# Patient Record
Sex: MALE | Race: BLACK OR AFRICAN AMERICAN | NOT HISPANIC OR LATINO
[De-identification: names, ages, dates, MRNs, and addresses within clinical notes are randomized per-mention and may not be internally consistent; named-entity substitution may affect disease eponyms.]

---

## 2017-01-26 PROBLEM — Z00.00 ENCOUNTER FOR PREVENTIVE HEALTH EXAMINATION: Status: ACTIVE | Noted: 2017-01-26

## 2017-04-25 ENCOUNTER — APPOINTMENT (OUTPATIENT)
Dept: OPHTHALMOLOGY | Facility: CLINIC | Age: 77
End: 2017-04-25

## 2017-07-18 ENCOUNTER — APPOINTMENT (OUTPATIENT)
Dept: OPHTHALMOLOGY | Facility: CLINIC | Age: 77
End: 2017-07-18

## 2017-08-29 ENCOUNTER — APPOINTMENT (OUTPATIENT)
Dept: OPHTHALMOLOGY | Facility: CLINIC | Age: 77
End: 2017-08-29
Payer: MEDICARE

## 2017-08-29 PROCEDURE — 92012 INTRM OPH EXAM EST PATIENT: CPT

## 2017-12-19 ENCOUNTER — APPOINTMENT (OUTPATIENT)
Dept: OPHTHALMOLOGY | Facility: CLINIC | Age: 77
End: 2017-12-19
Payer: MEDICARE

## 2017-12-19 ENCOUNTER — NON-APPOINTMENT (OUTPATIENT)
Age: 77
End: 2017-12-19

## 2017-12-19 PROCEDURE — 92012 INTRM OPH EXAM EST PATIENT: CPT

## 2018-04-17 ENCOUNTER — APPOINTMENT (OUTPATIENT)
Dept: OPHTHALMOLOGY | Facility: CLINIC | Age: 78
End: 2018-04-17
Payer: MEDICARE

## 2018-04-17 PROCEDURE — 92225: CPT | Mod: LT

## 2018-04-17 PROCEDURE — 92014 COMPRE OPH EXAM EST PT 1/>: CPT

## 2018-08-14 ENCOUNTER — APPOINTMENT (OUTPATIENT)
Dept: OPHTHALMOLOGY | Facility: CLINIC | Age: 78
End: 2018-08-14
Payer: MEDICARE

## 2018-08-14 PROCEDURE — 92083 EXTENDED VISUAL FIELD XM: CPT

## 2018-08-14 PROCEDURE — 92133 CPTRZD OPH DX IMG PST SGM ON: CPT

## 2018-08-14 PROCEDURE — 92012 INTRM OPH EXAM EST PATIENT: CPT

## 2018-12-18 ENCOUNTER — APPOINTMENT (OUTPATIENT)
Dept: OPHTHALMOLOGY | Facility: CLINIC | Age: 78
End: 2018-12-18
Payer: MEDICARE

## 2018-12-18 PROCEDURE — 92012 INTRM OPH EXAM EST PATIENT: CPT

## 2019-04-16 ENCOUNTER — APPOINTMENT (OUTPATIENT)
Dept: OPHTHALMOLOGY | Facility: CLINIC | Age: 79
End: 2019-04-16
Payer: MEDICARE

## 2019-04-16 ENCOUNTER — NON-APPOINTMENT (OUTPATIENT)
Age: 79
End: 2019-04-16

## 2019-04-16 PROCEDURE — 92014 COMPRE OPH EXAM EST PT 1/>: CPT

## 2019-07-08 ENCOUNTER — NON-APPOINTMENT (OUTPATIENT)
Age: 79
End: 2019-07-08

## 2019-07-08 ENCOUNTER — APPOINTMENT (OUTPATIENT)
Dept: OPHTHALMOLOGY | Facility: CLINIC | Age: 79
End: 2019-07-08
Payer: MEDICARE

## 2019-07-08 PROCEDURE — 92015 DETERMINE REFRACTIVE STATE: CPT

## 2019-07-08 PROCEDURE — 92012 INTRM OPH EXAM EST PATIENT: CPT

## 2019-08-08 ENCOUNTER — NON-APPOINTMENT (OUTPATIENT)
Age: 79
End: 2019-08-08

## 2019-08-08 ENCOUNTER — APPOINTMENT (OUTPATIENT)
Dept: OPHTHALMOLOGY | Facility: CLINIC | Age: 79
End: 2019-08-08
Payer: MEDICARE

## 2019-08-08 PROCEDURE — 92012 INTRM OPH EXAM EST PATIENT: CPT

## 2019-08-15 ENCOUNTER — NON-APPOINTMENT (OUTPATIENT)
Age: 79
End: 2019-08-15

## 2019-08-15 ENCOUNTER — APPOINTMENT (OUTPATIENT)
Dept: OPHTHALMOLOGY | Facility: CLINIC | Age: 79
End: 2019-08-15
Payer: MEDICARE

## 2019-08-15 PROCEDURE — 92012 INTRM OPH EXAM EST PATIENT: CPT

## 2019-08-19 ENCOUNTER — NON-APPOINTMENT (OUTPATIENT)
Age: 79
End: 2019-08-19

## 2019-08-19 ENCOUNTER — APPOINTMENT (OUTPATIENT)
Dept: OPHTHALMOLOGY | Facility: CLINIC | Age: 79
End: 2019-08-19
Payer: MEDICARE

## 2019-08-19 PROCEDURE — 92012 INTRM OPH EXAM EST PATIENT: CPT

## 2019-08-19 PROCEDURE — 92083 EXTENDED VISUAL FIELD XM: CPT

## 2019-08-19 PROCEDURE — 92133 CPTRZD OPH DX IMG PST SGM ON: CPT

## 2019-09-13 ENCOUNTER — NON-APPOINTMENT (OUTPATIENT)
Age: 79
End: 2019-09-13

## 2019-09-13 ENCOUNTER — APPOINTMENT (OUTPATIENT)
Dept: OPHTHALMOLOGY | Facility: CLINIC | Age: 79
End: 2019-09-13
Payer: MEDICARE

## 2019-09-13 PROCEDURE — 76514 ECHO EXAM OF EYE THICKNESS: CPT

## 2019-09-13 PROCEDURE — 92012 INTRM OPH EXAM EST PATIENT: CPT

## 2019-10-01 ENCOUNTER — NON-APPOINTMENT (OUTPATIENT)
Age: 79
End: 2019-10-01

## 2019-10-01 ENCOUNTER — APPOINTMENT (OUTPATIENT)
Dept: OPHTHALMOLOGY | Facility: CLINIC | Age: 79
End: 2019-10-01
Payer: MEDICARE

## 2019-10-01 PROCEDURE — 92012 INTRM OPH EXAM EST PATIENT: CPT

## 2019-10-07 ENCOUNTER — APPOINTMENT (OUTPATIENT)
Dept: OPHTHALMOLOGY | Facility: CLINIC | Age: 79
End: 2019-10-07
Payer: MEDICARE

## 2019-10-07 ENCOUNTER — NON-APPOINTMENT (OUTPATIENT)
Age: 79
End: 2019-10-07

## 2019-10-07 PROCEDURE — 92012 INTRM OPH EXAM EST PATIENT: CPT

## 2019-10-11 ENCOUNTER — NON-APPOINTMENT (OUTPATIENT)
Age: 79
End: 2019-10-11

## 2019-10-11 ENCOUNTER — APPOINTMENT (OUTPATIENT)
Dept: OPHTHALMOLOGY | Facility: CLINIC | Age: 79
End: 2019-10-11
Payer: MEDICARE

## 2019-10-11 PROCEDURE — 92012 INTRM OPH EXAM EST PATIENT: CPT

## 2019-10-14 ENCOUNTER — APPOINTMENT (OUTPATIENT)
Dept: OPHTHALMOLOGY | Facility: CLINIC | Age: 79
End: 2019-10-14
Payer: MEDICARE

## 2019-10-14 ENCOUNTER — NON-APPOINTMENT (OUTPATIENT)
Age: 79
End: 2019-10-14

## 2019-10-14 PROCEDURE — 92012 INTRM OPH EXAM EST PATIENT: CPT

## 2019-10-15 ENCOUNTER — APPOINTMENT (OUTPATIENT)
Dept: OPHTHALMOLOGY | Facility: CLINIC | Age: 79
End: 2019-10-15
Payer: MEDICARE

## 2019-10-15 ENCOUNTER — NON-APPOINTMENT (OUTPATIENT)
Age: 79
End: 2019-10-15

## 2019-10-15 PROCEDURE — 92012 INTRM OPH EXAM EST PATIENT: CPT

## 2019-10-16 ENCOUNTER — NON-APPOINTMENT (OUTPATIENT)
Age: 79
End: 2019-10-16

## 2019-10-16 ENCOUNTER — APPOINTMENT (OUTPATIENT)
Dept: OPHTHALMOLOGY | Facility: CLINIC | Age: 79
End: 2019-10-16
Payer: MEDICARE

## 2019-10-16 ENCOUNTER — APPOINTMENT (OUTPATIENT)
Dept: OPHTHALMOLOGY | Facility: CLINIC | Age: 79
End: 2019-10-16

## 2019-10-16 PROCEDURE — 92012 INTRM OPH EXAM EST PATIENT: CPT

## 2019-10-17 ENCOUNTER — NON-APPOINTMENT (OUTPATIENT)
Age: 79
End: 2019-10-17

## 2019-10-17 ENCOUNTER — APPOINTMENT (OUTPATIENT)
Dept: OPHTHALMOLOGY | Facility: CLINIC | Age: 79
End: 2019-10-17
Payer: MEDICARE

## 2019-10-17 PROCEDURE — 92012 INTRM OPH EXAM EST PATIENT: CPT

## 2019-10-18 ENCOUNTER — NON-APPOINTMENT (OUTPATIENT)
Age: 79
End: 2019-10-18

## 2019-10-18 ENCOUNTER — APPOINTMENT (OUTPATIENT)
Dept: OPHTHALMOLOGY | Facility: CLINIC | Age: 79
End: 2019-10-18
Payer: MEDICARE

## 2019-10-18 PROCEDURE — 92012 INTRM OPH EXAM EST PATIENT: CPT

## 2019-10-21 ENCOUNTER — APPOINTMENT (OUTPATIENT)
Dept: OPHTHALMOLOGY | Facility: CLINIC | Age: 79
End: 2019-10-21
Payer: MEDICARE

## 2019-10-21 ENCOUNTER — NON-APPOINTMENT (OUTPATIENT)
Age: 79
End: 2019-10-21

## 2019-10-21 PROCEDURE — 92012 INTRM OPH EXAM EST PATIENT: CPT

## 2019-10-23 ENCOUNTER — APPOINTMENT (OUTPATIENT)
Dept: OPHTHALMOLOGY | Facility: CLINIC | Age: 79
End: 2019-10-23
Payer: MEDICARE

## 2019-10-23 ENCOUNTER — NON-APPOINTMENT (OUTPATIENT)
Age: 79
End: 2019-10-23

## 2019-10-23 PROCEDURE — 92012 INTRM OPH EXAM EST PATIENT: CPT

## 2019-10-24 ENCOUNTER — INPATIENT (INPATIENT)
Facility: HOSPITAL | Age: 79
LOS: 4 days | Discharge: NURSING, REHAB OR LONG-TERM CARE | DRG: 312 | End: 2019-10-29
Attending: INTERNAL MEDICINE | Admitting: INTERNAL MEDICINE
Payer: MEDICARE

## 2019-10-24 VITALS
WEIGHT: 167.99 LBS | HEART RATE: 68 BPM | DIASTOLIC BLOOD PRESSURE: 70 MMHG | SYSTOLIC BLOOD PRESSURE: 115 MMHG | TEMPERATURE: 98 F | OXYGEN SATURATION: 98 % | RESPIRATION RATE: 16 BRPM

## 2019-10-24 LAB
ALBUMIN SERPL ELPH-MCNC: 4.4 G/DL — SIGNIFICANT CHANGE UP (ref 3.3–5)
ALP SERPL-CCNC: 60 U/L — SIGNIFICANT CHANGE UP (ref 40–120)
ALT FLD-CCNC: 22 U/L — SIGNIFICANT CHANGE UP (ref 10–45)
ANION GAP SERPL CALC-SCNC: 17 MMOL/L — SIGNIFICANT CHANGE UP (ref 5–17)
AST SERPL-CCNC: 30 U/L — SIGNIFICANT CHANGE UP (ref 10–40)
BASOPHILS # BLD AUTO: 0.03 K/UL — SIGNIFICANT CHANGE UP (ref 0–0.2)
BASOPHILS NFR BLD AUTO: 0.5 % — SIGNIFICANT CHANGE UP (ref 0–2)
BILIRUB SERPL-MCNC: 0.6 MG/DL — SIGNIFICANT CHANGE UP (ref 0.2–1.2)
BUN SERPL-MCNC: 15 MG/DL — SIGNIFICANT CHANGE UP (ref 7–23)
CALCIUM SERPL-MCNC: 9.7 MG/DL — SIGNIFICANT CHANGE UP (ref 8.4–10.5)
CHLORIDE SERPL-SCNC: 95 MMOL/L — LOW (ref 96–108)
CO2 SERPL-SCNC: 24 MMOL/L — SIGNIFICANT CHANGE UP (ref 22–31)
CREAT SERPL-MCNC: 1.21 MG/DL — SIGNIFICANT CHANGE UP (ref 0.5–1.3)
EOSINOPHIL # BLD AUTO: 0.02 K/UL — SIGNIFICANT CHANGE UP (ref 0–0.5)
EOSINOPHIL NFR BLD AUTO: 0.3 % — SIGNIFICANT CHANGE UP (ref 0–6)
GAS PNL BLDV: SIGNIFICANT CHANGE UP
GLUCOSE SERPL-MCNC: 191 MG/DL — HIGH (ref 70–99)
HCT VFR BLD CALC: 38.9 % — LOW (ref 39–50)
HGB BLD-MCNC: 13 G/DL — SIGNIFICANT CHANGE UP (ref 13–17)
IMM GRANULOCYTES NFR BLD AUTO: 0.5 % — SIGNIFICANT CHANGE UP (ref 0–1.5)
LYMPHOCYTES # BLD AUTO: 0.76 K/UL — LOW (ref 1–3.3)
LYMPHOCYTES # BLD AUTO: 12.5 % — LOW (ref 13–44)
MAGNESIUM SERPL-MCNC: 1.8 MG/DL — SIGNIFICANT CHANGE UP (ref 1.6–2.6)
MCHC RBC-ENTMCNC: 28.8 PG — SIGNIFICANT CHANGE UP (ref 27–34)
MCHC RBC-ENTMCNC: 33.4 GM/DL — SIGNIFICANT CHANGE UP (ref 32–36)
MCV RBC AUTO: 86.1 FL — SIGNIFICANT CHANGE UP (ref 80–100)
MONOCYTES # BLD AUTO: 0.5 K/UL — SIGNIFICANT CHANGE UP (ref 0–0.9)
MONOCYTES NFR BLD AUTO: 8.2 % — SIGNIFICANT CHANGE UP (ref 2–14)
NEUTROPHILS # BLD AUTO: 4.74 K/UL — SIGNIFICANT CHANGE UP (ref 1.8–7.4)
NEUTROPHILS NFR BLD AUTO: 78 % — HIGH (ref 43–77)
NRBC # BLD: 0 /100 WBCS — SIGNIFICANT CHANGE UP (ref 0–0)
PHOSPHATE SERPL-MCNC: 2.5 MG/DL — SIGNIFICANT CHANGE UP (ref 2.5–4.5)
PLATELET # BLD AUTO: 216 K/UL — SIGNIFICANT CHANGE UP (ref 150–400)
POTASSIUM SERPL-MCNC: 4.3 MMOL/L — SIGNIFICANT CHANGE UP (ref 3.5–5.3)
POTASSIUM SERPL-SCNC: 4.3 MMOL/L — SIGNIFICANT CHANGE UP (ref 3.5–5.3)
PROT SERPL-MCNC: 7.5 G/DL — SIGNIFICANT CHANGE UP (ref 6–8.3)
RBC # BLD: 4.52 M/UL — SIGNIFICANT CHANGE UP (ref 4.2–5.8)
RBC # FLD: 13 % — SIGNIFICANT CHANGE UP (ref 10.3–14.5)
SODIUM SERPL-SCNC: 136 MMOL/L — SIGNIFICANT CHANGE UP (ref 135–145)
WBC # BLD: 6.08 K/UL — SIGNIFICANT CHANGE UP (ref 3.8–10.5)
WBC # FLD AUTO: 6.08 K/UL — SIGNIFICANT CHANGE UP (ref 3.8–10.5)

## 2019-10-24 PROCEDURE — 70450 CT HEAD/BRAIN W/O DYE: CPT | Mod: 26

## 2019-10-24 PROCEDURE — 99285 EMERGENCY DEPT VISIT HI MDM: CPT | Mod: GC

## 2019-10-24 PROCEDURE — 93010 ELECTROCARDIOGRAM REPORT: CPT

## 2019-10-24 PROCEDURE — 71045 X-RAY EXAM CHEST 1 VIEW: CPT | Mod: 26

## 2019-10-24 NOTE — ED PROVIDER NOTE - NS ED ROS FT
CONST: no fevers, no chills, no trauma  EYES: no pain, no visual disturbances  ENT: no sore throat, no epistaxis, no rhinorrhea, no hearing changes  CV: no chest pain, no palpitations, no orthopnea, no extremity pain or swelling  RESP: no shortness of breath, no cough, no sputum, no pleurisy, no wheezing  ABD: no abdominal pain, no nausea, no vomiting, no diarrhea, no black or bloody stool  : no dysuria, no hematuria, no frequency, no urgency  MSK: no back pain, no neck pain, no extremity pain  NEURO: no headache, no sensory disturbances, no focal weakness, no dizziness  HEME: no easy bleeding or bruising  SKIN: no diaphoresis, no rash CONST: no fevers, no chills, no trauma  EYES: + pain, + visual disturbances, + pus and crusting in b/l eyes   ENT: no sore throat, no epistaxis, no rhinorrhea, no hearing changes  CV: no chest pain, no palpitations, no orthopnea, no extremity pain or swelling  RESP: no shortness of breath, + cough, no sputum, no pleurisy, no wheezing  ABD: no abdominal pain, no nausea, no vomiting, no diarrhea, no black or bloody stool  : no dysuria, no hematuria, no frequency, no urgency  MSK: no back pain, no neck pain, no extremity pain  NEURO: + LOC, no headache, no sensory disturbances, no focal weakness, no dizziness  HEME: no easy bleeding or bruising  SKIN: no diaphoresis, no rash

## 2019-10-24 NOTE — ED ADULT NURSE NOTE - NSIMPLEMENTINTERV_GEN_ALL_ED
Implemented All Fall Risk Interventions:  [place] to call system. Call bell, personal items and telephone within reach. Instruct patient to call for assistance. Room bathroom lighting operational. Non-slip footwear when patient is off stretcher. Physically safe environment: no spills, clutter or unnecessary equipment. Stretcher in lowest position, wheels locked, appropriate side rails in place. Provide visual cue, wrist band, yellow gown, etc. Monitor gait and stability. Monitor for mental status changes and reorient to person, place, and time. Review medications for side effects contributing to fall risk. Reinforce activity limits and safety measures with patient and family.

## 2019-10-24 NOTE — ED PROVIDER NOTE - PROGRESS NOTE DETAILS
Spoke with Dr. Bucio (PCP) who agrees on admission. Spoke with [facility] Hospitalist, will admit for further eval/mgt. pt signed out. admitted for syncope. plan for me was to fu head ct which was read as neg

## 2019-10-24 NOTE — ED PROVIDER NOTE - CLINICAL SUMMARY MEDICAL DECISION MAKING FREE TEXT BOX
SANTINO Wayne MD: P is an 78 y/o male with pmhx DM, glaucoma, BPH is BIBA s/p syncope. Per wife and per EMS report, patient was sitting at the dinner table when wife saw his head go back and become unresponsive. Soon after, pt went to the bathroom and had a bout of diarrhea and appeared weak. Pt is currently being treated for complicated eye condition, keratoconjunctivitis x 4 weeks on both PO and ocular abx and other eye medications, with worsening vision. Pt is being closely followed by [facility] on [address] Pt denies: chest pain, SOB, cough, fevers, chills, pleuritic chest pain, abdominal pain, n/v/d, back pain, neck pain, HA, neck stiffness, focal numbness or weakness, dizziness, lightheadedness, leg pain/swelling, recent travel, recent trauma, recent immobilization, dysuria, hematuria, rash. Ddx includes, however, is not limited to: CARMEL, dehydration, electrolyte abnormality, metabolic d/o, infectious d/o, arrhythmia, other cardiac d/o, ICH. Plan: basic labs, ekg, trop, CXR, CTH, TBA for syncope w/u SANTINO Wayne MD: P is an 80 y/o male with pmhx DM, HTN, HLD, glaucoma, BPH is BIBA s/p syncope. Per wife and per EMS report, patient was sitting at the dinner table when wife saw his head go back and become unresponsive. Soon after, pt went to the bathroom and had a bout of diarrhea and appeared weak. Pt is currently being treated for complicated eye condition, keratoconjunctivitis x 4 weeks on both PO and ocular abx and other eye medications, with worsening vision. Pt is being closely followed by [facility] on [address] Pt denies: chest pain, SOB, cough, fevers, chills, pleuritic chest pain, abdominal pain, n/v/d, back pain, neck pain, HA, neck stiffness, focal numbness or weakness, dizziness, lightheadedness, leg pain/swelling, recent travel, recent trauma, recent immobilization, dysuria, hematuria, rash. Ddx includes, however, is not limited to: CARMEL, dehydration, electrolyte abnormality, metabolic d/o, infectious d/o, arrhythmia, other cardiac d/o, ICH. Plan: basic labs, ekg, trop, CXR, CTH, TBA for syncope w/u

## 2019-10-24 NOTE — ED PROVIDER NOTE - OBJECTIVE STATEMENT
79M pmhx DM, glaucoma, BPH presenting with CC of syncope. Per EMS report, patient was sitting at the dinner table when wife saw him starting to slouch over and was not very responsive. He went to the bathroom and had a bout of diarrhea and appeared weak. Per pt, he does not believe he lost consciousness, states that he was aware during the episode. Denies dizziness, lightheadedness, recent fevers, chills, nausea, vomiting. Not currently in pain, no chest pain or abdominal pain. Patient did not fall during LOC, wife was able to catch him when he started slouching. Of note patient has been struggling with b/l bacterial conjunctivitis and has been having worsening vision as a result.

## 2019-10-24 NOTE — ED ADULT NURSE NOTE - OBJECTIVE STATEMENT
80 YO male with PMH DM, glaucoma, & BPH, via EMS from home, presenting with complaints of syncopal episode. As per patient, and patients' family, while eating dinner, pt became "shaky," and lost consciousness for approximately 5 minutes. Afterwards, pt experienced NBNB diarhea.   Pt Axox4, gross neuro intact, PERRL mm. Lungs clear throughout bilateral. S1S2 heard. Abdomen soft, non-tender, non-distended. Skin warm, dry, and intact. Safety and comfort measures maintained. family present at bedside. 80 YO male with PMH DM, glaucoma, & BPH, via EMS from home, presenting with complaints of syncopal episode. As per patient, and patients' family, while eating dinner, pt became "shaky," and lost consciousness for approximately 5 minutes. Afterwards, pt experienced NBNB diarhea. Pt denies losing consciousness. Pt denies falling, pt denies hitting head. Pt endorsing that she was going for glaucoma treatment, but due to redness in his sclera, and glaucoma treatment was postponed. Pt denies chest pain, shortness of breath, visual disturbances, numbness/tingling, fever, chills, diaphoresis, headache, nausea, vomiting, constipation, diarrhea, or urinary symptoms.   Pt Axox2, gross neuro intact, Reddened sclera. Lungs clear throughout bilateral. S1S2 heard. Abdomen soft, non-tender, non-distended. Skin warm, dry, and intact. Pt placed in position of comfort. Pt educated on call bell system and provided call bell. Bed in lowest position, wheels locked, appropriate side rails raised. Pt denies needs at this time. Fall risk precautions in place.

## 2019-10-24 NOTE — ED PROVIDER NOTE - PHYSICAL EXAMINATION
Const: Well-nourished, Well-developed, appearing stated age.  Eyes: PERRL, no conjunctival injection, and symmetrical lids.  HEENT: Head NCAT, no lesions. Atraumatic external nose and ears. Moist MM.  Neck: Symmetric, trachea midline, No thyromegaly.  CVS: +S1/S2, Peripheral pulses 2+ and equal in all extremities.  RESP: Unlabored respiratory effort. Clear to auscultation bilaterally.  GI: Nontender/Nondistended, No hepatosplenomegaly.  MSK: Normocephalic/Atraumatic, Extremities w/o deformity or ttp. No cyanosis or clubbing  Skin: Warm, dry and intact. No rashes or lesions.  Neuro: CNs II-XII grossly intact. Motor & Sensation grossly intact.  Psych: Awake, Alert, & Oriented (AAO) x3. Appropriate mood and affect. Const: Well-nourished, Well-developed, appearing stated age.  Eyes: Pt initially has eyes closed when assessed. Unable to visualize pupils 2/2 to infection, + conjunctival injection b/l with pus/crusting, EOMI, vision grossly intact in both eyes.   HEENT: Head NCAT, no lesions. Atraumatic external nose and ears. Moist MM.  Neck: Symmetric, trachea midline.   CVS: +S1/S2, Peripheral pulses 2+ and equal in all extremities.  RESP: Unlabored respiratory effort. Clear to auscultation bilaterally.  GI: Nontender/Nondistended, No hepatosplenomegaly.  MSK: Normocephalic/Atraumatic, Extremities w/o deformity or ttp. No cyanosis or clubbing  Skin: Warm, dry and intact. No rashes or lesions.  Neuro: CNs II-XII grossly intact. Motor & Sensation grossly intact.  Psych: Awake, Alert, & Oriented (AAO) x3. Appropriate mood and affect.

## 2019-10-25 DIAGNOSIS — R55 SYNCOPE AND COLLAPSE: ICD-10-CM

## 2019-10-25 LAB
ALBUMIN SERPL ELPH-MCNC: 3.8 G/DL — SIGNIFICANT CHANGE UP (ref 3.3–5)
ALP SERPL-CCNC: 54 U/L — SIGNIFICANT CHANGE UP (ref 40–120)
ALT FLD-CCNC: 19 U/L — SIGNIFICANT CHANGE UP (ref 10–45)
ANION GAP SERPL CALC-SCNC: 11 MMOL/L — SIGNIFICANT CHANGE UP (ref 5–17)
APPEARANCE UR: CLEAR — SIGNIFICANT CHANGE UP
AST SERPL-CCNC: 27 U/L — SIGNIFICANT CHANGE UP (ref 10–40)
BACTERIA # UR AUTO: NEGATIVE — SIGNIFICANT CHANGE UP
BASOPHILS # BLD AUTO: 0.03 K/UL — SIGNIFICANT CHANGE UP (ref 0–0.2)
BASOPHILS NFR BLD AUTO: 0.6 % — SIGNIFICANT CHANGE UP (ref 0–2)
BILIRUB SERPL-MCNC: 0.4 MG/DL — SIGNIFICANT CHANGE UP (ref 0.2–1.2)
BILIRUB UR-MCNC: NEGATIVE — SIGNIFICANT CHANGE UP
BUN SERPL-MCNC: 15 MG/DL — SIGNIFICANT CHANGE UP (ref 7–23)
CALCIUM SERPL-MCNC: 9.5 MG/DL — SIGNIFICANT CHANGE UP (ref 8.4–10.5)
CHLORIDE SERPL-SCNC: 101 MMOL/L — SIGNIFICANT CHANGE UP (ref 96–108)
CHOLEST SERPL-MCNC: 141 MG/DL — SIGNIFICANT CHANGE UP (ref 10–199)
CO2 SERPL-SCNC: 27 MMOL/L — SIGNIFICANT CHANGE UP (ref 22–31)
COLOR SPEC: YELLOW — SIGNIFICANT CHANGE UP
COMMENT - URINE: SIGNIFICANT CHANGE UP
CREAT SERPL-MCNC: 0.91 MG/DL — SIGNIFICANT CHANGE UP (ref 0.5–1.3)
DIFF PNL FLD: NEGATIVE — SIGNIFICANT CHANGE UP
EOSINOPHIL # BLD AUTO: 0.04 K/UL — SIGNIFICANT CHANGE UP (ref 0–0.5)
EOSINOPHIL NFR BLD AUTO: 0.8 % — SIGNIFICANT CHANGE UP (ref 0–6)
EPI CELLS # UR: 2 — SIGNIFICANT CHANGE UP
GLUCOSE BLDC GLUCOMTR-MCNC: 109 MG/DL — HIGH (ref 70–99)
GLUCOSE BLDC GLUCOMTR-MCNC: 91 MG/DL — SIGNIFICANT CHANGE UP (ref 70–99)
GLUCOSE BLDC GLUCOMTR-MCNC: 94 MG/DL — SIGNIFICANT CHANGE UP (ref 70–99)
GLUCOSE BLDC GLUCOMTR-MCNC: 96 MG/DL — SIGNIFICANT CHANGE UP (ref 70–99)
GLUCOSE SERPL-MCNC: 102 MG/DL — HIGH (ref 70–99)
GLUCOSE UR QL: ABNORMAL
GRAN CASTS # UR COMP ASSIST: ABNORMAL /LPF
HBA1C BLD-MCNC: 6.6 % — HIGH (ref 4–5.6)
HCT VFR BLD CALC: 38.3 % — LOW (ref 39–50)
HDLC SERPL-MCNC: 51 MG/DL — SIGNIFICANT CHANGE UP
HGB BLD-MCNC: 12.6 G/DL — LOW (ref 13–17)
HYALINE CASTS # UR AUTO: 24 /LPF — HIGH (ref 0–7)
IMM GRANULOCYTES NFR BLD AUTO: 0.4 % — SIGNIFICANT CHANGE UP (ref 0–1.5)
KETONES UR-MCNC: ABNORMAL
LEUKOCYTE ESTERASE UR-ACNC: NEGATIVE — SIGNIFICANT CHANGE UP
LIPID PNL WITH DIRECT LDL SERPL: 81 MG/DL — SIGNIFICANT CHANGE UP
LYMPHOCYTES # BLD AUTO: 0.87 K/UL — LOW (ref 1–3.3)
LYMPHOCYTES # BLD AUTO: 18.2 % — SIGNIFICANT CHANGE UP (ref 13–44)
MCHC RBC-ENTMCNC: 29 PG — SIGNIFICANT CHANGE UP (ref 27–34)
MCHC RBC-ENTMCNC: 32.9 GM/DL — SIGNIFICANT CHANGE UP (ref 32–36)
MCV RBC AUTO: 88.2 FL — SIGNIFICANT CHANGE UP (ref 80–100)
MONOCYTES # BLD AUTO: 0.41 K/UL — SIGNIFICANT CHANGE UP (ref 0–0.9)
MONOCYTES NFR BLD AUTO: 8.6 % — SIGNIFICANT CHANGE UP (ref 2–14)
NEUTROPHILS # BLD AUTO: 3.4 K/UL — SIGNIFICANT CHANGE UP (ref 1.8–7.4)
NEUTROPHILS NFR BLD AUTO: 71.4 % — SIGNIFICANT CHANGE UP (ref 43–77)
NITRITE UR-MCNC: NEGATIVE — SIGNIFICANT CHANGE UP
PH UR: 6 — SIGNIFICANT CHANGE UP (ref 5–8)
PLATELET # BLD AUTO: 199 K/UL — SIGNIFICANT CHANGE UP (ref 150–400)
POTASSIUM SERPL-MCNC: 3.7 MMOL/L — SIGNIFICANT CHANGE UP (ref 3.5–5.3)
POTASSIUM SERPL-SCNC: 3.7 MMOL/L — SIGNIFICANT CHANGE UP (ref 3.5–5.3)
PROT SERPL-MCNC: 6.6 G/DL — SIGNIFICANT CHANGE UP (ref 6–8.3)
PROT UR-MCNC: ABNORMAL
RBC # BLD: 4.34 M/UL — SIGNIFICANT CHANGE UP (ref 4.2–5.8)
RBC # FLD: 13.1 % — SIGNIFICANT CHANGE UP (ref 10.3–14.5)
RBC CASTS # UR COMP ASSIST: 8 /HPF — HIGH (ref 0–4)
SODIUM SERPL-SCNC: 139 MMOL/L — SIGNIFICANT CHANGE UP (ref 135–145)
SP GR SPEC: 1.03 — HIGH (ref 1.01–1.02)
TOTAL CHOLESTEROL/HDL RATIO MEASUREMENT: 2.8 RATIO — LOW (ref 3.4–9.6)
TRIGL SERPL-MCNC: 43 MG/DL — SIGNIFICANT CHANGE UP (ref 10–149)
TROPONIN T, HIGH SENSITIVITY RESULT: 21 NG/L — SIGNIFICANT CHANGE UP (ref 0–51)
UROBILINOGEN FLD QL: ABNORMAL
WBC # BLD: 4.77 K/UL — SIGNIFICANT CHANGE UP (ref 3.8–10.5)
WBC # FLD AUTO: 4.77 K/UL — SIGNIFICANT CHANGE UP (ref 3.8–10.5)
WBC UR QL: 7 /HPF — HIGH (ref 0–5)

## 2019-10-25 PROCEDURE — 93306 TTE W/DOPPLER COMPLETE: CPT | Mod: 26

## 2019-10-25 RX ORDER — ATORVASTATIN CALCIUM 80 MG/1
10 TABLET, FILM COATED ORAL AT BEDTIME
Refills: 0 | Status: DISCONTINUED | OUTPATIENT
Start: 2019-10-25 | End: 2019-10-29

## 2019-10-25 RX ORDER — INSULIN LISPRO 100/ML
VIAL (ML) SUBCUTANEOUS
Refills: 0 | Status: DISCONTINUED | OUTPATIENT
Start: 2019-10-25 | End: 2019-10-29

## 2019-10-25 RX ORDER — INSULIN LISPRO 100/ML
3 VIAL (ML) SUBCUTANEOUS
Refills: 0 | Status: DISCONTINUED | OUTPATIENT
Start: 2019-10-25 | End: 2019-10-29

## 2019-10-25 RX ORDER — GLUCAGON INJECTION, SOLUTION 0.5 MG/.1ML
1 INJECTION, SOLUTION SUBCUTANEOUS ONCE
Refills: 0 | Status: DISCONTINUED | OUTPATIENT
Start: 2019-10-25 | End: 2019-10-29

## 2019-10-25 RX ORDER — LATANOPROST 0.05 MG/ML
1 SOLUTION/ DROPS OPHTHALMIC; TOPICAL AT BEDTIME
Refills: 0 | Status: DISCONTINUED | OUTPATIENT
Start: 2019-10-25 | End: 2019-10-29

## 2019-10-25 RX ORDER — SODIUM CHLORIDE 9 MG/ML
1000 INJECTION, SOLUTION INTRAVENOUS
Refills: 0 | Status: DISCONTINUED | OUTPATIENT
Start: 2019-10-25 | End: 2019-10-29

## 2019-10-25 RX ORDER — INFLUENZA VIRUS VACCINE 15; 15; 15; 15 UG/.5ML; UG/.5ML; UG/.5ML; UG/.5ML
0.5 SUSPENSION INTRAMUSCULAR ONCE
Refills: 0 | Status: DISCONTINUED | OUTPATIENT
Start: 2019-10-25 | End: 2019-10-29

## 2019-10-25 RX ORDER — DEXTROSE 50 % IN WATER 50 %
25 SYRINGE (ML) INTRAVENOUS ONCE
Refills: 0 | Status: DISCONTINUED | OUTPATIENT
Start: 2019-10-25 | End: 2019-10-29

## 2019-10-25 RX ORDER — CIPROFLOXACIN HCL 0.3 %
1 DROPS OPHTHALMIC (EYE) EVERY 4 HOURS
Refills: 0 | Status: DISCONTINUED | OUTPATIENT
Start: 2019-10-25 | End: 2019-10-29

## 2019-10-25 RX ORDER — DEXTROSE 50 % IN WATER 50 %
15 SYRINGE (ML) INTRAVENOUS ONCE
Refills: 0 | Status: DISCONTINUED | OUTPATIENT
Start: 2019-10-25 | End: 2019-10-29

## 2019-10-25 RX ORDER — TAMSULOSIN HYDROCHLORIDE 0.4 MG/1
0.4 CAPSULE ORAL AT BEDTIME
Refills: 0 | Status: DISCONTINUED | OUTPATIENT
Start: 2019-10-25 | End: 2019-10-29

## 2019-10-25 RX ORDER — ASPIRIN/CALCIUM CARB/MAGNESIUM 324 MG
81 TABLET ORAL DAILY
Refills: 0 | Status: DISCONTINUED | OUTPATIENT
Start: 2019-10-25 | End: 2019-10-29

## 2019-10-25 RX ORDER — DORZOLAMIDE HYDROCHLORIDE 20 MG/ML
1 SOLUTION/ DROPS OPHTHALMIC THREE TIMES A DAY
Refills: 0 | Status: DISCONTINUED | OUTPATIENT
Start: 2019-10-25 | End: 2019-10-29

## 2019-10-25 RX ORDER — DEXTROSE 50 % IN WATER 50 %
12.5 SYRINGE (ML) INTRAVENOUS ONCE
Refills: 0 | Status: DISCONTINUED | OUTPATIENT
Start: 2019-10-25 | End: 2019-10-29

## 2019-10-25 RX ORDER — NEOMYCIN/POLYMYXIN B/DEXAMETHA 0.1 %
1 SUSPENSION, DROPS(FINAL DOSAGE FORM)(ML) OPHTHALMIC (EYE) AT BEDTIME
Refills: 0 | Status: DISCONTINUED | OUTPATIENT
Start: 2019-10-25 | End: 2019-10-25

## 2019-10-25 RX ORDER — ENOXAPARIN SODIUM 100 MG/ML
40 INJECTION SUBCUTANEOUS DAILY
Refills: 0 | Status: DISCONTINUED | OUTPATIENT
Start: 2019-10-25 | End: 2019-10-29

## 2019-10-25 RX ADMIN — DORZOLAMIDE HYDROCHLORIDE 1 DROP(S): 20 SOLUTION/ DROPS OPHTHALMIC at 21:50

## 2019-10-25 RX ADMIN — ENOXAPARIN SODIUM 40 MILLIGRAM(S): 100 INJECTION SUBCUTANEOUS at 12:50

## 2019-10-25 RX ADMIN — LATANOPROST 1 DROP(S): 0.05 SOLUTION/ DROPS OPHTHALMIC; TOPICAL at 21:50

## 2019-10-25 RX ADMIN — Medication 3 UNIT(S): at 12:06

## 2019-10-25 RX ADMIN — DORZOLAMIDE HYDROCHLORIDE 1 DROP(S): 20 SOLUTION/ DROPS OPHTHALMIC at 14:02

## 2019-10-25 RX ADMIN — Medication 1 DROP(S): at 17:08

## 2019-10-25 RX ADMIN — Medication 81 MILLIGRAM(S): at 12:50

## 2019-10-25 RX ADMIN — Medication 3 UNIT(S): at 07:30

## 2019-10-25 RX ADMIN — Medication 3 UNIT(S): at 17:02

## 2019-10-25 RX ADMIN — Medication 1 DROP(S): at 22:03

## 2019-10-25 RX ADMIN — DORZOLAMIDE HYDROCHLORIDE 1 DROP(S): 20 SOLUTION/ DROPS OPHTHALMIC at 05:23

## 2019-10-25 RX ADMIN — Medication 1 DROP(S): at 05:24

## 2019-10-25 RX ADMIN — Medication 1 DROP(S): at 12:47

## 2019-10-25 RX ADMIN — ATORVASTATIN CALCIUM 10 MILLIGRAM(S): 80 TABLET, FILM COATED ORAL at 21:49

## 2019-10-25 RX ADMIN — TAMSULOSIN HYDROCHLORIDE 0.4 MILLIGRAM(S): 0.4 CAPSULE ORAL at 21:49

## 2019-10-25 RX ADMIN — Medication 20 MILLIGRAM(S): at 05:25

## 2019-10-25 NOTE — H&P ADULT - ASSESSMENT
79yr old m with hx of dm II comes in for a syncopal episode.     1. Syncope: monitor on telemetry for now. CT head negative. check echo. monitor orthostatics.     2. DM II: hold oral meds, continue on sliding scale for now.     3. Hypertension: stable on enalipril.    4. Bilateral conjunctivitis: continue multiple eyedrops. outpt follow up.     5. DVT prophylaxis: lovenox daily.

## 2019-10-25 NOTE — CONSULT NOTE ADULT - SUBJECTIVE AND OBJECTIVE BOX
[facility] Ophthalmology Consult Note    HPI:    Pt seen at outpt clinic, measured VA is CF at 1ft OU.   No pain despite epithelial defects.    Of note, pt reports 60 lb weight loss in the last 3 months.     PMH: None  Meds: None  POcHx (including surgeries/lasers/trauma):  Glaucoma   Drops: None  FamHx: None  Social Hx: None  Allergies: NKDA    ROS:  General (weight loss ), Vision (per HPI), Head and Neck (neg), Pulm (neg), CV (neg), GI (neg),  (neg), Musculoskeletal (neg), Skin/Integ (neg), Neuro (neg), Endocrine (neg), Heme (neg), All/Immuno (neg)    Mood and Affect Appropriate ( x ),  Oriented to Time, Place, and Person x 3 ( x )    Ophthalmology Exam    Visual acuity (cc): OD: CF 1 ft, OS : 20/800   Pupils: PERRL OU, no APD  Ttono: 19 OD, 18 OS   Extraocular movements (EOMs): Full OU, no pain, no diplopia     Pen Light Exam (PLE)  External:  Flat OU  Lids/Lashes/Lacrimal Ducts: Flat OU    Sclera/Conjunctiva:  OU diffuse +2 injection of the bulbar and palpebral conj, +3 follicles of the palpebral conj   Cornea: OU: +3 SPK with stromal haze diffuse and D-folds visible. OD: central horizontal 4 x 2 epithelial defect, OS central horizontal 5 x 1 epithelial defect,   Anterior Chamber: D+F OU  Iris:  Flat OU  Lens:  Cl OU      Diagnostic Testing:  EKC swab in Outpatient Clinic 10/2019 : negative  Conjunctival Mucus discharge OD 10/25/19 : sent and pending  Conjunctival Mucus discharge OS 10/25/19: sent and pending    Assessment and Plan:    Ciprofloxacin QID both eyes  Lotemax Ointment QHS both eyes was his outpt regimen but this is not available inpatient, pt can use Maxitrol ointment QHS both eyes  Dorzolamide BID both eyes  Travatan QHS both eyes  PO Doxycycline 50 mg BID         Follow-Up:  Patient should follow up his/her ophthalmologist or in the [facility] Ophthalmology Practice within 1 week of discharge.  [address] [place] [id number]  [phone]    S/D/W Dr Jimenez (attending) [facility] Ophthalmology Consult Note    HPI:  The pt is an 78 y/o M with a PMH DM, glaucoma, BPH who is admitted for management of an episode of syncope which took place from a seated position. The pt is well known to the ophthalmology service. He has been evaluated numerous times in the last month. The pt has had chronic conjunctivitis for the last four weeks in both eyes. The pt has been treated for a slowly healing epithelial defect in both eyes over this same time period. The pt reports mucoid discharge and poor vision OU. Pt seen at outpt clinic and measured to have VA of CF at 1ft OU. The pt also reports no eye pain despite epithelial defects.      Of note, pt reports 60 lb weight loss in the last 3 months. Pt denies dysuria, oral ulcers, fever, night sweats, abdominal pain, dysphagia, new joint pain.     PMH: as above   Meds: Enalapril, Glipizide, Metformin, Atorvastatin, ASA 81, Tamsulosin,  POcHx (including surgeries/lasers/trauma):  Glaucoma, chronic conjunctivitis   Drops: Travatan QHS ,Vigamox QID OU , Lotemax Ointment QHS both eyes , Dorzolamide BID OU ,   FamHx: None  Social Hx: None  Allergies: NKDA    ROS:  General (weight loss ), Vision (per HPI), Head and Neck (neg),  Neuro (neg),     Mood and Affect Appropriate ( x ),  Oriented to Time, Place, and Person x 3 ( x )    Ophthalmology Exam    Visual acuity (cc): OD: CF 1 ft, OS : 20/800   Pupils: PERRL OU, no APD  Ttono: 19 OD, 18 OS   Extraocular movements (EOMs): Full OU, no pain, no diplopia     Pen Light Exam (PLE)  External:  Flat OU  Lids/Lashes/Lacrimal Ducts: Flat OU    Sclera/Conjunctiva:  OU diffuse +2 injection of the bulbar and palpebral conj, +3 follicles of the palpebral conj   Cornea: OU: +3 SPK with stromal haze diffuse and D-folds visible. OD: central horizontal 4 x 2 epithelial defect, OS central horizontal 5 x 1 epithelial defect,   Anterior Chamber: D+F OU  Iris:  Flat OU  Lens:  Cl OU      Diagnostic Testing:  EKC swab in Outpatient Clinic 10/2019 : negative  Conjunctival Mucus discharge OD 10/25/19 : sent and pending  Conjunctival Mucus discharge OS 10/25/19: sent and pending    Assessment   78 y/o M with a PMH DM, glaucoma, BPH who is admitted for management of an episode of syncope. The pt is well known to the ophthalmology service, has had chronic conjunctivitis for the last four weeks in both eyes. The pt reports mucoid discharge, eye redness, and poor vision OU. Pt seen at outpt clinic and measured to have VA of CF at 1ft OU. The pt also reports no eye pain despite epithelial defects.      Plan:   Ciprofloxacin QID both eyes  Lotemax Ointment QHS both eyes was his outpt regimen but this is not available inpatient, pt can use Maxitrol ointment QHS both eyes  Dorzolamide BID both eyes  Travatan QHS both eyes  PO Doxycycline 50 mg BID   eye cultures of both eyes sent, will follow result        Follow-Up:  Patient should follow up his/her ophthalmologist or in the [facility] Ophthalmology Practice within 1 week of discharge.  [address] [place] [address].  [place] [address] [phone]    S/D/W Dr Jimenez (attending) [facility] Ophthalmology Consult Note    HPI:  The pt is an 78 y/o M with a PMH DM, glaucoma, BPH who is admitted for management of an episode of syncope which took place from a seated position. The pt is well known to the ophthalmology service. He has been evaluated numerous times in the last month. The pt has had chronic conjunctivitis for the last four weeks in both eyes. The pt has been treated for a slowly healing epithelial defect in both eyes over this same time period. The pt reports mucoid discharge and poor vision OU. Pt seen at outpt clinic and measured to have VA of CF at 1ft OU. The pt also reports no eye pain despite epithelial defects.      Of note, pt reports 60 lb weight loss in the last 3 months. Pt denies dysuria, oral ulcers, fever, night sweats, abdominal pain, dysphagia, new joint pain.     PMH: as above   Meds: Enalapril, Glipizide, Metformin, Atorvastatin, ASA 81, Tamsulosin,  POcHx (including surgeries/lasers/trauma):  Glaucoma, chronic conjunctivitis   Drops: Travatan QHS ,Vigamox QID OU , Lotemax Ointment QHS both eyes , Dorzolamide BID OU ,   FamHx: None  Social Hx: None  Allergies: NKDA    ROS:  General (weight loss ), Vision (per HPI), Head and Neck (neg),  Neuro (neg),     Mood and Affect Appropriate ( x ),  Oriented to Time, Place, and Person x 3 ( x )    Ophthalmology Exam    Visual acuity (cc): OD: CF 1 ft, OS : 20/800   Pupils: PERRL OU, no APD  Ttono: 19 OD, 18 OS   Extraocular movements (EOMs): Full OU, no pain, no diplopia     Pen Light Exam (PLE)  External:  Flat OU  Lids/Lashes/Lacrimal Ducts: Flat OU    Sclera/Conjunctiva:  OU diffuse +2 injection of the bulbar and palpebral conj, +3 follicles of the palpebral conj   Cornea: OU: +3 SPK with stromal haze diffuse and D-folds visible. OD: central horizontal 4 x 2 epithelial defect, OS central horizontal 5 x 1 epithelial defect,   Anterior Chamber: D+F OU  Iris:  Flat OU  Lens:  Cl OU      Diagnostic Testing:  EKC swab in Outpatient Clinic 10/2019 : negative  Conjunctival Mucus discharge OD 10/25/19 : sent and pending  Conjunctival Mucus discharge OS 10/25/19: sent and pending    Assessment   78 y/o M with a PMH DM, glaucoma, BPH who is admitted for management of an episode of syncope. The pt is well known to the ophthalmology service, has had chronic conjunctivitis for the last four weeks in both eyes. The pt reports mucoid discharge, eye redness, and poor vision OU. Pt seen at outpt clinic and measured to have VA of CF at 1ft OU. The pt also reports no eye pain despite epithelial defects.      Plan:   Ciprofloxacin QID both eyes  Lotemax Ointment QHS both eyes was his outpt regimen but this is not available inpatient, pt can use Maxitrol ointment QHS both eyes  Dorzolamide BID both eyes  Travatan QHS both eyes  PO Doxycycline 50 mg BID   eye cultures of both eyes sent, will follow result        Follow-Up:  Patient should follow up his/her ophthalmologist or in the [facility] Ophthalmology Practice within 1 week of discharge.  [address] [place] [address].  [place] [address] [phone]    S/D/W Dr Warren (attending)

## 2019-10-25 NOTE — H&P ADULT - HISTORY OF PRESENT ILLNESS
79M pmhx DM, glaucoma, BPH presenting with CC of syncope. Per EMS report, patient was sitting at the dinner table when wife saw him starting to slouch over and was not very responsive. He went to the bathroom and had a bout of diarrhea and appeared weak. Per pt, he does not believe he lost consciousness, states that he was aware during the episode. Denies dizziness, lightheadedness, recent fevers, chills, nausea, vomiting. Not currently in pain, no chest pain or abdominal pain. Patient did not fall during LOC, wife was able to catch him when he started slouching. Of note patient has been struggling with b/l bacterial conjunctivitis and has been having worsening vision as a result 79yr old m with a pmhx DM, glaucoma, BPH presenting with CC of syncope. Patient was sitting at the dinner table when wife saw him starting to slouch over and was not very responsive. She then decided to get neighbors assistance and then they called EMS. When he became arousable he went to the bathroom and had a bout of diarrhea and appeared weak.  Denies dizziness, lightheadedness, recent fevers, chills, nausea, vomiting. Not currently in pain, no chest pain or abdominal pain. Patient did not fall during LOC, wife was able to catch him when he started slouching. Of note patient has been struggling with b/l bacterial conjunctivitis and has been having worsening vision as a result

## 2019-10-25 NOTE — CONSULT NOTE ADULT - SUBJECTIVE AND OBJECTIVE BOX
[facility]        Patient is a 79y old  Male who presents with a chief complaint of evaluation after syncopal episode (25 Oct 2019 16:07)    Excerpt from H&P, 79yr old m with a pmhx DM, glaucoma, BPH presenting with CC of syncope. Patient was sitting at the dinner table when wife saw him starting to slouch over and was not very responsive. She then decided to get neighbors assistance and then they called EMS. When he became arousable he went to the bathroom and had a bout of diarrhea and appeared weak.  Denies dizziness, lightheadedness, recent fevers, chills, nausea, vomiting. Not currently in pain, no chest pain or abdominal pain. Patient did not fall during LOC, wife was able to catch him when he started slouching. Of note patient has been struggling with b/l bacterial conjunctivitis and has been having worsening vision as a result           *****PAST MEDICAL / Surgical  HISTORY:  PAST MEDICAL & SURGICAL HISTORY:  BPH (benign prostatic hyperplasia)  Hyperlipidemia  HTN (hypertension)  DM (diabetes mellitus)  No significant past surgical history           *****FAMILY HISTORY:  FAMILY HISTORY:           *****SOCIAL HISTORY:  Alcohol: None  Smoking: None         *****ALLERGIES:   Allergies    No Known Allergies    Intolerances             *****MEDICATIONS: current medication reviewed and documented.   MEDICATIONS  (STANDING):  artificial  tears Solution 1 Drop(s) Both EYES four times a day  aspirin enteric coated 81 milliGRAM(s) Oral daily  atorvastatin 10 milliGRAM(s) Oral at bedtime  ciprofloxacin  0.3% Ophthalmic Solution 1 Drop(s) Both EYES every 4 hours  dextrose 5%. 1000 milliLiter(s) (50 mL/Hr) IV Continuous <Continuous>  dextrose 50% Injectable 12.5 Gram(s) IV Push once  dextrose 50% Injectable 25 Gram(s) IV Push once  dextrose 50% Injectable 25 Gram(s) IV Push once  dorzolamide 2% Ophthalmic Solution 1 Drop(s) Both EYES three times a day  doxycycline hyclate Capsule 50 milliGRAM(s) Oral every 12 hours  enalapril 20 milliGRAM(s) Oral daily  enoxaparin Injectable 40 milliGRAM(s) SubCutaneous daily  influenza   Vaccine 0.5 milliLiter(s) IntraMuscular once  insulin lispro (HumaLOG) corrective regimen sliding scale   SubCutaneous three times a day before meals  insulin lispro Injectable (HumaLOG) 3 Unit(s) SubCutaneous three times a day before meals  latanoprost 0.005% Ophthalmic Solution 1 Drop(s) Both EYES at bedtime  Timmy &amp; PolymyxinB, Bacitr, &amp; HC Eye Oint 1 Application(s) 1 Application(s) Both EYES at bedtime  tamsulosin 0.4 milliGRAM(s) Oral at bedtime    MEDICATIONS  (PRN):  dextrose 40% Gel 15 Gram(s) Oral once PRN Blood Glucose LESS THAN 70 milliGRAM(s)/deciliter  glucagon  Injectable 1 milliGRAM(s) IntraMuscular once PRN Glucose LESS THAN 70 milligrams/deciliter           *****REVIEW OF SYSTEM:  GEN: no fever, no chills, no pain  RESP: no SOB, no cough, no sputum  CVS: no chest pain, no palpitations, no edema  GI: no abdominal pain, no nausea, no vomiting, no constipation, no diarrhea  : no dysurea, no frequency, no hematurea  Neuro: no headache, no dizziness  PSYCH: no anxiety, no depression  Derm : no itching, no rash         *****VITAL SIGNS:  T(C): 36.3 (10-26-19 @ 04:21), Max: 36.7 (10-25-19 @ 20:36)  HR: 73 (10-26-19 @ 04:21) (62 - 73)  BP: 119/74 (10-26-19 @ 04:21) (119/74 - 130/71)  RR: 18 (10-26-19 @ 04:21) (18 - 18)  SpO2: 99% (10-26-19 @ 04:21) (97% - 99%)  Wt(kg): --    10-24 @ 07:  -  10-25 @ 07:00  --------------------------------------------------------  IN: 240 mL / OUT: 300 mL / NET: -60 mL    10-25 @ 07:  -  10-26 @ 06:24  --------------------------------------------------------  IN: 950 mL / OUT: 650 mL / NET: 300 mL             *****PHYSICAL EXAM:   eyes closed  Alert oriented x2 didnot know the name of the hospital or the date.  Attention comprehension are limited    Able to follow 1-2 step commands.       eyes closed crusted. poor vision   No facial asymmetry   Tongue is midline   Palate elevates symmetrically   Moving all 4 ext symmetrically        Gait : not assessed.  B/L down going toes               *****LAB AND IMAGIN.6   4.77  )-----------( 199      ( 25 Oct 2019 09:40 )             38.3               10-25    139  |  101  |  15  ----------------------------<  102<H>  3.7   |  27  |  0.91    Ca    9.5      25 Oct 2019 07:15  Phos  2.5     10  Mg     1.8     10-24    TPro  6.6  /  Alb  3.8  /  TBili  0.4  /  DBili  x   /  AST  27  /  ALT  19  /  AlkPhos  54  10-25                            Urinalysis Basic - ( 25 Oct 2019 03:34 )    Color: Yellow / Appearance: Clear / S.026 / pH: x  Gluc: x / Ketone: Small  / Bili: Negative / Urobili: 2 mg/dL   Blood: x / Protein: 30 mg/dL / Nitrite: Negative   Leuk Esterase: Negative / RBC: 8 /hpf / WBC 7 /HPF   Sq Epi: x / Non Sq Epi: 2 / Bacteria: Negative      < from: CT Head No Cont (10.24.19 @ 22:54) >  There is a 5 mm calcified meningioma along the right parietal bone and a   5 mm calcified meningioma along the right frontal bone.    The basal cisterns are patent.    Small mucous retention cyst versus polyp in the left maxillary sinus. The   remaining visualized paranasal sinuses and mastoid air cells are clear.     The soft tissues of the scalp and face are unremarkable. Sequela of   bilateral lenssurgery.    The bones of the calvarium, skull base, and face are unremarkable.    IMPRESSION:     No acute intracranial hemorrhage, territorial infarct or mass effe    < end of copied text >    [All pertinent recent Imaging reports reviewed]         *****A S S E S S M E N T   A N D   P L A N :        Excerpt from H&P, 79yr old m with a pmhx DM, glaucoma, BPH presenting with CC of syncope. Patient was sitting at the dinner table when wife saw him starting to slouch over and was not very responsive. She then decided to get neighbors assistance and then they called EMS. When he became arousable he went to the bathroom and had a bout of diarrhea and appeared weak.  Denies dizziness, lightheadedness, recent fevers, chills, nausea, vomiting. Not currently in pain, no chest pain or abdominal pain. Patient did not fall during LOC, wife was able to catch him when he started slouching. Of note patient has been struggling with b/l bacterial conjunctivitis and has been having worsening vision as a result      Problem/Recommendations 1: syncope   cardiac w/u underway   pt had a large meal ? post prandial vagal episode?    there was no prolonged post ictal period. lower suspicion for seizure.   ct head without any acute process.    will continue to monitor.      ___________________________  Will follow with you.  Thank you,  Yelena Alatorre MD  Diplomate of the American Board of Neurology and Psychiatry.  Diplomate of the American Board of Vascular Neurology.   [facility] ([facility]), [facility]   Ph: [phone]    Differential diagnosis and plan of care discussed with patient after the evaluation.   Advanced care planning options discussed.   Pain assessed and judicious use of narcotics when appropriate was discussed.  Importance of Fall prevention discussed.  Counseling on Smoking and Alcohol cessation was offered when appropriate.  Counseling on Diet, exercise, and medication compliance was done.     123 minutes spent on the total encounter;  more than 50 % of the visit was spent on counseling  and or coordinating care by the attending physician.    Thank you for allowing me to participate in the care of this maritza patient. Please do not hesitate to call me if you have any questions.     This and subsequent notes were partially created using voice recognition software and will  inherently be subject to errors including those of syntax and sound alike substitutions which may escape proofreading. In such instances original meaning may be extrapolated by contextual derivation.

## 2019-10-25 NOTE — H&P ADULT - NSTOBACCOSCREENHP_GEN_A_NCS
INR is in goal range. Continue same coumadin dosage. Watch for bleeding problems such as black tarry stools, blood in urine, frequent or prolonged nose bleeds, unusual bruising, or any other unusual bleeding.  Seek medical attention if any bleeding prob
No

## 2019-10-25 NOTE — H&P ADULT - NSHPREVIEWOFSYSTEMS_GEN_ALL_CORE
REVIEW OF SYSEMS:  General: no weakness, no fever/chills, no weight loss/gain  Skin/Breast: no rash, no jaundice  Ophthalmologic: no vision changes, no dry eyes   Respiratory and Thorax: no cough, no wheezing, no hemoptysis, no dyspnea  Cardiovascular: no chest pain, no shortness of breath, no orthopnea  Gastrointestinal: no n/v/d, no abdominal pain, no dysphagia   Genitourinary: no dysuria, no frequency, no nocturia, no hematuria  Musculoskeletal: no trauma, no sprain/strain, no myalgias, no arthralgias, no fracture  Neurological: no HA, no dizziness, no weakness, no numbness  Psychiatric: no depression, no SI/HI  Hematology/Lymphatics: no easy bruising  Endocrine: no heat or cold intolerance. no weight gain or loss  Allergic/Immunologic: no allergy or recent reaction REVIEW OF SYSTEMS:  General: + weakness, no fever/chills, no weight loss/gain  Skin/Breast: no rash, no jaundice  Ophthalmologic: bilateral conjunctival discharge+  Respiratory and Thorax: no cough, no wheezing, no hemoptysis, no dyspnea  Cardiovascular: no chest pain, no shortness of breath, no orthopnea  Gastrointestinal: no n/v/d, no abdominal pain, no dysphagia   Genitourinary: no dysuria, no frequency, no nocturia, no hematuria  Musculoskeletal: no trauma, no sprain/strain, no myalgias, no arthralgias, no fracture  Neurological: no HA, no dizziness, no weakness, no numbness  Psychiatric: no depression, no SI/HI  Hematology/Lymphatics: no easy bruising  Endocrine: no heat or cold intolerance. no weight gain or loss  Allergic/Immunologic: no allergy or recent reaction

## 2019-10-25 NOTE — H&P ADULT - NSHPLABSRESULTS_GEN_ALL_CORE
LABS:                        13.0   6.08  )-----------( 216      ( 24 Oct 2019 22:18 )             38.9     10-    136  |  95<L>  |  15  ----------------------------<  191<H>  4.3   |  24  |  1.21    Ca    9.7      24 Oct 2019 22:18  Phos  2.5     10-  Mg     1.8     10-24    TPro  7.5  /  Alb  4.4  /  TBili  0.6  /  DBili  x   /  AST  30  /  ALT  22  /  AlkPhos  60  10-      CAPILLARY BLOOD GLUCOSE      POCT Blood Glucose.: 177 mg/dL (24 Oct 2019 21:24)        Urinalysis Basic - ( 25 Oct 2019 03:34 )    Color: Yellow / Appearance: Clear / S.026 / pH: x  Gluc: x / Ketone: Small  / Bili: Negative / Urobili: 2 mg/dL   Blood: x / Protein: 30 mg/dL / Nitrite: Negative   Leuk Esterase: Negative / RBC: 8 /hpf / WBC 7 /HPF   Sq Epi: x / Non Sq Epi: 2 / Bacteria: Negative        RADIOLOGY & ADDITIONAL TESTS:    Imaging Personally Reviewed:  [x] YES  [ ] NO    Consultant(s) Notes Reviewed:  [x] YES  [ ] NO    Care Discussed with Consultants/Other Providers [x] YES  [ ] NO

## 2019-10-25 NOTE — H&P ADULT - NSHPPHYSICALEXAM_GEN_ALL_CORE
GENERAL: NAD, AAOx3  HEAD:  Atraumatic, Normocephalic  EYES: bilateral conjunctival discharge, keeps eyes closed.  NECK: Supple, No JVD, No LAD  CHEST/LUNG: clear bilaterally, No wheeze  HEART: Regular rate and rhythm; No murmurs, rubs, or gallops  ABDOMEN: Soft, Nontender, Nondistended; Bowel sounds present  EXTREMITIES:   No clubbing, cyanosis, or edema  SKIN: No rashes or lesions

## 2019-10-25 NOTE — H&P ADULT - NSICDXPASTMEDICALHX_GEN_ALL_CORE_FT
PAST MEDICAL HISTORY:  BPH (benign prostatic hyperplasia)     DM (diabetes mellitus)     HTN (hypertension)     Hyperlipidemia

## 2019-10-26 LAB
CULTURE RESULTS: SIGNIFICANT CHANGE UP
GLUCOSE BLDC GLUCOMTR-MCNC: 104 MG/DL — HIGH (ref 70–99)
GLUCOSE BLDC GLUCOMTR-MCNC: 132 MG/DL — HIGH (ref 70–99)
GLUCOSE BLDC GLUCOMTR-MCNC: 148 MG/DL — HIGH (ref 70–99)
GLUCOSE BLDC GLUCOMTR-MCNC: 189 MG/DL — HIGH (ref 70–99)
HBA1C BLD-MCNC: 6.7 % — HIGH (ref 4–5.6)
SPECIMEN SOURCE: SIGNIFICANT CHANGE UP

## 2019-10-26 RX ADMIN — Medication 1 DROP(S): at 11:43

## 2019-10-26 RX ADMIN — Medication 50 MILLIGRAM(S): at 17:46

## 2019-10-26 RX ADMIN — LATANOPROST 1 DROP(S): 0.05 SOLUTION/ DROPS OPHTHALMIC; TOPICAL at 21:30

## 2019-10-26 RX ADMIN — Medication 50 MILLIGRAM(S): at 05:01

## 2019-10-26 RX ADMIN — Medication 1 DROP(S): at 21:30

## 2019-10-26 RX ADMIN — DORZOLAMIDE HYDROCHLORIDE 1 DROP(S): 20 SOLUTION/ DROPS OPHTHALMIC at 21:31

## 2019-10-26 RX ADMIN — Medication 3 UNIT(S): at 12:00

## 2019-10-26 RX ADMIN — ENOXAPARIN SODIUM 40 MILLIGRAM(S): 100 INJECTION SUBCUTANEOUS at 11:42

## 2019-10-26 RX ADMIN — Medication 1 DROP(S): at 17:47

## 2019-10-26 RX ADMIN — DORZOLAMIDE HYDROCHLORIDE 1 DROP(S): 20 SOLUTION/ DROPS OPHTHALMIC at 05:01

## 2019-10-26 RX ADMIN — Medication 3 UNIT(S): at 08:25

## 2019-10-26 RX ADMIN — Medication 1 DROP(S): at 05:01

## 2019-10-26 RX ADMIN — Medication 1 DROP(S): at 13:01

## 2019-10-26 RX ADMIN — Medication 81 MILLIGRAM(S): at 11:42

## 2019-10-26 RX ADMIN — TAMSULOSIN HYDROCHLORIDE 0.4 MILLIGRAM(S): 0.4 CAPSULE ORAL at 21:29

## 2019-10-26 RX ADMIN — Medication 1 DROP(S): at 00:07

## 2019-10-26 RX ADMIN — Medication 20 MILLIGRAM(S): at 05:01

## 2019-10-26 RX ADMIN — DORZOLAMIDE HYDROCHLORIDE 1 DROP(S): 20 SOLUTION/ DROPS OPHTHALMIC at 13:00

## 2019-10-26 RX ADMIN — Medication 1 DROP(S): at 02:13

## 2019-10-26 RX ADMIN — ATORVASTATIN CALCIUM 10 MILLIGRAM(S): 80 TABLET, FILM COATED ORAL at 21:29

## 2019-10-26 RX ADMIN — Medication 1: at 12:00

## 2019-10-26 RX ADMIN — Medication 3 UNIT(S): at 16:45

## 2019-10-26 RX ADMIN — Medication 1 DROP(S): at 09:06

## 2019-10-26 NOTE — PHYSICAL THERAPY INITIAL EVALUATION ADULT - PERTINENT HX OF CURRENT PROBLEM, REHAB EVAL
Pt is an 79 year old male PMH DM, glaucoma, BPH presenting with CC of syncope. Patient was sitting at the dinner table when wife saw him starting to slouch over and was not very responsive. Evaluated by opthalm, started on appropriate eye therapy, conjunctivitis has been ongoing for the last 4 weeks. CT head neg.

## 2019-10-26 NOTE — PHYSICAL THERAPY INITIAL EVALUATION ADULT - ADDITIONAL COMMENTS
PTA pt lived in pvt home with wife, 2 steps to enter +HR, flight inside to bedroom +HR, independent without AD, often does grocery shopping and mobilizes in community.

## 2019-10-26 NOTE — PHYSICAL THERAPY INITIAL EVALUATION ADULT - CRITERIA FOR SKILLED THERAPEUTIC INTERVENTIONS
functional limitations in following categories/impairments found/therapy frequency/predicted duration of therapy intervention/risk reduction/prevention/rehab potential/anticipated discharge recommendation

## 2019-10-27 LAB
ANION GAP SERPL CALC-SCNC: 10 MMOL/L — SIGNIFICANT CHANGE UP (ref 5–17)
BUN SERPL-MCNC: 15 MG/DL — SIGNIFICANT CHANGE UP (ref 7–23)
CALCIUM SERPL-MCNC: 9.5 MG/DL — SIGNIFICANT CHANGE UP (ref 8.4–10.5)
CHLORIDE SERPL-SCNC: 101 MMOL/L — SIGNIFICANT CHANGE UP (ref 96–108)
CO2 SERPL-SCNC: 25 MMOL/L — SIGNIFICANT CHANGE UP (ref 22–31)
CREAT SERPL-MCNC: 0.81 MG/DL — SIGNIFICANT CHANGE UP (ref 0.5–1.3)
CULTURE RESULTS: SIGNIFICANT CHANGE UP
CULTURE RESULTS: SIGNIFICANT CHANGE UP
GLUCOSE BLDC GLUCOMTR-MCNC: 125 MG/DL — HIGH (ref 70–99)
GLUCOSE BLDC GLUCOMTR-MCNC: 132 MG/DL — HIGH (ref 70–99)
GLUCOSE BLDC GLUCOMTR-MCNC: 137 MG/DL — HIGH (ref 70–99)
GLUCOSE BLDC GLUCOMTR-MCNC: 146 MG/DL — HIGH (ref 70–99)
GLUCOSE SERPL-MCNC: 125 MG/DL — HIGH (ref 70–99)
HCT VFR BLD CALC: 37.6 % — LOW (ref 39–50)
HGB BLD-MCNC: 12.4 G/DL — LOW (ref 13–17)
MCHC RBC-ENTMCNC: 28.1 PG — SIGNIFICANT CHANGE UP (ref 27–34)
MCHC RBC-ENTMCNC: 33 GM/DL — SIGNIFICANT CHANGE UP (ref 32–36)
MCV RBC AUTO: 85.3 FL — SIGNIFICANT CHANGE UP (ref 80–100)
PLATELET # BLD AUTO: 213 K/UL — SIGNIFICANT CHANGE UP (ref 150–400)
POTASSIUM SERPL-MCNC: 3.8 MMOL/L — SIGNIFICANT CHANGE UP (ref 3.5–5.3)
POTASSIUM SERPL-SCNC: 3.8 MMOL/L — SIGNIFICANT CHANGE UP (ref 3.5–5.3)
RBC # BLD: 4.41 M/UL — SIGNIFICANT CHANGE UP (ref 4.2–5.8)
RBC # FLD: 12.9 % — SIGNIFICANT CHANGE UP (ref 10.3–14.5)
SODIUM SERPL-SCNC: 136 MMOL/L — SIGNIFICANT CHANGE UP (ref 135–145)
SPECIMEN SOURCE: SIGNIFICANT CHANGE UP
SPECIMEN SOURCE: SIGNIFICANT CHANGE UP
WBC # BLD: 4.3 K/UL — SIGNIFICANT CHANGE UP (ref 3.8–10.5)
WBC # FLD AUTO: 4.3 K/UL — SIGNIFICANT CHANGE UP (ref 3.8–10.5)

## 2019-10-27 PROCEDURE — 95816 EEG AWAKE AND DROWSY: CPT | Mod: 26

## 2019-10-27 RX ADMIN — Medication 1 DROP(S): at 00:01

## 2019-10-27 RX ADMIN — TAMSULOSIN HYDROCHLORIDE 0.4 MILLIGRAM(S): 0.4 CAPSULE ORAL at 21:40

## 2019-10-27 RX ADMIN — DORZOLAMIDE HYDROCHLORIDE 1 DROP(S): 20 SOLUTION/ DROPS OPHTHALMIC at 13:03

## 2019-10-27 RX ADMIN — Medication 1 DROP(S): at 01:25

## 2019-10-27 RX ADMIN — Medication 50 MILLIGRAM(S): at 17:41

## 2019-10-27 RX ADMIN — Medication 3 UNIT(S): at 16:49

## 2019-10-27 RX ADMIN — Medication 1 DROP(S): at 11:36

## 2019-10-27 RX ADMIN — LATANOPROST 1 DROP(S): 0.05 SOLUTION/ DROPS OPHTHALMIC; TOPICAL at 21:44

## 2019-10-27 RX ADMIN — ATORVASTATIN CALCIUM 10 MILLIGRAM(S): 80 TABLET, FILM COATED ORAL at 21:40

## 2019-10-27 RX ADMIN — Medication 3 UNIT(S): at 08:00

## 2019-10-27 RX ADMIN — Medication 1 DROP(S): at 17:42

## 2019-10-27 RX ADMIN — Medication 1 DROP(S): at 06:10

## 2019-10-27 RX ADMIN — Medication 1 DROP(S): at 21:44

## 2019-10-27 RX ADMIN — Medication 1 DROP(S): at 13:04

## 2019-10-27 RX ADMIN — ENOXAPARIN SODIUM 40 MILLIGRAM(S): 100 INJECTION SUBCUTANEOUS at 11:36

## 2019-10-27 RX ADMIN — Medication 81 MILLIGRAM(S): at 11:36

## 2019-10-27 RX ADMIN — DORZOLAMIDE HYDROCHLORIDE 1 DROP(S): 20 SOLUTION/ DROPS OPHTHALMIC at 06:10

## 2019-10-27 RX ADMIN — DORZOLAMIDE HYDROCHLORIDE 1 DROP(S): 20 SOLUTION/ DROPS OPHTHALMIC at 21:43

## 2019-10-27 RX ADMIN — Medication 3 UNIT(S): at 12:22

## 2019-10-27 RX ADMIN — Medication 20 MILLIGRAM(S): at 06:10

## 2019-10-27 RX ADMIN — Medication 1 DROP(S): at 10:46

## 2019-10-27 RX ADMIN — Medication 50 MILLIGRAM(S): at 06:10

## 2019-10-27 NOTE — EEG REPORT - NS EEG TEXT BOX
BARNEY ERICKSON [id number]     Study Date: 		10-27-19    ROUTINE EEG    Technical Information:			  		  Placement and Labeling of Electrodes:  The EEG was performed utilizing 20 channels referential EEG connections (coronal over temporal over parasagittal montage) using all standard 10-20 electrode placements with EKG.  Recording was at a sampling rate of 256 samples per second per channel.  Time synchronized digital video recording was done simultaneously with EEG recording.  A low light infrared camera was used for low light recording.  Charles and seizure detection algorithms were utilized.    CSA Technical Component:  Quantitative EEG analysis using a separate Compressed Spectral Array (CSA) software package was conducted in real-time and run at bedside after set up by the technician, digitally displaying the power of electrographic frequencies included in the 1-30Hz band using a graded color map.  This data was reviewed and interpreted independently, and is reported in a separate section below.    --------------------------------------------------------------------------------------------------  History:  CC/ HPI Patient is a 79y old  Male who presents with a chief complaint of evaluation after syncopal episode (27 Oct 2019 11:54)    MEDICATIONS  (STANDING):  artificial  tears Solution 1 Drop(s) Both EYES four times a day  aspirin enteric coated 81 milliGRAM(s) Oral daily  atorvastatin 10 milliGRAM(s) Oral at bedtime  ciprofloxacin  0.3% Ophthalmic Solution 1 Drop(s) Both EYES every 4 hours  dextrose 5%. 1000 milliLiter(s) (50 mL/Hr) IV Continuous <Continuous>  dextrose 50% Injectable 12.5 Gram(s) IV Push once  dextrose 50% Injectable 25 Gram(s) IV Push once  dextrose 50% Injectable 25 Gram(s) IV Push once  dorzolamide 2% Ophthalmic Solution 1 Drop(s) Both EYES three times a day  doxycycline hyclate Capsule 50 milliGRAM(s) Oral every 12 hours  enalapril 20 milliGRAM(s) Oral daily  enoxaparin Injectable 40 milliGRAM(s) SubCutaneous daily  influenza   Vaccine 0.5 milliLiter(s) IntraMuscular once  insulin lispro (HumaLOG) corrective regimen sliding scale   SubCutaneous three times a day before meals  insulin lispro Injectable (HumaLOG) 3 Unit(s) SubCutaneous three times a day before meals  latanoprost 0.005% Ophthalmic Solution 1 Drop(s) Both EYES at bedtime  Timmy &amp; PolymyxinB, Bacitr, &amp; HC Eye Oint 1 Application(s) 1 Application(s) Both EYES at bedtime  tamsulosin 0.4 milliGRAM(s) Oral at bedtime    --------------------------------------------------------------------------------------------------  Study Interpretation:    [[[Abbreviation Key:  PDR=alpha rhythm/posterior dominant rhythm. A-P=anterior posterior gradient.  Amplitude: ‘very low’:<20; ‘low’:20-50; ‘medium’:; ‘high’:>200uV.  Persistence for periodic/rhythmic patterns (% of epoch) ‘rare’:<1%; ‘occasional’:1-10%; ‘frequent’:10-50%; ‘abundant’:50-90%; ‘continuous’:>90%.  Persistence for sporadic discharges: ‘rare’:<1/hr; ‘occasional’:1/min-1/hr; ‘frequent’:>1/min; ‘abundant’:>1/10 sec.  GRDA=generalized rhythmic delta activity, LRDA=lateralized rhythmic delta activity, TIRDA=temporal intermittent rhythmic delta activity, FIRDA=frontal intermittent rhythmic activity. LPD=PLED=lateralized periodic discharges, GPD=generalized periodic discharges, BiPDs=BiPLEDs=bilateral independent periodic epileptiform discharges, SIRPID=stimulus induced rhythmic, periodic, or ictal appearing discharges.  Modifiers: +F=with fast component, +S=with spike component, +R=with rhythmic component.  S-B=burst suppression pattern.  Max=maximal. N1-drowsy, N2-stage II sleep, N3-slow wave sleep.  HV=hyperventilation, PS=photic stimulation]]]    FINDINGS:  The background was continuous, spontaneously variable and reactive.  During wakefulness, the posteriorly dominant rhythm was modulated at 7hz..      There was more continuous diffuse irregular theta and polymorphic delta activity present.    Sleep Background:  Drowsiness was characterized by fragmentation, attenuation, and slowing of the background activity.    Stage II sleep transients were not recorded.    Epileptiform Activity:   No epileptiform discharges were present.    Events:  No clinical events were recorded.  No seizures were recorded.    Activation Procedures:   -Hyperventilation was not performed.    -Photic stimulation was not performed.    Artifacts:  Intermittent myogenic and movement artifacts were noted.    ECG:  The heart rate on single channel ECG at baseline was predominantly near BPM = 60-70  -----------------------------------------------------------------------------------------------------    EEG Classification / Summary:  Abnormal EEG study  Mild background slowing    -----------------------------------------------------------------------------------------------------    Clinical Impression:  Mild diffuse or multifocal cerebral dysfunction, not specific as to etiology.  There were no epileptiform abnormalities recorded.      -------------------------------------------------------------------------------------------------------  Kadi Pugh DO  Epilepsy Fellow, [facility] [place]    Quentin Elizabeth MD PhD   of Neurology, [facility] [place]

## 2019-10-28 ENCOUNTER — TRANSCRIPTION ENCOUNTER (OUTPATIENT)
Age: 79
End: 2019-10-28

## 2019-10-28 ENCOUNTER — APPOINTMENT (OUTPATIENT)
Dept: OPHTHALMOLOGY | Facility: CLINIC | Age: 79
End: 2019-10-28

## 2019-10-28 LAB
ANION GAP SERPL CALC-SCNC: 15 MMOL/L — SIGNIFICANT CHANGE UP (ref 5–17)
BUN SERPL-MCNC: 12 MG/DL — SIGNIFICANT CHANGE UP (ref 7–23)
CALCIUM SERPL-MCNC: 9.7 MG/DL — SIGNIFICANT CHANGE UP (ref 8.4–10.5)
CHLORIDE SERPL-SCNC: 101 MMOL/L — SIGNIFICANT CHANGE UP (ref 96–108)
CO2 SERPL-SCNC: 24 MMOL/L — SIGNIFICANT CHANGE UP (ref 22–31)
CREAT SERPL-MCNC: 0.84 MG/DL — SIGNIFICANT CHANGE UP (ref 0.5–1.3)
GLUCOSE BLDC GLUCOMTR-MCNC: 116 MG/DL — HIGH (ref 70–99)
GLUCOSE BLDC GLUCOMTR-MCNC: 132 MG/DL — HIGH (ref 70–99)
GLUCOSE BLDC GLUCOMTR-MCNC: 134 MG/DL — HIGH (ref 70–99)
GLUCOSE BLDC GLUCOMTR-MCNC: 137 MG/DL — HIGH (ref 70–99)
GLUCOSE SERPL-MCNC: 157 MG/DL — HIGH (ref 70–99)
POTASSIUM SERPL-MCNC: 3.8 MMOL/L — SIGNIFICANT CHANGE UP (ref 3.5–5.3)
POTASSIUM SERPL-SCNC: 3.8 MMOL/L — SIGNIFICANT CHANGE UP (ref 3.5–5.3)
SODIUM SERPL-SCNC: 140 MMOL/L — SIGNIFICANT CHANGE UP (ref 135–145)

## 2019-10-28 RX ORDER — CIPROFLOXACIN HCL 0.3 %
1 DROPS OPHTHALMIC (EYE)
Qty: 1 | Refills: 0
Start: 2019-10-28 | End: 2019-10-31

## 2019-10-28 RX ADMIN — LATANOPROST 1 DROP(S): 0.05 SOLUTION/ DROPS OPHTHALMIC; TOPICAL at 22:03

## 2019-10-28 RX ADMIN — Medication 1 DROP(S): at 05:15

## 2019-10-28 RX ADMIN — Medication 1 DROP(S): at 17:01

## 2019-10-28 RX ADMIN — Medication 3 UNIT(S): at 12:05

## 2019-10-28 RX ADMIN — Medication 50 MILLIGRAM(S): at 05:14

## 2019-10-28 RX ADMIN — DORZOLAMIDE HYDROCHLORIDE 1 DROP(S): 20 SOLUTION/ DROPS OPHTHALMIC at 05:14

## 2019-10-28 RX ADMIN — Medication 1 DROP(S): at 11:16

## 2019-10-28 RX ADMIN — Medication 3 UNIT(S): at 16:56

## 2019-10-28 RX ADMIN — Medication 1 DROP(S): at 13:09

## 2019-10-28 RX ADMIN — ENOXAPARIN SODIUM 40 MILLIGRAM(S): 100 INJECTION SUBCUTANEOUS at 11:15

## 2019-10-28 RX ADMIN — ATORVASTATIN CALCIUM 10 MILLIGRAM(S): 80 TABLET, FILM COATED ORAL at 22:03

## 2019-10-28 RX ADMIN — Medication 50 MILLIGRAM(S): at 17:01

## 2019-10-28 RX ADMIN — Medication 20 MILLIGRAM(S): at 05:14

## 2019-10-28 RX ADMIN — Medication 1 DROP(S): at 05:14

## 2019-10-28 RX ADMIN — Medication 1 DROP(S): at 22:03

## 2019-10-28 RX ADMIN — TAMSULOSIN HYDROCHLORIDE 0.4 MILLIGRAM(S): 0.4 CAPSULE ORAL at 22:03

## 2019-10-28 RX ADMIN — Medication 81 MILLIGRAM(S): at 11:15

## 2019-10-28 RX ADMIN — Medication 1 DROP(S): at 01:44

## 2019-10-28 RX ADMIN — Medication 1 DROP(S): at 00:11

## 2019-10-28 RX ADMIN — DORZOLAMIDE HYDROCHLORIDE 1 DROP(S): 20 SOLUTION/ DROPS OPHTHALMIC at 22:04

## 2019-10-28 RX ADMIN — Medication 3 UNIT(S): at 07:45

## 2019-10-28 RX ADMIN — Medication 1 DROP(S): at 09:39

## 2019-10-28 RX ADMIN — Medication 1 DROP(S): at 23:57

## 2019-10-28 RX ADMIN — DORZOLAMIDE HYDROCHLORIDE 1 DROP(S): 20 SOLUTION/ DROPS OPHTHALMIC at 13:08

## 2019-10-28 NOTE — DISCHARGE NOTE PROVIDER - NSDCFUSCHEDAPPT_GEN_ALL_CORE_FT
BARNEY ERICKSON ; 12/19/2019 ; NPP Ophthal [address] [facility] BARNEY ERICKSON ; 12/19/2019 ; NPP Ophthal [address] [facility] BARNEY ERICKSON ; 12/19/2019 ; NPP Ophthal [address] [facility] BARNEY ERICKSON ; 12/19/2019 ; NPP Ophthal [address] [facility]

## 2019-10-28 NOTE — DISCHARGE NOTE PROVIDER - NSDCCPCAREPLAN_GEN_ALL_CORE_FT
PRINCIPAL DISCHARGE DIAGNOSIS  Diagnosis: Syncope and collapse  Assessment and Plan of Treatment: HOME CARE INSTRUCTIONS  Have someone stay with you until you feel stable.  Do not drive, operate machinery, or play sports until your caregiver says it is okay.  Keep all follow-up appointments as directed by your caregiver.   Lie down right away if you start feeling like you might faint. Breathe deeply and steadily. Wait until all the symptoms have passed.Drink enough fluids to keep your urine clear or pale yellow.  If you are taking blood pressure or heart medicine, get up slowly, taking several minutes to sit and then stand. This can reduce dizziness.  SEEK IMMEDIATE MEDICAL CARE IF:  You have a severe headache.  You have unusual pain in the chest, abdomen, or back.  You are bleeding from the mouth or rectum, or you have black or tarry stool.  You have an irregular or very fast heartbeat.  You have pain with breathing.  You have repeated fainting or seizure-like jerking during an episode.  You faint when sitting or lying down.  You have confusion.  You have difficulty walking.  You have severe weakness.  You have vision problems.  If you fainted, call your local emergency services. Do not drive yourself to the hospital      SECONDARY DISCHARGE DIAGNOSES  Diagnosis: Bilateral conjunctivitis  Assessment and Plan of Treatment: Continue eye drops  Follow up with Ophthalmology    Diagnosis: Benign essential HTN  Assessment and Plan of Treatment: Low salt diet  Activity as tolerated.  Take all medication as prescribed.  Follow up with your medical doctor for routine blood pressure monitoring at your next visit.  Notify your doctor if you have any of the following symptoms:   Dizziness, Lightheadedness, Blurry vision, Headache, Chest pain, Shortness of breath

## 2019-10-28 NOTE — DISCHARGE NOTE PROVIDER - CARE PROVIDER_API CALL
Kelvin,   Ophthalmology  Phone: (   )    -  Fax: (   )    -  Follow Up Time:     Ronny Bucio)  Internal Medicine  [facility], [address] [place] [id number]  Phone: [phone]  Fax: [phone]  Follow Up Time: Ronny Bucio)  Internal Medicine  [facility], [address] [place] [id number]  Phone: [phone]  Fax: [phone]  Follow Up Time:     Kelvin,   Ophthalmology  Phone: (   )    -  Fax: (   )    -  Follow Up Time:     Nissenbaum, Michael A (MD)  Neurology  [address] [facility] [address]  [place] [address]  Phone: [phone]  Fax: [phone]  Follow Up Time:

## 2019-10-28 NOTE — DISCHARGE NOTE PROVIDER - PROVIDER TOKENS
FREE:[LAST:[Kelvin],PHONE:[(   )    -],FAX:[(   )    -],ADDRESS:[Ophthalmology]],PROVIDER:[TOKEN:[[id number]]] PROVIDER:[TOKEN:[[id number]]],FREE:[LAST:[Kelvin],PHONE:[(   )    -],FAX:[(   )    -],ADDRESS:[Ophthalmology]],PROVIDER:[TOKEN:[[id number]]]

## 2019-10-28 NOTE — DISCHARGE NOTE PROVIDER - CARE PROVIDERS DIRECT ADDRESSES
,DirectAddress_Unknown,[email or web address],DirectAddress_Unknown,DirectAddress_Unknown

## 2019-10-28 NOTE — DISCHARGE NOTE PROVIDER - HOSPITAL COURSE
79yr old m with hx of dm II comes in for a syncopal episode.   CTH negative. TTE showed nl LV systolic function without any segmental wall motion abnormalities. not orthostatic.  S/P EEG: Mild diffuse or multifocal cerebral dysfunction, not specific as to etiology.  There were no epileptiform abnormalities recorded.      DM II: hold oral meds, continue on sliding scale for now.     Hypertension: stable on enalapril.    Bilateral conjunctivitis: continue multiple eyedrops. Ophtho cs appreciated. D/C to home with home PT. 79yr old m with hx of dm II comes in for a syncopal episode.   CTH negative. TTE showed nl LV systolic function without any segmental wall motion abnormalities. not orthostatic.  S/P EEG: Mild diffuse or multifocal cerebral dysfunction, not specific as to etiology.  There were no epileptiform abnormalities recorded.      DM II: hold oral meds, continue on sliding scale for now.   Prolonged EEG out pt. and memory eval. F/U with Dr. Nissenbaum out pt.     Hypertension: stable on enalapril.    Bilateral conjunctivitis: continue multiple eyedrops. Ophtho cs appreciated. D/C to home with home PT.

## 2019-10-28 NOTE — PROGRESS NOTE ADULT - ASSESSMENT
79yr old m with hx of dm II comes in for a syncopal episode.     1. Syncope: monitor on telemetry for now. CT head negative. TTE showed nl LV systolic function without any segmental wall motion abnormalities. not orthostatic.   neuro cs fu appreciated; undergoing EEG -non diagnostic  outpt pcp and cards fu  likely vasovagal syncope    2. DM II: hold oral meds, continue on sliding scale for now.     3. Hypertension: stable on enalapril.    4. Bilateral conjunctivitis: continue multiple eyedrops. Ophtho cs appreciated  improving  oupt ophtho fu    5. DVT prophylaxis: lovenox daily.     PT eval
79yr old m with hx of dm II comes in for a syncopal episode.     1. Syncope: monitor on telemetry for now. CT head negative. TTE showed nl LV systolic function without any segmental wall motion abnormalities. not orthostatic.   neuro cs fu appreciated; undergoing EEG today    2. DM II: hold oral meds, continue on sliding scale for now.     3. Hypertension: stable on enalapril.    4. Bilateral conjunctivitis: continue multiple eyedrops. Ophtho cs appreciated    5. DVT prophylaxis: lovenox daily.     PT eval
79yr old m with hx of dm II comes in for a syncopal episode.     1. Syncope: monitor on telemetry for now. CT head negative. check echo. notorthostatics.   neuro cs fu    2. DM II: hold oral meds, continue on sliding scale for now.     3. Hypertension: stable on enalipril.    4. Bilateral conjunctivitis: continue multiple eyedrops. Ophtho cs appreciated    5. DVT prophylaxis: lovenox daily.     Pt eval
Assessment   80 y/o M with a PMH DM, glaucoma, BPH who is admitted for management of an episode of syncope. The pt is well known to the ophthalmology service, has had chronic conjunctivitis for the last four weeks in both eyes. The pt reports mucoid discharge, eye redness, and poor vision OU. Pt seen at outpt clinic and measured to have VA of CF at 1ft OU. The pt also reports no eye pain despite epithelial defects.      Plan:   The epithelial defect is similar comparing to yesterday.  continue:  Ciprofloxacin QID both eyes  Lotemax Ointment QHS both eyes was his outpt regimen but this is not available inpatient, pt can use Maxitrol ointment QHS both eyes  Dorzolamide BID both eyes  Travatan QHS both eyes  PO Doxycycline 50 mg BID   eye cultures of both eyes sent, will follow result      Follow-Up:  Patient should follow up his/her ophthalmologist or in the [facility] within 1 week of discharge.  [address] [place] [address].  [place] [address] [phone]

## 2019-10-28 NOTE — DISCHARGE NOTE PROVIDER - NSDCFUADDAPPT_GEN_ALL_CORE_FT
Follow-Up:  You should follow up his/her ophthalmologist or in the [facility] within 1 week of discharge.  [address] [facility] [place] [address] [phone] Follow-Up:  You should follow up his/her ophthalmologist or in the [facility] within 1 week of discharge.  [address] [facility] [place] [address]  [phone]  Follow up with neurology, Dr. Nissenbaum for prolong EEG, memory evaluation, and continued monitoring.

## 2019-10-29 ENCOUNTER — TRANSCRIPTION ENCOUNTER (OUTPATIENT)
Age: 79
End: 2019-10-29

## 2019-10-29 VITALS
RESPIRATION RATE: 18 BRPM | OXYGEN SATURATION: 99 % | DIASTOLIC BLOOD PRESSURE: 81 MMHG | TEMPERATURE: 98 F | HEART RATE: 76 BPM | SYSTOLIC BLOOD PRESSURE: 146 MMHG

## 2019-10-29 LAB
GLUCOSE BLDC GLUCOMTR-MCNC: 148 MG/DL — HIGH (ref 70–99)
GLUCOSE BLDC GLUCOMTR-MCNC: 180 MG/DL — HIGH (ref 70–99)

## 2019-10-29 RX ORDER — FLUOROMETHOLONE 1 MG/ML
1 SOLUTION/ DROPS OPHTHALMIC
Qty: 1 | Refills: 0
Start: 2019-10-29 | End: 2019-11-04

## 2019-10-29 RX ORDER — FLUOROMETHOLONE 1 MG/ML
1 SOLUTION/ DROPS OPHTHALMIC AT BEDTIME
Refills: 0 | Status: DISCONTINUED | OUTPATIENT
Start: 2019-10-29 | End: 2019-10-29

## 2019-10-29 RX ADMIN — Medication 1 DROP(S): at 02:56

## 2019-10-29 RX ADMIN — Medication 1 DROP(S): at 10:13

## 2019-10-29 RX ADMIN — Medication 1 DROP(S): at 06:09

## 2019-10-29 RX ADMIN — Medication 3 UNIT(S): at 08:02

## 2019-10-29 RX ADMIN — Medication 1 DROP(S): at 11:54

## 2019-10-29 RX ADMIN — Medication 3 UNIT(S): at 11:57

## 2019-10-29 RX ADMIN — DORZOLAMIDE HYDROCHLORIDE 1 DROP(S): 20 SOLUTION/ DROPS OPHTHALMIC at 06:09

## 2019-10-29 RX ADMIN — Medication 1: at 11:57

## 2019-10-29 RX ADMIN — DORZOLAMIDE HYDROCHLORIDE 1 DROP(S): 20 SOLUTION/ DROPS OPHTHALMIC at 14:10

## 2019-10-29 RX ADMIN — Medication 20 MILLIGRAM(S): at 06:08

## 2019-10-29 RX ADMIN — Medication 81 MILLIGRAM(S): at 11:54

## 2019-10-29 RX ADMIN — Medication 50 MILLIGRAM(S): at 06:08

## 2019-10-29 RX ADMIN — ENOXAPARIN SODIUM 40 MILLIGRAM(S): 100 INJECTION SUBCUTANEOUS at 11:54

## 2019-10-29 RX ADMIN — Medication 1 DROP(S): at 14:10

## 2019-10-29 NOTE — PROGRESS NOTE ADULT - SUBJECTIVE AND OBJECTIVE BOX
[facility] Neurological Care [facility]      Seen earlier today, and examined.  - Today, patient is without complaints.           *****MEDICATIONS: Current medication reviewed and documented.    MEDICATIONS  (STANDING):  artificial  tears Solution 1 Drop(s) Both EYES four times a day  aspirin enteric coated 81 milliGRAM(s) Oral daily  atorvastatin 10 milliGRAM(s) Oral at bedtime  ciprofloxacin  0.3% Ophthalmic Solution 1 Drop(s) Both EYES every 4 hours  dextrose 5%. 1000 milliLiter(s) (50 mL/Hr) IV Continuous <Continuous>  dextrose 50% Injectable 12.5 Gram(s) IV Push once  dextrose 50% Injectable 25 Gram(s) IV Push once  dextrose 50% Injectable 25 Gram(s) IV Push once  dorzolamide 2% Ophthalmic Solution 1 Drop(s) Both EYES three times a day  doxycycline hyclate Capsule 50 milliGRAM(s) Oral every 12 hours  enalapril 20 milliGRAM(s) Oral daily  enoxaparin Injectable 40 milliGRAM(s) SubCutaneous daily  fluorometholone 0.1% Ointment 1 Application(s) Both EYES at bedtime  influenza   Vaccine 0.5 milliLiter(s) IntraMuscular once  insulin lispro (HumaLOG) corrective regimen sliding scale   SubCutaneous three times a day before meals  insulin lispro Injectable (HumaLOG) 3 Unit(s) SubCutaneous three times a day before meals  latanoprost 0.005% Ophthalmic Solution 1 Drop(s) Both EYES at bedtime  tamsulosin 0.4 milliGRAM(s) Oral at bedtime    MEDICATIONS  (PRN):  dextrose 40% Gel 15 Gram(s) Oral once PRN Blood Glucose LESS THAN 70 milliGRAM(s)/deciliter  glucagon  Injectable 1 milliGRAM(s) IntraMuscular once PRN Glucose LESS THAN 70 milligrams/deciliter          ***** VITAL SIGNS:  T(F): 97.8 (10-29-19 @ 04:03), Max: 97.8 (10-29-19 @ 04:03)  HR: 66 (10-29-19 @ 04:03) (59 - 66)  BP: 129/72 (10-29-19 @ 04:03) (103/69 - 129/72)  RR: 17 (10-29-19 @ 04:03) (17 - 18)  SpO2: 99% (10-29-19 @ 04:03) (96% - 100%)  Wt(kg): --  ,   I&O's Summary    28 Oct 2019 07:01  -  29 Oct 2019 07:00  --------------------------------------------------------  IN: 720 mL / OUT: 900 mL / NET: -180 mL             *****PHYSICAL EXAM:   alert oriented x 3 attention comprehension are improved. still with poor memory.   Able to name, repeat.   EOmi fundi not visualized   no nystagmus VFF to confrontation  Tongue is midline  Palate elevates symmetrically   Moving all 4 ext spontaneously no drift appreciated    Gait not assessed.            *****LAB AND IMAGING:              10-28    140  |  101  |  12  ----------------------------<  157<H>  3.8   |  24  |  0.84    Ca    9.7      28 Oct 2019 05:58                           [All pertinent recent Imaging/Reports reviewed]           *****A S S E S S M E N T   A N D   P L A N :      Excerpt from H&P, 79yr old m with a pmhx DM, glaucoma, BPH presenting with CC of syncope. Patient was sitting at the dinner table when wife saw him starting to slouch over and was not very responsive. She then decided to get neighbors assistance and then they called EMS. When he became arousable he went to the bathroom and had a bout of diarrhea and appeared weak.  Denies dizziness, lightheadedness, recent fevers, chills, nausea, vomiting. Not currently in pain, no chest pain or abdominal pain. Patient did not fall during LOC, wife was able to catch him when he started slouching. Of note patient has been struggling with b/l bacterial conjunctivitis and has been having worsening vision as a result      Problem/Recommendations 1: syncope   cardiac w/u underway   pt had a large meal ? post prandial vagal episode?  smaller meals was recommended.   also pt with fluctuating bp   there was no prolonged post ictal period. lower suspicion for seizure. spot eeg without any seizures.     consider prolonged eeg outpt.   ct head without any acute process.    will continue to monitor.    discussed at bedside with pt, wife and son regarding finding.   memory evaluation was recommended   f/u with Dr. Nissenbaum [phone]          Thank you for allowing me to participate in the care of this patient. Please do not hesitate to call me if you have any  questions.        ________________  Yelena Alatorre MD  [facility] Neurological [facility] ([facility]  [phone]      33 minutes spent on total encounter; more than 50 % of the visit was  spent counseling about plan of care, compliance to diet/exercise and medication regimen and or  coordinating care by the attending physician.      It is advised that stroke patients follow up with BENJAMÍN Keller @ [phone] in 1- 2 weeks.   Others please follow up with Dr. Michael Nissenbaum [phone]
[facility] Ophthalmology Consult Note    interval: patient was seen and examined bed side. Patient felt his vision is better. His nurse stated his redness, swollen and discharge improved significantly today.     ROS:  General (weight loss ), Vision (per HPI), Head and Neck (neg),  Neuro (neg),     Mood and Affect Appropriate ( x ),  Oriented to Time, Place, and Person x 3 ( x )    Ophthalmology Exam    Visual acuity (cc): OD: CF at the face, OS : CF @ 2 feets  Pupils: PERRL OU, no APD  Ttono: OD 15, OS 16  Extraocular movements (EOMs): Full OU, no pain, no diplopia     Pen Light Exam (PLE)  External:  Flat OU  Lids/Lashes/Lacrimal Ducts: Flat OU    Sclera/Conjunctiva:  OU diffuse +2 injection of the bulbar and palpebral conj, +3 follicles of the palpebral conj   Cornea: OU: +3 SPK with stromal haze diffuse and D-folds visible. OD: central horizontal 4 x 1 epithelial defect, OS central horizontal 5 x 1 epithelial defect,   Anterior Chamber: D+F OU  Iris:  Flat OU  Lens:  Cl OU      Diagnostic Testing:  EKC swab in Outpatient Clinic 10/2019 : negative  Conjunctival Mucus discharge OD 10/25/19 : sent and pending  Conjunctival Mucus discharge OS 10/25/19: sent and pending    Assessment   78 y/o M with a PMH DM, glaucoma, BPH who is admitted for management of an episode of syncope. The pt is well known to the ophthalmology service, has had chronic conjunctivitis for the last four weeks in both eyes. The pt reports mucoid discharge, eye redness, and poor vision OU. Pt seen at outpt clinic and measured to have VA of CF at 1ft OU. The pt also reports no eye pain despite epithelial defects.      Plan:   The epithelial defect is smaller comparing to the document yesterday.  continue:  Ciprofloxacin QID both eyes  Lotemax Ointment QHS both eyes was his outpt regimen but this is not available inpatient, pt can use Maxitrol ointment QHS both eyes  Dorzolamide BID both eyes  Travatan QHS both eyes  PO Doxycycline 50 mg BID   eye cultures of both eyes sent, will follow result        Follow-Up:  Patient should follow up his/her ophthalmologist or in the [facility] Ophthalmology Practice within 1 week of discharge.  [address] [place] [address].  [place] [address] [phone]
Patient is a 79y old  Male who presents with a chief complaint of evaluation after syncopal episode       INTERVAL HPI/OVERNIGHT EVENTS:no tele events, no cp/sob/dizziness/not orthostatic      Vital Signs Last 24 Hrs  vss    artificial  tears Solution 1 Drop(s) Both EYES four times a day  aspirin enteric coated 81 milliGRAM(s) Oral daily  atorvastatin 10 milliGRAM(s) Oral at bedtime  ciprofloxacin  0.3% Ophthalmic Solution 1 Drop(s) Both EYES every 4 hours  dextrose 40% Gel 15 Gram(s) Oral once PRN  dextrose 5%. 1000 milliLiter(s) IV Continuous <Continuous>  dextrose 50% Injectable 12.5 Gram(s) IV Push once  dextrose 50% Injectable 25 Gram(s) IV Push once  dextrose 50% Injectable 25 Gram(s) IV Push once  dorzolamide 2% Ophthalmic Solution 1 Drop(s) Both EYES three times a day  doxycycline hyclate Capsule 50 milliGRAM(s) Oral every 12 hours  enalapril 20 milliGRAM(s) Oral daily  enoxaparin Injectable 40 milliGRAM(s) SubCutaneous daily  fluorometholone 0.1% Ointment 1 Application(s) Both EYES at bedtime  glucagon  Injectable 1 milliGRAM(s) IntraMuscular once PRN  influenza   Vaccine 0.5 milliLiter(s) IntraMuscular once  insulin lispro (HumaLOG) corrective regimen sliding scale   SubCutaneous three times a day before meals  insulin lispro Injectable (HumaLOG) 3 Unit(s) SubCutaneous three times a day before meals  latanoprost 0.005% Ophthalmic Solution 1 Drop(s) Both EYES at bedtime  tamsulosin 0.4 milliGRAM(s) Oral at bedtime      PHYSICAL EXAM:  GENERAL: NAD,   EYES: conjunctiva and sclera clear  ENMT: Moist mucous membranes  NECK: Supple, No JVD, Normal thyroid  NERVOUS SYSTEM:  Alert & Oriented X,   CHEST/LUNG: Clear to auscultation bilaterally; No rales, rhonchi, wheezing, or rubs  HEART: Regular rate and rhythm; No murmurs, rubs, or gallops  ABDOMEN: Soft, Nontender, Nondistended; Bowel sounds present  EXTREMITIES:  2+ Peripheral Pulses, No clubbing, cyanosis, or edema  LYMPH: No lymphadenopathy noted  SKIN: No rashes or lesions    Consultant(s) Notes Reviewed:  [x ] YES  [ ] NO  Care Discussed with Consultants/Other Providers [ x] YES  [ ] NO    LABS:    10-28    140  |  101  |  12  ----------------------------<  157<H>  3.8   |  24  |  0.84    Ca    9.7      28 Oct 2019 05:58          CAPILLARY BLOOD GLUCOSE      POCT Blood Glucose.: 180 mg/dL (29 Oct 2019 11:30)  POCT Blood Glucose.: 148 mg/dL (29 Oct 2019 07:36)  POCT Blood Glucose.: 134 mg/dL (28 Oct 2019 21:03)              RADIOLOGY & ADDITIONAL TESTS:    Imaging Personally Reviewed:  [x ] YES  [ ] NO
[facility] Neurological Care [facility]      Seen earlier today, and examined.  - Today, patient is without complaints.           *****MEDICATIONS: Current medication reviewed and documented.    MEDICATIONS  (STANDING):  artificial  tears Solution 1 Drop(s) Both EYES four times a day  aspirin enteric coated 81 milliGRAM(s) Oral daily  atorvastatin 10 milliGRAM(s) Oral at bedtime  ciprofloxacin  0.3% Ophthalmic Solution 1 Drop(s) Both EYES every 4 hours  dextrose 5%. 1000 milliLiter(s) (50 mL/Hr) IV Continuous <Continuous>  dextrose 50% Injectable 12.5 Gram(s) IV Push once  dextrose 50% Injectable 25 Gram(s) IV Push once  dextrose 50% Injectable 25 Gram(s) IV Push once  dorzolamide 2% Ophthalmic Solution 1 Drop(s) Both EYES three times a day  doxycycline hyclate Capsule 50 milliGRAM(s) Oral every 12 hours  enalapril 20 milliGRAM(s) Oral daily  enoxaparin Injectable 40 milliGRAM(s) SubCutaneous daily  influenza   Vaccine 0.5 milliLiter(s) IntraMuscular once  insulin lispro (HumaLOG) corrective regimen sliding scale   SubCutaneous three times a day before meals  insulin lispro Injectable (HumaLOG) 3 Unit(s) SubCutaneous three times a day before meals  latanoprost 0.005% Ophthalmic Solution 1 Drop(s) Both EYES at bedtime  Timmy &amp; PolymyxinB, Bacitr, &amp; HC Eye Oint 1 Application(s) 1 Application(s) Both EYES at bedtime  tamsulosin 0.4 milliGRAM(s) Oral at bedtime    MEDICATIONS  (PRN):  dextrose 40% Gel 15 Gram(s) Oral once PRN Blood Glucose LESS THAN 70 milliGRAM(s)/deciliter  glucagon  Injectable 1 milliGRAM(s) IntraMuscular once PRN Glucose LESS THAN 70 milligrams/deciliter          ***** VITAL SIGNS:  T(F): 97.8 (10-28-19 @ 04:38), Max: 97.8 (10-28-19 @ 04:38)  HR: 64 (10-28-19 @ 12:07) (64 - 77)  BP: 119/77 (10-28-19 @ 12:07) (119/77 - 143/84)  RR: 18 (10-28-19 @ 12:07) (17 - 18)  SpO2: 100% (10-28-19 @ 12:07) (99% - 100%)  Wt(kg): --  ,   I&O's Summary    27 Oct 2019 07:  -  28 Oct 2019 07:00  --------------------------------------------------------  IN: 780 mL / OUT: 802 mL / NET: -22 mL    28 Oct 2019 07:  -  28 Oct 2019 17:24  --------------------------------------------------------  IN: 600 mL / OUT: 800 mL / NET: -200 mL             *****PHYSICAL EXAM:  alert oriented x1 attention comprehension are poor      EOmi fundi not visualized   no nystagmus VFF to confrontation  Tongue is midline  Palate elevates symmetrically   Moving all 4 ext spontaneously no drift appreciated    Gait not assessed.            *****LAB AND IMAGIN.4   4.30  )-----------( 213      ( 27 Oct 2019 08:41 )             37.6               10-28    140  |  101  |  12  ----------------------------<  157<H>  3.8   |  24  |  0.84    Ca    9.7      28 Oct 2019 05:58                           [All pertinent recent Imaging/Reports reviewed]           *****A S S E S S M E N T   A N D   P L A N :      Excerpt from H&P, 79yr old m with a pmhx DM, glaucoma, BPH presenting with CC of syncope. Patient was sitting at the dinner table when wife saw him starting to slouch over and was not very responsive. She then decided to get neighbors assistance and then they called EMS. When he became arousable he went to the bathroom and had a bout of diarrhea and appeared weak.  Denies dizziness, lightheadedness, recent fevers, chills, nausea, vomiting. Not currently in pain, no chest pain or abdominal pain. Patient did not fall during LOC, wife was able to catch him when he started slouching. Of note patient has been struggling with b/l bacterial conjunctivitis and has been having worsening vision as a result      Problem/Recommendations 1: syncope   cardiac w/u underway   pt had a large meal ? post prandial vagal episode?  smaller meals was recommended.   also pt with fluctuating bp   there was no prolonged post ictal period. lower suspicion for seizure. spot eeg without any seizures.     consider prolonged eeg outpt.   ct head without any acute process.    will continue to monitor.    discussed at bedside with pt, wife and son regarding finding.   memory evaluation was recommended   f/u with Dr. Nissenbaum [phone]      Thank you for allowing me to participate in the care of this patient. Please do not hesitate to call me if you have any  questions.        ________________  Yelena Alatorre MD  [facility] Neurological [facility] ([facility]  [phone]      33 minutes spent on total encounter; more than 50 % of the visit was  spent counseling about plan of care, compliance to diet/exercise and medication regimen and or  coordinating care by the attending physician.      It is advised that stroke patients follow up with BENJAMÍN Keller @ [phone] in 1- 2 weeks.   Others please follow up with Dr. Michael Nissenbaum [phone]
[facility] Neurological Care [facility]      Seen earlier today, and examined.  - Today, patient is without complaints.           *****MEDICATIONS: Current medication reviewed and documented.    MEDICATIONS  (STANDING):  artificial  tears Solution 1 Drop(s) Both EYES four times a day  aspirin enteric coated 81 milliGRAM(s) Oral daily  atorvastatin 10 milliGRAM(s) Oral at bedtime  ciprofloxacin  0.3% Ophthalmic Solution 1 Drop(s) Both EYES every 4 hours  dextrose 5%. 1000 milliLiter(s) (50 mL/Hr) IV Continuous <Continuous>  dextrose 50% Injectable 12.5 Gram(s) IV Push once  dextrose 50% Injectable 25 Gram(s) IV Push once  dextrose 50% Injectable 25 Gram(s) IV Push once  dorzolamide 2% Ophthalmic Solution 1 Drop(s) Both EYES three times a day  doxycycline hyclate Capsule 50 milliGRAM(s) Oral every 12 hours  enalapril 20 milliGRAM(s) Oral daily  enoxaparin Injectable 40 milliGRAM(s) SubCutaneous daily  influenza   Vaccine 0.5 milliLiter(s) IntraMuscular once  insulin lispro (HumaLOG) corrective regimen sliding scale   SubCutaneous three times a day before meals  insulin lispro Injectable (HumaLOG) 3 Unit(s) SubCutaneous three times a day before meals  latanoprost 0.005% Ophthalmic Solution 1 Drop(s) Both EYES at bedtime  Timmy &amp; PolymyxinB, Bacitr, &amp; HC Eye Oint 1 Application(s) 1 Application(s) Both EYES at bedtime  tamsulosin 0.4 milliGRAM(s) Oral at bedtime    MEDICATIONS  (PRN):  dextrose 40% Gel 15 Gram(s) Oral once PRN Blood Glucose LESS THAN 70 milliGRAM(s)/deciliter  glucagon  Injectable 1 milliGRAM(s) IntraMuscular once PRN Glucose LESS THAN 70 milligrams/deciliter          ***** VITAL SIGNS:  T(F): 97.3 (10-26-19 @ 04:21), Max: 98.1 (10-25-19 @ 20:36)  HR: 73 (10-26-19 @ 04:21) (62 - 73)  BP: 119/74 (10-26-19 @ 04:21) (119/74 - 130/71)  RR: 18 (10-26-19 @ 04:21) (18 - 18)  SpO2: 99% (10-26-19 @ 04:21) (97% - 99%)  Wt(kg): --  ,   I&O's Summary    25 Oct 2019 07:01  -  26 Oct 2019 07:00  --------------------------------------------------------  IN: 1190 mL / OUT: 950 mL / NET: 240 mL             *****PHYSICAL EXAM:   alert oriented x1 attention comprehension are poor      EOmi fundi not visualized   no nystagmus VFF to confrontation  Tongue is midline  Palate elevates symmetrically   Moving all 4 ext spontaneously no drift appreciated    Gait not assessed.            *****LAB AND IMAGIN.6   4.77  )-----------( 199      ( 25 Oct 2019 09:40 )             38.3               10-    139  |  101  |  15  ----------------------------<  102<H>  3.7   |  27  |  0.91    Ca    9.5      25 Oct 2019 07:15  Phos  2.5     10-24  Mg     1.8     10-24    TPro  6.6  /  Alb  3.8  /  TBili  0.4  /  DBili  x   /  AST  27  /  ALT  19  /  AlkPhos  54  10-25                       Urinalysis Basic - ( 25 Oct 2019 03:34 )    Color: Yellow / Appearance: Clear / S.026 / pH: x  Gluc: x / Ketone: Small  / Bili: Negative / Urobili: 2 mg/dL   Blood: x / Protein: 30 mg/dL / Nitrite: Negative   Leuk Esterase: Negative / RBC: 8 /hpf / WBC 7 /HPF   Sq Epi: x / Non Sq Epi: 2 / Bacteria: Negative      [All pertinent recent Imaging/Reports reviewed]           *****A S S E S S M E N T   A N D   P L A N :    Excerpt from H&P, 79yr old m with a pmhx DM, glaucoma, BPH presenting with CC of syncope. Patient was sitting at the dinner table when wife saw him starting to slouch over and was not very responsive. She then decided to get neighbors assistance and then they called EMS. When he became arousable he went to the bathroom and had a bout of diarrhea and appeared weak.  Denies dizziness, lightheadedness, recent fevers, chills, nausea, vomiting. Not currently in pain, no chest pain or abdominal pain. Patient did not fall during LOC, wife was able to catch him when he started slouching. Of note patient has been struggling with b/l bacterial conjunctivitis and has been having worsening vision as a result      Problem/Recommendations 1: syncope   cardiac w/u underway   pt had a large meal ? post prandial vagal episode?    there was no prolonged post ictal period. lower suspicion for seizure. eeg ordered   consider prolonged eeg outpt.   ct head without any acute process.    will continue to monitor.          Thank you for allowing me to participate in the care of this patient. Please do not hesitate to call me if you have any  questions.        ________________  Yelena Alatorre MD  [facility] Neurological [facility] ([facility]  [phone]      33 minutes spent on total encounter; more than 50 % of the visit was  spent counseling about plan of care, compliance to diet/exercise and medication regimen and or  coordinating care by the attending physician.      It is advised that stroke patients follow up with BENJAMÍN Keller @ [phone] in 1- 2 weeks.   Others please follow up with Dr. Michael Nissenbaum [phone]
[facility] Neurological Care [facility]      Seen earlier today, and examined.  - Today, patient is without complaints.           *****MEDICATIONS: Current medication reviewed and documented.    MEDICATIONS  (STANDING):  artificial  tears Solution 1 Drop(s) Both EYES four times a day  aspirin enteric coated 81 milliGRAM(s) Oral daily  atorvastatin 10 milliGRAM(s) Oral at bedtime  ciprofloxacin  0.3% Ophthalmic Solution 1 Drop(s) Both EYES every 4 hours  dextrose 5%. 1000 milliLiter(s) (50 mL/Hr) IV Continuous <Continuous>  dextrose 50% Injectable 12.5 Gram(s) IV Push once  dextrose 50% Injectable 25 Gram(s) IV Push once  dextrose 50% Injectable 25 Gram(s) IV Push once  dorzolamide 2% Ophthalmic Solution 1 Drop(s) Both EYES three times a day  doxycycline hyclate Capsule 50 milliGRAM(s) Oral every 12 hours  enalapril 20 milliGRAM(s) Oral daily  enoxaparin Injectable 40 milliGRAM(s) SubCutaneous daily  influenza   Vaccine 0.5 milliLiter(s) IntraMuscular once  insulin lispro (HumaLOG) corrective regimen sliding scale   SubCutaneous three times a day before meals  insulin lispro Injectable (HumaLOG) 3 Unit(s) SubCutaneous three times a day before meals  latanoprost 0.005% Ophthalmic Solution 1 Drop(s) Both EYES at bedtime  Timmy &amp; PolymyxinB, Bacitr, &amp; HC Eye Oint 1 Application(s) 1 Application(s) Both EYES at bedtime  tamsulosin 0.4 milliGRAM(s) Oral at bedtime    MEDICATIONS  (PRN):  dextrose 40% Gel 15 Gram(s) Oral once PRN Blood Glucose LESS THAN 70 milliGRAM(s)/deciliter  glucagon  Injectable 1 milliGRAM(s) IntraMuscular once PRN Glucose LESS THAN 70 milligrams/deciliter          ***** VITAL SIGNS:  T(F): 97.6 (10-26-19 @ 11:26), Max: 97.6 (10-26-19 @ 11:26)  HR: 78 (10-26-19 @ 11:57) (73 - 84)  BP: 117/69 (10-26-19 @ 11:57) (117/69 - 131/79)  RR: 18 (10-26-19 @ 11:26) (18 - 18)  SpO2: 98% (10-26-19 @ 11:57) (92% - 99%)  Wt(kg): --  ,   I&O's Summary    25 Oct 2019 07:  -  26 Oct 2019 07:00  --------------------------------------------------------  IN: 1190 mL / OUT: 950 mL / NET: 240 mL    26 Oct 2019 07:  -  26 Oct 2019 20:57  --------------------------------------------------------  IN: 1080 mL / OUT: 0 mL / NET: 1080 mL          alert oriented x1 attention comprehension are poor      EOmi fundi not visualized   no nystagmus VFF to confrontation  Tongue is midline  Palate elevates symmetrically   Moving all 4 ext spontaneously no drift appreciated    Gait not assessed.          *****LAB AND IMAGIN.6   4.77  )-----------( 199      ( 25 Oct 2019 09:40 )             38.3               10-    139  |  101  |  15  ----------------------------<  102<H>  3.7   |  27  |  0.91    Ca    9.5      25 Oct 2019 07:15  Phos  2.5     10-24  Mg     1.8     10-24    TPro  6.6  /  Alb  3.8  /  TBili  0.4  /  DBili  x   /  AST  27  /  ALT  19  /  AlkPhos  54  10-25                       Urinalysis Basic - ( 25 Oct 2019 03:34 )    Color: Yellow / Appearance: Clear / S.026 / pH: x  Gluc: x / Ketone: Small  / Bili: Negative / Urobili: 2 mg/dL   Blood: x / Protein: 30 mg/dL / Nitrite: Negative   Leuk Esterase: Negative / RBC: 8 /hpf / WBC 7 /HPF   Sq Epi: x / Non Sq Epi: 2 / Bacteria: Negative      [All pertinent recent Imaging/Reports reviewed]           *****A S S E S S M E N T   A N D   P L A N :      Excerpt from H&P, 79yr old m with a pmhx DM, glaucoma, BPH presenting with CC of syncope. Patient was sitting at the dinner table when wife saw him starting to slouch over and was not very responsive. She then decided to get neighbors assistance and then they called EMS. When he became arousable he went to the bathroom and had a bout of diarrhea and appeared weak.  Denies dizziness, lightheadedness, recent fevers, chills, nausea, vomiting. Not currently in pain, no chest pain or abdominal pain. Patient did not fall during LOC, wife was able to catch him when he started slouching. Of note patient has been struggling with b/l bacterial conjunctivitis and has been having worsening vision as a result      Problem/Recommendations 1: syncope   cardiac w/u underway   pt had a large meal ? post prandial vagal episode?    there was no prolonged post ictal period. lower suspicion for seizure. eeg ordered   consider prolonged eeg outpt.   ct head without any acute process.    will continue to monitor.          Thank you for allowing me to participate in the care of this patient. Please do not hesitate to call me if you have any  questions.        ________________  Yelena Alatorre MD  [facility] Neurological [facility] ([facility]  [phone]      33 minutes spent on total encounter; more than 50 % of the visit was  spent counseling about plan of care, compliance to diet/exercise and medication regimen and or  coordinating care by the attending physician.      It is advised that stroke patients follow up with BENJAMÍN Keller @ [phone] in 1- 2 weeks.   Others please follow up with Dr. Michael Nissenbaum [phone]
Patient is a 79y old  Male who presents with a chief complaint of evaluation after syncopal episode (25 Oct 2019 17:23)      INTERVAL HPI/OVERNIGHT EVENTS: able to open eyes this am, feels well  denies cp/sob, no tele events      Vital Signs Last 24 Hrs  T(C): 36.3 (26 Oct 2019 04:21), Max: 36.7 (25 Oct 2019 20:36)  T(F): 97.3 (26 Oct 2019 04:21), Max: 98.1 (25 Oct 2019 20:36)  HR: 73 (26 Oct 2019 04:21) (62 - 73)  BP: 119/74 (26 Oct 2019 04:21) (119/74 - 130/71)  BP(mean): --  RR: 18 (26 Oct 2019 04:21) (18 - 18)  SpO2: 99% (26 Oct 2019 04:21) (97% - 99%)    artificial  tears Solution 1 Drop(s) Both EYES four times a day  aspirin enteric coated 81 milliGRAM(s) Oral daily  atorvastatin 10 milliGRAM(s) Oral at bedtime  ciprofloxacin  0.3% Ophthalmic Solution 1 Drop(s) Both EYES every 4 hours  dextrose 40% Gel 15 Gram(s) Oral once PRN  dextrose 5%. 1000 milliLiter(s) IV Continuous <Continuous>  dextrose 50% Injectable 12.5 Gram(s) IV Push once  dextrose 50% Injectable 25 Gram(s) IV Push once  dextrose 50% Injectable 25 Gram(s) IV Push once  dorzolamide 2% Ophthalmic Solution 1 Drop(s) Both EYES three times a day  doxycycline hyclate Capsule 50 milliGRAM(s) Oral every 12 hours  enalapril 20 milliGRAM(s) Oral daily  enoxaparin Injectable 40 milliGRAM(s) SubCutaneous daily  glucagon  Injectable 1 milliGRAM(s) IntraMuscular once PRN  influenza   Vaccine 0.5 milliLiter(s) IntraMuscular once  insulin lispro (HumaLOG) corrective regimen sliding scale   SubCutaneous three times a day before meals  insulin lispro Injectable (HumaLOG) 3 Unit(s) SubCutaneous three times a day before meals  latanoprost 0.005% Ophthalmic Solution 1 Drop(s) Both EYES at bedtime  Timmy &amp; PolymyxinB, Bacitr, &amp; HC Eye Oint 1 Application(s) 1 Application(s) Both EYES at bedtime  tamsulosin 0.4 milliGRAM(s) Oral at bedtime      PHYSICAL EXAM:  GENERAL: NAD,   EYES: b/l mildly inflamed conjunctiva  ENMT: Moist mucous membranes  NECK: Supple, No JVD, Normal thyroid  NERVOUS SYSTEM:  Alert & Oriented X,   CHEST/LUNG: Clear to auscultation bilaterally; No rales, rhonchi, wheezing, or rubs  HEART: Regular rate and rhythm; No murmurs, rubs, or gallops  ABDOMEN: Soft, Nontender, Nondistended; Bowel sounds present  EXTREMITIES:  2+ Peripheral Pulses, No clubbing, cyanosis, or edema  LYMPH: No lymphadenopathy noted  SKIN: No rashes or lesions    Consultant(s) Notes Reviewed:  [x ] YES  [ ] NO  Care Discussed with Consultants/Other Providers [ x] YES  [ ] NO    LABS:                        12.6   4.77  )-----------( 199      ( 25 Oct 2019 09:40 )             38.3     10-    139  |  101  |  15  ----------------------------<  102<H>  3.7   |  27  |  0.91    Ca    9.5      25 Oct 2019 07:15  Phos  2.5     10-24  Mg     1.8     10-24    TPro  6.6  /  Alb  3.8  /  TBili  0.4  /  DBili  x   /  AST  27  /  ALT  19  /  AlkPhos  54  10-25      Urinalysis Basic - ( 25 Oct 2019 03:34 )    Color: Yellow / Appearance: Clear / S.026 / pH: x  Gluc: x / Ketone: Small  / Bili: Negative / Urobili: 2 mg/dL   Blood: x / Protein: 30 mg/dL / Nitrite: Negative   Leuk Esterase: Negative / RBC: 8 /hpf / WBC 7 /HPF   Sq Epi: x / Non Sq Epi: 2 / Bacteria: Negative      CAPILLARY BLOOD GLUCOSE      POCT Blood Glucose.: 132 mg/dL (26 Oct 2019 07:36)  POCT Blood Glucose.: 96 mg/dL (25 Oct 2019 21:18)  POCT Blood Glucose.: 94 mg/dL (25 Oct 2019 16:38)  POCT Blood Glucose.: 109 mg/dL (25 Oct 2019 11:34)        Urinalysis Basic - ( 25 Oct 2019 03:34 )    Color: Yellow / Appearance: Clear / S.026 / pH: x  Gluc: x / Ketone: Small  / Bili: Negative / Urobili: 2 mg/dL   Blood: x / Protein: 30 mg/dL / Nitrite: Negative   Leuk Esterase: Negative / RBC: 8 /hpf / WBC 7 /HPF   Sq Epi: x / Non Sq Epi: 2 / Bacteria: Negative        Culture - Urine (collected 25 Oct 2019 10:03)  Source: .Urine  Final Report (26 Oct 2019 08:20):    <10,000 CFU/mL Normal Urogenital Shannen        RADIOLOGY & ADDITIONAL TESTS:    Imaging Personally Reviewed:  [x ] YES  [ ] NO
[facility] Ophthalmology follow up Note    interval: patient was seen and examined bed side. Patient felt his vision is better. His son felt he can open his eyes today which seems better than yesterday.     ROS:  General (weight loss ), Vision (per HPI), Head and Neck (neg),  Neuro (neg),     Mood and Affect Appropriate ( x ),  Oriented to Time, Place, and Person x 3 ( x )    Ophthalmology Exam    Visual acuity (cc): OD: HM 2 Ft (CF at the face not reliable), OS : CF @ 2 feets  Pupils: PERRL OU, no APD  Ttono: OD 15, OS 14  Extraocular movements (EOMs): Full OU, no pain, no diplopia     Pen Light Exam (PLE)  External:  Flat OU  Lids/Lashes/Lacrimal Ducts: Flat OU    Sclera/Conjunctiva:  OU diffuse +2 injection of the bulbar and palpebral conj, +3 follicles of the palpebral conj   Cornea: OU: +3 SPK with stromal haze diffuse and D-folds visible. OD: central horizontal 4 x 1 epithelial defect, OS central horizontal 5 x 1 epithelial defect,   Anterior Chamber: D+F OU  Iris:  Flat OU  Lens:  Cl OU      Diagnostic Testing:  EKC swab in Outpatient Clinic 10/2019 : negative  Conjunctival Mucus discharge OD 10/25/19 : sent and pending  Conjunctival Mucus discharge OS 10/25/19: sent and pending
Undergoing EEG  No acute palpitations, dizziness, SOB or CP    Vital Signs Last 24 Hrs  T(C): 36.7 (27 Oct 2019 04:41), Max: 36.7 (26 Oct 2019 20:54)  T(F): 98 (27 Oct 2019 04:41), Max: 98.1 (26 Oct 2019 20:54)  HR: 84 (27 Oct 2019 06:08) (61 - 84)  BP: 121/69 (27 Oct 2019 06:08) (117/69 - 126/78)  BP(mean): --  RR: 18 (27 Oct 2019 04:41) (18 - 18)  SpO2: 99% (27 Oct 2019 04:41) (98% - 99%)    GENERAL: NAD,   EYES: b/l mildly inflamed conjunctiva  ENMT: Moist mucous membranes  NECK: Supple, No JVD, Normal thyroid  NERVOUS SYSTEM:  Alert & Oriented X,   CHEST/LUNG: Clear to auscultation bilaterally; No rales, rhonchi, wheezing, or rubs  HEART: Regular rate and rhythm; No murmurs, rubs, or gallops  ABDOMEN: Soft, Nontender, Nondistended; Bowel sounds present  EXTREMITIES:  2+ Peripheral Pulses, No clubbing, cyanosis, or edema  LYMPH: No lymphadenopathy noted  SKIN: No rashes or lesions    LABS:                        12.4   4.30  )-----------( 213      ( 27 Oct 2019 08:41 )             37.6     10-27    136  |  101  |  15  ----------------------------<  125<H>  3.8   |  25  |  0.81    Ca    9.5      27 Oct 2019 06:20        CAPILLARY BLOOD GLUCOSE      POCT Blood Glucose.: 146 mg/dL (27 Oct 2019 11:37)  POCT Blood Glucose.: 137 mg/dL (27 Oct 2019 07:19)  POCT Blood Glucose.: 104 mg/dL (26 Oct 2019 21:11)  POCT Blood Glucose.: 148 mg/dL (26 Oct 2019 16:32)  POCT Blood Glucose.: 189 mg/dL (26 Oct 2019 11:57)

## 2019-10-29 NOTE — PROGRESS NOTE ADULT - PROVIDER SPECIALTY LIST ADULT
Internal Medicine
Internal Medicine
Neurology
Ophthalmology
Neurology
Ophthalmology
Internal Medicine

## 2019-10-29 NOTE — DISCHARGE NOTE NURSING/CASE MANAGEMENT/SOCIAL WORK - NSDCFUADDAPPT_GEN_ALL_CORE_FT
Follow-Up:  You should follow up his/her ophthalmologist or in the [facility] within 1 week of discharge.  [address] [facility] [facility] [place] [address]  [phone]  Follow up with neurology, Dr. Nissenbaum for prolong EEG, memory evaluation, and continued monitoring.

## 2019-10-29 NOTE — DISCHARGE NOTE NURSING/CASE MANAGEMENT/SOCIAL WORK - PATIENT PORTAL LINK FT
You can access the FollowMyHealth Patient Portal offered by [facility] by registering at the following website: [email or web address]. By joining [employer]’s FollowMyHealth portal, you will also be able to view your health information using other applications (apps) compatible with our system.

## 2019-10-29 NOTE — PROGRESS NOTE ADULT - REASON FOR ADMISSION
evaluation after syncopal episode

## 2019-11-04 PROBLEM — E78.5 HYPERLIPIDEMIA, UNSPECIFIED: Chronic | Status: ACTIVE | Noted: 2019-10-25

## 2019-11-04 PROBLEM — N40.0 BENIGN PROSTATIC HYPERPLASIA WITHOUT LOWER URINARY TRACT SYMPTOMS: Chronic | Status: ACTIVE | Noted: 2019-10-25

## 2019-11-04 PROBLEM — E11.9 TYPE 2 DIABETES MELLITUS WITHOUT COMPLICATIONS: Chronic | Status: ACTIVE | Noted: 2019-10-25

## 2019-11-04 PROBLEM — I10 ESSENTIAL (PRIMARY) HYPERTENSION: Chronic | Status: ACTIVE | Noted: 2019-10-25

## 2019-11-06 ENCOUNTER — NON-APPOINTMENT (OUTPATIENT)
Age: 79
End: 2019-11-06

## 2019-11-06 ENCOUNTER — APPOINTMENT (OUTPATIENT)
Dept: OPHTHALMOLOGY | Facility: CLINIC | Age: 79
End: 2019-11-06
Payer: MEDICARE

## 2019-11-06 PROCEDURE — 92012 INTRM OPH EXAM EST PATIENT: CPT

## 2019-11-12 PROCEDURE — 87070 CULTURE OTHR SPECIMN AEROBIC: CPT

## 2019-11-12 PROCEDURE — 87086 URINE CULTURE/COLONY COUNT: CPT

## 2019-11-12 PROCEDURE — 82330 ASSAY OF CALCIUM: CPT

## 2019-11-12 PROCEDURE — 83735 ASSAY OF MAGNESIUM: CPT

## 2019-11-12 PROCEDURE — 82962 GLUCOSE BLOOD TEST: CPT

## 2019-11-12 PROCEDURE — 81001 URINALYSIS AUTO W/SCOPE: CPT

## 2019-11-12 PROCEDURE — 80053 COMPREHEN METABOLIC PANEL: CPT

## 2019-11-12 PROCEDURE — 84484 ASSAY OF TROPONIN QUANT: CPT

## 2019-11-12 PROCEDURE — 80048 BASIC METABOLIC PNL TOTAL CA: CPT

## 2019-11-12 PROCEDURE — 97162 PT EVAL MOD COMPLEX 30 MIN: CPT

## 2019-11-12 PROCEDURE — 82435 ASSAY OF BLOOD CHLORIDE: CPT

## 2019-11-12 PROCEDURE — 84100 ASSAY OF PHOSPHORUS: CPT

## 2019-11-12 PROCEDURE — 85027 COMPLETE CBC AUTOMATED: CPT

## 2019-11-12 PROCEDURE — 85014 HEMATOCRIT: CPT

## 2019-11-12 PROCEDURE — 82947 ASSAY GLUCOSE BLOOD QUANT: CPT

## 2019-11-12 PROCEDURE — 70450 CT HEAD/BRAIN W/O DYE: CPT

## 2019-11-12 PROCEDURE — 93005 ELECTROCARDIOGRAM TRACING: CPT

## 2019-11-12 PROCEDURE — 83605 ASSAY OF LACTIC ACID: CPT

## 2019-11-12 PROCEDURE — 99285 EMERGENCY DEPT VISIT HI MDM: CPT

## 2019-11-12 PROCEDURE — 95816 EEG AWAKE AND DROWSY: CPT

## 2019-11-12 PROCEDURE — 83036 HEMOGLOBIN GLYCOSYLATED A1C: CPT

## 2019-11-12 PROCEDURE — 84132 ASSAY OF SERUM POTASSIUM: CPT

## 2019-11-12 PROCEDURE — 80061 LIPID PANEL: CPT

## 2019-11-12 PROCEDURE — 93306 TTE W/DOPPLER COMPLETE: CPT

## 2019-11-12 PROCEDURE — 71045 X-RAY EXAM CHEST 1 VIEW: CPT

## 2019-11-12 PROCEDURE — 84295 ASSAY OF SERUM SODIUM: CPT

## 2019-11-12 PROCEDURE — 82803 BLOOD GASES ANY COMBINATION: CPT

## 2019-11-13 ENCOUNTER — NON-APPOINTMENT (OUTPATIENT)
Age: 79
End: 2019-11-13

## 2019-11-13 ENCOUNTER — APPOINTMENT (OUTPATIENT)
Dept: OPHTHALMOLOGY | Facility: CLINIC | Age: 79
End: 2019-11-13
Payer: MEDICARE

## 2019-11-13 PROCEDURE — 92014 COMPRE OPH EXAM EST PT 1/>: CPT

## 2019-11-18 ENCOUNTER — APPOINTMENT (OUTPATIENT)
Dept: OPHTHALMOLOGY | Facility: CLINIC | Age: 79
End: 2019-11-18
Payer: MEDICARE

## 2019-11-18 ENCOUNTER — NON-APPOINTMENT (OUTPATIENT)
Age: 79
End: 2019-11-18

## 2019-11-18 PROCEDURE — 92012 INTRM OPH EXAM EST PATIENT: CPT

## 2019-11-22 ENCOUNTER — NON-APPOINTMENT (OUTPATIENT)
Age: 79
End: 2019-11-22

## 2019-11-22 ENCOUNTER — APPOINTMENT (OUTPATIENT)
Dept: OPHTHALMOLOGY | Facility: CLINIC | Age: 79
End: 2019-11-22
Payer: MEDICARE

## 2019-11-22 PROCEDURE — 92012 INTRM OPH EXAM EST PATIENT: CPT

## 2019-11-26 ENCOUNTER — NON-APPOINTMENT (OUTPATIENT)
Age: 79
End: 2019-11-26

## 2019-11-26 ENCOUNTER — APPOINTMENT (OUTPATIENT)
Dept: OPHTHALMOLOGY | Facility: CLINIC | Age: 79
End: 2019-11-26
Payer: MEDICARE

## 2019-11-26 PROCEDURE — 92012 INTRM OPH EXAM EST PATIENT: CPT

## 2019-12-05 ENCOUNTER — NON-APPOINTMENT (OUTPATIENT)
Age: 79
End: 2019-12-05

## 2019-12-05 ENCOUNTER — APPOINTMENT (OUTPATIENT)
Dept: OPHTHALMOLOGY | Facility: CLINIC | Age: 79
End: 2019-12-05
Payer: MEDICARE

## 2019-12-05 PROCEDURE — 92012 INTRM OPH EXAM EST PATIENT: CPT

## 2019-12-19 ENCOUNTER — APPOINTMENT (OUTPATIENT)
Dept: OPHTHALMOLOGY | Facility: CLINIC | Age: 79
End: 2019-12-19
Payer: MEDICARE

## 2019-12-19 ENCOUNTER — APPOINTMENT (OUTPATIENT)
Dept: OPHTHALMOLOGY | Facility: CLINIC | Age: 79
End: 2019-12-19

## 2019-12-19 ENCOUNTER — NON-APPOINTMENT (OUTPATIENT)
Age: 79
End: 2019-12-19

## 2019-12-19 PROCEDURE — 92012 INTRM OPH EXAM EST PATIENT: CPT

## 2020-01-30 ENCOUNTER — NON-APPOINTMENT (OUTPATIENT)
Age: 80
End: 2020-01-30

## 2020-01-30 ENCOUNTER — APPOINTMENT (OUTPATIENT)
Dept: OPHTHALMOLOGY | Facility: CLINIC | Age: 80
End: 2020-01-30
Payer: MEDICARE

## 2020-01-30 PROCEDURE — 92012 INTRM OPH EXAM EST PATIENT: CPT

## 2020-02-04 ENCOUNTER — APPOINTMENT (OUTPATIENT)
Dept: OPHTHALMOLOGY | Facility: CLINIC | Age: 80
End: 2020-02-04
Payer: MEDICARE

## 2020-02-04 ENCOUNTER — NON-APPOINTMENT (OUTPATIENT)
Age: 80
End: 2020-02-04

## 2020-02-04 PROCEDURE — 92012 INTRM OPH EXAM EST PATIENT: CPT

## 2020-03-06 ENCOUNTER — NON-APPOINTMENT (OUTPATIENT)
Age: 80
End: 2020-03-06

## 2020-03-06 ENCOUNTER — APPOINTMENT (OUTPATIENT)
Dept: OPHTHALMOLOGY | Facility: CLINIC | Age: 80
End: 2020-03-06
Payer: MEDICARE

## 2020-03-06 PROCEDURE — 92012 INTRM OPH EXAM EST PATIENT: CPT

## 2020-03-11 ENCOUNTER — APPOINTMENT (OUTPATIENT)
Dept: OPHTHALMOLOGY | Facility: CLINIC | Age: 80
End: 2020-03-11
Payer: MEDICARE

## 2020-03-11 ENCOUNTER — NON-APPOINTMENT (OUTPATIENT)
Age: 80
End: 2020-03-11

## 2020-03-11 PROCEDURE — 92012 INTRM OPH EXAM EST PATIENT: CPT

## 2020-03-25 ENCOUNTER — NON-APPOINTMENT (OUTPATIENT)
Age: 80
End: 2020-03-25

## 2020-03-25 ENCOUNTER — APPOINTMENT (OUTPATIENT)
Dept: OPHTHALMOLOGY | Facility: CLINIC | Age: 80
End: 2020-03-25
Payer: MEDICARE

## 2020-03-25 PROCEDURE — 92012 INTRM OPH EXAM EST PATIENT: CPT

## 2020-04-02 ENCOUNTER — APPOINTMENT (OUTPATIENT)
Dept: OPHTHALMOLOGY | Facility: CLINIC | Age: 80
End: 2020-04-02
Payer: MEDICARE

## 2020-04-02 ENCOUNTER — NON-APPOINTMENT (OUTPATIENT)
Age: 80
End: 2020-04-02

## 2020-04-02 PROCEDURE — 92012 INTRM OPH EXAM EST PATIENT: CPT

## 2020-04-23 ENCOUNTER — NON-APPOINTMENT (OUTPATIENT)
Age: 80
End: 2020-04-23

## 2020-04-23 ENCOUNTER — APPOINTMENT (OUTPATIENT)
Dept: OPHTHALMOLOGY | Facility: CLINIC | Age: 80
End: 2020-04-23
Payer: MEDICARE

## 2020-04-23 PROCEDURE — 99441: CPT

## 2020-04-29 ENCOUNTER — APPOINTMENT (OUTPATIENT)
Dept: OPHTHALMOLOGY | Facility: CLINIC | Age: 80
End: 2020-04-29

## 2020-06-09 ENCOUNTER — APPOINTMENT (OUTPATIENT)
Dept: OPHTHALMOLOGY | Facility: CLINIC | Age: 80
End: 2020-06-09

## 2020-06-16 ENCOUNTER — NON-APPOINTMENT (OUTPATIENT)
Age: 80
End: 2020-06-16

## 2020-06-16 ENCOUNTER — APPOINTMENT (OUTPATIENT)
Dept: OPHTHALMOLOGY | Facility: CLINIC | Age: 80
End: 2020-06-16
Payer: MEDICARE

## 2020-06-16 PROCEDURE — 92012 INTRM OPH EXAM EST PATIENT: CPT

## 2020-06-16 PROCEDURE — 92133 CPTRZD OPH DX IMG PST SGM ON: CPT

## 2020-06-23 ENCOUNTER — APPOINTMENT (OUTPATIENT)
Dept: MRI IMAGING | Facility: CLINIC | Age: 80
End: 2020-06-23
Payer: MEDICARE

## 2020-06-23 ENCOUNTER — OUTPATIENT (OUTPATIENT)
Dept: OUTPATIENT SERVICES | Facility: HOSPITAL | Age: 80
LOS: 1 days | End: 2020-06-23
Payer: MEDICARE

## 2020-06-23 ENCOUNTER — RESULT REVIEW (OUTPATIENT)
Age: 80
End: 2020-06-23

## 2020-06-23 DIAGNOSIS — Z00.8 ENCOUNTER FOR OTHER GENERAL EXAMINATION: ICD-10-CM

## 2020-06-23 PROCEDURE — 70551 MRI BRAIN STEM W/O DYE: CPT | Mod: 26

## 2020-06-23 PROCEDURE — 70551 MRI BRAIN STEM W/O DYE: CPT

## 2020-08-26 ENCOUNTER — APPOINTMENT (OUTPATIENT)
Dept: OPHTHALMOLOGY | Facility: CLINIC | Age: 80
End: 2020-08-26
Payer: MEDICARE

## 2020-08-26 ENCOUNTER — NON-APPOINTMENT (OUTPATIENT)
Age: 80
End: 2020-08-26

## 2020-08-26 PROCEDURE — 92012 INTRM OPH EXAM EST PATIENT: CPT

## 2020-09-16 ENCOUNTER — APPOINTMENT (OUTPATIENT)
Dept: OPHTHALMOLOGY | Facility: CLINIC | Age: 80
End: 2020-09-16
Payer: MEDICARE

## 2020-09-16 ENCOUNTER — NON-APPOINTMENT (OUTPATIENT)
Age: 80
End: 2020-09-16

## 2020-09-16 PROCEDURE — 92012 INTRM OPH EXAM EST PATIENT: CPT

## 2020-09-17 ENCOUNTER — NON-APPOINTMENT (OUTPATIENT)
Age: 80
End: 2020-09-17

## 2020-09-17 ENCOUNTER — APPOINTMENT (OUTPATIENT)
Dept: OPHTHALMOLOGY | Facility: CLINIC | Age: 80
End: 2020-09-17
Payer: MEDICARE

## 2020-09-17 PROCEDURE — 92012 INTRM OPH EXAM EST PATIENT: CPT

## 2020-10-13 ENCOUNTER — APPOINTMENT (OUTPATIENT)
Dept: OPHTHALMOLOGY | Facility: CLINIC | Age: 80
End: 2020-10-13
Payer: MEDICARE

## 2020-10-13 ENCOUNTER — NON-APPOINTMENT (OUTPATIENT)
Age: 80
End: 2020-10-13

## 2020-10-13 PROCEDURE — 92014 COMPRE OPH EXAM EST PT 1/>: CPT

## 2020-10-20 ENCOUNTER — NON-APPOINTMENT (OUTPATIENT)
Age: 80
End: 2020-10-20

## 2020-10-20 ENCOUNTER — APPOINTMENT (OUTPATIENT)
Dept: OPHTHALMOLOGY | Facility: CLINIC | Age: 80
End: 2020-10-20
Payer: MEDICARE

## 2020-10-20 PROCEDURE — 92012 INTRM OPH EXAM EST PATIENT: CPT

## 2020-10-20 PROCEDURE — 92285 EXTERNAL OCULAR PHOTOGRAPHY: CPT

## 2020-10-26 ENCOUNTER — APPOINTMENT (OUTPATIENT)
Dept: OPHTHALMOLOGY | Facility: CLINIC | Age: 80
End: 2020-10-26

## 2020-10-30 ENCOUNTER — NON-APPOINTMENT (OUTPATIENT)
Age: 80
End: 2020-10-30

## 2020-10-30 ENCOUNTER — APPOINTMENT (OUTPATIENT)
Dept: OPHTHALMOLOGY | Facility: CLINIC | Age: 80
End: 2020-10-30
Payer: MEDICARE

## 2020-10-30 PROCEDURE — 92012 INTRM OPH EXAM EST PATIENT: CPT

## 2020-11-03 ENCOUNTER — APPOINTMENT (OUTPATIENT)
Dept: OPHTHALMOLOGY | Facility: CLINIC | Age: 80
End: 2020-11-03

## 2020-11-06 ENCOUNTER — INPATIENT (INPATIENT)
Facility: HOSPITAL | Age: 80
LOS: 11 days | Discharge: HOME | DRG: 115 | End: 2020-11-18
Attending: INTERNAL MEDICINE | Admitting: INTERNAL MEDICINE
Payer: MEDICARE

## 2020-11-06 ENCOUNTER — NON-APPOINTMENT (OUTPATIENT)
Age: 80
End: 2020-11-06

## 2020-11-06 ENCOUNTER — TRANSCRIPTION ENCOUNTER (OUTPATIENT)
Age: 80
End: 2020-11-06

## 2020-11-06 ENCOUNTER — APPOINTMENT (OUTPATIENT)
Dept: OPHTHALMOLOGY | Facility: CLINIC | Age: 80
End: 2020-11-06
Payer: MEDICARE

## 2020-11-06 VITALS
OXYGEN SATURATION: 99 % | RESPIRATION RATE: 17 BRPM | HEIGHT: 71 IN | SYSTOLIC BLOOD PRESSURE: 170 MMHG | TEMPERATURE: 98 F | HEART RATE: 61 BPM | WEIGHT: 134.92 LBS | DIASTOLIC BLOOD PRESSURE: 97 MMHG

## 2020-11-06 DIAGNOSIS — Z01.818 ENCOUNTER FOR OTHER PREPROCEDURAL EXAMINATION: ICD-10-CM

## 2020-11-06 DIAGNOSIS — R63.4 ABNORMAL WEIGHT LOSS: ICD-10-CM

## 2020-11-06 DIAGNOSIS — E78.5 HYPERLIPIDEMIA, UNSPECIFIED: ICD-10-CM

## 2020-11-06 DIAGNOSIS — Z29.9 ENCOUNTER FOR PROPHYLACTIC MEASURES, UNSPECIFIED: ICD-10-CM

## 2020-11-06 DIAGNOSIS — I10 ESSENTIAL (PRIMARY) HYPERTENSION: ICD-10-CM

## 2020-11-06 DIAGNOSIS — N40.0 BENIGN PROSTATIC HYPERPLASIA WITHOUT LOWER URINARY TRACT SYMPTOMS: ICD-10-CM

## 2020-11-06 DIAGNOSIS — H18.891 OTHER SPECIFIED DISORDERS OF CORNEA, RIGHT EYE: ICD-10-CM

## 2020-11-06 DIAGNOSIS — E11.9 TYPE 2 DIABETES MELLITUS WITHOUT COMPLICATIONS: ICD-10-CM

## 2020-11-06 LAB
ALBUMIN SERPL ELPH-MCNC: 4.5 G/DL — SIGNIFICANT CHANGE UP (ref 3.3–5)
ALP SERPL-CCNC: 51 U/L — SIGNIFICANT CHANGE UP (ref 40–120)
ALT FLD-CCNC: 16 U/L — SIGNIFICANT CHANGE UP (ref 10–45)
ANION GAP SERPL CALC-SCNC: 11 MMOL/L — SIGNIFICANT CHANGE UP (ref 5–17)
APTT BLD: 31.1 SEC — SIGNIFICANT CHANGE UP (ref 27.5–35.5)
AST SERPL-CCNC: 17 U/L — SIGNIFICANT CHANGE UP (ref 10–40)
BASOPHILS # BLD AUTO: 0.02 K/UL — SIGNIFICANT CHANGE UP (ref 0–0.2)
BASOPHILS NFR BLD AUTO: 0.5 % — SIGNIFICANT CHANGE UP (ref 0–2)
BILIRUB SERPL-MCNC: 0.5 MG/DL — SIGNIFICANT CHANGE UP (ref 0.2–1.2)
BLD GP AB SCN SERPL QL: NEGATIVE — SIGNIFICANT CHANGE UP
BUN SERPL-MCNC: 9 MG/DL — SIGNIFICANT CHANGE UP (ref 7–23)
CALCIUM SERPL-MCNC: 10 MG/DL — SIGNIFICANT CHANGE UP (ref 8.4–10.5)
CHLORIDE SERPL-SCNC: 96 MMOL/L — SIGNIFICANT CHANGE UP (ref 96–108)
CO2 SERPL-SCNC: 28 MMOL/L — SIGNIFICANT CHANGE UP (ref 22–31)
CREAT SERPL-MCNC: 0.78 MG/DL — SIGNIFICANT CHANGE UP (ref 0.5–1.3)
EOSINOPHIL # BLD AUTO: 0.03 K/UL — SIGNIFICANT CHANGE UP (ref 0–0.5)
EOSINOPHIL NFR BLD AUTO: 0.8 % — SIGNIFICANT CHANGE UP (ref 0–6)
GAS PNL BLDV: SIGNIFICANT CHANGE UP
GLUCOSE BLDC GLUCOMTR-MCNC: 139 MG/DL — HIGH (ref 70–99)
GLUCOSE SERPL-MCNC: 125 MG/DL — HIGH (ref 70–99)
HCT VFR BLD CALC: 38.1 % — LOW (ref 39–50)
HGB BLD-MCNC: 12.6 G/DL — LOW (ref 13–17)
IMM GRANULOCYTES NFR BLD AUTO: 0.3 % — SIGNIFICANT CHANGE UP (ref 0–1.5)
INR BLD: 1.12 RATIO — SIGNIFICANT CHANGE UP (ref 0.88–1.16)
LYMPHOCYTES # BLD AUTO: 0.66 K/UL — LOW (ref 1–3.3)
LYMPHOCYTES # BLD AUTO: 17.1 % — SIGNIFICANT CHANGE UP (ref 13–44)
MCHC RBC-ENTMCNC: 29.2 PG — SIGNIFICANT CHANGE UP (ref 27–34)
MCHC RBC-ENTMCNC: 33.1 GM/DL — SIGNIFICANT CHANGE UP (ref 32–36)
MCV RBC AUTO: 88.2 FL — SIGNIFICANT CHANGE UP (ref 80–100)
MONOCYTES # BLD AUTO: 0.34 K/UL — SIGNIFICANT CHANGE UP (ref 0–0.9)
MONOCYTES NFR BLD AUTO: 8.8 % — SIGNIFICANT CHANGE UP (ref 2–14)
NEUTROPHILS # BLD AUTO: 2.8 K/UL — SIGNIFICANT CHANGE UP (ref 1.8–7.4)
NEUTROPHILS NFR BLD AUTO: 72.5 % — SIGNIFICANT CHANGE UP (ref 43–77)
NRBC # BLD: 0 /100 WBCS — SIGNIFICANT CHANGE UP (ref 0–0)
PLATELET # BLD AUTO: 237 K/UL — SIGNIFICANT CHANGE UP (ref 150–400)
POTASSIUM SERPL-MCNC: 3.9 MMOL/L — SIGNIFICANT CHANGE UP (ref 3.5–5.3)
POTASSIUM SERPL-SCNC: 3.9 MMOL/L — SIGNIFICANT CHANGE UP (ref 3.5–5.3)
PROT SERPL-MCNC: 7.5 G/DL — SIGNIFICANT CHANGE UP (ref 6–8.3)
PROTHROM AB SERPL-ACNC: 13.4 SEC — SIGNIFICANT CHANGE UP (ref 10.6–13.6)
RBC # BLD: 4.32 M/UL — SIGNIFICANT CHANGE UP (ref 4.2–5.8)
RBC # FLD: 12.9 % — SIGNIFICANT CHANGE UP (ref 10.3–14.5)
RH IG SCN BLD-IMP: POSITIVE — SIGNIFICANT CHANGE UP
SARS-COV-2 RNA SPEC QL NAA+PROBE: SIGNIFICANT CHANGE UP
SODIUM SERPL-SCNC: 135 MMOL/L — SIGNIFICANT CHANGE UP (ref 135–145)
WBC # BLD: 3.86 K/UL — SIGNIFICANT CHANGE UP (ref 3.8–10.5)
WBC # FLD AUTO: 3.86 K/UL — SIGNIFICANT CHANGE UP (ref 3.8–10.5)

## 2020-11-06 PROCEDURE — 99222 1ST HOSP IP/OBS MODERATE 55: CPT | Mod: 25

## 2020-11-06 PROCEDURE — 99223 1ST HOSP IP/OBS HIGH 75: CPT

## 2020-11-06 PROCEDURE — 99285 EMERGENCY DEPT VISIT HI MDM: CPT | Mod: CS,GC

## 2020-11-06 PROCEDURE — 65430 CORNEAL SMEAR: CPT | Mod: RT,PD

## 2020-11-06 PROCEDURE — 93010 ELECTROCARDIOGRAM REPORT: CPT | Mod: GC

## 2020-11-06 PROCEDURE — 92012 INTRM OPH EXAM EST PATIENT: CPT | Mod: 25,PD

## 2020-11-06 RX ORDER — DEXTROSE 50 % IN WATER 50 %
12.5 SYRINGE (ML) INTRAVENOUS ONCE
Refills: 0 | Status: DISCONTINUED | OUTPATIENT
Start: 2020-11-06 | End: 2020-11-07

## 2020-11-06 RX ORDER — SODIUM CHLORIDE 9 MG/ML
1000 INJECTION INTRAMUSCULAR; INTRAVENOUS; SUBCUTANEOUS
Refills: 0 | Status: COMPLETED | OUTPATIENT
Start: 2020-11-06 | End: 2020-11-07

## 2020-11-06 RX ORDER — DEXTROSE 50 % IN WATER 50 %
15 SYRINGE (ML) INTRAVENOUS ONCE
Refills: 0 | Status: DISCONTINUED | OUTPATIENT
Start: 2020-11-06 | End: 2020-11-07

## 2020-11-06 RX ORDER — PREDNISOLONE SODIUM PHOSPHATE 1 %
1 DROPS OPHTHALMIC (EYE) AT BEDTIME
Refills: 0 | Status: DISCONTINUED | OUTPATIENT
Start: 2020-11-06 | End: 2020-11-06

## 2020-11-06 RX ORDER — OFLOXACIN 0.3 %
1 DROPS OPHTHALMIC (EYE)
Refills: 0 | Status: DISCONTINUED | OUTPATIENT
Start: 2020-11-06 | End: 2020-11-07

## 2020-11-06 RX ORDER — ATORVASTATIN CALCIUM 80 MG/1
10 TABLET, FILM COATED ORAL AT BEDTIME
Refills: 0 | Status: DISCONTINUED | OUTPATIENT
Start: 2020-11-06 | End: 2020-11-07

## 2020-11-06 RX ORDER — SODIUM CHLORIDE 9 MG/ML
1000 INJECTION, SOLUTION INTRAVENOUS
Refills: 0 | Status: DISCONTINUED | OUTPATIENT
Start: 2020-11-06 | End: 2020-11-07

## 2020-11-06 RX ORDER — VANCOMYCIN HCL 1 G
4 VIAL (EA) INTRAVENOUS
Refills: 0 | Status: DISCONTINUED | OUTPATIENT
Start: 2020-11-06 | End: 2020-11-06

## 2020-11-06 RX ORDER — PREGABALIN 225 MG/1
1000 CAPSULE ORAL DAILY
Refills: 0 | Status: DISCONTINUED | OUTPATIENT
Start: 2020-11-06 | End: 2020-11-07

## 2020-11-06 RX ORDER — TOBRAMYCIN SULFATE 40 MG/ML
1 VIAL (ML) INJECTION
Refills: 0 | Status: DISCONTINUED | OUTPATIENT
Start: 2020-11-06 | End: 2020-11-07

## 2020-11-06 RX ORDER — INSULIN LISPRO 100/ML
VIAL (ML) SUBCUTANEOUS AT BEDTIME
Refills: 0 | Status: DISCONTINUED | OUTPATIENT
Start: 2020-11-06 | End: 2020-11-07

## 2020-11-06 RX ORDER — TRAVOPROST 0.04 MG/ML
1 SOLUTION/ DROPS OPHTHALMIC
Qty: 0 | Refills: 0 | DISCHARGE

## 2020-11-06 RX ORDER — INSULIN LISPRO 100/ML
VIAL (ML) SUBCUTANEOUS
Refills: 0 | Status: DISCONTINUED | OUTPATIENT
Start: 2020-11-06 | End: 2020-11-07

## 2020-11-06 RX ORDER — GLUCAGON INJECTION, SOLUTION 0.5 MG/.1ML
1 INJECTION, SOLUTION SUBCUTANEOUS ONCE
Refills: 0 | Status: DISCONTINUED | OUTPATIENT
Start: 2020-11-06 | End: 2020-11-07

## 2020-11-06 RX ORDER — DORZOLAMIDE HYDROCHLORIDE TIMOLOL MALEATE 20; 5 MG/ML; MG/ML
1 SOLUTION/ DROPS OPHTHALMIC
Refills: 0 | Status: DISCONTINUED | OUTPATIENT
Start: 2020-11-06 | End: 2020-11-07

## 2020-11-06 RX ORDER — POLYETHYLENE GLYCOL 3350 17 G/17G
17 POWDER, FOR SOLUTION ORAL DAILY
Refills: 0 | Status: DISCONTINUED | OUTPATIENT
Start: 2020-11-06 | End: 2020-11-07

## 2020-11-06 RX ORDER — MOXIFLOXACIN HCL 0.5 %
1 DROPS OPHTHALMIC (EYE)
Qty: 0 | Refills: 0 | DISCHARGE

## 2020-11-06 RX ORDER — TAMSULOSIN HYDROCHLORIDE 0.4 MG/1
0.4 CAPSULE ORAL AT BEDTIME
Refills: 0 | Status: DISCONTINUED | OUTPATIENT
Start: 2020-11-06 | End: 2020-11-07

## 2020-11-06 RX ORDER — DORZOLAMIDE HYDROCHLORIDE 20 MG/ML
1 SOLUTION/ DROPS OPHTHALMIC
Qty: 0 | Refills: 0 | DISCHARGE

## 2020-11-06 RX ORDER — DEXTROSE 50 % IN WATER 50 %
25 SYRINGE (ML) INTRAVENOUS ONCE
Refills: 0 | Status: DISCONTINUED | OUTPATIENT
Start: 2020-11-06 | End: 2020-11-07

## 2020-11-06 RX ORDER — ACETAMINOPHEN 500 MG
650 TABLET ORAL EVERY 6 HOURS
Refills: 0 | Status: DISCONTINUED | OUTPATIENT
Start: 2020-11-06 | End: 2020-11-07

## 2020-11-06 RX ORDER — SENNA PLUS 8.6 MG/1
2 TABLET ORAL AT BEDTIME
Refills: 0 | Status: DISCONTINUED | OUTPATIENT
Start: 2020-11-06 | End: 2020-11-07

## 2020-11-06 RX ORDER — FLUOROMETHOLONE 1 MG/ML
1 SOLUTION/ DROPS OPHTHALMIC AT BEDTIME
Refills: 0 | Status: DISCONTINUED | OUTPATIENT
Start: 2020-11-06 | End: 2020-11-07

## 2020-11-06 RX ORDER — DORZOLAMIDE HYDROCHLORIDE TIMOLOL MALEATE 20; 5 MG/ML; MG/ML
1 SOLUTION/ DROPS OPHTHALMIC
Refills: 0 | Status: DISCONTINUED | OUTPATIENT
Start: 2020-11-06 | End: 2020-11-06

## 2020-11-06 RX ORDER — VANCOMYCIN HCL 1 G
1 VIAL (EA) INTRAVENOUS
Refills: 0 | Status: DISCONTINUED | OUTPATIENT
Start: 2020-11-06 | End: 2020-11-07

## 2020-11-06 RX ADMIN — Medication 1 DROP(S): at 21:42

## 2020-11-06 RX ADMIN — SENNA PLUS 2 TABLET(S): 8.6 TABLET ORAL at 21:46

## 2020-11-06 RX ADMIN — Medication 1 DROP(S): at 23:50

## 2020-11-06 RX ADMIN — DORZOLAMIDE HYDROCHLORIDE TIMOLOL MALEATE 1 DROP(S): 20; 5 SOLUTION/ DROPS OPHTHALMIC at 23:50

## 2020-11-06 RX ADMIN — Medication 1 DROP(S): at 23:53

## 2020-11-06 RX ADMIN — Medication 1 DROP(S): at 21:44

## 2020-11-06 RX ADMIN — SODIUM CHLORIDE 75 MILLILITER(S): 9 INJECTION INTRAMUSCULAR; INTRAVENOUS; SUBCUTANEOUS at 21:43

## 2020-11-06 RX ADMIN — Medication 1 DROP(S): at 18:34

## 2020-11-06 RX ADMIN — ATORVASTATIN CALCIUM 10 MILLIGRAM(S): 80 TABLET, FILM COATED ORAL at 21:43

## 2020-11-06 RX ADMIN — Medication 1 DROP(S): at 17:07

## 2020-11-06 RX ADMIN — TAMSULOSIN HYDROCHLORIDE 0.4 MILLIGRAM(S): 0.4 CAPSULE ORAL at 21:43

## 2020-11-06 RX ADMIN — Medication 1 DROP(S): at 18:35

## 2020-11-06 NOTE — H&P ADULT - NSHPPHYSICALEXAM_GEN_ALL_CORE
PHYSICAL EXAM:  Vital Signs Last 24 Hrs  T(C): 36.3 (11-06-20 @ 18:26)  T(F): 97.4 (11-06-20 @ 18:26), Max: 97.7 (11-06-20 @ 13:52)  HR: 66 (11-06-20 @ 18:26) (61 - 66)  BP: 150/90 (11-06-20 @ 18:26)  BP(mean): --  RR: 16 (11-06-20 @ 18:26) (16 - 17)  SpO2: 99% (11-06-20 @ 18:26) (99% - 99%)  Wt(kg): --    Constitutional: NAD, awake and alert, thin  EYES: Right eye shield on. Both eyes closed.   ENT:  Reduced hearing.    Neck: Soft and supple, No significant LAD.   Respiratory: Breath sounds are clear bilaterally, No wheezing, rales or rhonchi  Cardiovascular: S1 and S2 normal, regular rate and rhythm, no Murmurs, gallops or rubs  Gastrointestinal: Bowel Sounds present, soft, nontender, nondistended, no guarding, no rebound  Extremities: No cyanosis or clubbing or edema; warm to touch  Neurological: Awake and answers basic questions and follows commands.  Skin: No rashes  Psych: No depression or anhedonia  Heme: No bruises, no nose bleeds

## 2020-11-06 NOTE — ED ADULT TRIAGE NOTE - CHIEF COMPLAINT QUOTE
sent here for admission to have surgery on right eye tomorrow morning  reports vision loss x 4 days ago - Dr. Rosy Warren

## 2020-11-06 NOTE — DISCHARGE NOTE PROVIDER - CARE PROVIDER_API CALL
Ronny Bucio  CRITICAL CARE MEDICINE  [address] [place] [address]  Phone: [phone]  Fax: [phone]  Follow Up Time: 1-3 days   Ronny Bucio  CRITICAL CARE MEDICINE  [address] [place] [id number]  Phone: [phone]  Fax: [phone]  Follow Up Time: 1-3 days    Rosy Warren  OPHTHALMOLOGY  [address] [place] [address]  Phone: [phone]  Fax: [phone]  Follow Up Time:    Ronny Bucio  CRITICAL CARE MEDICINE  [address] [place] [address]  Phone: [phone]  Fax: [phone]  Follow Up Time: 1-3 days    Rosy Warren  OPHTHALMOLOGY  [address] [place] [address]  Phone: [phone]  Fax: [phone]  Follow Up Time:     Adwoa Lara  INTERNAL MEDICINE  [address] [place] [address] [address]  [place] [address]  Phone: [phone]  Fax: [phone]  Follow Up Time:    Rosy Warren  OPHTHALMOLOGY  [address] [place] [address]  Phone: [phone]  Fax: [phone]  Follow Up Time:     Adwoa Lara  INTERNAL MEDICINE  [address]  [place] [address]  Phone: [phone]  Fax: [phone]  Follow Up Time:     Ronny Bucio  INTERNAL MEDICINE  [facility], [address] [place] [id number]  Phone: [phone]  Fax: [phone]  Follow Up Time:

## 2020-11-06 NOTE — H&P ADULT - HISTORY OF PRESENT ILLNESS
80 year old male with PMH of DM, HTN, HLD, chronic conjunctivitis; presented from ophthalmologist's office (Dr. Warren) for surgery of the right eye. Per pt and pt's wife, pt started to notice vision loss of the right eye approximately one week ago, was diagnosed with a tc abrasion, went to go and see his ophthalmologist today who recommended he come to the ED for admission for surgery for progressive corneal thinning. Pt has + right sided eye discharge, eye covered with shield, denies headaches, dizziness, hearing changes, head/sinus pain, no fevers, chills, nausea, vomiting.  Has had unintentional progressive weight loss with about 60 lbs over the past year with poor PO intake. Denies any joint pains, or CP. Has intermittent SOB. Reports constipation.   He denies history of MI, CAD, or stroke. He has limited functional status at baseline.   Reports last colonoscopy was about 5 years ago and was normal.

## 2020-11-06 NOTE — ED ADULT NURSE NOTE - OBJECTIVE STATEMENT
81 yo male with pmh DM, chronic conjunctivitis presenting from ophthalmologist's office (Dr. Warren) for surgery of the right eye. Per pt and pts wife, pt started to notice vision loss of the right eye approx one week ago, was diagnosed with a tc abrasion, went to go and see his ophthalmologist today who recommended he come to the ED for admission for surgery. Pt has + right sided eye discharge, eye covered with shield, denies headaches, head/sinus pain no fevers, chills, nausea, vomiting.

## 2020-11-06 NOTE — DISCHARGE NOTE PROVIDER - NSDCCPCAREPLAN_GEN_ALL_CORE_FT
PRINCIPAL DISCHARGE DIAGNOSIS  Diagnosis: Corneal thinning of right eye  Assessment and Plan of Treatment: You are being transferred to [place] for Eye Surgery.       PRINCIPAL DISCHARGE DIAGNOSIS  Diagnosis: Corneal thinning of right eye  Assessment and Plan of Treatment: You were transferred to [place] and had left Eye Surgery 11/7  then returned for continued medical care.  Take medications and eye drops as ordered, follow up with PCP and ophtho in a few days. Return to hospital if worsens       PRINCIPAL DISCHARGE DIAGNOSIS  Diagnosis: Corneal thinning of right eye  Assessment and Plan of Treatment: You were transferred to [place] and had left Eye Surgery 11/7  then returned for continued medical care.  Take medications and eye drops as ordered.  You must follow up with your ophthalmologist on Thursday, November 19, 2020 - please call to make an appointment.   Return to hospital if worsens.      SECONDARY DISCHARGE DIAGNOSES  Diagnosis: Weight loss, unintentional  Assessment and Plan of Treatment: Weight loss, unintentional  work up to date has been negative  follow up with PMD after discharge    Diagnosis: Loss of vision  Assessment and Plan of Treatment: Pt now with vision loss, safety is of paramount concern.  VNS for possible HHA assistance    Diagnosis: Type 2 diabetes mellitus without complication, without long-term current use of insulin  Assessment and Plan of Treatment: Make sure you get your HgA1c checked every three months.  If you take oral diabetes medications, check your blood glucose two times a day.  If you take insulin, check your blood glucose before meals and at bedtime.  It's important not to skip any meals.  Keep a log of your blood glucose results and always take it with you to your doctor appointments.  Keep a list of your current medications including injectables and over the counter medications and bring this medication list with you to all your doctor appointments.  If you have not seen your ophthalmologist this year call for appointment.  Check your feet daily for redness, sores, or openings. Do not self treat. If no improvement in two days call your primary care physician for an appointment.  Low blood sugar (hypoglycemia) is a blood sugar below 70mg/dl. Check your blood sugar if you feel signs/symptoms of hypoglycemia. If your blood sugar is below 70 take 15 grams of carbohydrates (ex 4 oz of apple juice, 3-4 glucose tablets, or 4-6 oz of regular soda) wait 15 minutes and repeat blood sugar to make sure it comes up above 70.  If your blood sugar is above 70 and you are due for a meal, have a meal.  If you are not due for a meal have a snack.  This snack helps keeps your blood sugar at a safe range.       PRINCIPAL DISCHARGE DIAGNOSIS  Diagnosis: Corneal thinning of right eye  Assessment and Plan of Treatment: Take medications and eye drops as ordered.  You must follow up with your ophthalmologist on Thursday, November 19, 2020 - please call to make an appointment.  At this appointment, you will discuss your current medication regimen and if any changes need to be made.  You will discuss if/when to taper your prednisone (you cannot stop this medication abruptly - it must be tapered down slowly by your opthalmologist).  You will also discuss how long to take your Doxcycline and what eye drops to stop/start.   Return to hospital if worsens.      SECONDARY DISCHARGE DIAGNOSES  Diagnosis: Weight loss, unintentional  Assessment and Plan of Treatment: Weight loss, unintentional  work up to date has been negative  follow up with PMD after discharge    Diagnosis: Loss of vision  Assessment and Plan of Treatment: Pt now with vision loss, safety is of paramount concern.  VNS for possible HHA assistance    Diagnosis: Type 2 diabetes mellitus without complication, without long-term current use of insulin  Assessment and Plan of Treatment: Make sure you get your HgA1c checked every three months.  If you take oral diabetes medications, check your blood glucose two times a day.  If you take insulin, check your blood glucose before meals and at bedtime.  It's important not to skip any meals.  Keep a log of your blood glucose results and always take it with you to your doctor appointments.  Keep a list of your current medications including injectables and over the counter medications and bring this medication list with you to all your doctor appointments.  If you have not seen your ophthalmologist this year call for appointment.  Check your feet daily for redness, sores, or openings. Do not self treat. If no improvement in two days call your primary care physician for an appointment.  Low blood sugar (hypoglycemia) is a blood sugar below 70mg/dl. Check your blood sugar if you feel signs/symptoms of hypoglycemia. If your blood sugar is below 70 take 15 grams of carbohydrates (ex 4 oz of apple juice, 3-4 glucose tablets, or 4-6 oz of regular soda) wait 15 minutes and repeat blood sugar to make sure it comes up above 70.  If your blood sugar is above 70 and you are due for a meal, have a meal.  If you are not due for a meal have a snack.  This snack helps keeps your blood sugar at a safe range.       PRINCIPAL DISCHARGE DIAGNOSIS  Diagnosis: Corneal thinning of right eye  Assessment and Plan of Treatment: Take medications and eye drops as ordered.  Please keep eye shields, especially while sleeping.  Change tape as needed.  If needed, you can gently clean your eye lids (eyes closed) with saline and a washcloth once nightly, to remove eye crust.  You must follow up with your ophthalmologist on Thursday, November 19, 2020 - please call to make an appointment.  At this appointment, you will discuss your current medication regimen and if any changes need to be made.  You will discuss if/when to taper your prednisone (you cannot stop this medication abruptly - it must be tapered down slowly by your opthalmologist).  You will also discuss how long to take your Doxcycline and what eye drops to stop/start.   Return to hospital if worsens.      SECONDARY DISCHARGE DIAGNOSES  Diagnosis: Weight loss, unintentional  Assessment and Plan of Treatment: Weight loss, unintentional  work up to date has been negative  follow up with PMD after discharge    Diagnosis: Loss of vision  Assessment and Plan of Treatment: Pt now with vision loss, safety is of paramount concern.  VNS for possible HHA assistance    Diagnosis: Type 2 diabetes mellitus without complication, without long-term current use of insulin  Assessment and Plan of Treatment: Make sure you get your HgA1c checked every three months.  If you take oral diabetes medications, check your blood glucose two times a day.  If you take insulin, check your blood glucose before meals and at bedtime.  It's important not to skip any meals.  Keep a log of your blood glucose results and always take it with you to your doctor appointments.  Keep a list of your current medications including injectables and over the counter medications and bring this medication list with you to all your doctor appointments.  If you have not seen your ophthalmologist this year call for appointment.  Check your feet daily for redness, sores, or openings. Do not self treat. If no improvement in two days call your primary care physician for an appointment.  Low blood sugar (hypoglycemia) is a blood sugar below 70mg/dl. Check your blood sugar if you feel signs/symptoms of hypoglycemia. If your blood sugar is below 70 take 15 grams of carbohydrates (ex 4 oz of apple juice, 3-4 glucose tablets, or 4-6 oz of regular soda) wait 15 minutes and repeat blood sugar to make sure it comes up above 70.  If your blood sugar is above 70 and you are due for a meal, have a meal.  If you are not due for a meal have a snack.  This snack helps keeps your blood sugar at a safe range.

## 2020-11-06 NOTE — H&P ADULT - ASSESSMENT
80 year old male with PMH of DM, HTN, HLD, chronic conjunctivitis; presented from ophthalmologist's office (Dr. Warren) for surgery of the right eye. Per pt and pt's wife, pt started to notice vision loss of the right eye approximately one week ago, was diagnosed with a tc abrasion, went to go and see his ophthalmologist who recommended he come to the ED for admission for surgery for progressive corneal thinning.

## 2020-11-06 NOTE — ED PROVIDER NOTE - OBJECTIVE STATEMENT
79 yo male with pmh DM, chronic conjunctivitis presenting from ophthalmologist's office (Dr. Warren) for surgery of the right eye. Per pt and pts wife, pt started to notice vision loss of the right eye approx one week ago, was diagnosed with a tc abrasion, went to go and see his ophthalmologist today who recommended he come to the ED for admission for surgery. Pt has + right sided eye discharge, eye covered with shield, denies headaches, head/sinus pain no fevers, chills, nausea, vomiting.

## 2020-11-06 NOTE — CHART NOTE - NSCHARTNOTEFT_GEN_A_CORE
Updates to plan below:  - appreciate documented medical clearance in H&P  - patient is ordered to be NPO @ Midnight  - not on anticoagulation  - Changed drop regimen: d/angela cosopt for the right eye given low IOP, and changed pred forte to fluorometholone qhs for the left eye only  - Plan as below for transfer to [facility] for surgical repair.    D/w Dr. Warren.      Pt pending OR for patch graft repair tomorrow with Dr. Yaya Yoo at [facility] via transfer in the morning.    Pt had 4 days of progressive corneal thinning and now the cornea is very thin centrally and is at risk of perforation, essentially open globe  about 8mm horizontal x 4mm vertical epi defect centrally with very thin area of thinning in the center OD, no leakage, AC looks grossly formed. OS- small circular area of thinning 1x1 at 7oclock area    Impending Corneal Perforation OD and corneal thinning OS  - confirmation# from [place] [facility] for full thickness cornea graft   - Shield on eye at ALL times   - no eye rubbing, PLEASE DO NOT put pressure on eye while putting in drops as there is impending corneal perforation  - fortified tobramycin drops Q2 hours and fortified Vancomycin drops Q2 hours (alternate the two drops so he gets a drop every hour)  - COVID test STAT   - Medical clearance please for surgery by medicine under MAC   - TRANSPORT set up for tomorrow 730AM  to [place].   - basic labs   - NPO after MN  - pt had 60lb weight loss in the past few months, loss of appetite, pt also has corneal thinning in BOTH eyes which is not consistent with an infectious ulcer. Possibly autoimmune condition causing his cornea to be thin. Recommend work up for an underlying autoimmune condition per rheumatology, and work up for possible malignancy due to weight loss/appetite.

## 2020-11-06 NOTE — DISCHARGE NOTE PROVIDER - PROVIDER TOKENS
PROVIDER:[TOKEN:[[id number]],FOLLOWUP:[1-3 days]] PROVIDER:[TOKEN:[[id number]],FOLLOWUP:[1-3 days]],PROVIDER:[TOKEN:[[id number]]] PROVIDER:[TOKEN:[[id number]],FOLLOWUP:[1-3 days]],PROVIDER:[TOKEN:[[id number]]],PROVIDER:[TOKEN:[[id number]]] PROVIDER:[TOKEN:[[id number]]],PROVIDER:[TOKEN:[[id number]]],PROVIDER:[TOKEN:[[id number]]]

## 2020-11-06 NOTE — ED ADULT NURSE REASSESSMENT NOTE - NS ED NURSE REASSESS COMMENT FT1
Called pharmacy for vancomycin and tobramycin eye drops. Pharmacy is making and sending them to [facility] with instructions that drops should be refrigerated when not in use and need to go with the patient to the floor he is admitted to.

## 2020-11-06 NOTE — CHART NOTE - NSCHARTNOTEFT_GEN_A_CORE
Patient was to have CT C/A/P Patient was to have CT C/A/P performed to eval for malignancy.   [facility]/2020[facility] with  at 07:30 for OR patch repair. Patient will then be re-transferred back to [facility].    CT C/A/P cancelled for now, and will be re-ordered when patient is transferred back to [facility].    Will endorse to AM team    Molly Desai PA-C  #[id number]

## 2020-11-06 NOTE — ED PROVIDER NOTE - PROGRESS NOTE DETAILS
ophthalmology at bedside. -Addie Sarmiento PA-C ophthalmology recommending vanco  and tobramycin eye drops to kate goodson, 4-5 drops Q2 hours, and lotamax 1 drop left eye once a day, ofloxacin drops 1 drop L eye 4x/day, keep shield on at all times. they are requesting pt have malignancy/rheum work up for ? underlaying autoimmune disease which they would like to have done here but have pt transferred tomorrow for surgery to [place], then transferred here back after the surgery, will d/w case management/social work. -Addie Sarmiento PA-C ophthalmology recommending vanco and tobramycin eye drops to right eye, 1 drop Q2 hours, and lotamax 1 drop left eye once a day, ofloxacin drops 1 drop L eye 4x/day, keep shield on at all times. they are requesting pt have malignancy/rheum work up for ? underlaying autoimmune disease which they would like to have done here but have pt transferred tomorrow for surgery to [place], then transferred here back after the surgery, will d/w case management/social work. -Addie Sarmiento PA-C case management aware of need to transfer pt tomorrow for ophthalmologic surgery. -Addie Sarmiento PA-C

## 2020-11-06 NOTE — H&P ADULT - NSHPSOURCEINFORD_GEN_ALL_CORE
Chart(s)/Patient/Spouse/Significant Other Chart(s)/Spouse/Significant Other/Patient/Physician/Provider

## 2020-11-06 NOTE — H&P ADULT - PROBLEM SELECTOR PLAN 3
-Reported 60lbs weight loss over the past year with poor PO intake and constipation.   -Will check FOBT, PSA, UA, TSH, am cortisol.   -CT chest/abd/pelvis with contrast to eval for underlying malignancy. Will give IVF's for renal protection. -Does not need to be completed prior to transfer for OR.    -Last colonoscopy about 5 years ago per wife was within normal.   -Nutrition eval.   -Anemia work up with iron studies, B12, folate, and LDH.

## 2020-11-06 NOTE — ED ADULT TRIAGE NOTE - IDEAL BODY WEIGHT(KG)
[address] [facility] RAFI Olea MD  [phone]      2019    Colonoscopy Procedure Note  Humaira Avilez  :  1936  [facility] Medical Record Number: [id number]    Indications:     Hematochezia/melena, Screening colonoscopy  PCP:  Jacinta Hampton MD  Anesthesia/Sedation: Conscious Sedation/Moderate Sedation/GETA, see notes  Endoscopist:  Dr. Aydin Mayfield  Complications:  None  Estimated Blood Loss:  None    Permit:  The indications, risks, benefits and alternatives were reviewed with the patient or their decision maker who was provided an opportunity to ask questions and all questions were answered. The specific risks of colonoscopy with conscious sedation were reviewed, including but not limited to anesthetic complication, bleeding, adverse drug reaction, missed lesion, infection, IV site reactions, and intestinal perforation which would lead to the need for surgical repair. Alternatives to colonoscopy including radiographic imaging, observation without testing, or laboratory testing were reviewed including the limitations of those alternatives. After considering the options and having all their questions answered, the patient or their decision maker provided both verbal and written consent to proceed. Procedure in Detail:  After obtaining informed consent, positioning of the patient in the left lateral decubitus position, and conduction of a pre-procedure pause or \"time out\" the endoscope was introduced into the anus and advanced to the cecum, which was identified by the ileocecal valve and appendiceal orifice. The quality of the colonic preparation was good. A careful inspection was made as the colonoscope was withdrawn, findings and interventions are described below.     Findings:   6mm semipedunculated polyp of the ascending colon is removed with hot snare and all samples retrieved. In the rectum, medium internal hemorrhoids are noted without bleeding. There is diverticulosis in the sigmoid colon without complications such as bleeding, inflammatory change, or luminal narrowing. Specimens:    See above    Complications:   None; patient tolerated the procedure well. Impression:  Colon polyp, diverticulosis, and hemorrhoids    Recommendations:     - Await pathology, but given age I doubt there will be need for further surveillance/screening colonoscopy. - High fiber diet. Thank you for entrusting me with this patient's care. Please do not hesitate to contact me with any questions or if I can be of assistance with any of your other patients' GI needs.     Signed By: Vicky Cool MD                        January 4, 2019      Surgical assistant none 74

## 2020-11-06 NOTE — ED ADULT NURSE NOTE - PMH
BPH (benign prostatic hyperplasia)    DM (diabetes mellitus)    HTN (hypertension)    Hyperlipidemia

## 2020-11-06 NOTE — CHART NOTE - NSCHARTNOTEFT_GEN_A_CORE
[facility]ation# [facility] [place] [facility] graft     Pt pending OR for patch graft repair tomorrow with Dr.Jules Regalado at [place].  Pt had 4 days of progressive corneal thinning and now the cornea is very thin centrally and is at risk of perforation, essentially open globe  Please arrange for transfer in the morning to [facility].    - Shield on eye at ALL times   - no eye rubbing, PLEASE DO NOT put pressure on eye while putting in drops as there is impending corneal perforation  - fortified tobramycin drops Q2 hours and fortified Vancomycin drops Q2 hours (alternate the two drops so he gets a drop every hour)  - COVID test STAT   - Medical clearance please for surgery by medicine under MAC   - basic labs   - NPO after MN  - pt had 60lb weight loss in the past few months, loss of appetite, pt also has corneal thinning in BOTH eyes which is not consistent with an infectious ulcer. Possibly autoimmune condition causing his cornea to be thin. Recommend work up for an underlying autoimmune condition per rheumatology, and work up for possible malignancy due to weight loss/appetite. confirmation# [facility] [place] [facility] full [place] graft     Pt pending OR for patch graft repair tomorrow with Dr.Jules Regalado at [place].  Pt had 4 days of progressive corneal thinning and now the cornea is very thin centrally and is at risk of perforation, essentially open globe  Please arrange for transfer in the morning to [facility].    - Shield on eye at ALL times   - no eye rubbing, PLEASE DO NOT put pressure on eye while putting in drops as there is impending corneal perforation  - fortified tobramycin drops Q2 hours and fortified Vancomycin drops Q2 hours (alternate the two drops so he gets a drop every hour)  - COVID test STAT   - Medical clearance please for surgery by medicine under MAC   - TRANSPORT set up for tomorrow 730AM  to [place].   - basic labs   - NPO after MN  - pt had 60lb weight loss in the past few months, loss of appetite, pt also has corneal thinning in BOTH eyes which is not consistent with an infectious ulcer. Possibly autoimmune condition causing his cornea to be thin. Recommend work up for an underlying autoimmune condition per rheumatology, and work up for possible malignancy due to weight loss/appetite. Pt pending OR for patch graft repair tomorrow with Dr.Jules Regalado at [place].  Pt had 4 days of progressive corneal thinning and now the cornea is very thin centrally and is at risk of perforation, essentially open globe  about 8mm horizontal x 4mm vertical epi defect centrally with very thin area of thinning in the center OD, no leakage, AC looks grossly formed. OS- small circular area of thinning 1x1 at 7oclock area    Impending Corneal Perforation OD and corneal thinning OS  - [place] [facility] [place] [facility] [place] graft   - Shield on eye at ALL times   - no eye rubbing, PLEASE DO NOT put pressure on eye while putting in drops as there is impending corneal perforation  - fortified tobramycin drops Q2 hours and fortified Vancomycin drops Q2 hours (alternate the two drops so he gets a drop every hour)  - COVID test STAT   - Medical clearance please for surgery by medicine under MAC   - TRANSPORT set up for tomorrow 730AM  to [place].   - basic labs   - NPO after MN  - pt had 60lb weight loss in the past few months, loss of appetite, pt also has corneal thinning in BOTH eyes which is not consistent with an infectious ulcer. Possibly autoimmune condition causing his cornea to be thin. Recommend work up for an underlying autoimmune condition per rheumatology, and work up for possible malignancy due to weight loss/appetite.

## 2020-11-06 NOTE — H&P ADULT - NSHPLABSRESULTS_GEN_ALL_CORE
LABS:                          12.6   3.86  )-----------( 237      ( 06 Nov 2020 16:18 )             38.1       11-06    135  |  96  |  9   ----------------------------<  125<H>  3.9   |  28  |  0.78    Ca    10.0      06 Nov 2020 16:19    TPro  7.5  /  Alb  4.5  /  TBili  0.5  /  DBili  x   /  AST  17  /  ALT  16  /  AlkPhos  51  11-06          CAPILLARY BLOOD GLUCOSE          PT/INR - ( 06 Nov 2020 16:18 )   PT: 13.4 sec;   INR: 1.12 ratio         PTT - ( 06 Nov 2020 16:18 )  PTT:31.1 sec    Lactate Trend LABS:                          12.6   3.86  )-----------( 237      ( 06 Nov 2020 16:18 )             38.1       11-06    135  |  96  |  9   ----------------------------<  125<H>  3.9   |  28  |  0.78    Ca    10.0      06 Nov 2020 16:19    TPro  7.5  /  Alb  4.5  /  TBili  0.5  /  DBili  x   /  AST  17  /  ALT  16  /  AlkPhos  51  11-06          CAPILLARY BLOOD GLUCOSE      PT/INR - ( 06 Nov 2020 16:18 )   PT: 13.4 sec;   INR: 1.12 ratio         PTT - ( 06 Nov 2020 16:18 )  PTT:31.1 sec    Lactate Trend    CARDIOLOGY:    EKG reviewed: Sinus deanna. LAD. RBBB. HR 56. QTc 414. No acute ST-T wave changes. Q waves in V1-V2.

## 2020-11-06 NOTE — ED ADULT TRIAGE NOTE - PAIN: PRESENCE, MLM
Date of last visit:  12/12/2019  Date of next visit:  6/17/2020    Requested Prescriptions     Pending Prescriptions Disp Refills    traMADol (ULTRAM) 50 MG tablet 90 tablet 0     Sig: Take 1 tablet by mouth every 8 hours as needed for Pain for up to 30 days. denies pain/discomfort

## 2020-11-06 NOTE — DISCHARGE NOTE PROVIDER - HOSPITAL COURSE
81 yo male with pmh DM, chronic conjunctivitis presenting from ophthalmologist's office (Dr. Warren) for surgery of the right eye. Per pt and pts wife, pt started to notice vision loss of the right eye approx one week ago, was diagnosed with a tc abrasion, went to go and see his ophthalmologist today who recommended he come to the ED for admission for surgery. Patient seen by ophthalmology recommending vanco and tobramycin eye drops to right eye, 1 drop Q2 hours, and lotamax 1 drop left eye once a day, ofloxacin drops 1 drop L eye 4x/day, keep shield on at all times. they are requesting pt have malignancy/rheum work up for underlaying autoimmune disease which they would like to have done here but have pt transferred tomorrow for surgery to [place], then transferred here back after the surgery. Patient is stable for transfer to [place].    79 yo male with pmh DM, chronic conjunctivitis presenting from ophthalmologist's office (Dr. Warren) for surgery of the right eye. Per pt and pts wife, pt started to notice vision loss of the right eye approx one week ago, was diagnosed with a tc abrasion, went to go and see his ophthalmologist today who recommended he come to the ED for admission for surgery. Patient seen by ophthalmology recommending vanco and tobramycin eye drops to right eye, 1 drop Q2 hours, and lotamax 1 drop left eye once a day, ofloxacin drops 1 drop L eye 4x/day, keep shield on at all times. The patient is status post a corneal patch graft of the right eye and corneal gluing of the left eye, as well as conjunctival biopsy.  The patient has also had unexplained weight loss - Hematology/Oncology and Rheumatology consulted.  CT chest/abdomen/pelvis shows that the  prostate gland is markedly enlarged with significant mass effect on the urinary bladder; the urinary bladder has diffuse wall thickening and trabeculations; lytic lesions are noted within the right acetabulum and L5, consider malignant process; moderate amount of stool throughout colon without proximal obstruction.  MRI lumbar spine shows degenerative changes.  MRI pelvis shows no suspicious osseous lesions; advanced right hip osteoarthritis and moderate left hip osteoarthritis; lower lumbar spondylosis; right iliopsoas bursitis.    - discharge from lashes cleaned gently   - c/w vitamin C 1g daily and doxycycline 100mg BID daily as per primary team if no contraindications      # h/o glaucoma  - Hold off on cosopt for now        DW Dr. Warren (cornea attending)    Outpatient follow-up: Patient should follow-up with his/her ophthalmologist or with [facility] Department of Ophthalmology within 2-3 days of discharge at:    [address]  [place] [address]  [phone]   81 yo male with pmh DM, chronic conjunctivitis presenting from ophthalmologist's office (Dr. Warren) for surgery of the right eye. Per pt and pts wife, pt started to notice vision loss of the right eye approx one week ago, was diagnosed with a tc abrasion, went to go and see his ophthalmologist today who recommended he come to the ED for admission for surgery. Patient seen by ophthalmology recommending vanco and tobramycin eye drops to right eye, 1 drop Q2 hours, and lotamax 1 drop left eye once a day, ofloxacin drops 1 drop L eye 4x/day, keep shield on at all times. The patient is status post a corneal patch graft of the right eye and corneal gluing of the left eye, as well as conjunctival biopsy.  The patient has also had unexplained weight loss - Hematology/Oncology and Rheumatology consulted.  CT chest/abdomen/pelvis shows that the  prostate gland is markedly enlarged with significant mass effect on the urinary bladder; the urinary bladder has diffuse wall thickening and trabeculations; lytic lesions are noted within the right acetabulum and L5, consider malignant process; moderate amount of stool throughout colon without proximal obstruction.  MRI lumbar spine shows degenerative changes.  MRI pelvis shows no suspicious osseous lesions; advanced right hip osteoarthritis and moderate left hip osteoarthritis; lower lumbar spondylosis; right iliopsoas bursitis.  MRI abdomen shows hepatic cysts.  Ophthalmology is clearing patient for discharge with close ophthalmology follow up.  Cleared for discharge by Dr. Tinoco, with PMD and Ophthalmology follow up.                 81 yo male with pmh DM, chronic conjunctivitis presenting from ophthalmologist's office (Dr. Warren) for surgery of the right eye. Per pt and pts wife, pt started to notice vision loss of the right eye approx one week ago, was diagnosed with a tc abrasion, went to go and see his ophthalmologist today who recommended he come to the ED for admission for surgery. Patient seen by ophthalmology recommending vanco and tobramycin eye drops to right eye, 1 drop Q2 hours, and lotamax 1 drop left eye once a day, ofloxacin drops 1 drop L eye 4x/day, keep shield on at all times. The patient is status post a corneal patch graft of the right eye and corneal gluing of the left eye, as well as conjunctival biopsy.  The patient has also had unexplained weight loss - Hematology/Oncology and Rheumatology consulted.  CT chest/abdomen/pelvis shows that the  prostate gland is markedly enlarged with significant mass effect on the urinary bladder; the urinary bladder has diffuse wall thickening and trabeculations; lytic lesions are noted within the right acetabulum and L5, consider malignant process; moderate amount of stool throughout colon without proximal obstruction.  MRI lumbar spine shows degenerative changes.  MRI pelvis shows no suspicious osseous lesions; advanced right hip osteoarthritis and moderate left hip osteoarthritis; lower lumbar spondylosis; right iliopsoas bursitis.  MRI abdomen shows hepatic cysts.  Ophthalmology is clearing patient for discharge with close ophthalmology follow up.  Cleared for discharge by Dr. Erickson, with PMD and Ophthalmology follow up.

## 2020-11-06 NOTE — H&P ADULT - PROBLEM SELECTOR PLAN 2
-Preop risk stratification:  -Patient's risk factors are advanced age, limited functional status, and DM-2, HLD, HTN.  -Echo from about a year ago with normal EF. EKG reviewed.   -Based on the above: patient is low to intermediate risk for a low risk procedure.

## 2020-11-06 NOTE — DISCHARGE NOTE PROVIDER - NSDCHC_MEDRECSTATUS_GEN_ALL_CORE
Admission Reconciliation is Not Complete  Discharge Reconciliation is Not Complete Admission Reconciliation is Completed  Discharge Reconciliation is Completed Admission Reconciliation is Not Complete  Discharge Reconciliation is Completed

## 2020-11-06 NOTE — ED PROVIDER NOTE - PHYSICAL EXAMINATION
A&Ox3, NAD. NCAT. Right eye with overlaying ointment, pt unable to open eye lid, no swelling, surrounding redness, Neck supple, no LAD. Lungs CTAB. +S1S2, RRR, No m/r/g. Abd soft, NT/ND, +BS, no rebound or guarding. Extremities: cap refill <2, pulses in distal extremities 4+, no edema. Skin without rash. CN II-XII intact. Strength 5/5 UE/LE. Sensations intact throughout. Gait steady.

## 2020-11-06 NOTE — DISCHARGE NOTE PROVIDER - NSDCMRMEDTOKEN_GEN_ALL_CORE_FT
Aspir 81 oral delayed release tablet: 1 tab(s) orally once a day  atorvastatin 10 mg oral tablet: 1 tab(s) orally once a day  ciprofloxacin 0.3% ophthalmic solution: 1 drop(s) to each affected eye every 4 hours  dorzolamide 2% ophthalmic solution: 1 drop(s) to each affected eye 3 times a day  doxycycline monohydrate 50 mg oral capsule: 1 cap(s) orally every 12 hours  enalapril 20 mg oral tablet: 1 tab(s) orally once a day  fluorometholone 0.1% ophthalmic ointment: 1 application to each affected eye once a day (at bedtime)  glipiZIDE 10 mg oral tablet, extended release: 1 tab(s) orally once a day  Lotemax 0.5% ophthalmic ointment: 1 application to each affected eye once a day (at bedtime)  metFORMIN 1000 mg oral tablet: 1 tab(s) orally 2 times a day  ocular lubricant ophthalmic solution: 1 drop(s) to each affected eye 4 times a day  Refresh ophthalmic solution: 1 drop(s) to each affected eye 4 times a day  tamsulosin 0.4 mg oral capsule: 1 cap(s) orally once a day  Travatan 0.004% ophthalmic solution: 1 drop(s) to each affected eye once a day (in the evening)  Vigamox 0.5% ophthalmic solution: 1 drop(s) to each affected eye 4 times a day   acetaminophen 325 mg oral tablet: 2 tab(s) orally every 6 hours, As needed, Mild Pain (1 - 3), Moderate Pain (4 - 6)  Aspir 81 oral delayed release tablet: 1 tab(s) orally once a day  atorvastatin 10 mg oral tablet: 1 tab(s) orally once a day  Cosopt 2.23%-0.68% ophthalmic solution: 1 drop(s) to left eye 2 times a day  Dextrose 5% in Water intravenous solution: 1000 milliliter(s) intravenous   enalapril 5 mg oral tablet: 1 tab(s) orally once a day  fluorometholone 0.1% ophthalmic suspension: 1 drop(s) to each affected eye once a day (at bedtime)  glucose 40% oral gel:  orally   glucose 50% intravenous solution: 25 milliliter(s) intravenous once  glucose 50% intravenous solution: 50 milliliter(s) intravenous once  glucose 50% intravenous solution: 50 milliliter(s) intravenous once  insulin lispro 100 units/mL injectable solution:  injectable   insulin lispro 100 units/mL injectable solution:  injectable   Lotemax 0.5% ophthalmic ointment: 1 application to each affected eye once a day (at bedtime)  MiraLax oral powder for reconstitution: 1 dose(s) orally once a day  ofloxacin 0.3% ophthalmic solution: 1 drop(s) to each affected eye 4 times a day  Refresh ophthalmic solution: 1 drop(s) to each affected eye 4 times a day  Senna 8.6 mg oral tablet: 2 tab(s) orally once a day (at bedtime)  sodium chloride 0.9% injectable solution:  injectable   tamsulosin 0.4 mg oral capsule: 1 cap(s) orally once a day  tobramycin:   vancomycin:   Vitamin B12 1000 mcg oral tablet: 1 tab(s) orally once a day   acetaminophen 325 mg oral tablet: 2 tab(s) orally every 6 hours, As needed, Mild Pain (1 - 3), Moderate Pain (4 - 6)  Aspir 81 oral delayed release tablet: 1 tab(s) orally once a day  atorvastatin 10 mg oral tablet: 1 tab(s) orally once a day  Cosopt 2.23%-0.68% ophthalmic solution: 1 drop(s) to left eye 2 times a day  enalapril 5 mg oral tablet: 1 tab(s) orally once a day  fluorometholone 0.1% ophthalmic suspension: 1 drop(s) to each affected eye once a day (at bedtime)  Lotemax 0.5% ophthalmic ointment: 1 application to each affected eye once a day (at bedtime)  MiraLax oral powder for reconstitution: 1 dose(s) orally once a day  moxifloxacin 0.5% ophthalmic solution: 1 drop(s) in both eyes 4 times a day (6am, 10am, 2pm and 8pm)    Dispense 30 day supply  ofloxacin 0.3% ophthalmic solution: 1 drop(s) to each affected eye 4 times a day  Refresh ophthalmic solution: 1 drop(s) to each affected eye 4 times a day  Senna 8.6 mg oral tablet: 2 tab(s) orally once a day (at bedtime)  tamsulosin 0.4 mg oral capsule: 1 cap(s) orally once a day  Vitamin B12 1000 mcg oral tablet: 1 tab(s) orally once a day   acetaminophen 325 mg oral tablet: 2 tab(s) orally every 6 hours, As needed, Mild Pain (1 - 3), Moderate Pain (4 - 6)  ascorbic acid 1000 mg oral tablet: 1 tab(s) orally once a day  Aspir 81 oral delayed release tablet: 1 tab(s) orally once a day  atorvastatin 10 mg oral tablet: 1 tab(s) orally once a day  Cosopt 2.23%-0.68% ophthalmic solution: 1 drop(s) to left eye 2 times a day  doxycycline monohydrate 100 mg oral capsule: 1 cap(s) orally every 12 hours    You must follow up with your ophthalmologist tomorrow 11/19/2020, to discuss how long you will need to stay on this medication  enalapril 5 mg oral tablet: 1 tab(s) orally once a day  famotidine 20 mg oral tablet: 1 tab(s) orally once a day  Lotemax 0.5% ophthalmic ointment: 1 application to each affected eye once a day (at bedtime)  MiraLax oral powder for reconstitution: 1 dose(s) orally once a day  moxifloxacin 0.5% ophthalmic solution: 1 drop(s) in both eyes 4 times a day (6am, 10am, 2pm and 8pm)    Dispense 30 day supply  ofloxacin 0.3% ophthalmic solution: 1 drop(s) to each affected eye 4 times a day  predniSONE 20 mg oral tablet: 2 tab(s) orally every 24 hours    You must follow up with ophthalmology tomorrow (11/19 /2020) to discuss tapering this medication  Senna 8.6 mg oral tablet: 2 tab(s) orally once a day (at bedtime)  tamsulosin 0.4 mg oral capsule: 1 cap(s) orally once a day  Vitamin B12 1000 mcg oral tablet: 1 tab(s) orally once a day   acetaminophen 325 mg oral tablet: 2 tab(s) orally every 6 hours, As needed, Mild Pain (1 - 3), Moderate Pain (4 - 6)  ascorbic acid 1000 mg oral tablet: 1 tab(s) orally once a day  Aspir 81 oral delayed release tablet: 1 tab(s) orally once a day  atorvastatin 10 mg oral tablet: 1 tab(s) orally once a day  doxycycline monohydrate 100 mg oral capsule: 1 cap(s) orally every 12 hours    You must follow up with your ophthalmologist tomorrow 11/19/2020, to discuss how long you will need to stay on this medication  enalapril 5 mg oral tablet: 1 tab(s) orally once a day  famotidine 20 mg oral tablet: 1 tab(s) orally once a day  metFORMIN 1000 mg oral tablet: 1 tab(s) orally 2 times a day  MiraLax oral powder for reconstitution: 1 dose(s) orally once a day  moxifloxacin 0.5% ophthalmic solution: 1 drop(s) in both eyes 4 times a day (6am, 10am, 2pm and 8pm)    Dispense 30 day supply  predniSONE 20 mg oral tablet: 2 tab(s) orally every 24 hours    You must follow up with ophthalmology tomorrow (11/19 /2020) to discuss tapering this medication  Senna 8.6 mg oral tablet: 2 tab(s) orally once a day (at bedtime)  tamsulosin 0.4 mg oral capsule: 1 cap(s) orally once a day  Vitamin B12 1000 mcg oral tablet: 1 tab(s) orally once a day

## 2020-11-06 NOTE — DISCHARGE NOTE PROVIDER - CARE PROVIDERS DIRECT ADDRESSES
,DirectAddress_Unknown ,DirectAddress_Unknown,DirectAddress_Unknown ,DirectAddress_Unknown,DirectAddress_Unknown,[email or web address] ,DirectAddress_Unknown,[email or web address]

## 2020-11-06 NOTE — H&P ADULT - PROBLEM SELECTOR PLAN 1
-Progressive corneal melting of right eye with concern for ?rheumatologic, ?malignant, ?infectious cause. But also has bilateral eye issues.   -Ophtho planning for OR patch graft repair tomorrow at [facility]. Preop risk stratification below. NPO at [place]. Hold home ASA and hold pharma PPx. -F/u OR cultures.   -Malignancy w/u as below. Rheum work up with THADDEUS, RF, anti-CCP, Sjogren's Ab, ESR, CRP, ANCA, and HCV Ab.   -Per ophtho: - Shield on right eye at ALL times   - No eye rubbing, PLEASE DO NOT put pressure on eye while putting in drops as there is impending corneal perforation.  - Fortified tobramycin drops Q2 hours and fortified Vancomycin drops Q2 hours (alternate the two drops so he gets a drop every hour) to the right eye.   -C/w lotamex 1 drop left eye once a day, ofloxacin drops 1 drop L eye 4x/day,  -Transportation arranged to [place] for tomorrow at 7:30am.  -Clarify with ophtho if patient should be continued on his home Cosopt and Refresh eye drops. -Progressive corneal melting of right eye with concern for ?rheumatologic, ?malignant, ?infectious cause. But also has bilateral eye issues.   -Ophtho planning for OR patch graft repair tomorrow at [facility]. Preop risk stratification below. NPO at [place]. Hold home ASA and hold pharma PPx. -F/u OR cultures.   -Malignancy w/u as below. Rheum work up with THADDEUS, RF, anti-CCP, Sjogren's Ab, ESR, CRP, ANCA, and HCV Ab.   -Per ophtho: - Shield on right eye at ALL times   - No eye rubbing, PLEASE DO NOT put pressure on eye while putting in drops as there is impending corneal perforation.  - Fortified tobramycin drops Q2 hours and fortified Vancomycin drops Q2 hours (alternate the two drops so he gets a drop every hour) to the right eye.   -C/w Lotemax (not on formulary - per pharmacy ok to do Pred-forte instead) 1 drop left eye once a day, ofloxacin drops 1 drop L eye 4x/day,  -Transportation arranged to [place] for tomorrow at 7:30am.  -Clarify with ophtho if patient should be continued on his home Cosopt and Refresh eye drops. -Progressive corneal melting of right eye with concern for ?rheumatologic, ?malignant, ?infectious cause. But also has bilateral eye issues.   -Ophtho planning for OR patch graft repair tomorrow at [facility]. Preop risk stratification below. NPO at [place]. Hold home ASA and hold pharma PPx. -F/u OR cultures.   -Malignancy w/u as below. Rheum work up with THADDEUS, RF, anti-CCP, Sjogren's Ab, ESR, CRP, ANCA, and HCV Ab.   -Per ophtho: - Shield on right eye at ALL times   - No eye rubbing, PLEASE DO NOT put pressure on eye while putting in drops as there is impending corneal perforation.  - Fortified tobramycin drops Q2 hours and fortified Vancomycin drops Q2 hours (alternate the two drops so he gets a drop every hour) to the right eye.   -C/w Lotemax (not on formulary - per pharmacy ok to do Pred-forte instead) 1 drop left eye once a day, ofloxacin drops 1 drop L eye 4x/day,  -Transportation arranged to [place] for tomorrow at 7:30am.  -Clarify with ophtho if patient should be continued on his home Cosopt and Refresh eye drops. -Will continue for now.

## 2020-11-06 NOTE — ED PROVIDER NOTE - ATTENDING CONTRIBUTION TO CARE
80M, pmh dm, chronic conjunctivitis, presents for medical eval prior to surgery on R eye. Per pt's wife, he noticed loss of vision to R eye one week ago, diagnosed with corneal abrasion today, seen by ophthalmology who stated he needed surgery and sent to ED. +Discharge from R eye. Pt denies f/c, n/v/d, headache, dizziness, cough, congestion, cp, sob, abd pain, or other complaints.    PE: NAD, NCAT, MMM, Trachea midline, Normal conjunctiva, lungs CTAB, S1/S2 RRR, Normal perfusion, 2+ radial pulses bilat, Abdomen Soft, NTND, No rebound/guarding, No LE edema, No deformity of extremities, No rashes,  No focal motor or sensory deficits.     Per ophthalmology, pt with concern for impending globe rupture, needs medical workup prior to procedure then will be transferred to [place] for surgery. To check labs. Abx per ophthalmology recs. Admit. - Donavan Fischer MD

## 2020-11-06 NOTE — DISCHARGE NOTE PROVIDER - NSDCFUADDAPPT_GEN_ALL_CORE_FT
You must follow up with your opthalmologist, Dr. Warren, on Thursday, November 19, 2020 - please call [phone] to make an appointment.  The office is located at [address] [place] [address], [facility] [address] [address].    You must follow up with your primary medical doctor, Dr. Bucio, within one week of discharge - please call to make an appointment. You MUST follow up with your opthalmologist, Dr. Warren, on Thursday, November 19, 2020 - please call [phone] to make an appointment.  The office is located at [address] [place] [address], [facility] [address] [address].    You must follow up with your primary medical doctor, Dr. Bucio, within one week of discharge - please call to make an appointment.    You can follow up with rheumatology, Dr. Lara, as needed.

## 2020-11-06 NOTE — H&P ADULT - ATTENDING COMMENTS
Ramos Palomo MD  Division of [facility] Medicine  Cell: [phone]  Pager: [phone]  Office: [phone]

## 2020-11-07 ENCOUNTER — RESULT REVIEW (OUTPATIENT)
Age: 80
End: 2020-11-07

## 2020-11-07 LAB
A1C WITH ESTIMATED AVERAGE GLUCOSE RESULT: 6 % — HIGH (ref 4–5.6)
ANION GAP SERPL CALC-SCNC: 9 MMOL/L — SIGNIFICANT CHANGE UP (ref 5–17)
APPEARANCE UR: ABNORMAL
APTT BLD: 30 SEC — SIGNIFICANT CHANGE UP (ref 27.5–35.5)
BILIRUB UR-MCNC: NEGATIVE — SIGNIFICANT CHANGE UP
BLD GP AB SCN SERPL QL: NEGATIVE — SIGNIFICANT CHANGE UP
BUN SERPL-MCNC: 9 MG/DL — SIGNIFICANT CHANGE UP (ref 7–23)
CALCIUM SERPL-MCNC: 9.5 MG/DL — SIGNIFICANT CHANGE UP (ref 8.4–10.5)
CHLORIDE SERPL-SCNC: 100 MMOL/L — SIGNIFICANT CHANGE UP (ref 96–108)
CO2 SERPL-SCNC: 28 MMOL/L — SIGNIFICANT CHANGE UP (ref 22–31)
COLOR SPEC: SIGNIFICANT CHANGE UP
CORTIS AM PEAK SERPL-MCNC: 17.2 UG/DL — SIGNIFICANT CHANGE UP (ref 6–18.4)
CREAT SERPL-MCNC: 0.73 MG/DL — SIGNIFICANT CHANGE UP (ref 0.5–1.3)
DIFF PNL FLD: NEGATIVE — SIGNIFICANT CHANGE UP
ERYTHROCYTE [SEDIMENTATION RATE] IN BLOOD: 21 MM/HR — HIGH (ref 0–20)
ESTIMATED AVERAGE GLUCOSE: 126 MG/DL — HIGH (ref 68–114)
FERRITIN SERPL-MCNC: 130 NG/ML — SIGNIFICANT CHANGE UP (ref 30–400)
FOLATE SERPL-MCNC: 18.4 NG/ML — SIGNIFICANT CHANGE UP
GLUCOSE BLDC GLUCOMTR-MCNC: 106 MG/DL — HIGH (ref 70–99)
GLUCOSE BLDC GLUCOMTR-MCNC: 120 MG/DL — HIGH (ref 70–99)
GLUCOSE BLDC GLUCOMTR-MCNC: 152 MG/DL — HIGH (ref 70–99)
GLUCOSE BLDC GLUCOMTR-MCNC: 154 MG/DL — HIGH (ref 70–99)
GLUCOSE SERPL-MCNC: 97 MG/DL — SIGNIFICANT CHANGE UP (ref 70–99)
GLUCOSE UR QL: NEGATIVE — SIGNIFICANT CHANGE UP
HCT VFR BLD CALC: 36.9 % — LOW (ref 39–50)
HCV AB S/CO SERPL IA: 0.09 S/CO — SIGNIFICANT CHANGE UP (ref 0–0.99)
HCV AB SERPL-IMP: SIGNIFICANT CHANGE UP
HGB BLD-MCNC: 11.9 G/DL — LOW (ref 13–17)
INR BLD: 1.15 RATIO — SIGNIFICANT CHANGE UP (ref 0.88–1.16)
IRON SATN MFR SERPL: 17 % — SIGNIFICANT CHANGE UP (ref 16–55)
IRON SATN MFR SERPL: 44 UG/DL — LOW (ref 45–165)
KETONES UR-MCNC: ABNORMAL
LDH SERPL L TO P-CCNC: 188 U/L — SIGNIFICANT CHANGE UP (ref 50–242)
LEUKOCYTE ESTERASE UR-ACNC: NEGATIVE — SIGNIFICANT CHANGE UP
MAGNESIUM SERPL-MCNC: 2 MG/DL — SIGNIFICANT CHANGE UP (ref 1.6–2.6)
MCHC RBC-ENTMCNC: 28.9 PG — SIGNIFICANT CHANGE UP (ref 27–34)
MCHC RBC-ENTMCNC: 32.2 GM/DL — SIGNIFICANT CHANGE UP (ref 32–36)
MCV RBC AUTO: 89.6 FL — SIGNIFICANT CHANGE UP (ref 80–100)
NITRITE UR-MCNC: NEGATIVE — SIGNIFICANT CHANGE UP
NRBC # BLD: 0 /100 WBCS — SIGNIFICANT CHANGE UP (ref 0–0)
PH UR: 7 — SIGNIFICANT CHANGE UP (ref 5–8)
PHOSPHATE SERPL-MCNC: 3.1 MG/DL — SIGNIFICANT CHANGE UP (ref 2.5–4.5)
PLATELET # BLD AUTO: 228 K/UL — SIGNIFICANT CHANGE UP (ref 150–400)
POTASSIUM SERPL-MCNC: 3.6 MMOL/L — SIGNIFICANT CHANGE UP (ref 3.5–5.3)
POTASSIUM SERPL-SCNC: 3.6 MMOL/L — SIGNIFICANT CHANGE UP (ref 3.5–5.3)
PROCALCITONIN SERPL-MCNC: <0.03 NG/ML — SIGNIFICANT CHANGE UP (ref 0.02–0.1)
PROT UR-MCNC: SIGNIFICANT CHANGE UP
PROTHROM AB SERPL-ACNC: 13.7 SEC — HIGH (ref 10.6–13.6)
PSA FLD-MCNC: 0.92 NG/ML — SIGNIFICANT CHANGE UP (ref 0–4)
RBC # BLD: 4.12 M/UL — LOW (ref 4.2–5.8)
RBC # FLD: 13.2 % — SIGNIFICANT CHANGE UP (ref 10.3–14.5)
RH IG SCN BLD-IMP: POSITIVE — SIGNIFICANT CHANGE UP
RHEUMATOID FACT SERPL-ACNC: <10 IU/ML — SIGNIFICANT CHANGE UP (ref 0–13)
SODIUM SERPL-SCNC: 137 MMOL/L — SIGNIFICANT CHANGE UP (ref 135–145)
SP GR SPEC: 1.01 — SIGNIFICANT CHANGE UP (ref 1.01–1.02)
TIBC SERPL-MCNC: 262 UG/DL — SIGNIFICANT CHANGE UP (ref 220–430)
TSH SERPL-MCNC: 2.77 UIU/ML — SIGNIFICANT CHANGE UP (ref 0.27–4.2)
UIBC SERPL-MCNC: 219 UG/DL — SIGNIFICANT CHANGE UP (ref 110–370)
UROBILINOGEN FLD QL: SIGNIFICANT CHANGE UP
VIT B12 SERPL-MCNC: 1391 PG/ML — HIGH (ref 232–1245)
WBC # BLD: 3.76 K/UL — LOW (ref 3.8–10.5)
WBC # FLD AUTO: 3.76 K/UL — LOW (ref 3.8–10.5)

## 2020-11-07 PROCEDURE — 65710 CORNEAL TRANSPLANT: CPT | Mod: 80,RT

## 2020-11-07 PROCEDURE — 88304 TISSUE EXAM BY PATHOLOGIST: CPT | Mod: 26

## 2020-11-07 PROCEDURE — 88312 SPECIAL STAINS GROUP 1: CPT | Mod: 26

## 2020-11-07 RX ORDER — SODIUM CHLORIDE 9 MG/ML
1000 INJECTION, SOLUTION INTRAVENOUS
Qty: 0 | Refills: 0 | DISCHARGE
Start: 2020-11-07

## 2020-11-07 RX ORDER — FLUOROMETHOLONE 1 MG/ML
1 SOLUTION/ DROPS OPHTHALMIC
Qty: 0 | Refills: 0 | DISCHARGE
Start: 2020-11-07

## 2020-11-07 RX ORDER — DEXTROSE 50 % IN WATER 50 %
12.5 SYRINGE (ML) INTRAVENOUS ONCE
Refills: 0 | Status: DISCONTINUED | OUTPATIENT
Start: 2020-11-07 | End: 2020-11-07

## 2020-11-07 RX ORDER — DORZOLAMIDE HYDROCHLORIDE TIMOLOL MALEATE 20; 5 MG/ML; MG/ML
1 SOLUTION/ DROPS OPHTHALMIC
Qty: 0 | Refills: 0 | DISCHARGE

## 2020-11-07 RX ORDER — PREGABALIN 225 MG/1
1000 CAPSULE ORAL DAILY
Refills: 0 | Status: DISCONTINUED | OUTPATIENT
Start: 2020-11-07 | End: 2020-11-07

## 2020-11-07 RX ORDER — TOBRAMYCIN SULFATE 40 MG/ML
0 VIAL (ML) INJECTION
Qty: 0 | Refills: 0 | DISCHARGE
Start: 2020-11-07

## 2020-11-07 RX ORDER — ATORVASTATIN CALCIUM 80 MG/1
10 TABLET, FILM COATED ORAL AT BEDTIME
Refills: 0 | Status: DISCONTINUED | OUTPATIENT
Start: 2020-11-07 | End: 2020-11-07

## 2020-11-07 RX ORDER — VANCOMYCIN HCL 1 G
1 VIAL (EA) INTRAVENOUS
Refills: 0 | Status: DISCONTINUED | OUTPATIENT
Start: 2020-11-07 | End: 2020-11-07

## 2020-11-07 RX ORDER — DEXTROSE 50 % IN WATER 50 %
25 SYRINGE (ML) INTRAVENOUS
Qty: 0 | Refills: 0 | DISCHARGE
Start: 2020-11-07

## 2020-11-07 RX ORDER — METFORMIN HYDROCHLORIDE 850 MG/1
1 TABLET ORAL
Qty: 0 | Refills: 0 | DISCHARGE

## 2020-11-07 RX ORDER — DEXTROSE 50 % IN WATER 50 %
50 SYRINGE (ML) INTRAVENOUS
Qty: 0 | Refills: 0 | DISCHARGE
Start: 2020-11-07

## 2020-11-07 RX ORDER — SENNA PLUS 8.6 MG/1
2 TABLET ORAL AT BEDTIME
Refills: 0 | Status: DISCONTINUED | OUTPATIENT
Start: 2020-11-07 | End: 2020-11-07

## 2020-11-07 RX ORDER — DEXTROSE 50 % IN WATER 50 %
15 SYRINGE (ML) INTRAVENOUS ONCE
Refills: 0 | Status: DISCONTINUED | OUTPATIENT
Start: 2020-11-07 | End: 2020-11-07

## 2020-11-07 RX ORDER — DEXTROSE 50 % IN WATER 50 %
25 SYRINGE (ML) INTRAVENOUS ONCE
Refills: 0 | Status: DISCONTINUED | OUTPATIENT
Start: 2020-11-07 | End: 2020-11-07

## 2020-11-07 RX ORDER — ACETAMINOPHEN 500 MG
650 TABLET ORAL EVERY 6 HOURS
Refills: 0 | Status: DISCONTINUED | OUTPATIENT
Start: 2020-11-07 | End: 2020-11-07

## 2020-11-07 RX ORDER — DEXTROSE 50 % IN WATER 50 %
0 SYRINGE (ML) INTRAVENOUS
Qty: 0 | Refills: 0 | DISCHARGE
Start: 2020-11-07

## 2020-11-07 RX ORDER — TAMSULOSIN HYDROCHLORIDE 0.4 MG/1
0.4 CAPSULE ORAL AT BEDTIME
Refills: 0 | Status: DISCONTINUED | OUTPATIENT
Start: 2020-11-07 | End: 2020-11-07

## 2020-11-07 RX ORDER — INSULIN LISPRO 100/ML
0 VIAL (ML) SUBCUTANEOUS
Qty: 0 | Refills: 0 | DISCHARGE
Start: 2020-11-07

## 2020-11-07 RX ORDER — POLYETHYLENE GLYCOL 3350 17 G/17G
17 POWDER, FOR SOLUTION ORAL DAILY
Refills: 0 | Status: DISCONTINUED | OUTPATIENT
Start: 2020-11-07 | End: 2020-11-07

## 2020-11-07 RX ORDER — INSULIN LISPRO 100/ML
VIAL (ML) SUBCUTANEOUS AT BEDTIME
Refills: 0 | Status: DISCONTINUED | OUTPATIENT
Start: 2020-11-07 | End: 2020-11-07

## 2020-11-07 RX ORDER — INSULIN LISPRO 100/ML
VIAL (ML) SUBCUTANEOUS
Refills: 0 | Status: DISCONTINUED | OUTPATIENT
Start: 2020-11-07 | End: 2020-11-07

## 2020-11-07 RX ORDER — SODIUM CHLORIDE 9 MG/ML
0 INJECTION INTRAMUSCULAR; INTRAVENOUS; SUBCUTANEOUS
Qty: 0 | Refills: 0 | DISCHARGE
Start: 2020-11-07

## 2020-11-07 RX ORDER — VANCOMYCIN HCL 1 G
0 VIAL (EA) INTRAVENOUS
Qty: 0 | Refills: 0 | DISCHARGE
Start: 2020-11-07

## 2020-11-07 RX ORDER — ACETAMINOPHEN 500 MG
2 TABLET ORAL
Qty: 0 | Refills: 0 | DISCHARGE
Start: 2020-11-07

## 2020-11-07 RX ORDER — DORZOLAMIDE HYDROCHLORIDE TIMOLOL MALEATE 20; 5 MG/ML; MG/ML
1 SOLUTION/ DROPS OPHTHALMIC
Refills: 0 | Status: DISCONTINUED | OUTPATIENT
Start: 2020-11-07 | End: 2020-11-07

## 2020-11-07 RX ORDER — TOBRAMYCIN SULFATE 40 MG/ML
1 VIAL (ML) INJECTION
Refills: 0 | Status: DISCONTINUED | OUTPATIENT
Start: 2020-11-07 | End: 2020-11-07

## 2020-11-07 RX ORDER — OFLOXACIN 0.3 %
1 DROPS OPHTHALMIC (EYE)
Refills: 0 | Status: DISCONTINUED | OUTPATIENT
Start: 2020-11-07 | End: 2020-11-07

## 2020-11-07 RX ORDER — GLUCAGON INJECTION, SOLUTION 0.5 MG/.1ML
1 INJECTION, SOLUTION SUBCUTANEOUS ONCE
Refills: 0 | Status: DISCONTINUED | OUTPATIENT
Start: 2020-11-07 | End: 2020-11-07

## 2020-11-07 RX ORDER — SODIUM CHLORIDE 9 MG/ML
1000 INJECTION, SOLUTION INTRAVENOUS
Refills: 0 | Status: DISCONTINUED | OUTPATIENT
Start: 2020-11-07 | End: 2020-11-07

## 2020-11-07 RX ADMIN — SENNA PLUS 2 TABLET(S): 8.6 TABLET ORAL at 21:35

## 2020-11-07 RX ADMIN — Medication 1 DROP(S): at 01:55

## 2020-11-07 RX ADMIN — Medication 1 DROP(S): at 06:42

## 2020-11-07 RX ADMIN — Medication 1 DROP(S): at 03:04

## 2020-11-07 RX ADMIN — TAMSULOSIN HYDROCHLORIDE 0.4 MILLIGRAM(S): 0.4 CAPSULE ORAL at 21:35

## 2020-11-07 RX ADMIN — Medication 5 MILLIGRAM(S): at 05:23

## 2020-11-07 RX ADMIN — Medication 1 DROP(S): at 00:23

## 2020-11-07 RX ADMIN — Medication 1 DROP(S): at 05:47

## 2020-11-07 RX ADMIN — Medication 1 DROP(S): at 04:05

## 2020-11-07 RX ADMIN — PREGABALIN 1000 MICROGRAM(S): 225 CAPSULE ORAL at 17:47

## 2020-11-07 RX ADMIN — Medication 1 DROP(S): at 05:03

## 2020-11-07 RX ADMIN — Medication 1 DROP(S): at 05:24

## 2020-11-07 RX ADMIN — DORZOLAMIDE HYDROCHLORIDE TIMOLOL MALEATE 1 DROP(S): 20; 5 SOLUTION/ DROPS OPHTHALMIC at 17:47

## 2020-11-07 RX ADMIN — Medication 1 DROP(S): at 23:57

## 2020-11-07 RX ADMIN — Medication 1 DROP(S): at 17:48

## 2020-11-07 RX ADMIN — POLYETHYLENE GLYCOL 3350 17 GRAM(S): 17 POWDER, FOR SOLUTION ORAL at 17:47

## 2020-11-07 RX ADMIN — FLUOROMETHOLONE 1 DROP(S): 1 SOLUTION/ DROPS OPHTHALMIC at 00:22

## 2020-11-07 RX ADMIN — SODIUM CHLORIDE 75 MILLILITER(S): 9 INJECTION INTRAMUSCULAR; INTRAVENOUS; SUBCUTANEOUS at 14:51

## 2020-11-07 RX ADMIN — ATORVASTATIN CALCIUM 10 MILLIGRAM(S): 80 TABLET, FILM COATED ORAL at 21:35

## 2020-11-07 RX ADMIN — DORZOLAMIDE HYDROCHLORIDE TIMOLOL MALEATE 1 DROP(S): 20; 5 SOLUTION/ DROPS OPHTHALMIC at 05:25

## 2020-11-07 RX ADMIN — Medication 1: at 18:10

## 2020-11-07 RX ADMIN — Medication 5 MILLIGRAM(S): at 17:47

## 2020-11-07 NOTE — PRE-OP CHECKLIST - SELECT TESTS ORDERED
BMP/CBC/PT/PTT/Urinalysis/INR/Type and Cross/POCT Blood Glucose/106 BMP/PT/PTT/106/Urinalysis/COVID/POCT Blood Glucose/Type and Cross/CBC/INR

## 2020-11-08 LAB
ANION GAP SERPL CALC-SCNC: 8 MMOL/L — SIGNIFICANT CHANGE UP (ref 5–17)
BUN SERPL-MCNC: 10 MG/DL — SIGNIFICANT CHANGE UP (ref 7–23)
CALCIUM SERPL-MCNC: 9.5 MG/DL — SIGNIFICANT CHANGE UP (ref 8.4–10.5)
CHLORIDE SERPL-SCNC: 100 MMOL/L — SIGNIFICANT CHANGE UP (ref 96–108)
CO2 SERPL-SCNC: 27 MMOL/L — SIGNIFICANT CHANGE UP (ref 22–31)
CREAT SERPL-MCNC: 0.74 MG/DL — SIGNIFICANT CHANGE UP (ref 0.5–1.3)
DSDNA AB FLD-ACNC: 0.6 AI — SIGNIFICANT CHANGE UP
GLUCOSE BLDC GLUCOMTR-MCNC: 101 MG/DL — HIGH (ref 70–99)
GLUCOSE BLDC GLUCOMTR-MCNC: 125 MG/DL — HIGH (ref 70–99)
GLUCOSE BLDC GLUCOMTR-MCNC: 143 MG/DL — HIGH (ref 70–99)
GLUCOSE BLDC GLUCOMTR-MCNC: 151 MG/DL — HIGH (ref 70–99)
GLUCOSE SERPL-MCNC: 108 MG/DL — HIGH (ref 70–99)
HCT VFR BLD CALC: 34.4 % — LOW (ref 39–50)
HGB BLD-MCNC: 11.6 G/DL — LOW (ref 13–17)
MCHC RBC-ENTMCNC: 29.5 PG — SIGNIFICANT CHANGE UP (ref 27–34)
MCHC RBC-ENTMCNC: 33.7 GM/DL — SIGNIFICANT CHANGE UP (ref 32–36)
MCV RBC AUTO: 87.5 FL — SIGNIFICANT CHANGE UP (ref 80–100)
NRBC # BLD: 0 /100 WBCS — SIGNIFICANT CHANGE UP (ref 0–0)
PLATELET # BLD AUTO: 216 K/UL — SIGNIFICANT CHANGE UP (ref 150–400)
POTASSIUM SERPL-MCNC: 3.9 MMOL/L — SIGNIFICANT CHANGE UP (ref 3.5–5.3)
POTASSIUM SERPL-SCNC: 3.9 MMOL/L — SIGNIFICANT CHANGE UP (ref 3.5–5.3)
RBC # BLD: 3.93 M/UL — LOW (ref 4.2–5.8)
RBC # FLD: 13.1 % — SIGNIFICANT CHANGE UP (ref 10.3–14.5)
SODIUM SERPL-SCNC: 135 MMOL/L — SIGNIFICANT CHANGE UP (ref 135–145)
WBC # BLD: 7.42 K/UL — SIGNIFICANT CHANGE UP (ref 3.8–10.5)
WBC # FLD AUTO: 7.42 K/UL — SIGNIFICANT CHANGE UP (ref 3.8–10.5)

## 2020-11-08 PROCEDURE — 71260 CT THORAX DX C+: CPT | Mod: 26

## 2020-11-08 PROCEDURE — 74177 CT ABD & PELVIS W/CONTRAST: CPT | Mod: 26

## 2020-11-08 RX ORDER — INSULIN LISPRO 100/ML
VIAL (ML) SUBCUTANEOUS
Refills: 0 | Status: DISCONTINUED | OUTPATIENT
Start: 2020-11-08 | End: 2020-11-12

## 2020-11-08 RX ORDER — MOXIFLOXACIN HCL 0.5 %
1 DROPS OPHTHALMIC (EYE)
Refills: 0 | Status: DISCONTINUED | OUTPATIENT
Start: 2020-11-08 | End: 2020-11-18

## 2020-11-08 RX ORDER — INSULIN LISPRO 100/ML
VIAL (ML) SUBCUTANEOUS AT BEDTIME
Refills: 0 | Status: DISCONTINUED | OUTPATIENT
Start: 2020-11-08 | End: 2020-11-12

## 2020-11-08 RX ORDER — POLYETHYLENE GLYCOL 3350 17 G/17G
17 POWDER, FOR SOLUTION ORAL DAILY
Refills: 0 | Status: DISCONTINUED | OUTPATIENT
Start: 2020-11-08 | End: 2020-11-18

## 2020-11-08 RX ORDER — GLUCAGON INJECTION, SOLUTION 0.5 MG/.1ML
1 INJECTION, SOLUTION SUBCUTANEOUS ONCE
Refills: 0 | Status: DISCONTINUED | OUTPATIENT
Start: 2020-11-08 | End: 2020-11-18

## 2020-11-08 RX ORDER — DEXTROSE 50 % IN WATER 50 %
25 SYRINGE (ML) INTRAVENOUS ONCE
Refills: 0 | Status: DISCONTINUED | OUTPATIENT
Start: 2020-11-08 | End: 2020-11-18

## 2020-11-08 RX ORDER — ACETAMINOPHEN 500 MG
650 TABLET ORAL EVERY 6 HOURS
Refills: 0 | Status: DISCONTINUED | OUTPATIENT
Start: 2020-11-08 | End: 2020-11-18

## 2020-11-08 RX ORDER — DEXTROSE 50 % IN WATER 50 %
15 SYRINGE (ML) INTRAVENOUS ONCE
Refills: 0 | Status: DISCONTINUED | OUTPATIENT
Start: 2020-11-08 | End: 2020-11-18

## 2020-11-08 RX ORDER — TAMSULOSIN HYDROCHLORIDE 0.4 MG/1
0.4 CAPSULE ORAL AT BEDTIME
Refills: 0 | Status: DISCONTINUED | OUTPATIENT
Start: 2020-11-08 | End: 2020-11-18

## 2020-11-08 RX ORDER — PREGABALIN 225 MG/1
1000 CAPSULE ORAL DAILY
Refills: 0 | Status: DISCONTINUED | OUTPATIENT
Start: 2020-11-08 | End: 2020-11-18

## 2020-11-08 RX ORDER — SENNA PLUS 8.6 MG/1
2 TABLET ORAL AT BEDTIME
Refills: 0 | Status: DISCONTINUED | OUTPATIENT
Start: 2020-11-08 | End: 2020-11-18

## 2020-11-08 RX ORDER — ATORVASTATIN CALCIUM 80 MG/1
10 TABLET, FILM COATED ORAL AT BEDTIME
Refills: 0 | Status: DISCONTINUED | OUTPATIENT
Start: 2020-11-08 | End: 2020-11-18

## 2020-11-08 RX ORDER — DORZOLAMIDE HYDROCHLORIDE TIMOLOL MALEATE 20; 5 MG/ML; MG/ML
1 SOLUTION/ DROPS OPHTHALMIC
Refills: 0 | Status: DISCONTINUED | OUTPATIENT
Start: 2020-11-08 | End: 2020-11-18

## 2020-11-08 RX ORDER — DEXTROSE 50 % IN WATER 50 %
12.5 SYRINGE (ML) INTRAVENOUS ONCE
Refills: 0 | Status: DISCONTINUED | OUTPATIENT
Start: 2020-11-08 | End: 2020-11-18

## 2020-11-08 RX ORDER — SODIUM CHLORIDE 9 MG/ML
1000 INJECTION, SOLUTION INTRAVENOUS
Refills: 0 | Status: DISCONTINUED | OUTPATIENT
Start: 2020-11-08 | End: 2020-11-18

## 2020-11-08 RX ADMIN — ATORVASTATIN CALCIUM 10 MILLIGRAM(S): 80 TABLET, FILM COATED ORAL at 21:35

## 2020-11-08 RX ADMIN — Medication 5 MILLIGRAM(S): at 17:42

## 2020-11-08 RX ADMIN — TAMSULOSIN HYDROCHLORIDE 0.4 MILLIGRAM(S): 0.4 CAPSULE ORAL at 21:36

## 2020-11-08 RX ADMIN — POLYETHYLENE GLYCOL 3350 17 GRAM(S): 17 POWDER, FOR SOLUTION ORAL at 17:42

## 2020-11-08 RX ADMIN — Medication 1 DROP(S): at 05:45

## 2020-11-08 RX ADMIN — DORZOLAMIDE HYDROCHLORIDE TIMOLOL MALEATE 1 DROP(S): 20; 5 SOLUTION/ DROPS OPHTHALMIC at 17:42

## 2020-11-08 RX ADMIN — Medication 5 MILLIGRAM(S): at 05:46

## 2020-11-08 RX ADMIN — Medication 1 DROP(S): at 19:47

## 2020-11-08 RX ADMIN — Medication 1 DROP(S): at 05:47

## 2020-11-08 RX ADMIN — SENNA PLUS 2 TABLET(S): 8.6 TABLET ORAL at 21:36

## 2020-11-08 RX ADMIN — DORZOLAMIDE HYDROCHLORIDE TIMOLOL MALEATE 1 DROP(S): 20; 5 SOLUTION/ DROPS OPHTHALMIC at 05:46

## 2020-11-08 RX ADMIN — PREGABALIN 1000 MICROGRAM(S): 225 CAPSULE ORAL at 17:42

## 2020-11-08 NOTE — PROGRESS NOTE ADULT - PROBLEM SELECTOR PLAN 1
-Progressive corneal melting of right eye with concern for ?rheumatologic, ?malignant, ?infectious cause. But also has bilateral eye issues.   -Ophtho sp OR patch graft repair  at [facility].  --Will continue for now.

## 2020-11-08 NOTE — PROGRESS NOTE ADULT - SUBJECTIVE AND OBJECTIVE BOX
Patient is a 80y old  Male who presents with a chief complaint of Right eye progressive corneal thinning (2020 15:38)      INTERVAL HPI/OVERNIGHT EVENTS: noted  pt seen and examined  sp rt Eye corneal surgery  re-transferred from [facility]      Vital Signs Last 24 Hrs  T(C): 36.6 (2020 20:23), Max: 36.8 (2020 04:51)  T(F): 97.8 (2020 20:23), Max: 98.2 (2020 04:51)  HR: 61 (2020 20:23) (56 - 61)  BP: 143/79 (2020 20:23) (136/75 - 155/79)  BP(mean): --  RR: 18 (2020 20:23) (17 - 18)  SpO2: 100% (2020 20:23) (99% - 100%)    acetaminophen   Tablet .. 650 milliGRAM(s) Oral every 6 hours PRN  atorvastatin 10 milliGRAM(s) Oral at bedtime  cyanocobalamin 1000 MICROGram(s) Oral daily  dextrose 40% Gel 15 Gram(s) Oral once PRN  dextrose 5%. 1000 milliLiter(s) IV Continuous <Continuous>  dextrose 50% Injectable 12.5 Gram(s) IV Push once  dextrose 50% Injectable 25 Gram(s) IV Push once  dextrose 50% Injectable 25 Gram(s) IV Push once  dorzolamide 2%/timolol 0.5% Ophthalmic Solution 1 Drop(s) Left EYE two times a day  enalapril 5 milliGRAM(s) Oral daily  glucagon  Injectable 1 milliGRAM(s) IntraMuscular once PRN  insulin lispro (ADMELOG) corrective regimen sliding scale   SubCutaneous three times a day before meals  insulin lispro (ADMELOG) corrective regimen sliding scale   SubCutaneous at bedtime  moxifloxacin 0.5% Solution 1 Drop(s) Both EYES <User Schedule>  polyethylene glycol 3350 17 Gram(s) Oral daily  senna 2 Tablet(s) Oral at bedtime  tamsulosin 0.4 milliGRAM(s) Oral at bedtime      PHYSICAL EXAM:  GENERAL: NAD,   EYES: Rt eye dressing  ENMT: Moist mucous membranes  NECK: Supple, No JVD, Normal thyroid  CHEST/LUNG: non labored, cta b/l  HEART: Regular rate and rhythm; No murmurs, rubs, or gallops  ABDOMEN: Soft, Nontender, Nondistended; Bowel sounds present  EXTREMITIES:  2+ Peripheral Pulses, No clubbing, cyanosis, or edema  LYMPH: No lymphadenopathy noted  SKIN: No rashes or lesions    Consultant(s) Notes Reviewed:  [x ] YES  [ ] NO  Care Discussed with Consultants/Other Providers [ x] YES  [ ] NO    LABS:                        11.6   7.42  )-----------( 216      ( 2020 06:53 )             34.4     11-08    135  |  100  |  10  ----------------------------<  108<H>  3.9   |  27  |  0.74    Ca    9.5      2020 06:53  Phos  3.1     11-  Mg     2.0     11-07      PT/INR - ( 2020 07:37 )   PT: 13.7 sec;   INR: 1.15 ratio         PTT - ( 2020 07:37 )  PTT:30.0 sec  Urinalysis Basic - ( 2020 05:37 )    Color: Light Yellow / Appearance: Turbid / S.014 / pH: x  Gluc: x / Ketone: Small  / Bili: Negative / Urobili: <2 mg/dL   Blood: x / Protein: Trace / Nitrite: Negative   Leuk Esterase: Negative / RBC: 2 /HPF / WBC 0 /HPF   Sq Epi: x / Non Sq Epi: 0 /HPF / Bacteria: Negative      CAPILLARY BLOOD GLUCOSE      POCT Blood Glucose.: 143 mg/dL (2020 21:26)  POCT Blood Glucose.: 125 mg/dL (2020 17:17)  POCT Blood Glucose.: 151 mg/dL (2020 12:07)  POCT Blood Glucose.: 101 mg/dL (2020 08:18)        Urinalysis Basic - ( 2020 05:37 )    Color: Light Yellow / Appearance: Turbid / S.014 / pH: x  Gluc: x / Ketone: Small  / Bili: Negative / Urobili: <2 mg/dL   Blood: x / Protein: Trace / Nitrite: Negative   Leuk Esterase: Negative / RBC: 2 /HPF / WBC 0 /HPF   Sq Epi: x / Non Sq Epi: 0 /HPF / Bacteria: Negative          RADIOLOGY & ADDITIONAL TESTS:    Imaging Personally Reviewed:  [x ] YES  [ ] NO

## 2020-11-08 NOTE — CONSULT NOTE ADULT - SUBJECTIVE AND OBJECTIVE BOX
[facility] DEPARTMENT OF OPHTHALMOLOGY - INITIAL ADULT CONSULT  -----------------------------------------------------------------------------------------------------------------  Tonia Blood MD PGY-III  Pager: [phone]/[facility]: [phone]  -----------------------------------------------------------------------------------------------------------------    HPI:  80 year old male with PMH of DM, HTN, HLD, chronic conjunctivitis; presented from ophthalmologist's office (Dr. Warren) for surgery of the right eye. Per pt and pt's wife, pt started to notice vision loss of the right eye approximately one week ago, was diagnosed with a tc abrasion, went to go and see his ophthalmologist today who recommended he come to the ED for admission for surgery for progressive corneal thinning. Pt has + right sided eye discharge, eye covered with shield, denies headaches, dizziness, hearing changes, head/sinus pain, no fevers, chills, nausea, vomiting.  Has had unintentional progressive weight loss with about 60 lbs over the past year with poor PO intake. Denies any joint pains, or CP. Has intermittent SOB. Reports constipation.   He denies history of MI, CAD, or stroke. He has limited functional status at baseline.   Reports last colonoscopy was about 5 years ago and was normal.  (06 Nov 2020 18:26)    Interval History: Pt had 4 days of progressive corneal thinning at risk of perforation, and was taken to the OR yesterady where he had patch corneal graft repair of the right eye performed. Patient seen and examined at bedside today and reports feeling okay after the surgery. Patch dressing removed and eye examined, with replacement of clear rigid plastic shield to the right eye. Patient denies pain.    PAST MEDICAL & SURGICAL HISTORY:  BPH (benign prostatic hyperplasia)    Hyperlipidemia    HTN (hypertension)    DM (diabetes mellitus)    No significant past surgical history      Past Ocular History: as above, chronic conjunctivitis leading to progressive thinning of b/l corneas.   Ophthalmic Medications: cosopt, moxifloxacin  FAMILY HISTORY:  No pertinent family history in first degree relatives      Social History: denies    MEDICATIONS  (STANDING):  atorvastatin 10 milliGRAM(s) Oral at bedtime  cyanocobalamin 1000 MICROGram(s) Oral daily  dextrose 5%. 1000 milliLiter(s) (50 mL/Hr) IV Continuous <Continuous>  dextrose 50% Injectable 12.5 Gram(s) IV Push once  dextrose 50% Injectable 25 Gram(s) IV Push once  dextrose 50% Injectable 25 Gram(s) IV Push once  dorzolamide 2%/timolol 0.5% Ophthalmic Solution 1 Drop(s) Left EYE two times a day  enalapril 5 milliGRAM(s) Oral daily  insulin lispro (ADMELOG) corrective regimen sliding scale   SubCutaneous three times a day before meals  insulin lispro (ADMELOG) corrective regimen sliding scale   SubCutaneous at bedtime  moxifloxacin 0.5% Solution 1 Drop(s) Both EYES <User Schedule>  polyethylene glycol 3350 17 Gram(s) Oral daily  senna 2 Tablet(s) Oral at bedtime  tamsulosin 0.4 milliGRAM(s) Oral at bedtime    MEDICATIONS  (PRN):  acetaminophen   Tablet .. 650 milliGRAM(s) Oral every 6 hours PRN Mild Pain (1 - 3)  dextrose 40% Gel 15 Gram(s) Oral once PRN Blood Glucose LESS THAN 70 milliGRAM(s)/deciLiter  glucagon  Injectable 1 milliGRAM(s) IntraMuscular once PRN Glucose <70 milliGRAM(s)/deciLiter    Allergies & Intolerances: NKDA    Review of Systems:  Constitutional: No fever, chills  Eyes: No flashes, floaters, FBS, erythema,  double vision, OU  Neuro: No tremors  Cardiovascular: No chest pain, palpitations  Respiratory: No SOB, no cough  GI: No nausea, vomiting, abdominal pain  : No dysuria  Skin: no rash  Psych: no depression  Endocrine: no polyuria, polydipsia  Heme/lymph: no swelling    VITALS: T(C): 36.5 (11-08-20 @ 12:23)  T(F): 97.7 (11-08-20 @ 12:23), Max: 98.2 (11-08-20 @ 04:51)  HR: 58 (11-08-20 @ 12:23) (54 - 59)  BP: 155/79 (11-08-20 @ 12:23) (119/65 - 155/79)  RR:  (14 - 18)  SpO2:  (99% - 100%)  Wt(kg): --  General: AAO x 3, appropriate mood and affect    Ophthalmology Exam:  Visual acuity (sc): HM OD CF at 2 ft OS  Pupils: poor view 2/2 corneal changes, appears minimally reactive OU  Ttono: 12 OD 18 OS  Extraocular movements (EOMs): Grossly appear full OU, no pain, no diplopia  Confrontational Visual Field (CVF): poor fixation OU  Color Plates: ED 2/2 visual acuity OU    Pen Light Exam (PLE)  External: Flat OU Patch in place, removed and replaced clear shield OD  Lids/Lashes/Lacrimal Ducts: Flat OU Inferior lids noted to be slightly ectropic OU    Sclera/Conjunctiva: 2+ injection OD 1+ injection OS  Cornea: Patch graft in place, well sutured, laura negative OD small punctate epi defect 1x1mm centrally OS  Anterior Chamber: D+F OU    Iris: poor view but appears Flat OU  Lens: poor view OU

## 2020-11-08 NOTE — PROGRESS NOTE ADULT - PROBLEM SELECTOR PLAN 3
-Reported 60lbs weight loss over the past year with poor PO intake and constipation.   -Will check FOBT, PSA, UA, TSH, am cortisol.   -CT chest/abd/pelvis with contrast to eval for underlying malignancy.    -Last colonoscopy about 5 years ago per wife was within normal.   -Nutrition eval.   -Anemia work up with iron studies, B12, folate, and LDH.

## 2020-11-08 NOTE — CONSULT NOTE ADULT - ASSESSMENT
Assessment and Recommendations:  80y male w/ pmhx/ochx of  DM, HTN, HLD, chronic conjunctivitis and 60 lb weight loss with progressive corneal thinning of both eyes, now POD 1 s/p corneal patch graft of the right eye. Patient doing well postoperatively.    # corneal thinning of both eyes, now POD 1 s/p corneal patch graft of the right eye  - patch graft of right eye in place  - small epi defect/area of thinning in the left eye stable  - moxifloxacin qid to both eyes  - will monitor closely  - given recent weight loss and b/l nature of corneal pathology, await workup with CT chest/A/P to evaluate for underlying disease process    # h/o glaucoma  - cosopt BID to the left eye only    - Findings discussed with pt and primary team  - Clinical photos and care discussed with attending, Dr. Warren.    Outpatient follow-up: Patient should follow-up with his/her ophthalmologist or with [facility] Department of Ophthalmology within 1 week of after discharge at:    [address] [facility] [address]  [place] [address]  [phone]    Tonia Blood MD, PGY-III  Pager: [phone]/[facility]: [phone]  Also available on [employer] Teams

## 2020-11-09 ENCOUNTER — TRANSCRIPTION ENCOUNTER (OUTPATIENT)
Age: 80
End: 2020-11-09

## 2020-11-09 LAB
ANION GAP SERPL CALC-SCNC: 10 MMOL/L — SIGNIFICANT CHANGE UP (ref 5–17)
BASOPHILS # BLD AUTO: 0.02 K/UL — SIGNIFICANT CHANGE UP (ref 0–0.2)
BASOPHILS NFR BLD AUTO: 0.3 % — SIGNIFICANT CHANGE UP (ref 0–2)
BUN SERPL-MCNC: 10 MG/DL — SIGNIFICANT CHANGE UP (ref 7–23)
CALCIUM SERPL-MCNC: 9.7 MG/DL — SIGNIFICANT CHANGE UP (ref 8.4–10.5)
CCP IGG SERPL-ACNC: 26 UNITS — HIGH
CHLORIDE SERPL-SCNC: 96 MMOL/L — SIGNIFICANT CHANGE UP (ref 96–108)
CO2 SERPL-SCNC: 27 MMOL/L — SIGNIFICANT CHANGE UP (ref 22–31)
CREAT SERPL-MCNC: 0.69 MG/DL — SIGNIFICANT CHANGE UP (ref 0.5–1.3)
ENA SS-A AB FLD IA-ACNC: 0.3 AI — SIGNIFICANT CHANGE UP
EOSINOPHIL # BLD AUTO: 0.04 K/UL — SIGNIFICANT CHANGE UP (ref 0–0.5)
EOSINOPHIL NFR BLD AUTO: 0.7 % — SIGNIFICANT CHANGE UP (ref 0–6)
GLUCOSE BLDC GLUCOMTR-MCNC: 116 MG/DL — HIGH (ref 70–99)
GLUCOSE BLDC GLUCOMTR-MCNC: 124 MG/DL — HIGH (ref 70–99)
GLUCOSE BLDC GLUCOMTR-MCNC: 136 MG/DL — HIGH (ref 70–99)
GLUCOSE BLDC GLUCOMTR-MCNC: 144 MG/DL — HIGH (ref 70–99)
GLUCOSE BLDC GLUCOMTR-MCNC: 167 MG/DL — HIGH (ref 70–99)
GLUCOSE SERPL-MCNC: 127 MG/DL — HIGH (ref 70–99)
HCT VFR BLD CALC: 39.2 % — SIGNIFICANT CHANGE UP (ref 39–50)
HGB BLD-MCNC: 12.7 G/DL — LOW (ref 13–17)
IMM GRANULOCYTES NFR BLD AUTO: 0.5 % — SIGNIFICANT CHANGE UP (ref 0–1.5)
LYMPHOCYTES # BLD AUTO: 0.61 K/UL — LOW (ref 1–3.3)
LYMPHOCYTES # BLD AUTO: 10 % — LOW (ref 13–44)
MCHC RBC-ENTMCNC: 29.1 PG — SIGNIFICANT CHANGE UP (ref 27–34)
MCHC RBC-ENTMCNC: 32.4 GM/DL — SIGNIFICANT CHANGE UP (ref 32–36)
MCV RBC AUTO: 89.9 FL — SIGNIFICANT CHANGE UP (ref 80–100)
MONOCYTES # BLD AUTO: 0.4 K/UL — SIGNIFICANT CHANGE UP (ref 0–0.9)
MONOCYTES NFR BLD AUTO: 6.5 % — SIGNIFICANT CHANGE UP (ref 2–14)
NEUTROPHILS # BLD AUTO: 5.03 K/UL — SIGNIFICANT CHANGE UP (ref 1.8–7.4)
NEUTROPHILS NFR BLD AUTO: 82 % — HIGH (ref 43–77)
NRBC # BLD: 0 /100 WBCS — SIGNIFICANT CHANGE UP (ref 0–0)
PLATELET # BLD AUTO: 215 K/UL — SIGNIFICANT CHANGE UP (ref 150–400)
POTASSIUM SERPL-MCNC: 4 MMOL/L — SIGNIFICANT CHANGE UP (ref 3.5–5.3)
POTASSIUM SERPL-SCNC: 4 MMOL/L — SIGNIFICANT CHANGE UP (ref 3.5–5.3)
RBC # BLD: 4.36 M/UL — SIGNIFICANT CHANGE UP (ref 4.2–5.8)
RBC # FLD: 13.2 % — SIGNIFICANT CHANGE UP (ref 10.3–14.5)
RF+CCP IGG SER-IMP: ABNORMAL
SODIUM SERPL-SCNC: 133 MMOL/L — LOW (ref 135–145)
WBC # BLD: 6.13 K/UL — SIGNIFICANT CHANGE UP (ref 3.8–10.5)
WBC # FLD AUTO: 6.13 K/UL — SIGNIFICANT CHANGE UP (ref 3.8–10.5)

## 2020-11-09 PROCEDURE — 99232 SBSQ HOSP IP/OBS MODERATE 35: CPT

## 2020-11-09 PROCEDURE — 72149 MRI LUMBAR SPINE W/DYE: CPT | Mod: 26

## 2020-11-09 RX ORDER — VANCOMYCIN HCL 1 G
1 VIAL (EA) INTRAVENOUS
Refills: 0 | Status: DISCONTINUED | OUTPATIENT
Start: 2020-11-09 | End: 2020-11-12

## 2020-11-09 RX ADMIN — ATORVASTATIN CALCIUM 10 MILLIGRAM(S): 80 TABLET, FILM COATED ORAL at 22:59

## 2020-11-09 RX ADMIN — Medication 1 DROP(S): at 15:04

## 2020-11-09 RX ADMIN — Medication 1 DROP(S): at 10:12

## 2020-11-09 RX ADMIN — DORZOLAMIDE HYDROCHLORIDE TIMOLOL MALEATE 1 DROP(S): 20; 5 SOLUTION/ DROPS OPHTHALMIC at 06:25

## 2020-11-09 RX ADMIN — Medication 1 DROP(S): at 20:16

## 2020-11-09 RX ADMIN — Medication 1 DROP(S): at 06:29

## 2020-11-09 RX ADMIN — TAMSULOSIN HYDROCHLORIDE 0.4 MILLIGRAM(S): 0.4 CAPSULE ORAL at 22:59

## 2020-11-09 RX ADMIN — SENNA PLUS 2 TABLET(S): 8.6 TABLET ORAL at 22:59

## 2020-11-09 RX ADMIN — DORZOLAMIDE HYDROCHLORIDE TIMOLOL MALEATE 1 DROP(S): 20; 5 SOLUTION/ DROPS OPHTHALMIC at 17:51

## 2020-11-09 RX ADMIN — Medication 1 DROP(S): at 14:15

## 2020-11-09 RX ADMIN — POLYETHYLENE GLYCOL 3350 17 GRAM(S): 17 POWDER, FOR SOLUTION ORAL at 12:45

## 2020-11-09 RX ADMIN — PREGABALIN 1000 MICROGRAM(S): 225 CAPSULE ORAL at 12:45

## 2020-11-09 RX ADMIN — Medication 5 MILLIGRAM(S): at 06:26

## 2020-11-09 RX ADMIN — Medication 1: at 12:44

## 2020-11-09 NOTE — PROGRESS NOTE ADULT - SUBJECTIVE AND OBJECTIVE BOX
[facility] DEPARTMENT OF OPHTHALMOLOGY - ADULT PROGRESS NOTE  -----------------------------------------------------------------------------------------------------------------  Syeda LALA3 MD  Pager: [phone]  -----------------------------------------------------------------------------------------------------------------    80 year old male with PMH of DM, HTN, HLD, chronic conjunctivitis; presented from ophthalmologist's office (Dr. Warren) for surgery of the right eye (POD 2mcorneal patch graft) due to progressive corneal thinning OU.     Eye shield removed for eye exam. Denies pain. Per patient, vision at baseline.    VITALS: T(C): 36.5 (11-08-20 @ 12:23)  T(F): 97.7 (11-08-20 @ 12:23), Max: 98.2 (11-08-20 @ 04:51)  HR: 58 (11-08-20 @ 12:23) (54 - 59)  BP: 155/79 (11-08-20 @ 12:23) (119/65 - 155/79)  RR:  (14 - 18)  SpO2:  (99% - 100%)  Wt(kg): --  General: AAO x 3, appropriate mood and affect    Ophthalmology Exam:  Visual acuity (sc): HM OD, CF at 2 ft OS  Pupils: poor view 2/2 corneal changes, appears minimally reactive OU  Ttono: 12 OD 13 OS  Extraocular movements (EOMs): Grossly appear full OU, no pain, no diplopia    Pen Light Exam (PLE)  External: Clear shield in place OD  Lids/Lashes/Lacrimal Ducts: Flat OU Inferior lids noted to be slightly ectropic OU, eyelids crusted shut with significant purulent discharge  Sclera/Conjunctiva: 2+ injection OD 1+ injection OS  Cornea: Patch graft in place, well sutured, laura negative OD, epithelial defect curvilinear present temporally measuring approx 8x8 mm; small punctate epi defect 1x1mm centrally OS  Anterior Chamber: D+F OU    Iris: poor view but appears Flat OU  Lens: poor view OU       Assessment and Recommendation:   · Assessment	  Assessment and Recommendations:  80y male w/ pmhx/ochx of  DM, HTN, HLD, chronic conjunctivitis and 60 lb weight loss with progressive corneal thinning of both eyes, now POD 2 s/p corneal patch graft of the right eye. Patient with new epi defect OD.    # corneal thinning of both eyes, now POD 2 s/p corneal patch graft of the right eye  - patch graft of right eye in place; new curvilinear 8x8 mm epi defect present  - small epi defect/area of thinning in the left eye stable  - moxifloxacin QID to both eyes and fortified Vancomycin QID to both eyes  - will monitor closely  - given recent weight loss and b/l nature of corneal pathology, await malignancy workup to evaluate for underlying disease process. CT with vertebral lytic lesion, warranting further investigation  - discharge from lashes cleaned gently   - patient will be evaluated by cornea specialist Dr. Warren tomorrow    # h/o glaucoma  - cosopt BID to the left eye only    - Findings discussed with pt and primary team  - Clinical photos and care discussed with attending, Dr. Warren.    Outpatient follow-up: Patient should follow-up with his/her ophthalmologist or with [facility] Department of Ophthalmology within 1 week of after discharge at:    [address]  [place] [address]  [phone] [facility] DEPARTMENT OF OPHTHALMOLOGY - ADULT PROGRESS NOTE  -----------------------------------------------------------------------------------------------------------------  Syeda LALA3 MD  Pager: [phone]  -----------------------------------------------------------------------------------------------------------------    80 year old male with PMH of DM, HTN, HLD, chronic conjunctivitis; presented from ophthalmologist's office (Dr. Warren) for surgery of the right eye (POD 2mcorneal patch graft) due to progressive corneal thinning OU.     Eye shield removed for eye exam. Denies pain. Per patient, vision at baseline.    VITALS: T(C): 36.5 (11-08-20 @ 12:23)  T(F): 97.7 (11-08-20 @ 12:23), Max: 98.2 (11-08-20 @ 04:51)  HR: 58 (11-08-20 @ 12:23) (54 - 59)  BP: 155/79 (11-08-20 @ 12:23) (119/65 - 155/79)  RR:  (14 - 18)  SpO2:  (99% - 100%)  Wt(kg): --  General: AAO x 3, appropriate mood and affect    Ophthalmology Exam:  Visual acuity (sc): HM OD, CF at 2 ft OS  Pupils: poor view 2/2 corneal changes, appears minimally reactive OU  Ttono: 12 OD 13 OS  Extraocular movements (EOMs): Grossly appear full OU, no pain, no diplopia    Pen Light Exam (PLE)  External: Clear shield in place OD  Lids/Lashes/Lacrimal Ducts: Flat OU Inferior lids noted to be slightly ectropic OU, eyelids crusted shut with significant purulent discharge  Sclera/Conjunctiva: 2+ injection OD 1+ injection OS  Cornea: Patch graft in place, well sutured, laura negative OD, epithelial defect curvilinear present temporally measuring approx 8x8 mm, no obvious thinning; small punctate epi defect 1x1mm centrally with 20% thinning OS  Anterior Chamber: D+F OU    Iris: poor view but appears Flat OU  Lens: poor view OU       Assessment and Recommendation:   · Assessment	  Assessment and Recommendations:  80y male w/ pmhx/ochx of  DM, HTN, HLD, chronic conjunctivitis and 60 lb weight loss with progressive corneal thinning of both eyes, now POD 2 s/p corneal patch graft of the right eye. Patient with new epi defect OD.    # corneal thinning of both eyes, now POD 2 s/p corneal patch graft of the right eye  - patch graft of right eye in place; new curvilinear 8x8 mm epi defect present  - small epi defect/area of thinning in the left eye stable  - moxifloxacin QID to both eyes and fortified Vancomycin QID to both eyes  - will monitor closely  - given recent weight loss and b/l nature of corneal pathology, await malignancy workup to evaluate for underlying disease process. CT with vertebral lytic lesion, warranting further investigation  - discharge from lashes cleaned gently   - patient will be evaluated by cornea specialist Dr. Warren tomorrow    # h/o glaucoma  - cosopt BID to the left eye only    - Findings discussed with pt and primary team  - Clinical photos and care discussed with attending, Dr. Warren.    Outpatient follow-up: Patient should follow-up with his/her ophthalmologist or with [facility] Department of Ophthalmology within 1 week of after discharge at:    [address]  [place] [address]  [phone]

## 2020-11-09 NOTE — PHYSICAL THERAPY INITIAL EVALUATION ADULT - BALANCE TRAINING, PT EVAL
GOAL: Pt will improve static & dynamic standing balance by one grade to restore some independence with mobility & related ADLs.

## 2020-11-09 NOTE — PHYSICAL THERAPY INITIAL EVALUATION ADULT - LIVES WITH, PROFILE
spouse/in a pvt home has 3STE NHR, once inside has another 14 steps +RHR to the bedroom and bathroom.

## 2020-11-09 NOTE — OCCUPATIONAL THERAPY INITIAL EVALUATION ADULT - IMPAIRED TRANSFERS: BED/CHAIR, REHAB EVAL
impaired balance/cognition/decreased strength/impaired coordination/impaired sensory feedback/impaired postural control

## 2020-11-09 NOTE — DIETITIAN INITIAL EVALUATION ADULT. - NS FNS WEIGHT CHANGE REASON
Wife reports pt with 50 pounds weight loss x 1 year, unable to recall reason, from 180 to 130 pounds. Weight as per flow sheets (11/07) 134 pounds - will continue to monitor (pt sitting in chair).

## 2020-11-09 NOTE — DIETITIAN INITIAL EVALUATION ADULT. - REASON INDICATOR FOR ASSESSMENT
Pt seen for BMI <19 referral.  Information obtained from: medical record, pt, RN, and pt's wife Maia Pastor (called to [phone]).   Pt confused/disoriented as per documentation.

## 2020-11-09 NOTE — PROGRESS NOTE ADULT - ASSESSMENT
80 year old male with PMH of DM, HTN, HLD, chronic conjunctivitis; presented from ophthalmologist's office (Dr. Warren) for surgery of the right eye. Per pt and pt's wife, pt started to notice vision loss of the right eye approximately one week ago, was diagnosed with a tc abrasion, went to go and see his ophthalmologist who recommended he come to the ED for admission for surgery for progressive corneal thinning.     Ctc/a/p:   The prostate gland is markedly enlarged with significant mass effect on the urinary bladder. The urinary bladder has diffuse wall thickening and trabeculations. Correlate clinically for infection or other processes. Lytic lesions are noted within the right acetabulum and L5, consider malignant process.    Moderate amount of stool throughout colon without proximal obstruction.

## 2020-11-09 NOTE — DIETITIAN INITIAL EVALUATION ADULT. - ADD RECOMMEND
1. Will continue to monitor PO intake, weight, labs, skin, GI status, diet. 2. Encourage PO intake, obtain food preferences (wife did not recall any at this time), provide feeding assistance as needed. 3. Provided recommendations to wife on how to optimize pt's PO and protein intake - made aware RD remains available. 4. Malnutrition/BMI <19 notification placed in chart - spoke to PA.

## 2020-11-09 NOTE — OCCUPATIONAL THERAPY INITIAL EVALUATION ADULT - PRECAUTIONS/LIMITATIONS, REHAB EVAL
Pt has + right sided eye discharge, eye covered with shield, denies headaches, dizziness, hearing changes, head/sinus pain, no fevers, chills, nausea, vomiting. Has had unintentional progressive weight loss with about 60 lbs over the past year with poor PO intake. Denies any joint pains, or CP. Has intermittent SOB. Reports constipation. Pt taken to the OR 11/7 where he had patch corneal graft repair of the right eye performed.  CT CHEST/ABD 11/8: The prostate gland is markedly enlarged with significant mass effect on the urinary bladder. The urinary bladder has diffuse wall thickening and trabeculations. Correlate clinically for infection or other processes. Lytic lesions are noted within the right acetabulum and L5, consider malignant process. Moderate amount of stool throughout colon without proximal obstruction./fall precautions

## 2020-11-09 NOTE — DIETITIAN INITIAL EVALUATION ADULT. - DIET TYPE
1. Recommend change to Consistent Carbohydrate with snack diet to allow pt more food options and optimize PO intake. Will continue to monitor and adjust as needed. 2. Recommend Glucerna Shake 240mls 3x daily (660kcals, 30g protein) to optimize kcal and protein intake. Discussed diet and supplement with JEANNETTE Dailey -pending verification ordered.
0

## 2020-11-09 NOTE — PROGRESS NOTE ADULT - PROBLEM SELECTOR PLAN 3
-Reported 60lbs weight loss over the past year with poor PO intake and constipation. severe PC malnutrition  -Will check FOBT neg, PSA, UA, TSH, am cortisol. neg vanessa  -CT chest/abd/pelvis with contrast to eval for underlying malignancy reviewed as above- lytic lesions  hip and l5? malignant process  prostate enlarged-bladder thickening likely BPH, psa normal  -Last colonoscopy about 5 years ago per wife was within normal.   -Nutrition eval.   -Anemia work up with iron studies, B12, folate, and LDH.- reveiwed  encourage po intake

## 2020-11-09 NOTE — DIETITIAN INITIAL EVALUATION ADULT. - OTHER INFO
RN reports pt with good appetite and PO intake in house, states pt consumed 100% of his meals today. Noted 50-75% PO intake as per flow sheets. Offered nutritional supplement - wife agreed to Glucerna. RN denies pt with difficulty chewing or swallowing (confirmed with wife). Denies pt with nausea or vomiting. Last BM PTA - RN/PA aware.     Provided recommendations to wife on how to optimize pt's PO and protein intake, recommended small frequent meals by consuming nutrient-dense snacks or nutritional supplement between meals and to start with protein; reviewed foods with protein and nutrient-dense snacks. Wife denies having further questions/concerns about diet and nutrition; made aware RD remains available.

## 2020-11-09 NOTE — DISCHARGE NOTE NURSING/CASE MANAGEMENT/SOCIAL WORK - PATIENT PORTAL LINK FT
You can access the FollowMyHealth Patient Portal offered by [facility] by registering at the following website: [email or web address]. By joining [facility]’s FollowMyHealth portal, you will also be able to view your health information using other applications (apps) compatible with our system.

## 2020-11-09 NOTE — OCCUPATIONAL THERAPY INITIAL EVALUATION ADULT - VISUAL ACUITY
Pt R eye shut with surgical bandage/covering. Difficulty opening L eye voluntarily, only able to open ~1/2 way +secretions. Able to see bright contrasting colors, large general figures. Unable to see clock, room number, or buttons on call bell.

## 2020-11-09 NOTE — PHYSICAL THERAPY INITIAL EVALUATION ADULT - PRECAUTIONS/LIMITATIONS, REHAB EVAL
vision precautions/Pt has + right sided eye discharge, eye covered with shield, denies headaches, dizziness, hearing changes, head/sinus pain, no fevers, chills, nausea, vomiting. Has had unintentional progressive weight loss with about 60 lbs over the past year with poor PO intake. Denies any joint pains, or CP. Has intermittent SOB. Reports constipation. Pt taken to the OR 11/7 where he had patch corneal graft repair of the right eye performed.  CT CHEST/ABD 11/8: The prostate gland is markedly enlarged with significant mass effect on the urinary bladder. The urinary bladder has diffuse wall thickening and trabeculations. Correlate clinically for infection or other processes. Lytic lesions are noted within the right acetabulum and L5, consider malignant process. Moderate amount of stool throughout colon without proximal obstruction./fall precautions

## 2020-11-09 NOTE — PHYSICAL THERAPY INITIAL EVALUATION ADULT - ADDITIONAL COMMENTS
Pt is a poor historian, social and functional history obtained from spouse Yamilex #[id number], who's able to confirm the demographics, as per Maia, pt was independent w/ all ADLs and functional mobility prior to the vision loss without any AD, after the vision loss started pt required assistance w/ ADLs and functional mobility which the spouse was assisting.

## 2020-11-09 NOTE — DISCHARGE NOTE NURSING/CASE MANAGEMENT/SOCIAL WORK - NSDCFUADDAPPT_GEN_ALL_CORE_FT
You MUST follow up with your opthalmologist, Dr. Warren, on Thursday, November 19, 2020 - please call [phone] to make an appointment.  The office is located at [address] [place] [address], [facility] [address] [address].    You must follow up with your primary medical doctor, Dr. Bucio, within one week of discharge - please call to make an appointment.    You can follow up with rheumatology, Dr. Lara, as needed.

## 2020-11-09 NOTE — PROGRESS NOTE ADULT - ATTENDING COMMENTS
I have interviewed and examined the patient and reviewed the residents note including the history, exam, assessment, and plan.  I agree with the residents assessment and plan.    Agree with above  Corneal melt OD > OS- s/p corneal patch graft OD.  Charly negative, however pt has new epi defect OD, stable defect OS.  Lytic lesions seen on imaging.  Further work up being pursued by primary team  Continue management per Dr. Kelvin Warren to examine pt tomorrow  Findings and plan discussed with patient and primary team    Karen Jimenez MD

## 2020-11-09 NOTE — PROGRESS NOTE ADULT - PROBLEM SELECTOR PLAN 1
-Progressive corneal melting of right eye with concern for ?rheumatologic, ?malignant, ?infectious cause. But also has bilateral eye issues.   -Ophtho sp OR patch graft repair  at [facility].  - moxifloxacin qid to both eyes  - given recent weight loss and b/l nature of corneal pathology,    h/o glaucoma-cosopt BID to the left eye only

## 2020-11-09 NOTE — DIETITIAN INITIAL EVALUATION ADULT. - PROBLEM SELECTOR PLAN 1
-Progressive corneal melting of right eye with concern for ?rheumatologic, ?malignant, ?infectious cause. But also has bilateral eye issues.   -Ophtho planning for OR patch graft repair tomorrow at [facility]. Preop risk stratification below. NPO at [facility]. Hold home ASA and hold pharma PPx. -F/u OR cultures.   -Malignancy w/u as below. Rheum work up with THADDEUS, RF, anti-CCP, Sjogren's Ab, ESR, CRP, ANCA, and HCV Ab.   -Per ophtho: - Shield on right eye at ALL times   - No eye rubbing, PLEASE DO NOT put pressure on eye while putting in drops as there is impending corneal perforation.  - Fortified tobramycin drops Q2 hours and fortified Vancomycin drops Q2 hours (alternate the two drops so he gets a drop every hour) to the right eye.   -C/w Lotemax (not on formulary - per pharmacy ok to do Pred-forte instead) 1 drop left eye once a day, ofloxacin drops 1 drop L eye 4x/day,  -Transportation arranged to [place] for tomorrow at 7:30am.  -Clarify with ophtho if patient should be continued on his home Cosopt and Refresh eye drops. -Will continue for now.

## 2020-11-09 NOTE — PHYSICAL THERAPY INITIAL EVALUATION ADULT - PERTINENT HX OF CURRENT PROBLEM, REHAB EVAL
80 year old male with PMH of DM, HTN, HLD, chronic conjunctivitis; presented from ophthalmologist's office (Dr. Warren) for surgery of the right eye. Per pt and pt's wife, pt started to notice vision loss of the right eye approximately one week ago, was diagnosed with a tc abrasion, went to go and see his ophthalmologist today who recommended he come to the ED for admission for surgery for progressive corneal thinning.

## 2020-11-09 NOTE — OCCUPATIONAL THERAPY INITIAL EVALUATION ADULT - LIVES WITH, PROFILE
Pt lives in a house with his wife, +steps to enter & inside. Pt states he was able to see a "little better" prior to admission however required some assistance with ADL from wife. Pt also states his wife recently had knee surgery & is only able to help minimally. Pt poor historian.

## 2020-11-09 NOTE — OCCUPATIONAL THERAPY INITIAL EVALUATION ADULT - ADL RETRAINING, OT EVAL
GOAL: Patient will be independent with UB dressing within 4 weeks GOAL: Pt will perform LB dressing independently in 4 weeks GOAL: Patient will perform grooming standing sink level with set-up/supervision in 4 weeks

## 2020-11-09 NOTE — DIETITIAN INITIAL EVALUATION ADULT. - PHYSCIAL ASSESSMENT
underweight Skin: no noted pressure injuries as per documentation.  Noted visual signs of moderate muscle loss in clavicles and temporales; unable to obtain pt's consent to perform nutrition focused physical exam.

## 2020-11-09 NOTE — PROGRESS NOTE ADULT - SUBJECTIVE AND OBJECTIVE BOX
Patient is a 80y old  Male who presents with a chief complaint of Pt 81 y/o M with PMH: DM, HTN, HLD, BPH, chronic conjunctivitis, presented from ophthalmologist's office for surgery of the right eye with progressive corneal thinning, S/P corneal patch graft of the right eye (11/07), doing well postoperatively. (09 Nov 2020 14:11)      INTERVAL HPI/OVERNIGHT EVENTS: noted  pt seen and examined  rt eye dressing present  denies any pain  tolerating po well      Vital Signs Last 24 Hrs  T(C): 36.8 (09 Nov 2020 11:43), Max: 36.8 (09 Nov 2020 11:43)  T(F): 98.3 (09 Nov 2020 11:43), Max: 98.3 (09 Nov 2020 11:43)  HR: 67 (09 Nov 2020 11:43) (56 - 71)  BP: 131/79 (09 Nov 2020 11:43) (131/79 - 147/95)  BP(mean): --  RR: 18 (09 Nov 2020 11:43) (18 - 18)  SpO2: 99% (09 Nov 2020 11:43) (97% - 100%)    acetaminophen   Tablet .. 650 milliGRAM(s) Oral every 6 hours PRN  atorvastatin 10 milliGRAM(s) Oral at bedtime  cyanocobalamin 1000 MICROGram(s) Oral daily  dextrose 40% Gel 15 Gram(s) Oral once PRN  dextrose 5%. 1000 milliLiter(s) IV Continuous <Continuous>  dextrose 50% Injectable 12.5 Gram(s) IV Push once  dextrose 50% Injectable 25 Gram(s) IV Push once  dextrose 50% Injectable 25 Gram(s) IV Push once  dorzolamide 2%/timolol 0.5% Ophthalmic Solution 1 Drop(s) Left EYE two times a day  enalapril 5 milliGRAM(s) Oral daily  glucagon  Injectable 1 milliGRAM(s) IntraMuscular once PRN  insulin lispro (ADMELOG) corrective regimen sliding scale   SubCutaneous three times a day before meals  insulin lispro (ADMELOG) corrective regimen sliding scale   SubCutaneous at bedtime  moxifloxacin 0.5% Solution 1 Drop(s) Both EYES <User Schedule>  polyethylene glycol 3350 17 Gram(s) Oral daily  senna 2 Tablet(s) Oral at bedtime  tamsulosin 0.4 milliGRAM(s) Oral at bedtime  vancomycin 25 mG/mL Fortified Ophthalmic 1 Drop(s) Right EYE four times a day      PHYSICAL EXAM:  GENERAL: NAD,   EYES: rt eye dressing  ENMT: Moist mucous membranes  NECK: Supple, No JVD, Normal thyroid  CHEST/LUNG: non labored, cta b/l  HEART: Regular rate and rhythm; No murmurs, rubs, or gallops  ABDOMEN: Soft, Nontender, Nondistended; Bowel sounds present  EXTREMITIES:  2+ Peripheral Pulses, No clubbing, cyanosis, or edema  LYMPH: No lymphadenopathy noted  SKIN: No rashes or lesions    Consultant(s) Notes Reviewed:  [x ] YES  [ ] NO  Care Discussed with Consultants/Other Providers [ x] YES  [ ] NO    LABS:                        12.7   6.13  )-----------( 215      ( 09 Nov 2020 07:40 )             39.2     11-09    133<L>  |  96  |  10  ----------------------------<  127<H>  4.0   |  27  |  0.69    Ca    9.7      09 Nov 2020 07:40          CAPILLARY BLOOD GLUCOSE      POCT Blood Glucose.: 124 mg/dL (09 Nov 2020 17:25)  POCT Blood Glucose.: 144 mg/dL (09 Nov 2020 13:11)  POCT Blood Glucose.: 167 mg/dL (09 Nov 2020 12:40)  POCT Blood Glucose.: 136 mg/dL (09 Nov 2020 08:37)  POCT Blood Glucose.: 143 mg/dL (08 Nov 2020 21:26)              RADIOLOGY & ADDITIONAL TESTS:    Imaging Personally Reviewed:  [x ] YES  [ ] NO

## 2020-11-09 NOTE — OCCUPATIONAL THERAPY INITIAL EVALUATION ADULT - DIAGNOSIS, OT EVAL
Pt presents with decreased command following, impaired cognition, impaired vision, decreased strength, endurance, balance, and coordination, all impacting ability to perform ADLs and functional mobility.

## 2020-11-09 NOTE — DIETITIAN INITIAL EVALUATION ADULT. - ORAL INTAKE PTA/DIET HISTORY
Wife reports pt with "limited appetite" and PO intake PTA; states pt has been a "small eater" for a while. Confirms pt with NKFA. Reports pt not following any type of diet or restriction at home but tries to cook without salt. Reports pt taking Vitamin B12 and Glucerna 1xday PTA. Denies pt monitoring BG and states pt taking Metformin at home; HbA1c (11/07) 6.0%. Wife reports pt with "limited appetite" and PO intake PTA; states pt has been a "small eater" for a while. Confirms pt with NKFA. Reports pt not following any type of diet or restriction at home but tries to cook without salt. Reports pt taking Vitamin B12 and Glucerna 1xday PTA. Denies pt monitoring BG and states pt taking Metformin at home; HbA1c (11/07) 6.0% - indicates good BG control.

## 2020-11-09 NOTE — DIETITIAN INITIAL EVALUATION ADULT. - REASON FOR ADMISSION
Pt 79 y/o M with PMH: DM, HTN, HLD, BPH, chronic conjunctivitis, presented from ophthalmologist's office for surgery of the right eye with progressive corneal thinning, S/P corneal patch graft of the right eye (11/07), doing well postoperatively.

## 2020-11-09 NOTE — OCCUPATIONAL THERAPY INITIAL EVALUATION ADULT - IMPAIRED TRANSFERS: SIT/STAND, REHAB EVAL
Detail Level: Zone
Topical Steroids Counseling: I discussed with the patient that prolonged use of topical steroids can result in the increased appearance of superficial blood vessels (telangiectasias), lightening (hypopigmentation) and thinning of the skin (atrophy).  Patient understands to avoid using high potency steroids in skin folds, the groin or the face.  The patient verbalized understanding of the proper use and possible adverse effects of topical steroids.  All of the patient's questions and concerns were addressed.
impaired postural control/decreased strength/cognition/impaired balance/impaired sensory feedback

## 2020-11-10 LAB
ALBUMIN SERPL ELPH-MCNC: 3.6 G/DL — SIGNIFICANT CHANGE UP (ref 3.3–5)
ALP SERPL-CCNC: 49 U/L — SIGNIFICANT CHANGE UP (ref 40–120)
ALT FLD-CCNC: 15 U/L — SIGNIFICANT CHANGE UP (ref 10–45)
ANION GAP SERPL CALC-SCNC: 9 MMOL/L — SIGNIFICANT CHANGE UP (ref 5–17)
AST SERPL-CCNC: 17 U/L — SIGNIFICANT CHANGE UP (ref 10–40)
AUTO DIFF PNL BLD: NEGATIVE — SIGNIFICANT CHANGE UP
BASOPHILS # BLD AUTO: 0.02 K/UL — SIGNIFICANT CHANGE UP (ref 0–0.2)
BASOPHILS NFR BLD AUTO: 0.4 % — SIGNIFICANT CHANGE UP (ref 0–2)
BILIRUB SERPL-MCNC: 0.4 MG/DL — SIGNIFICANT CHANGE UP (ref 0.2–1.2)
BUN SERPL-MCNC: 13 MG/DL — SIGNIFICANT CHANGE UP (ref 7–23)
C-ANCA SER-ACNC: NEGATIVE — SIGNIFICANT CHANGE UP
CALCIUM SERPL-MCNC: 9.4 MG/DL — SIGNIFICANT CHANGE UP (ref 8.4–10.5)
CHLORIDE SERPL-SCNC: 99 MMOL/L — SIGNIFICANT CHANGE UP (ref 96–108)
CO2 SERPL-SCNC: 27 MMOL/L — SIGNIFICANT CHANGE UP (ref 22–31)
CREAT SERPL-MCNC: 0.78 MG/DL — SIGNIFICANT CHANGE UP (ref 0.5–1.3)
EOSINOPHIL # BLD AUTO: 0.05 K/UL — SIGNIFICANT CHANGE UP (ref 0–0.5)
EOSINOPHIL NFR BLD AUTO: 1 % — SIGNIFICANT CHANGE UP (ref 0–6)
GLUCOSE BLDC GLUCOMTR-MCNC: 128 MG/DL — HIGH (ref 70–99)
GLUCOSE BLDC GLUCOMTR-MCNC: 140 MG/DL — HIGH (ref 70–99)
GLUCOSE BLDC GLUCOMTR-MCNC: 183 MG/DL — HIGH (ref 70–99)
GLUCOSE BLDC GLUCOMTR-MCNC: 191 MG/DL — HIGH (ref 70–99)
GLUCOSE SERPL-MCNC: 103 MG/DL — HIGH (ref 70–99)
HCT VFR BLD CALC: 37.2 % — LOW (ref 39–50)
HGB BLD-MCNC: 11.8 G/DL — LOW (ref 13–17)
IMM GRANULOCYTES NFR BLD AUTO: 0.4 % — SIGNIFICANT CHANGE UP (ref 0–1.5)
INR BLD: 1.12 RATIO — SIGNIFICANT CHANGE UP (ref 0.88–1.16)
LYMPHOCYTES # BLD AUTO: 0.56 K/UL — LOW (ref 1–3.3)
LYMPHOCYTES # BLD AUTO: 10.9 % — LOW (ref 13–44)
MCHC RBC-ENTMCNC: 28.6 PG — SIGNIFICANT CHANGE UP (ref 27–34)
MCHC RBC-ENTMCNC: 31.7 GM/DL — LOW (ref 32–36)
MCV RBC AUTO: 90.1 FL — SIGNIFICANT CHANGE UP (ref 80–100)
MONOCYTES # BLD AUTO: 0.42 K/UL — SIGNIFICANT CHANGE UP (ref 0–0.9)
MONOCYTES NFR BLD AUTO: 8.2 % — SIGNIFICANT CHANGE UP (ref 2–14)
NEUTROPHILS # BLD AUTO: 4.08 K/UL — SIGNIFICANT CHANGE UP (ref 1.8–7.4)
NEUTROPHILS NFR BLD AUTO: 79.1 % — HIGH (ref 43–77)
NRBC # BLD: 0 /100 WBCS — SIGNIFICANT CHANGE UP (ref 0–0)
P-ANCA SER-ACNC: NEGATIVE — SIGNIFICANT CHANGE UP
PLATELET # BLD AUTO: 201 K/UL — SIGNIFICANT CHANGE UP (ref 150–400)
POTASSIUM SERPL-MCNC: 4.1 MMOL/L — SIGNIFICANT CHANGE UP (ref 3.5–5.3)
POTASSIUM SERPL-SCNC: 4.1 MMOL/L — SIGNIFICANT CHANGE UP (ref 3.5–5.3)
PROT SERPL-MCNC: 6.2 G/DL — SIGNIFICANT CHANGE UP (ref 6–8.3)
PROTHROM AB SERPL-ACNC: 13.2 SEC — SIGNIFICANT CHANGE UP (ref 10.6–13.6)
PSA FLD-MCNC: 0.91 NG/ML — SIGNIFICANT CHANGE UP (ref 0–4)
RBC # BLD: 4.13 M/UL — LOW (ref 4.2–5.8)
RBC # FLD: 13.2 % — SIGNIFICANT CHANGE UP (ref 10.3–14.5)
SODIUM SERPL-SCNC: 135 MMOL/L — SIGNIFICANT CHANGE UP (ref 135–145)
WBC # BLD: 5.15 K/UL — SIGNIFICANT CHANGE UP (ref 3.8–10.5)
WBC # FLD AUTO: 5.15 K/UL — SIGNIFICANT CHANGE UP (ref 3.8–10.5)

## 2020-11-10 PROCEDURE — 72197 MRI PELVIS W/O & W/DYE: CPT | Mod: 26

## 2020-11-10 PROCEDURE — 99233 SBSQ HOSP IP/OBS HIGH 50: CPT | Mod: 57

## 2020-11-10 PROCEDURE — 99222 1ST HOSP IP/OBS MODERATE 55: CPT

## 2020-11-10 RX ADMIN — Medication 1 DROP(S): at 14:50

## 2020-11-10 RX ADMIN — SENNA PLUS 2 TABLET(S): 8.6 TABLET ORAL at 21:58

## 2020-11-10 RX ADMIN — TAMSULOSIN HYDROCHLORIDE 0.4 MILLIGRAM(S): 0.4 CAPSULE ORAL at 21:58

## 2020-11-10 RX ADMIN — DORZOLAMIDE HYDROCHLORIDE TIMOLOL MALEATE 1 DROP(S): 20; 5 SOLUTION/ DROPS OPHTHALMIC at 05:23

## 2020-11-10 RX ADMIN — PREGABALIN 1000 MICROGRAM(S): 225 CAPSULE ORAL at 12:25

## 2020-11-10 RX ADMIN — Medication 1 DROP(S): at 20:20

## 2020-11-10 RX ADMIN — Medication 1 DROP(S): at 10:18

## 2020-11-10 RX ADMIN — POLYETHYLENE GLYCOL 3350 17 GRAM(S): 17 POWDER, FOR SOLUTION ORAL at 12:25

## 2020-11-10 RX ADMIN — Medication 5 MILLIGRAM(S): at 05:24

## 2020-11-10 RX ADMIN — Medication 1 DROP(S): at 05:23

## 2020-11-10 RX ADMIN — DORZOLAMIDE HYDROCHLORIDE TIMOLOL MALEATE 1 DROP(S): 20; 5 SOLUTION/ DROPS OPHTHALMIC at 17:17

## 2020-11-10 RX ADMIN — ATORVASTATIN CALCIUM 10 MILLIGRAM(S): 80 TABLET, FILM COATED ORAL at 21:58

## 2020-11-10 RX ADMIN — Medication 1: at 12:25

## 2020-11-10 NOTE — PROGRESS NOTE ADULT - ATTENDING COMMENTS
Dr Tinoco will be covering  starting 11/11/20  [facility] call [facility] [phone] for questions or concerns

## 2020-11-10 NOTE — PROGRESS NOTE ADULT - PROBLEM SELECTOR PLAN 3
-Reported 60lbs weight loss over the past year with poor PO intake and constipation. severe PC malnutrition  vanessa malignancy so far neg  encourage po, protein supplements  -Will check FOBT neg, PSA, UA, TSH, am cortisol. neg vanessa  -CT chest/abd/pelvis with contrast to eval for underlying malignancy reviewed as above- lytic lesions  hip and l5? malignant process- MRI- ruled out lytic lesions  prostate enlarged-bladder thickening likely BPH, psa normal, pt without urinary symptoms  -Last colonoscopy about 5 years ago per wife was within normal.   -Nutrition eval. severe PC malnutrition  -Anemia work up with iron studies, B12, folate, and LDH.- reviewed, no abnormality   encourage po intake

## 2020-11-10 NOTE — PROGRESS NOTE ADULT - SUBJECTIVE AND OBJECTIVE BOX
Patient is a 80y old  Male who presents with a chief complaint of Right eye progressive corneal thinning (09 Nov 2020 22:22)      INTERVAL HPI/OVERNIGHT EVENTS: noted  pt seen and examined  rt eye patch removed      Vital Signs Last 24 Hrs  T(C): 36.3 (10 Nov 2020 04:22), Max: 36.3 (09 Nov 2020 20:34)  T(F): 97.4 (10 Nov 2020 04:22), Max: 97.4 (09 Nov 2020 20:34)  HR: 52 (10 Nov 2020 04:23) (52 - 57)  BP: 118/73 (10 Nov 2020 04:22) (118/73 - 120/81)  BP(mean): --  RR: 17 (10 Nov 2020 04:22) (17 - 17)  SpO2: 99% (10 Nov 2020 04:22) (99% - 100%)    acetaminophen   Tablet .. 650 milliGRAM(s) Oral every 6 hours PRN  atorvastatin 10 milliGRAM(s) Oral at bedtime  cyanocobalamin 1000 MICROGram(s) Oral daily  dextrose 40% Gel 15 Gram(s) Oral once PRN  dextrose 5%. 1000 milliLiter(s) IV Continuous <Continuous>  dextrose 50% Injectable 12.5 Gram(s) IV Push once  dextrose 50% Injectable 25 Gram(s) IV Push once  dextrose 50% Injectable 25 Gram(s) IV Push once  dorzolamide 2%/timolol 0.5% Ophthalmic Solution 1 Drop(s) Left EYE two times a day  enalapril 5 milliGRAM(s) Oral daily  glucagon  Injectable 1 milliGRAM(s) IntraMuscular once PRN  insulin lispro (ADMELOG) corrective regimen sliding scale   SubCutaneous three times a day before meals  insulin lispro (ADMELOG) corrective regimen sliding scale   SubCutaneous at bedtime  moxifloxacin 0.5% Solution 1 Drop(s) Both EYES <User Schedule>  polyethylene glycol 3350 17 Gram(s) Oral daily  senna 2 Tablet(s) Oral at bedtime  tamsulosin 0.4 milliGRAM(s) Oral at bedtime  vancomycin 25 mG/mL Fortified Ophthalmic 1 Drop(s) Both EYES four times a day      PHYSICAL EXAM:  GENERAL: NAD,   EYES: closed, rt eye taped  ENMT: Moist mucous membranes  NECK: Supple, No JVD, Normal thyroid  CHEST/LUNG: non labored, cta b/l  HEART: Regular rate and rhythm; No murmurs, rubs, or gallops  ABDOMEN: Soft, Nontender, Nondistended; Bowel sounds present  EXTREMITIES:  2+ Peripheral Pulses, No clubbing, cyanosis, or edema  LYMPH: No lymphadenopathy noted  SKIN: No rashes or lesions    Consultant(s) Notes Reviewed:  [x ] YES  [ ] NO  Care Discussed with Consultants/Other Providers [ x] YES  [ ] NO    LABS:                        11.8   5.15  )-----------( 201      ( 10 Nov 2020 06:50 )             37.2     11-10    135  |  99  |  13  ----------------------------<  103<H>  4.1   |  27  |  0.78    Ca    9.4      10 Nov 2020 06:48    TPro  6.2  /  Alb  3.6  /  TBili  0.4  /  DBili  x   /  AST  17  /  ALT  15  /  AlkPhos  49  11-10    PT/INR - ( 10 Nov 2020 08:13 )   PT: 13.2 sec;   INR: 1.12 ratio             CAPILLARY BLOOD GLUCOSE      POCT Blood Glucose.: 140 mg/dL (10 Nov 2020 08:38)  POCT Blood Glucose.: 116 mg/dL (09 Nov 2020 22:56)  POCT Blood Glucose.: 124 mg/dL (09 Nov 2020 17:25)  POCT Blood Glucose.: 144 mg/dL (09 Nov 2020 13:11)  POCT Blood Glucose.: 167 mg/dL (09 Nov 2020 12:40)              RADIOLOGY & ADDITIONAL TESTS:    Imaging Personally Reviewed:  [x ] YES  [ ] NO

## 2020-11-10 NOTE — PROGRESS NOTE ADULT - PROBLEM SELECTOR PLAN 1
-Progressive corneal melting of right eye with concern for ?rheumatologic, ?malignant, ?infectious cause. But also has bilateral eye issues.   -Ophtho sp OR patch graft repair  at [facility].  - moxifloxacin qid to both eyes   h/o glaucoma-cosopt BID to the left eye only

## 2020-11-10 NOTE — CONSULT NOTE ADULT - ASSESSMENT
79 yo M with PMHx of DM, HTN, HLD and chronic conjuctivitis who was admitted for R eye vision loss and 60lb unintentional weight loss now s/p corneal patch with ophtho, now with CT findings of enlarged prostate with mass effect on the bladder and lytic lesions within R acetabulum and L5 concerning for malignant prostate. MR Pelvis without suspicious osseous lesions. PSA 0.91. Enlarged prostate likely 2/2 to BPH and less likely 2/2 prostate CA given imaging, labs and physical exam.    Recs:  - No acute urologic intervention at this time.  - Consider repeat UA/Urine Cx to r/o infectious process. Treat if indicated.   - Check PVR/Bladder scan to ensure pt is adequately emptying.  - Continue Flomax and bowel regimen.    Should establish care with urologist. May follow at the [facility].    WILL DISCUSS WITH ATTENDING. FOLLOW UP FINAL RECS IN AM    [facility]  [address] [place] [address], [address]  [place] [address] [phone] 79 yo M with PMHx of DM, HTN, HLD and chronic conjuctivitis who was admitted for R eye vision loss and 60lb unintentional weight loss now s/p corneal patch with ophtho, now with CT findings of enlarged prostate with mass effect on the bladder and lytic lesions within R acetabulum and L5. MR Pelvis without suspicious osseous lesions. PSA 0.91. Imaging (osteolytic lesions, non-suspicious findings on MRI), labs (normal PSA), physical exam (smooth, no nodules), and no known family history of  malignancy are less concerning for prostate cancer and more in-favor of BPH as cause of enlarged prostate.    Recs:  - No acute urologic intervention at this time.  - Would assess for other primary malignancies associated with osteolytic lesions as prostate cancer is typically associated with osteoblastic lesions.  - Check PVR/Bladder scan to ensure pt is adequately emptying if concern for urinary retention.  - Continue Flomax and bowel regimen.    Should establish care with urologist. May follow at the [facility].    WILL DISCUSS WITH ATTENDING. FOLLOW UP FINAL RECS IN AM    [facility]  [address] [place] [address], [address]  [place] [address] [phone] 81 yo M with PMHx of DM, HTN, HLD and chronic conjuctivitis who was admitted for R eye vision loss and 60lb unintentional weight loss now s/p corneal patch with ophtho, now with CT findings of enlarged prostate with mass effect on the bladder and lytic lesions within R acetabulum and L5. MR Pelvis without suspicious osseous lesions. PSA 0.91. Imaging (osteolytic lesions, non-suspicious findings on MRI), labs (normal PSA), physical exam (smooth, no nodules), and no known family history of  malignancy are less concerning for prostate cancer and more in-favor of BPH as cause of enlarged prostate.    Recs:  - No acute urologic intervention at this time.  - Check PVR/Bladder scan to ensure pt is adequately emptying if concern for urinary retention.  - Continue Flomax and bowel regimen.  - Consider biopsy of lytic bone lesions.    Should establish care with urologist. May follow at the [facility].    [facility]  [address] [place] [address], [address] [address] [phone]

## 2020-11-10 NOTE — CHART NOTE - NSCHARTNOTEFT_GEN_A_CORE
Patient seen by ophthalmology. Daily vision checks due to severe risk of corneal perforation and vision loss of both eyes. Rheum called to eval.    Pt with 60 lbs weight loss, CT with: prostate gland is markedly enlarged with significant mass effect on the urinary bladder. The urinary bladder has diffuse wall thickening and trabeculations. Correlate clinically for infection or other processes. Lytic lesions are noted within the right acetabulum and L5, consider malignant process.  House  consult called. GI eval with [facility].

## 2020-11-10 NOTE — CHART NOTE - NSCHARTNOTEFT_GEN_A_CORE
Consult called for corneal melt.  79M with 1 year history of chronic staphylococcal infection with recent steroid eye drops and sudden development of bilateral corneal melt.  No known h/o rheumatologic disease, but pt notes 60lb weight loss in the past year.   RF neg, CCP borderline positive (26); ANCA neg; SSA, SSB neg; ESR 21 (normal for age), CRP <0.06  Questionable lytic lesions in hip not confirmed on MRI.    CT C/A/P: Numerous small hypodensities within the liver, too small to completely characterize. Diffuse bladder wall thickening and trabeculations. There is markedly enlarged with significant mass effect on the urinary bladder. Lytic lesions are noted within the right acetabulum and L5  MRI hip and lumbar spine showing no lytic lesions.    Diff dg of corneal melt includes rheumatologic conditions such as  RA, AAV, SLE, sarcoidosis, reactive arthritis, PAN, Behcets, relapsing polychondritis; infectious conditions such as HSV, TB, syphilis, Lyme.    Significant weight loss of 60lb - SPEP, UPEP, immunofixation serum and urine. Urology consult for prostate and bladder thickening  Rheumatologic workup to be sent out with AM labs: THADDEUS, dsDNA; DOMO; ACE, Vit1,25OH, UA with microscopic, UP/C  Asked for ID input: send Quantiferon, syphilis, HSV, aspergillus    Patient will be seen tomorrow.    Dr. Adwoa Lara  Rheumatology Attending  [phone]  [email or web address] <<-----Click here for Discharge Medication Review

## 2020-11-10 NOTE — PROGRESS NOTE ADULT - ASSESSMENT
80y male w/ pmhx/ochx of  DM, HTN, HLD, chronic conjunctivitis and 60 lb weight loss with progressive corneal thinning of both eyes, now POD 3 s/p corneal patch graft of the right eye. Patient with stable Epi defect OU. New Area of Thinning TEmporal To graft     # corneal thinning of both eyes, now POD 3 s/p corneal patch graft of the right eye  - patch graft of right eye in place; Stable curvilinear 8x8 mm epi defect present Just adjacent to Temporal Graft  - small epi defect/ with possibly increased  thinning in the left eye   - No Appearance of Infectious Infiltrate, Ulcer, Or hypopyon appreciated.  - moxifloxacin QID to both eyes and fortified Vancomycin QID to both eyes  - given recent weight loss and b/l nature of corneal pathology, malignancy workup to evaluate for underlying disease process.  - rheumatology Consult recommended as well given Recent history of Weight-loss, and Corneal Melting  - CT with vertebral lytic lesion - Enlarged Prostate and Bladder wall.   - Pending Urology Consult in-patient  - MRI - Chronic Degenerative Changes - no Lytic Lesions appreciated  - discharge from lashes cleaned gently   - Patient with Unknown Etiology for Corneal melting In both eyes, Right greater than Left, with impending perforation and possible need for repeat Graft vs glue vs possible Enucleation (Last resort)   - will monitor closely    # h/o glaucoma  - cosopt BID to the left eye only    - Findings discussed with pt and primary team  - Clinical photos and care seen and discussed with attending, Dr. Warren.    Outpatient follow-up: Patient should follow-up with his/her ophthalmologist or with [facility] Department of Ophthalmology within 1 week of after discharge at:    [address]  [place] [address]  [phone]

## 2020-11-10 NOTE — PROGRESS NOTE ADULT - SUBJECTIVE AND OBJECTIVE BOX
[facility] DEPARTMENT OF OPHTHALMOLOGY  ------------------------------------------------------------------------------  Seb Garcia MD PGY 2  Pager: [phone]  ---------------------------------------  80 year old male with PMH of DM, HTN, HLD, chronic conjunctivitis,; presented from ophthalmologist's office (Dr. Warren) for surgery of the right eye (POD 3 corneal patch graft) due to progressive corneal thinning OU.     Interval History: No acute events overnight. Today patient reports still feeling "Discomfort" but no pain.     MEDICATIONS  (STANDING):  atorvastatin 10 milliGRAM(s) Oral at bedtime  cyanocobalamin 1000 MICROGram(s) Oral daily  dextrose 5%. 1000 milliLiter(s) (50 mL/Hr) IV Continuous <Continuous>  dextrose 50% Injectable 12.5 Gram(s) IV Push once  dextrose 50% Injectable 25 Gram(s) IV Push once  dextrose 50% Injectable 25 Gram(s) IV Push once  dorzolamide 2%/timolol 0.5% Ophthalmic Solution 1 Drop(s) Left EYE two times a day  enalapril 5 milliGRAM(s) Oral daily  insulin lispro (ADMELOG) corrective regimen sliding scale   SubCutaneous three times a day before meals  insulin lispro (ADMELOG) corrective regimen sliding scale   SubCutaneous at bedtime  moxifloxacin 0.5% Solution 1 Drop(s) Both EYES <User Schedule>  polyethylene glycol 3350 17 Gram(s) Oral daily  senna 2 Tablet(s) Oral at bedtime  tamsulosin 0.4 milliGRAM(s) Oral at bedtime  vancomycin 25 mG/mL Fortified Ophthalmic 1 Drop(s) Both EYES four times a day    MEDICATIONS  (PRN):  acetaminophen   Tablet .. 650 milliGRAM(s) Oral every 6 hours PRN Mild Pain (1 - 3)  dextrose 40% Gel 15 Gram(s) Oral once PRN Blood Glucose LESS THAN 70 milliGRAM(s)/deciLiter  glucagon  Injectable 1 milliGRAM(s) IntraMuscular once PRN Glucose <70 milliGRAM(s)/deciLiter      VITALS: T(C): 36.8 (11-10-20 @ 13:11)  T(F): 98.3 (11-10-20 @ 13:11), Max: 98.3 (11-10-20 @ 13:11)  HR: 53 (11-10-20 @ 13:11) (52 - 57)  BP: 144/82 (11-10-20 @ 13:11) (118/73 - 144/82)  RR:  (17 - 17)  SpO2:  (99% - 100%)  Wt(kg): --  Ophthalmology Exam:  Pupils: poor view 2/2 corneal changes, appears minimally reactive OU  Ttono: STP  Extraocular movements (EOMs): Grossly appear full OU, no pain, no diplopia    Pen Light Exam (PLE)  External: Clear shield in place OD  Lids/Lashes/Lacrimal Ducts: Flat OU Inferior lids noted to be slightly ectropic OU, eyelids crusted shut with significant discharge  Sclera/Conjunctiva: 2+ injection OD 1+ injection OS  Cornea: Patch graft in place, well sutured, (1 loose suture appreciated) laura negative; epithelial defect curvilinear present temporally measuring approx 8x8 mm, with New thinning just temporal to Graft. Some punctate heme in graft, OD, ; small punctate epi defect 1x1mm centrally with 60% thinning (Progressive thinning), No signs of iNfection, ulcer OS  Anterior Chamber: D+F OU  , No hypopyon appreciated OU  Iris: poor view but appears Flat OU  Lens: poor view OU    Radiology:  mazin< from: MR Pelvis Bony Only w/wo IV Cont (11.10.20 @ 14:41) >  The prostate is. There is diffuse thickening of the bladder wall with trabeculation. Please see report from CT of the abdomen and pelvis performed 11/8/2020 for further evaluation of the findings in the imaged portion of the pelvis.    IMPRESSION: No suspicious osseous lesions are demonstrated.  Advanced right hip osteoarthritis and moderate left hip osteoarthritis. Lower lumbar spondylosis.  Right iliopsoas bursitis.    < end of copied text >  < from: MR Lumbar Spine w/ IV Cont (11.09.20 @ 23:42) >  IMPRESSION: Degenerative changes as described above.    < end of copied text >  < from: CT Abdomen and Pelvis w/ IV Cont (11.08.20 @ 15:56) >  BLADDER: Diffuse bladder wall thickening and trabeculations.  REPRODUCTIVE ORGANS: There is markedly enlarged with significant mass effect on the urinary bladder.    < end of copied text >  < from: CT Abdomen and Pelvis w/ IV Cont (11.08.20 @ 15:56) >  IMPRESSION:    Motion limited evaluation.    The prostate gland is markedly enlarged with significant mass effect on the urinary bladder. The urinary bladder has diffuse wall thickening and trabeculations. Correlate clinically for infection or other processes. Lytic lesions are noted within the right acetabulum and L5, consider malignant process.    < end of copied text >

## 2020-11-10 NOTE — CONSULT NOTE ADULT - SUBJECTIVE AND OBJECTIVE BOX
HPI: 81 yo M with PMHx of DM, HTN, HLD and chronic conjuctivitis who was admitted for R eye vision loss and 60lb unintentional weight loss now s/p corneal patch with ophtho. Urology team consulted for concern of prostate CA based on CT scan findings. Pt seen and examined at the bedside. Pt states over past year he has had an unintentional weight loss of roughly 60lbs. Has also been having occasional fevers at home over the last few months, however cannot remember temperature. States fevers are not all the time. Has not had this worked up with outside doctor. Has never seen a urologist. States he is able to void without difficulty. Occasionally has dysuria, but not all the time. Stream can be weak, but unsure if it is always. Constipated at home and takes medication to have BM. Unsure about family history of  malignancy. Denies night sweats, fatigue, back pain, flank pain, abd pain, N/V, suprapubic pain, rectal pain, bony pain, hematuria, increased frequency, testicular pain, penile pain, or other acute complaint.     PAST MEDICAL & SURGICAL HISTORY:  BPH (benign prostatic hyperplasia)    Hyperlipidemia    HTN (hypertension)    DM (diabetes mellitus)    No significant past surgical history        MEDICATIONS  (STANDING):  atorvastatin 10 milliGRAM(s) Oral at bedtime  cyanocobalamin 1000 MICROGram(s) Oral daily  dextrose 5%. 1000 milliLiter(s) (50 mL/Hr) IV Continuous <Continuous>  dextrose 50% Injectable 12.5 Gram(s) IV Push once  dextrose 50% Injectable 25 Gram(s) IV Push once  dextrose 50% Injectable 25 Gram(s) IV Push once  dorzolamide 2%/timolol 0.5% Ophthalmic Solution 1 Drop(s) Left EYE two times a day  enalapril 5 milliGRAM(s) Oral daily  insulin lispro (ADMELOG) corrective regimen sliding scale   SubCutaneous three times a day before meals  insulin lispro (ADMELOG) corrective regimen sliding scale   SubCutaneous at bedtime  moxifloxacin 0.5% Solution 1 Drop(s) Both EYES <User Schedule>  polyethylene glycol 3350 17 Gram(s) Oral daily  senna 2 Tablet(s) Oral at bedtime  tamsulosin 0.4 milliGRAM(s) Oral at bedtime  vancomycin 25 mG/mL Fortified Ophthalmic 1 Drop(s) Both EYES four times a day    MEDICATIONS  (PRN):  acetaminophen   Tablet .. 650 milliGRAM(s) Oral every 6 hours PRN Mild Pain (1 - 3)  dextrose 40% Gel 15 Gram(s) Oral once PRN Blood Glucose LESS THAN 70 milliGRAM(s)/deciLiter  glucagon  Injectable 1 milliGRAM(s) IntraMuscular once PRN Glucose <70 milliGRAM(s)/deciLiter      FAMILY HISTORY:  No pertinent family history in first degree relatives        Allergies    No Known Allergies    Intolerances        SOCIAL HISTORY:    REVIEW OF SYSTEMS: Otherwise negative as stated in HPI    Physical Exam  Vital signs  T(C): 37.1 (11-10-20 @ 20:35), Max: 37.1 (11-10-20 @ 20:35)  HR: 65 (11-10-20 @ 20:35)  BP: 158/81 (11-10-20 @ 20:35)  SpO2: 94% (11-10-20 @ 20:35)  Wt(kg): --    Output    OUT:    Voided (mL): 950 mL  Total OUT: 950 mL    Total NET: -950 mL      OUT:    Voided (mL): 400 mL  Total OUT: 400 mL    Total NET: -400 mL    Gen: No Acute Distress, awake and alert. A&O x 2-3. Thin  Pulm: No respiratory distress  Abd: soft, nontender, nondistended  Back: No flank or CVAT bilaterally. No tenderness of spinous processes from C-spine down to sacrum.   : no suprapubic pain. Uncircumcised penis. No urethral discharge at meatus. Penis nontender to palpation. Testicles descended bilaterally and nontender. Vas deferens palpable bilaterally. No scrotal edema or skin changes.   RONNIE: Supervised by RIP Smith from [facility]. Good sphincter tone. Grossly enlarged prostate with firmness. Unable to palpate any nodularities. Prostate nontender to palpation.     LABS:               11.8   5.15  )-----------( 201      ( 10 Nov 2020 06:50 )             37.2       11-10    135  |  99  |  13  ----------------------------<  103<H>  4.1   |  27  |  0.78    Ca    9.4      10 Nov 2020 06:48    TPro  6.2  /  Alb  3.6  /  TBili  0.4  /  DBili  x   /  AST  17  /  ALT  15  /  AlkPhos  49  11-10    PT/INR - ( 10 Nov 2020 08:13 )   PT: 13.2 sec;   INR: 1.12 ratio         Prostate Ca Screen, PSA Total (11.10.20 @ 09:40)    Prostate Ca Screen, PSA Total: 0.91: Method: Roche Electrochemiluminescence Immuno Assay  Values obtained with different assay methods or kits cannot be  used interchangeably.  Results cannot be interpreted as absolute evidence of the presence  or absence of malignant disease. ng/mL    UA - negative for infection    RADIOLOGY:  < from: CT Abdomen and Pelvis w/ IV Cont (11.08.20 @ 15:56) >  IMPRESSION:    Motion limited evaluation.    The prostate gland is markedly enlarged with significant mass effect on the urinary bladder. The urinary bladder has diffuse wall thickening and trabeculations. Correlate clinically for infection or other processes. Lytic lesions are noted within the right acetabulum and L5, consider malignant process.    Moderate amount of stool throughout colon without proximal obstruction.    < end of copied text >     < from: MR Pelvis Bony Only w/wo IV Cont (11.10.20 @ 14:41) >  IMPRESSION: No suspicious osseous lesions are demonstrated.  Advanced right hip osteoarthritis and moderate left hip osteoarthritis. Lower lumbar spondylosis.  Right iliopsoas bursitis.    < end of copied text >     HPI: 79 yo M with PMHx of DM, HTN, HLD and chronic conjuctivitis who was admitted for R eye vision loss and 60lb unintentional weight loss now s/p corneal patch with ophtho. Urology team consulted for concern of prostate CA based on CT scan findings. Pt seen and examined at the bedside. Pt states over past year he has had an unintentional weight loss of roughly 60lbs. Has also been having occasional fevers at home over the last few months, however cannot remember temperature. States fevers are not all the time. Has not had this worked up with outside doctor. Has never seen a urologist. States he is able to void without difficulty. Occasionally has dysuria, but not all the time. Stream can be weak, but unsure if it is always. Constipated at home and takes medication to have BM. Unsure about family history of  malignancy. Denies night sweats, fatigue, back pain, flank pain, abd pain, N/V, suprapubic pain, rectal pain, bony pain, hematuria, increased frequency, testicular pain, penile pain, or other acute complaint.     PAST MEDICAL & SURGICAL HISTORY:  BPH (benign prostatic hyperplasia)    Hyperlipidemia    HTN (hypertension)    DM (diabetes mellitus)    No significant past surgical history        MEDICATIONS  (STANDING):  atorvastatin 10 milliGRAM(s) Oral at bedtime  cyanocobalamin 1000 MICROGram(s) Oral daily  dextrose 5%. 1000 milliLiter(s) (50 mL/Hr) IV Continuous <Continuous>  dextrose 50% Injectable 12.5 Gram(s) IV Push once  dextrose 50% Injectable 25 Gram(s) IV Push once  dextrose 50% Injectable 25 Gram(s) IV Push once  dorzolamide 2%/timolol 0.5% Ophthalmic Solution 1 Drop(s) Left EYE two times a day  enalapril 5 milliGRAM(s) Oral daily  insulin lispro (ADMELOG) corrective regimen sliding scale   SubCutaneous three times a day before meals  insulin lispro (ADMELOG) corrective regimen sliding scale   SubCutaneous at bedtime  moxifloxacin 0.5% Solution 1 Drop(s) Both EYES <User Schedule>  polyethylene glycol 3350 17 Gram(s) Oral daily  senna 2 Tablet(s) Oral at bedtime  tamsulosin 0.4 milliGRAM(s) Oral at bedtime  vancomycin 25 mG/mL Fortified Ophthalmic 1 Drop(s) Both EYES four times a day    MEDICATIONS  (PRN):  acetaminophen   Tablet .. 650 milliGRAM(s) Oral every 6 hours PRN Mild Pain (1 - 3)  dextrose 40% Gel 15 Gram(s) Oral once PRN Blood Glucose LESS THAN 70 milliGRAM(s)/deciLiter  glucagon  Injectable 1 milliGRAM(s) IntraMuscular once PRN Glucose <70 milliGRAM(s)/deciLiter      FAMILY HISTORY:  No pertinent family history in first degree relatives        Allergies    No Known Allergies    Intolerances        SOCIAL HISTORY:    REVIEW OF SYSTEMS: Otherwise negative as stated in HPI    Physical Exam  Vital signs  T(C): 37.1 (11-10-20 @ 20:35), Max: 37.1 (11-10-20 @ 20:35)  HR: 65 (11-10-20 @ 20:35)  BP: 158/81 (11-10-20 @ 20:35)  SpO2: 94% (11-10-20 @ 20:35)  Wt(kg): --    Output    OUT:    Voided (mL): 950 mL  Total OUT: 950 mL    Total NET: -950 mL      OUT:    Voided (mL): 400 mL  Total OUT: 400 mL    Total NET: -400 mL    Gen: No Acute Distress, awake and alert. A&O x 2-3. Thin  Pulm: No respiratory distress  Abd: soft, nontender, nondistended  Back: No flank or CVAT bilaterally. No tenderness of spinous processes from C-spine down to sacrum.   : no suprapubic pain. Uncircumcised penis. No urethral discharge at meatus. Penis nontender to palpation. Testicles descended bilaterally and nontender. Vas deferens palpable bilaterally. No scrotal edema or skin changes.   RONNIE: Supervised by RIP Smith from [facility]. Good sphincter tone. Grossly enlarged prostate with firmness. Unable to palpate any nodularities. Prostate nontender to palpation.   Extr: no edema  CV: reg rate    LABS:               11.8   5.15  )-----------( 201      ( 10 Nov 2020 06:50 )             37.2       11-10    135  |  99  |  13  ----------------------------<  103<H>  4.1   |  27  |  0.78    Ca    9.4      10 Nov 2020 06:48    TPro  6.2  /  Alb  3.6  /  TBili  0.4  /  DBili  x   /  AST  17  /  ALT  15  /  AlkPhos  49  11-10    PT/INR - ( 10 Nov 2020 08:13 )   PT: 13.2 sec;   INR: 1.12 ratio         Prostate Ca Screen, PSA Total (11.10.20 @ 09:40)    Prostate Ca Screen, PSA Total: 0.91: Method: Roche Electrochemiluminescence Immuno Assay  Values obtained with different assay methods or kits cannot be  used interchangeably.  Results cannot be interpreted as absolute evidence of the presence  or absence of malignant disease. ng/mL    UA - negative for infection    RADIOLOGY:  < from: CT Abdomen and Pelvis w/ IV Cont (11.08.20 @ 15:56) >  IMPRESSION:    Motion limited evaluation.    The prostate gland is markedly enlarged with significant mass effect on the urinary bladder. The urinary bladder has diffuse wall thickening and trabeculations. Correlate clinically for infection or other processes. Lytic lesions are noted within the right acetabulum and L5, consider malignant process.    Moderate amount of stool throughout colon without proximal obstruction.    < end of copied text >     < from: MR Pelvis Bony Only w/wo IV Cont (11.10.20 @ 14:41) >  IMPRESSION: No suspicious osseous lesions are demonstrated.  Advanced right hip osteoarthritis and moderate left hip osteoarthritis. Lower lumbar spondylosis.  Right iliopsoas bursitis.    < end of copied text >

## 2020-11-11 LAB
ALBUMIN SERPL ELPH-MCNC: 4.1 G/DL — SIGNIFICANT CHANGE UP (ref 3.3–5)
ALP SERPL-CCNC: 58 U/L — SIGNIFICANT CHANGE UP (ref 40–120)
ALT FLD-CCNC: 20 U/L — SIGNIFICANT CHANGE UP (ref 10–45)
ANA TITR SER: NEGATIVE — SIGNIFICANT CHANGE UP
ANION GAP SERPL CALC-SCNC: 10 MMOL/L — SIGNIFICANT CHANGE UP (ref 5–17)
APPEARANCE UR: ABNORMAL
AST SERPL-CCNC: 17 U/L — SIGNIFICANT CHANGE UP (ref 10–40)
B BURGDOR C6 AB SER-ACNC: NEGATIVE — SIGNIFICANT CHANGE UP
B BURGDOR IGG+IGM SER-ACNC: 0.2 INDEX — SIGNIFICANT CHANGE UP (ref 0.01–0.89)
BACTERIA # UR AUTO: ABNORMAL
BASOPHILS # BLD AUTO: 0.02 K/UL — SIGNIFICANT CHANGE UP (ref 0–0.2)
BASOPHILS NFR BLD AUTO: 0.3 % — SIGNIFICANT CHANGE UP (ref 0–2)
BILIRUB SERPL-MCNC: 0.5 MG/DL — SIGNIFICANT CHANGE UP (ref 0.2–1.2)
BILIRUB UR-MCNC: NEGATIVE — SIGNIFICANT CHANGE UP
BUN SERPL-MCNC: 11 MG/DL — SIGNIFICANT CHANGE UP (ref 7–23)
C3 SERPL-MCNC: 101 MG/DL — SIGNIFICANT CHANGE UP (ref 81–157)
C4 SERPL-MCNC: 30 MG/DL — SIGNIFICANT CHANGE UP (ref 13–39)
CALCIUM SERPL-MCNC: 9.6 MG/DL — SIGNIFICANT CHANGE UP (ref 8.4–10.5)
CHLORIDE SERPL-SCNC: 96 MMOL/L — SIGNIFICANT CHANGE UP (ref 96–108)
CO2 SERPL-SCNC: 27 MMOL/L — SIGNIFICANT CHANGE UP (ref 22–31)
COLOR SPEC: YELLOW — SIGNIFICANT CHANGE UP
COMMENT - URINE: SIGNIFICANT CHANGE UP
CREAT SERPL-MCNC: 0.73 MG/DL — SIGNIFICANT CHANGE UP (ref 0.5–1.3)
CRP SERPL-MCNC: 0.61 MG/DL — HIGH (ref 0–0.4)
DIFF PNL FLD: NEGATIVE — SIGNIFICANT CHANGE UP
EOSINOPHIL # BLD AUTO: 0.04 K/UL — SIGNIFICANT CHANGE UP (ref 0–0.5)
EOSINOPHIL NFR BLD AUTO: 0.7 % — SIGNIFICANT CHANGE UP (ref 0–6)
EPI CELLS # UR: 0 /HPF — SIGNIFICANT CHANGE UP (ref 0–5)
ERYTHROCYTE [SEDIMENTATION RATE] IN BLOOD: 32 MM/HR — HIGH (ref 0–20)
GLUCOSE BLDC GLUCOMTR-MCNC: 119 MG/DL — HIGH (ref 70–99)
GLUCOSE BLDC GLUCOMTR-MCNC: 202 MG/DL — HIGH (ref 70–99)
GLUCOSE BLDC GLUCOMTR-MCNC: 223 MG/DL — HIGH (ref 70–99)
GLUCOSE BLDC GLUCOMTR-MCNC: 239 MG/DL — HIGH (ref 70–99)
GLUCOSE SERPL-MCNC: 131 MG/DL — HIGH (ref 70–99)
GLUCOSE UR QL: NEGATIVE — SIGNIFICANT CHANGE UP
HBV CORE AB SER-ACNC: SIGNIFICANT CHANGE UP
HBV SURFACE AB SER-ACNC: SIGNIFICANT CHANGE UP
HBV SURFACE AG SER-ACNC: SIGNIFICANT CHANGE UP
HCT VFR BLD CALC: 39.6 % — SIGNIFICANT CHANGE UP (ref 39–50)
HGB BLD-MCNC: 12.9 G/DL — LOW (ref 13–17)
HSV1 IGG SER-ACNC: 4.64 INDEX — HIGH
HSV1 IGG SERPL QL IA: POSITIVE
HSV2 IGG FLD-ACNC: 0.17 INDEX — SIGNIFICANT CHANGE UP
HSV2 IGG SERPL QL IA: NEGATIVE — SIGNIFICANT CHANGE UP
HYALINE CASTS # UR AUTO: 0 /LPF — SIGNIFICANT CHANGE UP (ref 0–7)
IGA FLD-MCNC: 535 MG/DL — HIGH (ref 84–499)
IGG FLD-MCNC: 1227 MG/DL — SIGNIFICANT CHANGE UP (ref 610–1660)
IGM SERPL-MCNC: 40 MG/DL — SIGNIFICANT CHANGE UP (ref 35–242)
IMM GRANULOCYTES NFR BLD AUTO: 0.5 % — SIGNIFICANT CHANGE UP (ref 0–1.5)
KAPPA LC SER QL IFE: 2.98 MG/DL — HIGH (ref 0.33–1.94)
KAPPA/LAMBDA FREE LIGHT CHAIN RATIO, SERUM: 1.43 RATIO — SIGNIFICANT CHANGE UP (ref 0.26–1.65)
KETONES UR-MCNC: NEGATIVE — SIGNIFICANT CHANGE UP
LAMBDA LC SER QL IFE: 2.09 MG/DL — SIGNIFICANT CHANGE UP (ref 0.57–2.63)
LEUKOCYTE ESTERASE UR-ACNC: NEGATIVE — SIGNIFICANT CHANGE UP
LYME C6 AB IGG/IGM EIA REFLEX WESTERN BL: SIGNIFICANT CHANGE UP
LYMPHOCYTES # BLD AUTO: 0.53 K/UL — LOW (ref 1–3.3)
LYMPHOCYTES # BLD AUTO: 8.6 % — LOW (ref 13–44)
MCHC RBC-ENTMCNC: 29.1 PG — SIGNIFICANT CHANGE UP (ref 27–34)
MCHC RBC-ENTMCNC: 32.6 GM/DL — SIGNIFICANT CHANGE UP (ref 32–36)
MCV RBC AUTO: 89.4 FL — SIGNIFICANT CHANGE UP (ref 80–100)
MONOCYTES # BLD AUTO: 0.39 K/UL — SIGNIFICANT CHANGE UP (ref 0–0.9)
MONOCYTES NFR BLD AUTO: 6.3 % — SIGNIFICANT CHANGE UP (ref 2–14)
MYELOPEROXIDASE AB SER-ACNC: 12.8 UNITS — SIGNIFICANT CHANGE UP
MYELOPEROXIDASE CELLS FLD QL: NEGATIVE — SIGNIFICANT CHANGE UP
NEUTROPHILS # BLD AUTO: 5.14 K/UL — SIGNIFICANT CHANGE UP (ref 1.8–7.4)
NEUTROPHILS NFR BLD AUTO: 83.6 % — HIGH (ref 43–77)
NITRITE UR-MCNC: NEGATIVE — SIGNIFICANT CHANGE UP
NRBC # BLD: 0 /100 WBCS — SIGNIFICANT CHANGE UP (ref 0–0)
PH UR: 7.5 — SIGNIFICANT CHANGE UP (ref 5–8)
PLATELET # BLD AUTO: 234 K/UL — SIGNIFICANT CHANGE UP (ref 150–400)
POTASSIUM SERPL-MCNC: 3.9 MMOL/L — SIGNIFICANT CHANGE UP (ref 3.5–5.3)
POTASSIUM SERPL-SCNC: 3.9 MMOL/L — SIGNIFICANT CHANGE UP (ref 3.5–5.3)
PROT SERPL-MCNC: 6.7 G/DL — SIGNIFICANT CHANGE UP (ref 6–8.3)
PROT SERPL-MCNC: 6.7 G/DL — SIGNIFICANT CHANGE UP (ref 6–8.3)
PROT SERPL-MCNC: 7.2 G/DL — SIGNIFICANT CHANGE UP (ref 6–8.3)
PROT UR-MCNC: NEGATIVE — SIGNIFICANT CHANGE UP
PROTEINASE3 AB FLD-ACNC: 6.5 UNITS — SIGNIFICANT CHANGE UP
PROTEINASE3 AB SER-ACNC: NEGATIVE — SIGNIFICANT CHANGE UP
RBC # BLD: 4.43 M/UL — SIGNIFICANT CHANGE UP (ref 4.2–5.8)
RBC # FLD: 13.1 % — SIGNIFICANT CHANGE UP (ref 10.3–14.5)
RBC CASTS # UR COMP ASSIST: 0 /HPF — SIGNIFICANT CHANGE UP (ref 0–4)
SARS-COV-2 IGG SERPL QL IA: NEGATIVE — SIGNIFICANT CHANGE UP
SARS-COV-2 IGM SERPL IA-ACNC: <0.1 INDEX — SIGNIFICANT CHANGE UP
SODIUM SERPL-SCNC: 133 MMOL/L — LOW (ref 135–145)
SP GR SPEC: 1.02 — SIGNIFICANT CHANGE UP (ref 1.01–1.02)
T PALLIDUM AB TITR SER: NEGATIVE — SIGNIFICANT CHANGE UP
UROBILINOGEN FLD QL: SIGNIFICANT CHANGE UP
VIT D25+D1,25 OH+D1,25 PNL SERPL-MCNC: 31.5 PG/ML — SIGNIFICANT CHANGE UP (ref 19.9–79.3)
WBC # BLD: 6.15 K/UL — SIGNIFICANT CHANGE UP (ref 3.8–10.5)
WBC # FLD AUTO: 6.15 K/UL — SIGNIFICANT CHANGE UP (ref 3.8–10.5)
WBC UR QL: 0 /HPF — SIGNIFICANT CHANGE UP (ref 0–5)

## 2020-11-11 PROCEDURE — 99222 1ST HOSP IP/OBS MODERATE 55: CPT | Mod: GC

## 2020-11-11 PROCEDURE — 65286 REPAIR OF EYE WOUND: CPT | Mod: RT

## 2020-11-11 RX ORDER — ERYTHROMYCIN BASE 5 MG/GRAM
1 OINTMENT (GRAM) OPHTHALMIC (EYE) ONCE
Refills: 0 | Status: COMPLETED | OUTPATIENT
Start: 2020-11-11 | End: 2020-11-11

## 2020-11-11 RX ORDER — FAMOTIDINE 10 MG/ML
20 INJECTION INTRAVENOUS DAILY
Refills: 0 | Status: DISCONTINUED | OUTPATIENT
Start: 2020-11-11 | End: 2020-11-18

## 2020-11-11 RX ADMIN — Medication 1 DROP(S): at 12:15

## 2020-11-11 RX ADMIN — Medication 1 DROP(S): at 12:16

## 2020-11-11 RX ADMIN — Medication 1 DROP(S): at 05:33

## 2020-11-11 RX ADMIN — SENNA PLUS 2 TABLET(S): 8.6 TABLET ORAL at 21:10

## 2020-11-11 RX ADMIN — Medication 40 MILLIGRAM(S): at 21:10

## 2020-11-11 RX ADMIN — Medication 2: at 12:20

## 2020-11-11 RX ADMIN — FAMOTIDINE 20 MILLIGRAM(S): 10 INJECTION INTRAVENOUS at 21:10

## 2020-11-11 RX ADMIN — ATORVASTATIN CALCIUM 10 MILLIGRAM(S): 80 TABLET, FILM COATED ORAL at 21:10

## 2020-11-11 RX ADMIN — PREGABALIN 1000 MICROGRAM(S): 225 CAPSULE ORAL at 12:16

## 2020-11-11 RX ADMIN — DORZOLAMIDE HYDROCHLORIDE TIMOLOL MALEATE 1 DROP(S): 20; 5 SOLUTION/ DROPS OPHTHALMIC at 05:34

## 2020-11-11 RX ADMIN — Medication 1 DROP(S): at 05:32

## 2020-11-11 RX ADMIN — POLYETHYLENE GLYCOL 3350 17 GRAM(S): 17 POWDER, FOR SOLUTION ORAL at 12:16

## 2020-11-11 RX ADMIN — Medication 1 DROP(S): at 21:18

## 2020-11-11 RX ADMIN — Medication 1 DROP(S): at 21:39

## 2020-11-11 RX ADMIN — Medication 1 APPLICATION(S): at 18:24

## 2020-11-11 RX ADMIN — TAMSULOSIN HYDROCHLORIDE 0.4 MILLIGRAM(S): 0.4 CAPSULE ORAL at 21:38

## 2020-11-11 RX ADMIN — Medication 5 MILLIGRAM(S): at 05:31

## 2020-11-11 NOTE — CONSULT NOTE ADULT - ASSESSMENT
80M with DM2, HTN, HLD, chronic conjunctivitis with recent steroid eye drop use, admitted from ophthalmologist office for R. eye patch corneal graft repair due to progressive corneal thinning. Patient with both eyes affected, R>L. ID called to evaluate for possible infectious etiologies of corneal melt.     Progressive corneal thinning  Differential dx malignancy (given extensive weight loss) vs infectious vs autoimmune  Infectious etiologies include but are not limited to syphilis vs TB vs HSV  -pending serologies  --syphilis/RPR  --quantiferon  --lyme Ab  --HSV Ab  --hepatitis panel  -send BCx, AFB BCx  -additional autoimmune serologies pending, appreciate rheum recs  -if serologies inconclusive; may need bx for diagnosis  -additional management per ophtha

## 2020-11-11 NOTE — CONSULT NOTE ADULT - ASSESSMENT
79 yo M w/ significant weight loss   Closely followed by Dr Milligan his private GI. Last colonoscopy 2015 w small colon polyp  Typically would proceed with EGD/colonoscopy however given patient's vision is impaired and thus bowel preparation would be a challenge, will schedule for EGD tomorrow THURS 11/12. If negative, will consider inpt vs. outpatient colonoscopy.  DW patient's wife at length

## 2020-11-11 NOTE — PROGRESS NOTE ADULT - SUBJECTIVE AND OBJECTIVE BOX
Patient is a 80y old  Male who presents with a chief complaint of Right eye progressive corneal thinning (10 Nov 2020 21:22)      SUBJECTIVE / OVERNIGHT EVENTS:    Patient seen and examined. states he does not have vision in both eyes. denies other complaints.  states he lost 60 lbs unintentionally but it did not bother him. does not want to have a colonoscopy currently.      Vital Signs Last 24 Hrs  T(C): 36.7 (2020 04:14), Max: 37.1 (10 Nov 2020 20:35)  T(F): 98.1 (2020 04:14), Max: 98.7 (10 Nov 2020 20:35)  HR: 78 (2020 04:14) (53 - 78)  BP: 139/76 (2020 04:14) (139/76 - 158/81)  BP(mean): --  RR: 17 (2020 04:14) (17 - 18)  SpO2: 96% (2020 04:14) (94% - 99%)  I&O's Summary    10 Nov 2020 07:01  -  2020 07:00  --------------------------------------------------------  IN: 580 mL / OUT: 750 mL / NET: -170 mL        PE:  GENERAL: NAD, AAOx3  HEAD:  Atraumatic, Normocephalic  EYES: closed, loss of vision both sides  CHEST/LUNG: CTABL, No wheeze  HEART: Regular rate and rhythm; no murmur  ABDOMEN: Soft, Nontender, Nondistended; Bowel sounds present  EXTREMITIES:  2+ Peripheral Pulses, No clubbing, cyanosis, or edema  SKIN: No rashes or lesions  NEURO: No focal deficits    LABS:                        12.9   6.15  )-----------( 234      ( 2020 07:09 )             39.6         133<L>  |  96  |  11  ----------------------------<  131<H>  3.9   |  27  |  0.73    Ca    9.6      2020 07:08    TPro  7.2  /  Alb  4.1  /  TBili  0.5  /  DBili  x   /  AST  17  /  ALT  20  /  AlkPhos  58  11-11    PT/INR - ( 10 Nov 2020 08:13 )   PT: 13.2 sec;   INR: 1.12 ratio           CAPILLARY BLOOD GLUCOSE      POCT Blood Glucose.: 119 mg/dL (2020 09:20)  POCT Blood Glucose.: 191 mg/dL (10 Nov 2020 21:25)  POCT Blood Glucose.: 128 mg/dL (10 Nov 2020 17:15)  POCT Blood Glucose.: 183 mg/dL (10 Nov 2020 12:17)        Urinalysis Basic - ( 2020 09:15 )    Color: Yellow / Appearance: Turbid / S.016 / pH: x  Gluc: x / Ketone: Negative  / Bili: Negative / Urobili: <2 mg/dL   Blood: x / Protein: Negative / Nitrite: Negative   Leuk Esterase: Negative / RBC: x / WBC x   Sq Epi: x / Non Sq Epi: x / Bacteria: x        RADIOLOGY & ADDITIONAL TESTS:    Imaging Personally Reviewed:  [x] YES  [ ] NO    Consultant(s) Notes Reviewed:  [x] YES  [ ] NO    MEDICATIONS  (STANDING):  atorvastatin 10 milliGRAM(s) Oral at bedtime  cyanocobalamin 1000 MICROGram(s) Oral daily  dextrose 5%. 1000 milliLiter(s) (50 mL/Hr) IV Continuous <Continuous>  dextrose 50% Injectable 12.5 Gram(s) IV Push once  dextrose 50% Injectable 25 Gram(s) IV Push once  dextrose 50% Injectable 25 Gram(s) IV Push once  dorzolamide 2%/timolol 0.5% Ophthalmic Solution 1 Drop(s) Left EYE two times a day  enalapril 5 milliGRAM(s) Oral daily  insulin lispro (ADMELOG) corrective regimen sliding scale   SubCutaneous three times a day before meals  insulin lispro (ADMELOG) corrective regimen sliding scale   SubCutaneous at bedtime  moxifloxacin 0.5% Solution 1 Drop(s) Both EYES <User Schedule>  polyethylene glycol 3350 17 Gram(s) Oral daily  senna 2 Tablet(s) Oral at bedtime  tamsulosin 0.4 milliGRAM(s) Oral at bedtime  vancomycin 25 mG/mL Fortified Ophthalmic 1 Drop(s) Both EYES four times a day    MEDICATIONS  (PRN):  acetaminophen   Tablet .. 650 milliGRAM(s) Oral every 6 hours PRN Mild Pain (1 - 3)  dextrose 40% Gel 15 Gram(s) Oral once PRN Blood Glucose LESS THAN 70 milliGRAM(s)/deciLiter  glucagon  Injectable 1 milliGRAM(s) IntraMuscular once PRN Glucose <70 milliGRAM(s)/deciLiter      Care Discussed with Consultants/Other Providers [x] YES  [ ] NO    HEALTH ISSUES - PROBLEM Dx:  Prophylactic measure  Prophylactic measure    Benign prostatic hyperplasia, unspecified whether lower urinary tract symptoms present  Benign prostatic hyperplasia, unspecified whether lower urinary tract symptoms present    Hyperlipidemia, unspecified hyperlipidemia type  Hyperlipidemia, unspecified hyperlipidemia type    Essential hypertension  Essential hypertension    Type 2 diabetes mellitus without complication, without long-term current use of insulin  Type 2 diabetes mellitus without complication, without long-term current use of insulin    Weight loss, unintentional  Weight loss, unintentional    Preoperative examination  Preoperative examination    Corneal thinning of right eye  Corneal thinning of right eye

## 2020-11-11 NOTE — PROGRESS NOTE ADULT - PROBLEM SELECTOR PLAN 1
Progressive corneal melting of right eye with concern for rheumatologic vs malignant vs infectious cause. But also has bilateral eye issues.   Ophtho sp OR patch graft repair at [facility].  moxifloxacin qid to both eyes  h/o glaucoma-cosopt BID to the left eye only  malignancy work up   MRI pelvis did not see osseous lesion  CT abd enlarge prostate but PSA wnl, unlikely prostate CA, appreciate urology recs  optho following  rheum consult  ID consult

## 2020-11-11 NOTE — PROGRESS NOTE ADULT - ASSESSMENT
81 yo M with PMHx of DM, HTN, HLD and chronic conjuctivitis who was admitted for R eye vision loss and 60lb unintentional weight loss now s/p corneal patch with ophtho, now with CT findings of enlarged prostate with mass effect on the bladder and lytic lesions within R acetabulum and L5. MR Pelvis without suspicious osseous lesions. PSA 0.91. Imaging (osteolytic lesions, non-suspicious findings on MRI), labs (normal PSA), physical exam (smooth, no nodules), and no known family history of  malignancy are less concerning for prostate cancer and more in-favor of BPH as cause of enlarged prostate.    -PSA 0.9, suspect BPH over prostate cancer as ddx  -Can start flomax 0.4mg qhs for mild LUTS  -If concern for malignancy, bone lesion biopsy per primary team

## 2020-11-11 NOTE — CONSULT NOTE ADULT - SUBJECTIVE AND OBJECTIVE BOX
80 year old male with PMH of DM, HTN, HLD, chronic conjunctivitis; presented from ophthalmologist's office (Dr. Warren) for surgery of the right eye. Per pt and pt's wife, pt started to notice vision loss of the right eye approximately one week ago, was diagnosed with a tc abrasion, went to go and see his ophthalmologist today who recommended he come to the ED for admission for surgery for progressive corneal thinning. Pt has + right sided eye discharge, eye covered with shield, denies headaches, dizziness, hearing changes, head/sinus pain, no fevers, chills, nausea, vomiting.  Has had unintentional progressive weight loss with about 60 lbs over the past year with poor PO intake. Denies any joint pains, or CP. Has intermittent SOB. Reports constipation.   He denies history of MI, CAD, or stroke. He has limited functional status at baseline.   Reports last colonoscopy was about 5 years ago and was normal.  (2020 18:26)  --------------  Pt seen and examined  Spoke w/ patient's wife  +Weight loss and corneal thinning  Pt denies any digestive symptoms aside from weight loss  He is closely followed by Dr Chel CRONIN in [facility]  Last colonoscopy  - diverticulosis and small polyp resected.  No prior EGD      PAST MEDICAL & SURGICAL HISTORY:  BPH (benign prostatic hyperplasia)    Hyperlipidemia    HTN (hypertension)    DM (diabetes mellitus)    No significant past surgical history        MEDICATIONS  (STANDING):  atorvastatin 10 milliGRAM(s) Oral at bedtime  cyanocobalamin 1000 MICROGram(s) Oral daily  dextrose 5%. 1000 milliLiter(s) (50 mL/Hr) IV Continuous <Continuous>  dextrose 50% Injectable 12.5 Gram(s) IV Push once  dextrose 50% Injectable 25 Gram(s) IV Push once  dextrose 50% Injectable 25 Gram(s) IV Push once  dorzolamide 2%/timolol 0.5% Ophthalmic Solution 1 Drop(s) Left EYE two times a day  enalapril 5 milliGRAM(s) Oral daily  insulin lispro (ADMELOG) corrective regimen sliding scale   SubCutaneous three times a day before meals  insulin lispro (ADMELOG) corrective regimen sliding scale   SubCutaneous at bedtime  moxifloxacin 0.5% Solution 1 Drop(s) Both EYES <User Schedule>  polyethylene glycol 3350 17 Gram(s) Oral daily  senna 2 Tablet(s) Oral at bedtime  tamsulosin 0.4 milliGRAM(s) Oral at bedtime  vancomycin 25 mG/mL Fortified Ophthalmic 1 Drop(s) Both EYES four times a day    MEDICATIONS  (PRN):  acetaminophen   Tablet .. 650 milliGRAM(s) Oral every 6 hours PRN Mild Pain (1 - 3)  dextrose 40% Gel 15 Gram(s) Oral once PRN Blood Glucose LESS THAN 70 milliGRAM(s)/deciLiter  glucagon  Injectable 1 milliGRAM(s) IntraMuscular once PRN Glucose <70 milliGRAM(s)/deciLiter      Allergies    No Known Allergies    Intolerances        Review of Systems:    General:  ++ loss, fevers, chills, night sweats,fatigue,   CV:  No pain, palpitatioins, hypo/hypertension  Resp:  No dyspnea, cough, tachypnea, wheezing  GI:  see hpi  Muscle:  No pain, weakness  Neuro:  No weakness, tingling, memory problems  Psych:  No fatigue, insomnia, mood problems, depression  Endocrine:  No polyuria, polydypsia, cold/heat intolerance  Heme:  No petechiae, ecchymosis, easy bruisability  Skin:  No rash, tattoos, scars, edema      Vital Signs Last 24 Hrs  T(C): 36.7 (2020 11:52), Max: 37.1 (10 Nov 2020 20:35)  T(F): 98.1 (2020 11:52), Max: 98.7 (10 Nov 2020 20:35)  HR: 72 (2020 15:24) (65 - 78)  BP: 146/82 (2020 15:24) (139/76 - 158/81)  BP(mean): --  RR: 18 (2020 15:24) (17 - 18)  SpO2: 99% (2020 15:24) (94% - 99%)    PHYSICAL EXAM:    Constitutional: NAD, thin  Respiratory: CTA and P  Cardiovascular: S1 and S2  Gastrointestinal: BS+, soft, NT/ND, neg HSM,  Extremities: No peripheral edema, neg clubing, cyanosis  Vascular: 2+ peripheral pulses  Neurological: A/O x 3, no focal deficits  Psychiatric: Normal mood, normal affect  Skin: No rashes      LABS:                        12.9   6.15  )-----------( 234      ( 2020 07:09 )             39.6                         11.8   5.15  )-----------( 201      ( 10 Nov 2020 06:50 )             37.2                         12.7   6.13  )-----------( 215      ( 2020 07:40 )             39.2         133<L>  |  96  |  11  ----------------------------<  131<H>  3.9   |  27  |  0.73    Ca    9.6      2020 07:08    TPro  6.7  /  Alb  x   /  TBili  x   /  DBili  x   /  AST  x   /  ALT  x   /  AlkPhos  x       PT/INR - ( 10 Nov 2020 08:13 )   PT: 13.2 sec;   INR: 1.12 ratio           Urinalysis Basic - ( 2020 09:15 )    Color: Yellow / Appearance: Turbid / S.016 / pH: x  Gluc: x / Ketone: Negative  / Bili: Negative / Urobili: <2 mg/dL   Blood: x / Protein: Negative / Nitrite: Negative   Leuk Esterase: Negative / RBC: x / WBC x   Sq Epi: x / Non Sq Epi: x / Bacteria: x      LIVER FUNCTIONS - ( 2020 09:51 )  Alb: x     / Pro: 6.7 g/dL / ALK PHOS: x     / ALT: x     / AST: x     / GGT: x         LIVER FUNCTIONS - ( 2020 07:08 )  Alb: 4.1 g/dL / Pro: 7.2 g/dL / ALK PHOS: 58 U/L / ALT: 20 U/L / AST: 17 U/L / GGT: x         LIVER FUNCTIONS - ( 10 Nov 2020 06:48 )  Alb: 3.6 g/dL / Pro: 6.2 g/dL / ALK PHOS: 49 U/L / ALT: 15 U/L / AST: 17 U/L / GGT: x                 RADIOLOGY & ADDITIONAL TESTS:

## 2020-11-11 NOTE — PROGRESS NOTE ADULT - SUBJECTIVE AND OBJECTIVE BOX
[facility] DEPARTMENT OF OPHTHALMOLOGY  ------------------------------------------------------------------------------  Syeda LALA3 MD  Pager: [phone]  ---------------------------------------  80 year old male with PMH of DM, HTN, HLD, chronic conjunctivitis,; presented from ophthalmologist's office (Dr. Warren) for surgery of the right eye (POD 3 corneal patch graft) due to progressive corneal thinning OU.     Interval History: No acute events overnight. Patient reports continued discomfort. No pain. Denies changes in vision.     MEDICATIONS  (STANDING):  atorvastatin 10 milliGRAM(s) Oral at bedtime  cyanocobalamin 1000 MICROGram(s) Oral daily  dextrose 5%. 1000 milliLiter(s) (50 mL/Hr) IV Continuous <Continuous>  dextrose 50% Injectable 12.5 Gram(s) IV Push once  dextrose 50% Injectable 25 Gram(s) IV Push once  dextrose 50% Injectable 25 Gram(s) IV Push once  dorzolamide 2%/timolol 0.5% Ophthalmic Solution 1 Drop(s) Left EYE two times a day  enalapril 5 milliGRAM(s) Oral daily  insulin lispro (ADMELOG) corrective regimen sliding scale   SubCutaneous three times a day before meals  insulin lispro (ADMELOG) corrective regimen sliding scale   SubCutaneous at bedtime  moxifloxacin 0.5% Solution 1 Drop(s) Both EYES <User Schedule>  polyethylene glycol 3350 17 Gram(s) Oral daily  senna 2 Tablet(s) Oral at bedtime  tamsulosin 0.4 milliGRAM(s) Oral at bedtime  vancomycin 25 mG/mL Fortified Ophthalmic 1 Drop(s) Both EYES four times a day    MEDICATIONS  (PRN):  acetaminophen   Tablet .. 650 milliGRAM(s) Oral every 6 hours PRN Mild Pain (1 - 3)  dextrose 40% Gel 15 Gram(s) Oral once PRN Blood Glucose LESS THAN 70 milliGRAM(s)/deciLiter  glucagon  Injectable 1 milliGRAM(s) IntraMuscular once PRN Glucose <70 milliGRAM(s)/deciLiter      VITALS: T(C): 36.8 (11-10-20 @ 13:11)  T(F): 98.3 (11-10-20 @ 13:11), Max: 98.3 (11-10-20 @ 13:11)  HR: 53 (11-10-20 @ 13:11) (52 - 57)  BP: 144/82 (11-10-20 @ 13:11) (118/73 - 144/82)  RR:  (17 - 17)  SpO2:  (99% - 100%)  Wt(kg): --    Ophthalmology Exam:  VA: HM OD, CF OS  Pupils: poor view 2/2 corneal changes, appears minimally reactive OU  Ttono: 12, 11  Extraocular movements (EOMs): Grossly appear full OU, no pain, no diplopia    Slit Lamp Exam:   External: Clear shield in place OD  Lids/Lashes/Lacrimal Ducts: Flat OU Inferior lids noted to be slightly ectropic OU, eyelids crusted shut with significant discharge  Sclera/Conjunctiva: 2+ injection OD 1+ injection OS  Cornea: Patch graft in place, well sutured, (1 loose suture appreciated) laura negative; epithelial defect curvilinear present temporally measuring approx 8x8 mm, with New thinning just temporal to Graft. Some punctate heme in graft, OD, ; small punctate epi defect 1x1mm centrally with 60% thinning (Progressive thinning), No signs of iNfection, ulcer OS  Anterior Chamber: D+F OU  , No hypopyon appreciated OU  Iris: poor view but appears Flat OU  Lens: poor view OU    Radiology:  mazin< from: MR Pelvis Bony Only w/wo IV Cont (11.10.20 @ 14:41) >  The prostate is. There is diffuse thickening of the bladder wall with trabeculation. Please see report from CT of the abdomen and pelvis performed 11/8/2020 for further evaluation of the findings in the imaged portion of the pelvis.    IMPRESSION: No suspicious osseous lesions are demonstrated.  Advanced right hip osteoarthritis and moderate left hip osteoarthritis. Lower lumbar spondylosis.  Right iliopsoas bursitis.    < end of copied text >  < from: MR Lumbar Spine w/ IV Cont (11.09.20 @ 23:42) >  IMPRESSION: Degenerative changes as described above.    < end of copied text >  < from: CT Abdomen and Pelvis w/ IV Cont (11.08.20 @ 15:56) >  BLADDER: Diffuse bladder wall thickening and trabeculations.  REPRODUCTIVE ORGANS: There is markedly enlarged with significant mass effect on the urinary bladder.    < end of copied text >  < from: CT Abdomen and Pelvis w/ IV Cont (11.08.20 @ 15:56) >  IMPRESSION:    Motion limited evaluation.    The prostate gland is markedly enlarged with significant mass effect on the urinary bladder. The urinary bladder has diffuse wall thickening and trabeculations. Correlate clinically for infection or other processes. Lytic lesions are noted within the right acetabulum and L5, consider malignant process.    < end of copied text >      Assessment and Plan:   · Assessment	  80y male w/ pmhx/ochx of  DM, HTN, HLD, chronic conjunctivitis and 60 lb weight loss with progressive corneal thinning of both eyes, now POD 3 s/p corneal patch graft of the right eye. Patient with stable Epi defect OU. New Area of Thinning TEmporal To graft     # corneal thinning of both eyes, now POD 4 s/p corneal patch graft of the right eye  - patch graft of right eye in place; Stable curvilinear 8x8 mm epi defect present Just adjacent to Temporal Graft  - small epi defect/ with possibly increased  thinning in the left eye   - No Appearance of Infectious Infiltrate, Ulcer, Or hypopyon appreciated.  - moxifloxacin QID to both eyes and fortified Vancomycin QID to both eyes  - given recent weight loss and b/l nature of corneal pathology, malignancy workup to evaluate for underlying disease process.  - rheumatology Consult recommended as well given Recent history of Weight-loss, and Corneal Melting  - CT with vertebral lytic lesion - Enlarged Prostate and Bladder wall. Urology reports normal prostate exam  - MRI - Chronic Degenerative Changes - no Lytic Lesions appreciated  - discharge from lashes cleaned gently   - Patient with Unknown Etiology for Corneal melting In both eyes, Right greater than Left, with impending perforation and possible need for repeat Graft vs glue vs possible Enucleation (Last resort)   - will monitor closely    # h/o glaucoma  - cosopt BID to the left eye only    - Findings discussed with pt and primary team  - Clinical photos and care seen and discussed with attending, Dr. Warren.    Outpatient follow-up: Patient should follow-up with his/her ophthalmologist or with [facility] Department of Ophthalmology within 1 week of after discharge at:    [address]  [place] [address] [phone] [facility] DEPARTMENT OF OPHTHALMOLOGY  ------------------------------------------------------------------------------  Syeda LALA3 MD  Pager: [phone]  ---------------------------------------  80 year old male with PMH of DM, HTN, HLD, chronic conjunctivitis; presented from ophthalmologist's office (Dr. Warren) for surgery of the right eye (POD 3 corneal patch graft) due to progressive corneal thinning OU.     Interval History: No acute events overnight. Patient reports continued discomfort. No pain. Denies changes in vision.     MEDICATIONS  (STANDING):  atorvastatin 10 milliGRAM(s) Oral at bedtime  cyanocobalamin 1000 MICROGram(s) Oral daily  dextrose 5%. 1000 milliLiter(s) (50 mL/Hr) IV Continuous <Continuous>  dextrose 50% Injectable 12.5 Gram(s) IV Push once  dextrose 50% Injectable 25 Gram(s) IV Push once  dextrose 50% Injectable 25 Gram(s) IV Push once  dorzolamide 2%/timolol 0.5% Ophthalmic Solution 1 Drop(s) Left EYE two times a day  enalapril 5 milliGRAM(s) Oral daily  insulin lispro (ADMELOG) corrective regimen sliding scale   SubCutaneous three times a day before meals  insulin lispro (ADMELOG) corrective regimen sliding scale   SubCutaneous at bedtime  moxifloxacin 0.5% Solution 1 Drop(s) Both EYES <User Schedule>  polyethylene glycol 3350 17 Gram(s) Oral daily  senna 2 Tablet(s) Oral at bedtime  tamsulosin 0.4 milliGRAM(s) Oral at bedtime  vancomycin 25 mG/mL Fortified Ophthalmic 1 Drop(s) Both EYES four times a day    MEDICATIONS  (PRN):  acetaminophen   Tablet .. 650 milliGRAM(s) Oral every 6 hours PRN Mild Pain (1 - 3)  dextrose 40% Gel 15 Gram(s) Oral once PRN Blood Glucose LESS THAN 70 milliGRAM(s)/deciLiter  glucagon  Injectable 1 milliGRAM(s) IntraMuscular once PRN Glucose <70 milliGRAM(s)/deciLiter      VITALS: T(C): 36.8 (11-10-20 @ 13:11)  T(F): 98.3 (11-10-20 @ 13:11), Max: 98.3 (11-10-20 @ 13:11)  HR: 53 (11-10-20 @ 13:11) (52 - 57)  BP: 144/82 (11-10-20 @ 13:11) (118/73 - 144/82)  RR:  (17 - 17)  SpO2:  (99% - 100%)  Wt(kg): --    Ophthalmology Exam:  VA: HM OD, CF OS  Pupils: poor view 2/2 corneal changes, appears minimally reactive OU  Ttono: 12, 11  Extraocular movements (EOMs): Grossly appear full OU, no pain, no diplopia    Slit Lamp Exam:   External: Clear shield in place OD  Lids/Lashes/Lacrimal Ducts: Flat OU Inferior lids noted to be slightly ectropic OU, eyelids crusted shut with significant discharge  Sclera/Conjunctiva: 2+ injection OD 1+ injection OS  Cornea: Patch graft in place, well sutured, (1 loose suture appreciated) laura negative; epithelial defect improved today, measuring 4.5x3 mm (laura neg), with thinning temporal to Graft. Some punctate heme in graft, OD ; small punctate area of significant corneal thinning with about 10% of residual cornea, Laura with possible minimal leak, No signs of iNfection, ulcer OS  Anterior Chamber: D+F OU, No hypopyon appreciated OU, formed OS  Iris: poor view but appears Flat OU  Lens: poor view OU    Radiology:  mazin< from: MR Pelvis Bony Only w/wo IV Cont (11.10.20 @ 14:41) >  The prostate is. There is diffuse thickening of the bladder wall with trabeculation. Please see report from CT of the abdomen and pelvis performed 11/8/2020 for further evaluation of the findings in the imaged portion of the pelvis.    IMPRESSION: No suspicious osseous lesions are demonstrated.  Advanced right hip osteoarthritis and moderate left hip osteoarthritis. Lower lumbar spondylosis.  Right iliopsoas bursitis.    < end of copied text >  < from: MR Lumbar Spine w/ IV Cont (11.09.20 @ 23:42) >  IMPRESSION: Degenerative changes as described above.    < end of copied text >  < from: CT Abdomen and Pelvis w/ IV Cont (11.08.20 @ 15:56) >  BLADDER: Diffuse bladder wall thickening and trabeculations.  REPRODUCTIVE ORGANS: There is markedly enlarged with significant mass effect on the urinary bladder.    < end of copied text >  < from: CT Abdomen and Pelvis w/ IV Cont (11.08.20 @ 15:56) >  IMPRESSION:    Motion limited evaluation.    The prostate gland is markedly enlarged with significant mass effect on the urinary bladder. The urinary bladder has diffuse wall thickening and trabeculations. Correlate clinically for infection or other processes. Lytic lesions are noted within the right acetabulum and L5, consider malignant process.    < end of copied text >      Assessment and Plan:   · Assessment	  80y male w/ pmhx/ochx of  DM, HTN, HLD, chronic conjunctivitis and 60 lb weight loss with progressive corneal thinning of both eyes, now POD 3 s/p corneal patch graft of the right eye. Epi defect improved OD. Today with significant thinning OS with small punctate area of K thinning down to about 10%.    # corneal thinning of both eyes, now POD 4 s/p corneal patch graft of the right eye  - patch graft of right eye in place; epi defect improved  - significant thinning of cornea OS, at risk of imminent perforation, s/p corneal glue today   - No Appearance of Infectious Infiltrate, Ulcer, Or hypopyon appreciated.  - moxifloxacin QID to both eyes and fortified Vancomycin QID to both eyes  - given recent weight loss and b/l nature of corneal pathology, malignancy workup to evaluate for underlying disease process.  - rheumatology Consult recommended as well given Recent history of Weight-loss, and Corneal Melting  - CT with vertebral lytic lesion - Enlarged Prostate and Bladder wall. Urology reports normal prostate exam  - MRI - Chronic Degenerative Changes - no Lytic Lesions appreciated  - discharge from lashes cleaned gently   - Patient with Unknown Etiology for Corneal melting In both eyes, Right greater than Left, with impending perforation and possible need for repeat Graft vs glue vs possible Enucleation (Last resort)   - will monitor closely    # h/o glaucoma  - cosopt BID to the left eye only    - Findings discussed with pt and primary team  - Clinical photos and care seen and discussed with attending, Dr. Warren.    Outpatient follow-up: Patient should follow-up with his/her ophthalmologist or with [facility] Department of Ophthalmology within 1 week of after discharge at:    [address]  [place] [address]  [phone] [facility] DEPARTMENT OF OPHTHALMOLOGY  ------------------------------------------------------------------------------  Syeda LALA3 MD  Pager: [phone]  ---------------------------------------  80 year old male with PMH of DM, HTN, HLD, chronic conjunctivitis; presented from ophthalmologist's office (Dr. Warren) for surgery of the right eye (POD 3 corneal patch graft) due to progressive corneal thinning OU.     Interval History: No acute events overnight. Patient reports continued discomfort. No pain. Denies changes in vision.     MEDICATIONS  (STANDING):  atorvastatin 10 milliGRAM(s) Oral at bedtime  cyanocobalamin 1000 MICROGram(s) Oral daily  dextrose 5%. 1000 milliLiter(s) (50 mL/Hr) IV Continuous <Continuous>  dextrose 50% Injectable 12.5 Gram(s) IV Push once  dextrose 50% Injectable 25 Gram(s) IV Push once  dextrose 50% Injectable 25 Gram(s) IV Push once  dorzolamide 2%/timolol 0.5% Ophthalmic Solution 1 Drop(s) Left EYE two times a day  enalapril 5 milliGRAM(s) Oral daily  insulin lispro (ADMELOG) corrective regimen sliding scale   SubCutaneous three times a day before meals  insulin lispro (ADMELOG) corrective regimen sliding scale   SubCutaneous at bedtime  moxifloxacin 0.5% Solution 1 Drop(s) Both EYES <User Schedule>  polyethylene glycol 3350 17 Gram(s) Oral daily  senna 2 Tablet(s) Oral at bedtime  tamsulosin 0.4 milliGRAM(s) Oral at bedtime  vancomycin 25 mG/mL Fortified Ophthalmic 1 Drop(s) Both EYES four times a day    MEDICATIONS  (PRN):  acetaminophen   Tablet .. 650 milliGRAM(s) Oral every 6 hours PRN Mild Pain (1 - 3)  dextrose 40% Gel 15 Gram(s) Oral once PRN Blood Glucose LESS THAN 70 milliGRAM(s)/deciLiter  glucagon  Injectable 1 milliGRAM(s) IntraMuscular once PRN Glucose <70 milliGRAM(s)/deciLiter      VITALS: T(C): 36.8 (11-10-20 @ 13:11)  T(F): 98.3 (11-10-20 @ 13:11), Max: 98.3 (11-10-20 @ 13:11)  HR: 53 (11-10-20 @ 13:11) (52 - 57)  BP: 144/82 (11-10-20 @ 13:11) (118/73 - 144/82)  RR:  (17 - 17)  SpO2:  (99% - 100%)  Wt(kg): --    Ophthalmology Exam:  VA: HM OD, CF OS  Pupils: poor view 2/2 corneal changes, appears minimally reactive OU  Ttono: 12, 11  Extraocular movements (EOMs): Grossly appear full OU, no pain, no diplopia    Slit Lamp Exam:   External: Clear shield in place OD  Lids/Lashes/Lacrimal Ducts: Flat OU Inferior lids noted to be slightly ectropic OU, eyelids crusted shut with significant discharge  Sclera/Conjunctiva: 2+ injection OD 1+ injection OS  Cornea: Patch graft in place, well sutured, (1 loose suture appreciated) laura negative; epithelial defect improved today, measuring 4.5x3 mm (laura neg), with thinning temporal to Graft. Some punctate heme in graft, OD ; small punctate area of significant corneal thinning with about 10% of residual cornea, Laura with possible minimal leak, No signs of iNfection, ulcer OS  Anterior Chamber: D+F OU, No hypopyon appreciated OU, formed OS  Iris: poor view but appears Flat OU  Lens: poor view OU    Radiology:  mazin< from: MR Pelvis Bony Only w/wo IV Cont (11.10.20 @ 14:41) >  The prostate is. There is diffuse thickening of the bladder wall with trabeculation. Please see report from CT of the abdomen and pelvis performed 11/8/2020 for further evaluation of the findings in the imaged portion of the pelvis.    IMPRESSION: No suspicious osseous lesions are demonstrated.  Advanced right hip osteoarthritis and moderate left hip osteoarthritis. Lower lumbar spondylosis.  Right iliopsoas bursitis.    < end of copied text >  < from: MR Lumbar Spine w/ IV Cont (11.09.20 @ 23:42) >  IMPRESSION: Degenerative changes as described above.    < end of copied text >  < from: CT Abdomen and Pelvis w/ IV Cont (11.08.20 @ 15:56) >  BLADDER: Diffuse bladder wall thickening and trabeculations.  REPRODUCTIVE ORGANS: There is markedly enlarged with significant mass effect on the urinary bladder.    < end of copied text >  < from: CT Abdomen and Pelvis w/ IV Cont (11.08.20 @ 15:56) >  IMPRESSION:    Motion limited evaluation.    The prostate gland is markedly enlarged with significant mass effect on the urinary bladder. The urinary bladder has diffuse wall thickening and trabeculations. Correlate clinically for infection or other processes. Lytic lesions are noted within the right acetabulum and L5, consider malignant process.    < end of copied text >      Assessment and Plan:   · Assessment	  80y male w/ pmhx/ochx of  DM, HTN, HLD, chronic conjunctivitis and 60 lb weight loss with progressive corneal thinning of both eyes, now POD 3 s/p corneal patch graft of the right eye. Epi defect improved OD. Today with significant thinning OS with small punctate area of K thinning down to about 10%.    # corneal thinning of both eyes, now POD 4 s/p corneal patch graft of the right eye  - patch graft of right eye in place; epi defect improved  - significant thinning of cornea OS, at risk of imminent perforation, s/p corneal glue OS today done at bedside with Dr. Warren (cornea specialist)  - No Appearance of Infectious Infiltrate, Ulcer, Or hypopyon appreciated.  - moxifloxacin QID to both eyes and fortified Vancomycin QID to both eyes  - start pred forte QID to both eyes  - given recent weight loss and b/l nature of corneal pathology, malignancy workup to evaluate for underlying disease process.  - appreciate rheumatology consult given Recent history of Weight-loss, and Corneal Melting  - CT with vertebral lytic lesion - Enlarged Prostate and Bladder wall. 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 Warren.    Outpatient follow-up: Patient should follow-up with his/her ophthalmologist or with [facility] Department of Ophthalmology within 1 week of after discharge at:    [address]  [place] [address]  [phone] [facility] DEPARTMENT OF OPHTHALMOLOGY  ------------------------------------------------------------------------------  Syeda LALA3 MD  Pager: [phone]  ---------------------------------------  80 year old male with PMH of DM, HTN, HLD, chronic conjunctivitis; presented from ophthalmologist's office (Dr. Warren) for surgery of the right eye (POD 3 corneal patch graft) due to progressive corneal thinning OU.     Interval History: No acute events overnight. Patient reports continued discomfort. No pain. Denies changes in vision.     MEDICATIONS  (STANDING):  atorvastatin 10 milliGRAM(s) Oral at bedtime  cyanocobalamin 1000 MICROGram(s) Oral daily  dextrose 5%. 1000 milliLiter(s) (50 mL/Hr) IV Continuous <Continuous>  dextrose 50% Injectable 12.5 Gram(s) IV Push once  dextrose 50% Injectable 25 Gram(s) IV Push once  dextrose 50% Injectable 25 Gram(s) IV Push once  dorzolamide 2%/timolol 0.5% Ophthalmic Solution 1 Drop(s) Left EYE two times a day  enalapril 5 milliGRAM(s) Oral daily  insulin lispro (ADMELOG) corrective regimen sliding scale   SubCutaneous three times a day before meals  insulin lispro (ADMELOG) corrective regimen sliding scale   SubCutaneous at bedtime  moxifloxacin 0.5% Solution 1 Drop(s) Both EYES <User Schedule>  polyethylene glycol 3350 17 Gram(s) Oral daily  senna 2 Tablet(s) Oral at bedtime  tamsulosin 0.4 milliGRAM(s) Oral at bedtime  vancomycin 25 mG/mL Fortified Ophthalmic 1 Drop(s) Both EYES four times a day    MEDICATIONS  (PRN):  acetaminophen   Tablet .. 650 milliGRAM(s) Oral every 6 hours PRN Mild Pain (1 - 3)  dextrose 40% Gel 15 Gram(s) Oral once PRN Blood Glucose LESS THAN 70 milliGRAM(s)/deciLiter  glucagon  Injectable 1 milliGRAM(s) IntraMuscular once PRN Glucose <70 milliGRAM(s)/deciLiter      VITALS: T(C): 36.8 (11-10-20 @ 13:11)  T(F): 98.3 (11-10-20 @ 13:11), Max: 98.3 (11-10-20 @ 13:11)  HR: 53 (11-10-20 @ 13:11) (52 - 57)  BP: 144/82 (11-10-20 @ 13:11) (118/73 - 144/82)  RR:  (17 - 17)  SpO2:  (99% - 100%)  Wt(kg): --    Ophthalmology Exam:  VA: HM OD, CF OS  Pupils: poor view 2/2 corneal changes, appears minimally reactive OU  Ttono: 12, 11  Extraocular movements (EOMs): Grossly appear full OU, no pain, no diplopia    Slit Lamp Exam:   External: Clear shield in place OD  Lids/Lashes/Lacrimal Ducts: Flat OU Inferior lids noted to be slightly ectropic OU, eyelids crusted shut with significant discharge  Sclera/Conjunctiva: 2+ injection OD 1+ injection OS  Cornea: Patch graft in place, well sutured, (1 loose suture appreciated) laura negative; epithelial defect improved today, measuring 4.5x3 mm (laura neg), with thinning temporal to Graft. Some punctate heme in graft, OD ; small punctate area of significant corneal thinning with about 10% of residual cornea, Laura with possible minimal leak, No signs of iNfection, ulcer OS  Anterior Chamber: D+F OU, No hypopyon appreciated OU, formed OS  Iris: poor view but appears Flat OU  Lens: poor view OU    Radiology:  mazin< from: MR Pelvis Bony Only w/wo IV Cont (11.10.20 @ 14:41) >  The prostate is. There is diffuse thickening of the bladder wall with trabeculation. Please see report from CT of the abdomen and pelvis performed 11/8/2020 for further evaluation of the findings in the imaged portion of the pelvis.    IMPRESSION: No suspicious osseous lesions are demonstrated.  Advanced right hip osteoarthritis and moderate left hip osteoarthritis. Lower lumbar spondylosis.  Right iliopsoas bursitis.    < end of copied text >  < from: MR Lumbar Spine w/ IV Cont (11.09.20 @ 23:42) >  IMPRESSION: Degenerative changes as described above.    < end of copied text >  < from: CT Abdomen and Pelvis w/ IV Cont (11.08.20 @ 15:56) >  BLADDER: Diffuse bladder wall thickening and trabeculations.  REPRODUCTIVE ORGANS: There is markedly enlarged with significant mass effect on the urinary bladder.    < end of copied text >  < from: CT Abdomen and Pelvis w/ IV Cont (11.08.20 @ 15:56) >  IMPRESSION:    Motion limited evaluation.    The prostate gland is markedly enlarged with significant mass effect on the urinary bladder. The urinary bladder has diffuse wall thickening and trabeculations. Correlate clinically for infection or other processes. Lytic lesions are noted within the right acetabulum and L5, consider malignant process.    < end of copied text >      Assessment and Plan:   · Assessment	  80y male w/ pmhx/ochx of  DM, HTN, HLD, chronic conjunctivitis and 60 lb weight loss with progressive corneal thinning of both eyes, now POD 3 s/p corneal patch graft of the right eye. Epi defect improved OD. Today with significant thinning OS with small punctate area of K thinning down to about 10%.    # corneal thinning of both eyes, now POD 4 s/p corneal patch graft of the right eye  - patch graft of right eye in place; epi defect improved  - significant thinning of cornea OS, at risk of imminent perforation, s/p corneal glue OS today done at bedside with Dr. Warren (cornea specialist)  - No Appearance of Infectious Infiltrate, Ulcer, Or hypopyon appreciated.  - moxifloxacin QID to both eyes and fortified Vancomycin QID to both eyes  - start PO prednisone, dosing as per primary team  - given recent weight loss and b/l nature of corneal pathology, malignancy workup to evaluate for underlying disease process.  - appreciate rheumatology consult given Recent history of Weight-loss, and Corneal Melting  - CT with vertebral lytic lesion - Enlarged Prostate and Bladder wall. 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 Warren.    Outpatient follow-up: Patient should follow-up with his/her ophthalmologist or with [facility] Department of Ophthalmology within 1 week of after discharge at:    [address]  [place] [address]  [phone]

## 2020-11-11 NOTE — CHART NOTE - NSCHARTNOTEFT_GEN_A_CORE
Patient has had extensive progressive corneal melting. Despite recent corneal patch graft OD, there is now an epithelial defect and thinning. Cultures have been negative. 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 Without identification of underlying medical issue and treatment for such, the patient is high risk of losing both eyes. Corneal melting has been rapid and very progressive. Ophthalmology will see the patient again today for conjunctival biopsy and for possible BCL placement. Recommend continuing systemic work-up for underlying malignancy.

## 2020-11-11 NOTE — CONSULT NOTE ADULT - SUBJECTIVE AND OBJECTIVE BOX
BARNEY ERICKSON  [id number]    HISTORY OF PRESENT ILLNESS: 80 year old male with PMH of DM, HTN, HLD, chronic conjunctivitis; presented from ophthalmologist's office (Dr. Warren) for surgery of the right eye. Per pt and pt's wife, pt started to notice vision loss of the right eye approximately one week ago, was diagnosed with a tc abrasion, went to go and see his ophthalmologist who recommended he come to the ED for admission for surgery for progressive corneal thinning.     PAST MEDICAL & SURGICAL HISTORY:  BPH (benign prostatic hyperplasia)    Hyperlipidemia    HTN (hypertension)    DM (diabetes mellitus)    No significant past surgical history        Review of Systems:  Gen:  No fevers/chills, weight loss  HEENT: No blurry vision, no difficulty swallowing  CVS: No chest pain/palpitations  Resp: No SOB/wheezing  GI: No N/V/C/D/abdominal pain  MSK:  Skin: No new rashes  Neuro: No headaches    MEDICATIONS  (STANDING):  atorvastatin 10 milliGRAM(s) Oral at bedtime  cyanocobalamin 1000 MICROGram(s) Oral daily  dextrose 5%. 1000 milliLiter(s) (50 mL/Hr) IV Continuous <Continuous>  dextrose 50% Injectable 12.5 Gram(s) IV Push once  dextrose 50% Injectable 25 Gram(s) IV Push once  dextrose 50% Injectable 25 Gram(s) IV Push once  dorzolamide 2%/timolol 0.5% Ophthalmic Solution 1 Drop(s) Left EYE two times a day  enalapril 5 milliGRAM(s) Oral daily  insulin lispro (ADMELOG) corrective regimen sliding scale   SubCutaneous three times a day before meals  insulin lispro (ADMELOG) corrective regimen sliding scale   SubCutaneous at bedtime  moxifloxacin 0.5% Solution 1 Drop(s) Both EYES <User Schedule>  polyethylene glycol 3350 17 Gram(s) Oral daily  senna 2 Tablet(s) Oral at bedtime  tamsulosin 0.4 milliGRAM(s) Oral at bedtime  vancomycin 25 mG/mL Fortified Ophthalmic 1 Drop(s) Both EYES four times a day    MEDICATIONS  (PRN):  acetaminophen   Tablet .. 650 milliGRAM(s) Oral every 6 hours PRN Mild Pain (1 - 3)  dextrose 40% Gel 15 Gram(s) Oral once PRN Blood Glucose LESS THAN 70 milliGRAM(s)/deciLiter  glucagon  Injectable 1 milliGRAM(s) IntraMuscular once PRN Glucose <70 milliGRAM(s)/deciLiter      Allergies    No Known Allergies    Intolerances        PERTINENT MEDICATION HISTORY:    SOCIAL HISTORY:  OCCUPATION:  TRAVEL HISTORY:    FAMILY HISTORY:  No pertinent family history in first degree relatives        Vital Signs Last 24 Hrs  T(C): 36.7 (2020 04:14), Max: 37.1 (10 Nov 2020 20:35)  T(F): 98.1 (2020 04:14), Max: 98.7 (10 Nov 2020 20:35)  HR: 78 (2020 04:14) (53 - 78)  BP: 139/76 (2020 04:14) (139/76 - 158/81)  BP(mean): --  RR: 17 (2020 04:14) (17 - 18)  SpO2: 96% (2020 04:14) (94% - 99%)    Physical Exam:  General: No apparent distress  HEENT: EOMI, MMM  CVS: +S1/S2, RRR, no murmurs/rubs/gallops  Resp: CTA b/l. No crackles/wheezing  GI: Soft, NT/ND +BS  MSK:  Shoulders: wnl  Elbows: wnl  Wrists: wnl  MCPs: wnl  PIPs: wnl  DIPs: wnl   Hips: wnl  Knees: wnl   Ankle: wnl  Neuro: AAOx3  Skin: no visible rashes    LABS:                        12.9   6.15  )-----------( 234      ( 2020 07:09 )             39.6         133<L>  |  96  |  11  ----------------------------<  131<H>  3.9   |  27  |  0.73    Ca    9.6      2020 07:08    TPro  6.7  /  Alb  x   /  TBili  x   /  DBili  x   /  AST  x   /  ALT  x   /  AlkPhos  x       PT/INR - ( 10 Nov 2020 08:13 )   PT: 13.2 sec;   INR: 1.12 ratio           Urinalysis Basic - ( 2020 09:15 )    Color: Yellow / Appearance: Turbid / S.016 / pH: x  Gluc: x / Ketone: Negative  / Bili: Negative / Urobili: <2 mg/dL   Blood: x / Protein: Negative / Nitrite: Negative   Leuk Esterase: Negative / RBC: x / WBC x   Sq Epi: x / Non Sq Epi: x / Bacteria: x        RADIOLOGY & ADDITIONAL STUDIES: BARNEY ERICKSON  [id number]    HISTORY OF PRESENT ILLNESS: 80 year old male with PMH of DM, HTN, HLD, chronic conjunctivitis; presented from ophthalmologist's office (Dr. Warren) for surgery of the right eye. Per pt and pt's wife, pt started to notice vision loss of the right eye approximately one week ago, was diagnosed with a tc abrasion, went to go and see his ophthalmologist who recommended he come to the ED for admission for surgery for progressive corneal thinning.     Patient was taken to OR on 20, had a patch corneal graft repair of right eye. Has been treated with moxifloxacin qid to both eyes, per ophtho. Also being treated for glaucoma. CT c/a/p performed to evaluate for malignancy given his dramatic weight loss. CT imaging was followed by MRI given suspected lytic lesions in R. acetabulum and L5 on CT. However, MRI L-spine and pelvis did not show suspicious osseous lesions.    PAST MEDICAL & SURGICAL HISTORY:  BPH (benign prostatic hyperplasia)    Hyperlipidemia    HTN (hypertension)    DM (diabetes mellitus)    No significant past surgical history        Review of Systems:  Gen:  No fevers/chills, weight loss  HEENT: No blurry vision, no difficulty swallowing  CVS: No chest pain/palpitations  Resp: No SOB/wheezing  GI: No N/V/C/D/abdominal pain  MSK:  Skin: No new rashes  Neuro: No headaches    MEDICATIONS  (STANDING):  atorvastatin 10 milliGRAM(s) Oral at bedtime  cyanocobalamin 1000 MICROGram(s) Oral daily  dextrose 5%. 1000 milliLiter(s) (50 mL/Hr) IV Continuous <Continuous>  dextrose 50% Injectable 12.5 Gram(s) IV Push once  dextrose 50% Injectable 25 Gram(s) IV Push once  dextrose 50% Injectable 25 Gram(s) IV Push once  dorzolamide 2%/timolol 0.5% Ophthalmic Solution 1 Drop(s) Left EYE two times a day  enalapril 5 milliGRAM(s) Oral daily  insulin lispro (ADMELOG) corrective regimen sliding scale   SubCutaneous three times a day before meals  insulin lispro (ADMELOG) corrective regimen sliding scale   SubCutaneous at bedtime  moxifloxacin 0.5% Solution 1 Drop(s) Both EYES <User Schedule>  polyethylene glycol 3350 17 Gram(s) Oral daily  senna 2 Tablet(s) Oral at bedtime  tamsulosin 0.4 milliGRAM(s) Oral at bedtime  vancomycin 25 mG/mL Fortified Ophthalmic 1 Drop(s) Both EYES four times a day    MEDICATIONS  (PRN):  acetaminophen   Tablet .. 650 milliGRAM(s) Oral every 6 hours PRN Mild Pain (1 - 3)  dextrose 40% Gel 15 Gram(s) Oral once PRN Blood Glucose LESS THAN 70 milliGRAM(s)/deciLiter  glucagon  Injectable 1 milliGRAM(s) IntraMuscular once PRN Glucose <70 milliGRAM(s)/deciLiter      Allergies    No Known Allergies    Intolerances        PERTINENT MEDICATION HISTORY:    SOCIAL HISTORY:  OCCUPATION:  TRAVEL HISTORY:    FAMILY HISTORY:  No pertinent family history in first degree relatives        Vital Signs Last 24 Hrs  T(C): 36.7 (2020 04:14), Max: 37.1 (10 Nov 2020 20:35)  T(F): 98.1 (2020 04:14), Max: 98.7 (10 Nov 2020 20:35)  HR: 78 (2020 04:14) (53 - 78)  BP: 139/76 (2020 04:14) (139/76 - 158/81)  BP(mean): --  RR: 17 (2020 04:14) (17 - 18)  SpO2: 96% (2020 04:14) (94% - 99%)    Physical Exam:  General: No apparent distress  HEENT: EOMI, MMM  CVS: +S1/S2, RRR, no murmurs/rubs/gallops  Resp: CTA b/l. No crackles/wheezing  GI: Soft, NT/ND +BS  MSK:  Shoulders: wnl  Elbows: wnl  Wrists: wnl  MCPs: wnl  PIPs: wnl  DIPs: wnl   Hips: wnl  Knees: wnl   Ankle: wnl  Neuro: AAOx3  Skin: no visible rashes    LABS:                        12.9   6.15  )-----------( 234      ( 2020 07:09 )             39.6         133<L>  |  96  |  11  ----------------------------<  131<H>  3.9   |  27  |  0.73    Ca    9.6      2020 07:08    TPro  6.7  /  Alb  x   /  TBili  x   /  DBili  x   /  AST  x   /  ALT  x   /  AlkPhos  x   11-11    PT/INR - ( 10 Nov 2020 08:13 )   PT: 13.2 sec;   INR: 1.12 ratio           Urinalysis Basic - ( 2020 09:15 )    Color: Yellow / Appearance: Turbid / S.016 / pH: x  Gluc: x / Ketone: Negative  / Bili: Negative / Urobili: <2 mg/dL   Blood: x / Protein: Negative / Nitrite: Negative   Leuk Esterase: Negative / RBC: x / WBC x   Sq Epi: x / Non Sq Epi: x / Bacteria: x        RADIOLOGY & ADDITIONAL STUDIES:    CT chest/abdomen/pelvis 20  CHEST:  LUNGS AND LARGE AIRWAYS: Patent central airways. No pulmonary nodules.  PLEURA: No pleural effusion.  VESSELS: Atherosclerotic changes of the aorta and coronary arteries.  HEART: Heart size is normal. No pericardial effusion. Coronary artery calcifications.  MEDIASTINUM AND PETRONA: No lymphadenopathy.  CHEST WALL AND LOWER NECK: Within normal limits.    ABDOMEN AND PELVIS:  LIVER: Numerous small hypodensities within the liver, too small to completely characterize.  BILE DUCTS: Normal caliber.  GALLBLADDER: Borderline gallbladder wall thickening.  SPLEEN: Within normal limits.  PANCREAS: Within normal limits.  ADRENALS: Mild nodularity of the left adrenal gland.  KIDNEYS/URETERS: 2 mm left renal midpole nonobstructing calcification.    BLADDER: Diffuse bladder wall thickening and trabeculations.  REPRODUCTIVE ORGANS: There is markedly enlarged with significant mass effect on the urinary bladder.    BOWEL: Moderate amount of stool throughout colon. No bowel obstruction. Appendix is not visualized. No evidence of inflammation in the pericecal region.  PERITONEUM: No ascites.  VESSELS: Atherosclerotic changes.  RETROPERITONEUM/LYMPH NODES: No lymphadenopathy.  ABDOMINAL WALL: Within normal limits.  BONES: Diffuse osteopenia which can limit evaluation. Diffuse degenerative changes. Mild thoracolumbar scoliosis.    IMPRESSION:    Motion limited evaluation.    The prostate gland is markedly enlarged with significant mass effect on the urinary bladder. The urinary bladder has diffuse wall thickening and trabeculations. Correlate clinically for infection or other processes. Lytic lesions are noted within the right acetabulum and L5, consider malignant process.    Moderate amount of stool throughout colon without proximal obstruction.        MRI Lumbar Spine 20  INTERPRETATION:  Clinical indication: Possible lytic lesion on CT scan.    MRI of the lumbar spine was performed using sagittal T1-T2 and T2-weighted sequence fat suppression. Sagittal STIR sequences performed as well. The patient was injected with approximately 6 cc of Gadavist IV with 1.5 cc of contrast discarded. Sagittal T1-weighted sequence performed fat suppression. Axial T1-weighted sequence was performed.    Mild loss of the normal lumbar lordosis is seen.    There is a grade 1 anterolisthesis seen involving L4 on L5.    Schmorl's nodes are seen throughout the lower thoracic lumbar spine region which is secondary to degenerative change    Disc desiccation is seen involving the T11-12, T12-L1, L1-2, L2-3 and L3-4 levels. These findings are secondary to chronic degenerative change    T12-L1: Bilateral hypertrophic facet changes are seen without significant, as of the spinal canal or either neural foramen.    L1-2: Bilateral hypertrophic facet changes seen. Moderate narrowing of both neural foramen    L2-3: Disc bulge and bilateral hypertrophic facet changes seen. Moderate narrowing of the spinal canal. Moderate to severe narrowing of both neural foramen    L3-4: Disc bulge and bilateral hypertrophic facet changes. Hypertrophic change is seen involving both ligamentum flavum flavum. Moderate narrowing of the spinal canal.    L4-5: Disc bulge and bilateral hypertrophic facet changes seen. Moderate narrowing of the right spinal canal is seen.    L5-S1: Disc bulge and bilateral hypertrophic facet changes are seen. No significant compromise of the spinal canal or either neural foramen.    The conus ends at L2 and appears normal    Evaluation of the paraspinal soft tissues appear normal.    No abnormal areas of enhancement seen.    IMPRESSION: Degenerative changes as described above.      MRI Pelvis 11/10/20  FINDINGS: There is no fracture or osteonecrosis. There is advanced right hip osteoarthritis subchondral cystic change at the anterosuperior aspect of the acetabulum. There is moderate left hip osteoarthritis with subchondral cystic change at the anterosuperior aspect of the left acetabulum. There are mild degenerative changes at the sacroiliac joints bilaterally. There is lower lumbar spondylosis. Please see report from dedicated MRI of the lumbar spine performed the previous day for further evaluation of the findings in the imaged portion of the lumbar spine. No suspicious enhancing osseous lesions are demonstrated. There are no acute tendon or muscle tears. There is a small to moderate amount fluid within the right iliopsoas bursa.    The prostate is. There is diffuse thickening of the bladder wall with trabeculation. Please see report from CT of the abdomen and pelvis performed 2020 for further evaluation of the findings in the imaged portion of the pelvis.    IMPRESSION: No suspicious osseous lesions are demonstrated.  Advanced right hip osteoarthritis and moderate left hip osteoarthritis. Lower lumbar spondylosis.  Right iliopsoas bursitis.     BARNEY ERICKSON  [id number]    HISTORY OF PRESENT ILLNESS: 80 year old male with PMH of DM, HTN, HLD, chronic conjunctivitis; presented from ophthalmologist's office (Dr. Warren) for surgery of the right eye. Per pt and pt's wife, pt started to notice vision loss of the right eye approximately one week ago, was diagnosed with a corneal abrasion, went to go and see his ophthalmologist who recommended he come to the ED for admission for surgery for progressive corneal thinning.     Patient was taken to OR on 20, had a patch corneal graft repair of right eye. Has been treated with moxifloxacin qid to both eyes, per ophtho. Also being treated for glaucoma. CT c/a/p performed to evaluate for malignancy given his dramatic weight loss. CT imaging was followed by MRI given suspected lytic lesions in R. acetabulum and L5 on CT. However, MRI L-spine and pelvis did not show suspicious osseous lesions.    Patient denies joint pains, rashes, oral ulcers, skin tightening/thickening, muscle weakness, dysphagia, or personal or family history of autoimmune disease.     PAST MEDICAL & SURGICAL HISTORY:  BPH (benign prostatic hyperplasia)    Hyperlipidemia    HTN (hypertension)    DM (diabetes mellitus)    No significant past surgical history        Review of Systems:  Gen:  No fevers/chills, + weight loss as per HPI  HEENT: +Vision loss b/l, but no eye pain, no difficulty swallowing  CVS: No chest pain/palpitations  Resp: No SOB/wheezing  GI: No N/V/C/D/abdominal pain  MSK: No joint pain or swelling; no muscle pain or weakness.   Skin: No new rashes  Neuro: No headaches    MEDICATIONS  (STANDING):  atorvastatin 10 milliGRAM(s) Oral at bedtime  cyanocobalamin 1000 MICROGram(s) Oral daily  dextrose 5%. 1000 milliLiter(s) (50 mL/Hr) IV Continuous <Continuous>  dextrose 50% Injectable 12.5 Gram(s) IV Push once  dextrose 50% Injectable 25 Gram(s) IV Push once  dextrose 50% Injectable 25 Gram(s) IV Push once  dorzolamide 2%/timolol 0.5% Ophthalmic Solution 1 Drop(s) Left EYE two times a day  enalapril 5 milliGRAM(s) Oral daily  insulin lispro (ADMELOG) corrective regimen sliding scale   SubCutaneous three times a day before meals  insulin lispro (ADMELOG) corrective regimen sliding scale   SubCutaneous at bedtime  moxifloxacin 0.5% Solution 1 Drop(s) Both EYES <User Schedule>  polyethylene glycol 3350 17 Gram(s) Oral daily  senna 2 Tablet(s) Oral at bedtime  tamsulosin 0.4 milliGRAM(s) Oral at bedtime  vancomycin 25 mG/mL Fortified Ophthalmic 1 Drop(s) Both EYES four times a day    MEDICATIONS  (PRN):  acetaminophen   Tablet .. 650 milliGRAM(s) Oral every 6 hours PRN Mild Pain (1 - 3)  dextrose 40% Gel 15 Gram(s) Oral once PRN Blood Glucose LESS THAN 70 milliGRAM(s)/deciLiter  glucagon  Injectable 1 milliGRAM(s) IntraMuscular once PRN Glucose <70 milliGRAM(s)/deciLiter      Allergies    No Known Allergies    Intolerances        PERTINENT MEDICATION HISTORY:    SOCIAL HISTORY: No tobacco, alcohol or drug use.   OCCUPATION:  TRAVEL HISTORY: Had traveled to [place] to see his brother within the past year.     FAMILY HISTORY:  No pertinent family history in first degree relatives        Vital Signs Last 24 Hrs  T(C): 36.7 (2020 04:14), Max: 37.1 (10 Nov 2020 20:35)  T(F): 98.1 (2020 04:14), Max: 98.7 (10 Nov 2020 20:35)  HR: 78 (2020 04:14) (53 - 78)  BP: 139/76 (2020 04:14) (139/76 - 158/81)  BP(mean): --  RR: 17 (2020 04:14) (17 - 18)  SpO2: 96% (2020 04:14) (94% - 99%)    Physical Exam:  General: No apparent distress, sitting up in chair.   HEENT: R. eye covered in patch; left eye unable to open independently- has to open with fingers- discharge noted from left eye. Unable to assess extraocular muscles.   CVS: +S1/S2, RRR, no murmurs/rubs/gallops  Resp: CTA b/l. No crackles/wheezing  GI: Soft, NT/ND +BS  MSK: no joint synovitis and normal ROM in all joints.   Shoulders: wnl  Elbows: wnl  Wrists: wnl  MCPs: wnl  PIPs: wnl  DIPs: wnl   Hips: wnl  Knees: wnl   Ankle: wnl  Neuro: AAOx3  Skin: no visible rashes    LABS:                        12.9   6.15  )-----------( 234      ( 2020 07:09 )             39.6         133<L>  |  96  |  11  ----------------------------<  131<H>  3.9   |  27  |  0.73    Ca    9.6      2020 07:08    TPro  6.7  /  Alb  x   /  TBili  x   /  DBili  x   /  AST  x   /  ALT  x   /  AlkPhos  x       PT/INR - ( 10 Nov 2020 08:13 )   PT: 13.2 sec;   INR: 1.12 ratio           Urinalysis Basic - ( 2020 09:15 )    Color: Yellow / Appearance: Turbid / S.016 / pH: x  Gluc: x / Ketone: Negative  / Bili: Negative / Urobili: <2 mg/dL   Blood: x / Protein: Negative / Nitrite: Negative   Leuk Esterase: Negative / RBC: x / WBC x   Sq Epi: x / Non Sq Epi: x / Bacteria: x        RADIOLOGY & ADDITIONAL STUDIES:    CT chest/abdomen/pelvis 20  CHEST:  LUNGS AND LARGE AIRWAYS: Patent central airways. No pulmonary nodules.  PLEURA: No pleural effusion.  VESSELS: Atherosclerotic changes of the aorta and coronary arteries.  HEART: Heart size is normal. No pericardial effusion. Coronary artery calcifications.  MEDIASTINUM AND PETRONA: No lymphadenopathy.  CHEST WALL AND LOWER NECK: Within normal limits.    ABDOMEN AND PELVIS:  LIVER: Numerous small hypodensities within the liver, too small to completely characterize.  BILE DUCTS: Normal caliber.  GALLBLADDER: Borderline gallbladder wall thickening.  SPLEEN: Within normal limits.  PANCREAS: Within normal limits.  ADRENALS: Mild nodularity of the left adrenal gland.  KIDNEYS/URETERS: 2 mm left renal midpole nonobstructing calcification.    BLADDER: Diffuse bladder wall thickening and trabeculations.  REPRODUCTIVE ORGANS: There is markedly enlarged with significant mass effect on the urinary bladder.    BOWEL: Moderate amount of stool throughout colon. No bowel obstruction. Appendix is not visualized. No evidence of inflammation in the pericecal region.  PERITONEUM: No ascites.  VESSELS: Atherosclerotic changes.  RETROPERITONEUM/LYMPH NODES: No lymphadenopathy.  ABDOMINAL WALL: Within normal limits.  BONES: Diffuse osteopenia which can limit evaluation. Diffuse degenerative changes. Mild thoracolumbar scoliosis.    IMPRESSION:    Motion limited evaluation.    The prostate gland is markedly enlarged with significant mass effect on the urinary bladder. The urinary bladder has diffuse wall thickening and trabeculations. Correlate clinically for infection or other processes. Lytic lesions are noted within the right acetabulum and L5, consider malignant process.    Moderate amount of stool throughout colon without proximal obstruction.        MRI Lumbar Spine 20  INTERPRETATION:  Clinical indication: Possible lytic lesion on CT scan.    MRI of the lumbar spine was performed using sagittal T1-T2 and T2-weighted sequence fat suppression. Sagittal STIR sequences performed as well. The patient was injected with approximately 6 cc of Gadavist IV with 1.5 cc of contrast discarded. Sagittal T1-weighted sequence performed fat suppression. Axial T1-weighted sequence was performed.    Mild loss of the normal lumbar lordosis is seen.    There is a grade 1 anterolisthesis seen involving L4 on L5.    Schmorl's nodes are seen throughout the lower thoracic lumbar spine region which is secondary to degenerative change    Disc desiccation is seen involving the T11-12, T12-L1, L1-2, L2-3 and L3-4 levels. These findings are secondary to chronic degenerative change    T12-L1: Bilateral hypertrophic facet changes are seen without significant, as of the spinal canal or either neural foramen.    L1-2: Bilateral hypertrophic facet changes seen. Moderate narrowing of both neural foramen    L2-3: Disc bulge and bilateral hypertrophic facet changes seen. Moderate narrowing of the spinal canal. Moderate to severe narrowing of both neural foramen    L3-4: Disc bulge and bilateral hypertrophic facet changes. Hypertrophic change is seen involving both ligamentum flavum flavum. Moderate narrowing of the spinal canal.    L4-5: Disc bulge and bilateral hypertrophic facet changes seen. Moderate narrowing of the right spinal canal is seen.    L5-S1: Disc bulge and bilateral hypertrophic facet changes are seen. No significant compromise of the spinal canal or either neural foramen.    The conus ends at L2 and appears normal    Evaluation of the paraspinal soft tissues appear normal.    No abnormal areas of enhancement seen.    IMPRESSION: Degenerative changes as described above.      MRI Pelvis 11/10/20  FINDINGS: There is no fracture or osteonecrosis. There is advanced right hip osteoarthritis subchondral cystic change at the anterosuperior aspect of the acetabulum. There is moderate left hip osteoarthritis with subchondral cystic change at the anterosuperior aspect of the left acetabulum. There are mild degenerative changes at the sacroiliac joints bilaterally. There is lower lumbar spondylosis. Please see report from dedicated MRI of the lumbar spine performed the previous day for further evaluation of the findings in the imaged portion of the lumbar spine. No suspicious enhancing osseous lesions are demonstrated. There are no acute tendon or muscle tears. There is a small to moderate amount fluid within the right iliopsoas bursa.    The prostate is. There is diffuse thickening of the bladder wall with trabeculation. Please see report from CT of the abdomen and pelvis performed 2020 for further evaluation of the findings in the imaged portion of the pelvis.    IMPRESSION: No suspicious osseous lesions are demonstrated.  Advanced right hip osteoarthritis and moderate left hip osteoarthritis. Lower lumbar spondylosis.  Right iliopsoas bursitis.     BARNEY ERICKSON  [id number]    HISTORY OF PRESENT ILLNESS: 80 year old male with PMH of DM, HTN, HLD, chronic conjunctivitis; presented from ophthalmologist's office (Dr. Warren) for surgery of the right eye. Per pt and pt's wife, pt started to notice vision loss of the right eye approximately one week ago, was diagnosed with a corneal abrasion, went to go and see his ophthalmologist who recommended he come to the ED for admission for surgery for progressive corneal thinning.     Patient was taken to OR on 20, had a patch corneal graft repair of right eye. Has been treated with moxifloxacin qid to both eyes, per ophtho. Also being treated for glaucoma. CT c/a/p performed to evaluate for malignancy given his dramatic weight loss. CT imaging was followed by MRI given suspected lytic lesions in R. acetabulum and L5 on CT. However, MRI L-spine and pelvis did not show suspicious osseous lesions.    Patient denies joint pains, rashes, oral ulcers, skin tightening/thickening, muscle weakness, dysphagia, or personal or family history of autoimmune disease.     PAST MEDICAL & SURGICAL HISTORY:  BPH (benign prostatic hyperplasia)    Hyperlipidemia    HTN (hypertension)    DM (diabetes mellitus)    No significant past surgical history        Review of Systems:  Gen:  No fevers/chills, + weight loss as per HPI  HEENT: +Vision loss b/l, but no eye pain, no difficulty swallowing  CVS: No chest pain/palpitations  Resp: No SOB/wheezing  GI: No N/V/C/D/abdominal pain  MSK: No joint pain or swelling; no muscle pain or weakness.   Skin: No new rashes  Neuro: No headaches    MEDICATIONS  (STANDING):  atorvastatin 10 milliGRAM(s) Oral at bedtime  cyanocobalamin 1000 MICROGram(s) Oral daily  dextrose 5%. 1000 milliLiter(s) (50 mL/Hr) IV Continuous <Continuous>  dextrose 50% Injectable 12.5 Gram(s) IV Push once  dextrose 50% Injectable 25 Gram(s) IV Push once  dextrose 50% Injectable 25 Gram(s) IV Push once  dorzolamide 2%/timolol 0.5% Ophthalmic Solution 1 Drop(s) Left EYE two times a day  enalapril 5 milliGRAM(s) Oral daily  insulin lispro (ADMELOG) corrective regimen sliding scale   SubCutaneous three times a day before meals  insulin lispro (ADMELOG) corrective regimen sliding scale   SubCutaneous at bedtime  moxifloxacin 0.5% Solution 1 Drop(s) Both EYES <User Schedule>  polyethylene glycol 3350 17 Gram(s) Oral daily  senna 2 Tablet(s) Oral at bedtime  tamsulosin 0.4 milliGRAM(s) Oral at bedtime  vancomycin 25 mG/mL Fortified Ophthalmic 1 Drop(s) Both EYES four times a day    MEDICATIONS  (PRN):  acetaminophen   Tablet .. 650 milliGRAM(s) Oral every 6 hours PRN Mild Pain (1 - 3)  dextrose 40% Gel 15 Gram(s) Oral once PRN Blood Glucose LESS THAN 70 milliGRAM(s)/deciLiter  glucagon  Injectable 1 milliGRAM(s) IntraMuscular once PRN Glucose <70 milliGRAM(s)/deciLiter      Allergies    No Known Allergies    Intolerances        PERTINENT MEDICATION HISTORY:    SOCIAL HISTORY: No tobacco, alcohol or drug use.   OCCUPATION:  TRAVEL HISTORY: Had traveled to [place] to see his brother within the past year.     FAMILY HISTORY:  No pertinent family history in first degree relatives        Vital Signs Last 24 Hrs  T(C): 36.7 (2020 04:14), Max: 37.1 (10 Nov 2020 20:35)  T(F): 98.1 (2020 04:14), Max: 98.7 (10 Nov 2020 20:35)  HR: 78 (2020 04:14) (53 - 78)  BP: 139/76 (2020 04:14) (139/76 - 158/81)  BP(mean): --  RR: 17 (2020 04:14) (17 - 18)  SpO2: 96% (2020 04:14) (94% - 99%)    Physical Exam:  General: No apparent distress, sitting up in chair.   HEENT: R. eye covered in patch; left eye unable to open independently- has to open with fingers- discharge noted from left eye. Unable to assess extraocular muscles.   CVS: +S1/S2, RRR, no murmurs/rubs/gallops  Resp: CTA b/l. No crackles/wheezing  GI: Soft, NT/ND +BS  MSK: no joint synovitis and normal ROM in all joints.   Shoulders: wnl  Elbows: wnl  Wrists: wnl  MCPs: wnl  PIPs: wnl  DIPs: wnl   Hips: wnl  Knees: wnl   Ankle: wnl  Neuro: AAOx3  Skin: no visible rashes    LABS:                        12.9   6.15  )-----------( 234      ( 2020 07:09 )             39.6         133<L>  |  96  |  11  ----------------------------<  131<H>  3.9   |  27  |  0.73    Ca    9.6      2020 07:08    TPro  6.7  /  Alb  x   /  TBili  x   /  DBili  x   /  AST  x   /  ALT  x   /  AlkPhos  x       PT/INR - ( 10 Nov 2020 08:13 )   PT: 13.2 sec;   INR: 1.12 ratio      Urinalysis Basic - ( 2020 09:15 )    Color: Yellow / Appearance: Turbid / S.016 / pH: x  Gluc: x / Ketone: Negative  / Bili: Negative / Urobili: <2 mg/dL   Blood: x / Protein: Negative / Nitrite: Negative   Leuk Esterase: Negative / RBC: x / WBC x   Sq Epi: x / Non Sq Epi: x / Bacteria: x    RF neg, CCP borderline positive (26); ANCA neg; SSA, SSB neg; ESR 21 (normal for age), CRP <0.06    RADIOLOGY & ADDITIONAL STUDIES:    CT chest/abdomen/pelvis 20  CHEST:  LUNGS AND LARGE AIRWAYS: Patent central airways. No pulmonary nodules.  PLEURA: No pleural effusion.  VESSELS: Atherosclerotic changes of the aorta and coronary arteries.  HEART: Heart size is normal. No pericardial effusion. Coronary artery calcifications.  MEDIASTINUM AND PETRONA: No lymphadenopathy.  CHEST WALL AND LOWER NECK: Within normal limits.    ABDOMEN AND PELVIS:  LIVER: Numerous small hypodensities within the liver, too small to completely characterize.  BILE DUCTS: Normal caliber.  GALLBLADDER: Borderline gallbladder wall thickening.  SPLEEN: Within normal limits.  PANCREAS: Within normal limits.  ADRENALS: Mild nodularity of the left adrenal gland.  KIDNEYS/URETERS: 2 mm left renal midpole nonobstructing calcification.    BLADDER: Diffuse bladder wall thickening and trabeculations.  REPRODUCTIVE ORGANS: There is markedly enlarged with significant mass effect on the urinary bladder.    BOWEL: Moderate amount of stool throughout colon. No bowel obstruction. Appendix is not visualized. No evidence of inflammation in the pericecal region.  PERITONEUM: No ascites.  VESSELS: Atherosclerotic changes.  RETROPERITONEUM/LYMPH NODES: No lymphadenopathy.  ABDOMINAL WALL: Within normal limits.  BONES: Diffuse osteopenia which can limit evaluation. Diffuse degenerative changes. Mild thoracolumbar scoliosis.    IMPRESSION:    Motion limited evaluation.    The prostate gland is markedly enlarged with significant mass effect on the urinary bladder. The urinary bladder has diffuse wall thickening and trabeculations. Correlate clinically for infection or other processes. Lytic lesions are noted within the right acetabulum and L5, consider malignant process.    Moderate amount of stool throughout colon without proximal obstruction.        MRI Lumbar Spine 20  INTERPRETATION:  Clinical indication: Possible lytic lesion on CT scan.    MRI of the lumbar spine was performed using sagittal T1-T2 and T2-weighted sequence fat suppression. Sagittal STIR sequences performed as well. The patient was injected with approximately 6 cc of Gadavist IV with 1.5 cc of contrast discarded. Sagittal T1-weighted sequence performed fat suppression. Axial T1-weighted sequence was performed.    Mild loss of the normal lumbar lordosis is seen.    There is a grade 1 anterolisthesis seen involving L4 on L5.    Schmorl's nodes are seen throughout the lower thoracic lumbar spine region which is secondary to degenerative change    Disc desiccation is seen involving the T11-12, T12-L1, L1-2, L2-3 and L3-4 levels. These findings are secondary to chronic degenerative change    T12-L1: Bilateral hypertrophic facet changes are seen without significant, as of the spinal canal or either neural foramen.    L1-2: Bilateral hypertrophic facet changes seen. Moderate narrowing of both neural foramen    L2-3: Disc bulge and bilateral hypertrophic facet changes seen. Moderate narrowing of the spinal canal. Moderate to severe narrowing of both neural foramen    L3-4: Disc bulge and bilateral hypertrophic facet changes. Hypertrophic change is seen involving both ligamentum flavum flavum. Moderate narrowing of the spinal canal.    L4-5: Disc bulge and bilateral hypertrophic facet changes seen. Moderate narrowing of the right spinal canal is seen.    L5-S1: Disc bulge and bilateral hypertrophic facet changes are seen. No significant compromise of the spinal canal or either neural foramen.    The conus ends at L2 and appears normal    Evaluation of the paraspinal soft tissues appear normal.    No abnormal areas of enhancement seen.    IMPRESSION: Degenerative changes as described above.      MRI Pelvis 11/10/20  FINDINGS: There is no fracture or osteonecrosis. There is advanced right hip osteoarthritis subchondral cystic change at the anterosuperior aspect of the acetabulum. There is moderate left hip osteoarthritis with subchondral cystic change at the anterosuperior aspect of the left acetabulum. There are mild degenerative changes at the sacroiliac joints bilaterally. There is lower lumbar spondylosis. Please see report from dedicated MRI of the lumbar spine performed the previous day for further evaluation of the findings in the imaged portion of the lumbar spine. No suspicious enhancing osseous lesions are demonstrated. There are no acute tendon or muscle tears. There is a small to moderate amount fluid within the right iliopsoas bursa.    The prostate is. There is diffuse thickening of the bladder wall with trabeculation. Please see report from CT of the abdomen and pelvis performed 2020 for further evaluation of the findings in the imaged portion of the pelvis.    IMPRESSION: No suspicious osseous lesions are demonstrated.  Advanced right hip osteoarthritis and moderate left hip osteoarthritis. Lower lumbar spondylosis.  Right iliopsoas bursitis.

## 2020-11-11 NOTE — CONSULT NOTE ADULT - ASSESSMENT
80M with DM2, HTN, HLD, chronic conjunctivitis with recent steroid eye drop use, admitted from ophthalmologist office for R. eye patch corneal graft repair due to progressive corneal thinning. Patient with both eyes affected, R>L. Rheumatology called to evaluate for autoimmune cause of corneal melt.     Impression:  Progressive corneal thinning- ddx includes autoimmune vs. malignant (given 60 lb weight loss in past year) vs. infectious. Differential dx can include RA, AnCA associated vasculitis, SLE, sarcoidosis, PAN, Behcets, relapsing polychondritis. Infectious etiologies can include HSV, TB, syphilis, and lyme.     Plan:  - pending serologies. f/u THADDEUS, dsDNA, C3, C4, DOMO to evaluate for SLE.  - f/u ACE and Vitamin D levels (25-OH and 1,25-OH) to eval for sarcoidosis.   - f/u full hepatitis panel, syphilis and lyme serologies.  - f/u quantiferon gold     *** INCOMPLETE NOTE- TO BE DISCUSSED WITH ATTENDING ***      Sue Donovan MD PGY4  Rheumatology Fellow  Pager [phone] 80M with DM2, HTN, HLD, chronic conjunctivitis with recent steroid eye drop use, admitted from ophthalmologist office for R. eye patch corneal graft repair due to progressive corneal thinning. Patient with both eyes affected, R>L. Rheumatology called to evaluate for autoimmune cause of corneal melt.     Impression:  Progressive corneal thinning- ddx includes autoimmune vs. malignant (given 60 lb weight loss in past year) vs. infectious. Differential dx can include RA, ANCA associated vasculitis, SLE, sarcoidosis (which can cause scleritis), PAN, Behcets (less likely given lack of recurrent oral/genital ulcerations), relapsing polychondritis (although less likely given no hx of ear or nose inflammation). Infectious etiologies can include HSV, TB, syphilis, and lyme.     Plan:  - pending serologies. f/u THADDEUS, dsDNA, C3, C4, DOMO to evaluate for SLE.  - f/u ACE and Vitamin D levels (25-OH and 1,25-OH) to eval for sarcoidosis.   - f/u full hepatitis panel, syphilis and lyme serologies.  - f/u quantiferon gold   - f/u conjunctival biopsy planned by ophthalmology team.   - will not recommend additional therapies such as systemic steroids until labs have resulted.     Discussed with attending, Dr. Marco Donovan MD PGY4  Rheumatology Fellow  Pager [phone]

## 2020-11-11 NOTE — PROGRESS NOTE ADULT - SUBJECTIVE AND OBJECTIVE BOX
UROLOGY Progress Note  BARNEY ERICKSON    S: Patient seen at bedside w attending, Dr. Holloway.   Currently denies any voiding symptoms, no hematuria/dysuria/frequency.      O:  T(C): 36.7 (11-11-20 @ 11:52), Max: 37.1 (11-10-20 @ 20:35)  HR: 72 (11-11-20 @ 15:24) (65 - 78)  BP: 146/82 (11-11-20 @ 15:24) (139/76 - 158/81)  RR: 18 (11-11-20 @ 15:24) (17 - 18)  SpO2: 99% (11-11-20 @ 15:24) (94% - 99%)      Gen: No Acute Distress, awake and alert. A&O x 2-3. Thin  Pulm: No respiratory distress  Abd: soft, nontender, nondistended  Back: No flank or CVAT bilaterally. No tenderness of spinous processes from C-spine down to sacrum.   : no suprapubic pain. Uncircumcised penis. No urethral discharge at meatus. Penis nontender to palpation. Testicles descended bilaterally and nontender. Vas deferens palpable bilaterally. No scrotal edema or skin changes.   RONNIE: Good sphincter tone. Grossly enlarged prostate with firmness. Unable to palpate any nodularities. Prostate nontender to palpation.     Labs:  CBC (11-11 @ 07:09)                              12.9<L>                         6.15    )----------------(  234        83.6<H>% Neutrophils, 8.6<L>% Lymphocytes, ANC: 5.14                                39.6                  BMP (11-11 @ 07:08)             133<L>  |  96      |  11    		Ca++ --      Ca 9.6                ---------------------------------( 131<H>		Mg --                 3.9     |  27      |  0.73  			Ph --        LFTs (11-11 @ 09:51)      TPro 6.7 / Alb -- / TBili -- / DBili -- / AST -- / ALT -- / AlkPhos --  LFTs (11-11 @ 07:08)      TPro 7.2 / Alb 4.1 / TBili 0.5 / DBili -- / AST 17 / ALT 20 / AlkPhos 58

## 2020-11-11 NOTE — PROGRESS NOTE ADULT - PROBLEM SELECTOR PLAN 2
Reported 60lbs weight loss over the past year with poor PO intake and constipation. severe malnutrition  encourage po, protein supplements  CT chest/abd/pelvis with contrast for malignancy work up - lytic lesions hip and l5  MRI lumbar and pelvis did not see osseous lesions  prostate enlarged-bladder thickening likely BPH, PSA normal, pt without urinary symptoms  Last colonoscopy about 5 years ago per wife was within normal.   GI consult, clear liquid diet for now, to dw patient and wife re colonoscopy

## 2020-11-11 NOTE — CONSULT NOTE ADULT - SUBJECTIVE AND OBJECTIVE BOX
[facility], Division of Infectious Diseases  YULI Arroyo, ELAINE Sherman  [phone]    ERICKSONBARNEY BUSTOS  80y, Male  [id number]    HPI--  HPI:  Pt is a 80M w/ PMHx of DM, HTN, HLD, chronic conjunctivitis; presented from ophthalmologist's office (Dr. Warren) for surgery of the right eye. Per pt and pt's wife, pt started to notice vision loss of the right eye approximately one week ago, was diagnosed with a tc abrasion, went to go and see his ophthalmologist today who recommended he come to the ED for admission for surgery for progressive corneal thinning. Pt has + right sided eye discharge, eye covered with shield, denies headaches, dizziness, hearing changes, head/sinus pain, no fevers, chills, nausea, vomiting.  Has had unintentional progressive weight loss with about 60 lbs over the past year with poor PO intake. Denies any joint pains, or CP. Has intermittent SOB. Reports constipation.   He denies history of MI, CAD, or stroke. He has limited functional status at baseline.   Reports last colonoscopy was about 5 years ago and was normal.     Admitted on 2020.  Patient was taken to OR on 20, had a patch corneal graft repair of right eye. Has been treated with moxifloxacin qid to both eyes, per ophtho. Also being treated for glaucoma. CT c/a/p performed to evaluate for malignancy given his dramatic weight loss. CT imaging was followed by MRI given suspected lytic lesions in R. acetabulum and L5 on CT. However, MRI L-spine and pelvis did not show suspicious osseous lesions.    ID called for possible infectious etiology of corneal thinning.  Pt seen and examined at bedside.   Pt denies any travel hx to suggest exposure to zoonotic infections or endemic mycoses    Active Medications--  acetaminophen   Tablet .. 650 milliGRAM(s) Oral every 6 hours PRN  atorvastatin 10 milliGRAM(s) Oral at bedtime  cyanocobalamin 1000 MICROGram(s) Oral daily  dextrose 40% Gel 15 Gram(s) Oral once PRN  dextrose 5%. 1000 milliLiter(s) IV Continuous <Continuous>  dextrose 50% Injectable 12.5 Gram(s) IV Push once  dextrose 50% Injectable 25 Gram(s) IV Push once  dextrose 50% Injectable 25 Gram(s) IV Push once  dorzolamide 2%/timolol 0.5% Ophthalmic Solution 1 Drop(s) Left EYE two times a day  enalapril 5 milliGRAM(s) Oral daily  glucagon  Injectable 1 milliGRAM(s) IntraMuscular once PRN  insulin lispro (ADMELOG) corrective regimen sliding scale   SubCutaneous three times a day before meals  insulin lispro (ADMELOG) corrective regimen sliding scale   SubCutaneous at bedtime  moxifloxacin 0.5% Solution 1 Drop(s) Both EYES <User Schedule>  polyethylene glycol 3350 17 Gram(s) Oral daily  senna 2 Tablet(s) Oral at bedtime  tamsulosin 0.4 milliGRAM(s) Oral at bedtime  vancomycin 25 mG/mL Fortified Ophthalmic 1 Drop(s) Both EYES four times a day    Antimicrobials:   vancomycin 25 mG/mL Fortified Ophthalmic 1 Drop(s) Both EYES four times a day    Immunologic:     ROS:  CONSTITUTIONAL: No fevers or chills. No weakness or headache. No weight changes.  EYES/ENT: No visual or hearing changes. No sore throat or throat pain .  NECK: No pain or stiffness  RESPIRATORY: No cough, wheezing, or hemoptysis. No shortness of breath  CARDIOVASCULAR: No chest pain or palpitations  GASTROINTESTINAL: No abdominal pain. No nausea or vomiting. No diarrhea or constipation.  GENITOURINARY: No dysuria, frequency or hematuria  NEUROLOGICAL: No numbness or weakness  SKIN: No itching or rashes  PSYCHIATRIC: Pleasant. Appropriate affect    Allergies: No Known Allergies    PMH -- BPH (benign prostatic hyperplasia)    Hyperlipidemia    HTN (hypertension)    DM (diabetes mellitus)    No pertinent past medical history      PSH -- No significant past surgical history      FH -- No pertinent family history in first degree relatives      Social History --  EtOH: denies   Tobacco: denies   Drug Use: denies     Travel/Environmental/Occupational History:      Physical Exam--  Vital Signs Last 24 Hrs  T(F): 98.1 (2020 04:14), Max: 98.7 (10 Nov 2020 20:35)  HR: 78 (2020 04:14) (53 - 78)  BP: 139/76 (2020 04:14) (139/76 - 158/81)  RR: 17 (2020 04:14) (17 - 18)  SpO2: 96% (2020 04:14) (94% - 99%)  General: nontoxic-appearing, no acute distress  HEENT: NC/AT, EOMI, anicteric, conjunctiva pink and moist, oropharynx clear, dentition fair  Neck: Not rigid. No sense of mass. No LAD  Lungs: Clear bilaterally without rales, wheezing or rhonchi  Heart: Regular rate and rhythm. No murmur, rub or gallop.  Abdomen: Soft. Nondistended. Nontender. Bowel sounds present. No organomegaly.  Back: No spinal tenderness. No costovertebral angle tenderness.  Extremities: No cyanosis or clubbing. No edema.   Skin: Warm. Dry. Good turgor. No rash. No vasculitic stigmata.  Psychiatric: Appropriate affect and mood for situation.     Laboratory & Imaging Data--  CBC:                       12.9   6.15  )-----------( 234      ( 2020 07:09 )             39.6     CMP:     133<L>  |  96  |  11  ----------------------------<  131<H>  3.9   |  27  |  0.73    Ca    9.6      2020 07:08    TPro  6.7  /  Alb  x   /  TBili  x   /  DBili  x   /  AST  x   /  ALT  x   /  AlkPhos  x       LIVER FUNCTIONS - ( 2020 09:51 )  Alb: x     / Pro: 6.7 g/dL / ALK PHOS: x     / ALT: x     / AST: x     / GGT: x           Urinalysis Basic - ( 2020 09:15 )    Color: Yellow / Appearance: Turbid / S.016 / pH: x  Gluc: x / Ketone: Negative  / Bili: Negative / Urobili: <2 mg/dL   Blood: x / Protein: Negative / Nitrite: Negative   Leuk Esterase: Negative / RBC: x / WBC x   Sq Epi: x / Non Sq Epi: x / Bacteria: x      Microbiology:     Radiology:  < from: MR Pelvis Bony Only w/wo IV Cont (11.10.20 @ 14:41) >    EXAM:  MR PELVIS BONY ONLY WAW IC                            PROCEDURE DATE:  11/10/2020            INTERPRETATION:  EXAMINATION: MRI of the bony pelvis with and without contrast    CLINICAL INFORMATION: Weight loss. Lytic lesions on CT    TECHNIQUE: Multiplanar, multisequential MR imaging was performed. This includes T1-weighted images after the administration of 6 mL of intravenous gadolinium. 1.5 mL were discarded.    FINDINGS: There is no fracture or osteonecrosis. There is advanced right hip osteoarthritis subchondral cystic change at the anterosuperior aspect of the acetabulum. There is moderate left hip osteoarthritis with subchondral cystic change at the anterosuperior aspect of the left acetabulum. There are mild degenerative changes at the sacroiliac joints bilaterally. There is lower lumbar spondylosis. Please see report from dedicated MRI of the lumbar spine performed the previous day for further evaluation of the findings in the imaged portion of the lumbar spine. No suspicious enhancing osseous lesions are demonstrated. There are no acute tendon or muscle tears. There is a small to moderate amount fluid within the right iliopsoas bursa.    The prostate is. There is diffuse thickening of the bladder wall with trabeculation. Please see report from CT of the abdomen and pelvis performed 2020 for further evaluation of the findings in the imaged portion of the pelvis.    IMPRESSION: No suspicious osseous lesions are demonstrated.  Advanced right hip osteoarthritis and moderate left hip osteoarthritis. Lower lumbar spondylosis.  Right iliopsoas bursitis.                LIGIA BARBOSA MD; Attending Radiologist  This document has been electronically signed. Nov 10 2020  2:54PM    < end of copied text >  < from: MR Lumbar Spine w/ IV Cont (20 @ 23:42) >    EXAM:  MR SPINE LUMBAR IC                            PROCEDURE DATE:  2020            INTERPRETATION:  Clinical indication: Possible lytic lesion on CT scan.    MRI of the lumbar spine was performed using sagittal T1-T2 and T2-weighted sequence fat suppression. Sagittal STIR sequences performed as well. The patient was injected with approximately 6 cc of Gadavist IV with 1.5 cc of contrast discarded. Sagittal T1-weighted sequence performed fat suppression. Axial T1-weighted sequence was performed.    Mild loss of the normal lumbar lordosis is seen.    There is a grade 1 anterolisthesis seen involving L4 on L5.    Schmorl's nodes are seen throughout the lower thoracic lumbar spine region which is secondary to degenerative change    Disc desiccation is seen involving the T11-12, T12-L1, L1-2, L2-3 and L3-4 levels. These findings are secondary to chronic degenerative change    T12-L1: Bilateral hypertrophic facet changes are seen without significant, as of the spinal canal or either neural foramen.    L1-2: Bilateral hypertrophic facet changes seen. Moderate narrowing of both neural foramen    L2-3: Disc bulge and bilateral hypertrophic facet changes seen. Moderate narrowing of the spinal canal. Moderate to severe narrowing of both neural foramen    L3-4: Disc bulge and bilateral hypertrophic facet changes. Hypertrophic change is seen involving both ligamentum flavum flavum. Moderate narrowing of the spinal canal.    L4-5: Disc bulge and bilateral hypertrophic facet changes seen. Moderate narrowing of the right spinal canal is seen.    L5-S1: Disc bulge and bilateral hypertrophic facet changes are seen. No significant compromise of the spinal canal or either neural foramen.    The conus ends at L2 and appears normal    Evaluation of the paraspinal soft tissues appear normal.    No abnormal areas of enhancement seen.    IMPRESSION: Degenerative changes as described above.                ODALIS JAMES M.D., ATTENDING RADIOLOGIST  This document has been electronically signed. Nov 10 2020 10:38AM    < end of copied text >  < from: CT Abdomen and Pelvis w/ IV Cont (20 @ 15:56) >    EXAM:  CT ABDOMEN AND PELVIS IC                          EXAM:  CT CHEST IC                            PROCEDURE DATE:  2020            INTERPRETATION:  CLINICAL INFORMATION: Weight loss, evaluate for malignancy. Severe progressive weight loss, poor appetite, corneal melting, anemia, constipation.    COMPARISON: None.    PROCEDURE:  CT of the Chest, Abdomen and Pelvis was performed with intravenous contrast.  Intravenous contrast: 90 ml Omnipaque 350. 10 ml discarded.  Oral contrast: None.  Sagittal and coronal reformats were performed.    FINDINGS: Motion limited evaluation.      CHEST:  LUNGS AND LARGE AIRWAYS: Patent central airways. No pulmonary nodules.  PLEURA: No pleural effusion.  VESSELS: Atherosclerotic changes of the aorta and coronary arteries.  HEART: Heart size is normal. No pericardial effusion. Coronary artery calcifications.  MEDIASTINUM AND PETRONA: No lymphadenopathy.  CHEST WALL AND LOWER NECK: Within normal limits.    ABDOMEN AND PELVIS:  LIVER: Numerous small hypodensities within the liver, too small to completely characterize.  BILE DUCTS: Normal caliber.  GALLBLADDER: Borderline gallbladder wall thickening.  SPLEEN: Within normal limits.  PANCREAS: Within normal limits.  ADRENALS: Mild nodularity of the left adrenal gland.  KIDNEYS/URETERS: 2 mm left renal midpole nonobstructing calcification.    BLADDER: Diffuse bladder wall thickening and trabeculations.  REPRODUCTIVE ORGANS: There is markedly enlarged with significant mass effect on the urinary bladder.    BOWEL: Moderate amount of stool throughout colon. No bowel obstruction. Appendix is not visualized. No evidence of inflammation in the pericecal region.  PERITONEUM: No ascites.  VESSELS: Atherosclerotic changes.  RETROPERITONEUM/LYMPH NODES: No lymphadenopathy.  ABDOMINAL WALL: Within normal limits.  BONES: Diffuse osteopenia which can limit evaluation. Diffuse degenerative changes. Mild thoracolumbar scoliosis.    IMPRESSION:    Motion limited evaluation.    The prostate gland is markedly enlarged with significant mass effect on the urinary bladder. The urinary bladder has diffuse wall thickening and trabeculations. Correlate clinically for infection or other processes. Lytic lesions are noted within the right acetabulum and L5, consider malignant process.    Moderate amount of stool throughout colon without proximal obstruction.              SOFIA SHEA MD; Resident Radiology  This document has been electronically signed.  RAFA YBARRA MD; Attending Radiologist  This documenthas been electronically signed. 2020  4:21PM    < end of copied text >     [facility], Division of Infectious Diseases  YULI Arroyo, ELAINE Sherman  [phone]    ERICKSONBARNEY BUSTSO  80y, Male  [id number]    HPI--  HPI:  Pt is a 80M w/ PMHx of DM, HTN, HLD, chronic conjunctivitis; presented from ophthalmologist's office (Dr. Warren) for surgery of the right eye. Per pt and pt's wife, pt started to notice vision loss of the right eye approximately one week ago, was diagnosed with a tc abrasion, went to go and see his ophthalmologist today who recommended he come to the ED for admission for surgery for progressive corneal thinning. Pt has + right sided eye discharge, eye covered with shield, denies headaches, dizziness, hearing changes, head/sinus pain, no fevers, chills, nausea, vomiting.  Has had unintentional progressive weight loss with about 60 lbs over the past year with poor PO intake. Denies any joint pains, or CP. Has intermittent SOB. Reports constipation.   He denies history of MI, CAD, or stroke. He has limited functional status at baseline.   Reports last colonoscopy was about 5 years ago and was normal.     Admitted on 2020.  Patient was taken to OR on 20, had a patch corneal graft repair of right eye. Has been treated with moxifloxacin qid to both eyes, per ophtho. Also being treated for glaucoma. CT c/a/p performed to evaluate for malignancy given his dramatic weight loss. CT imaging was followed by MRI given suspected lytic lesions in R. acetabulum and L5 on CT. However, MRI L-spine and pelvis did not show suspicious osseous lesions.    ID called for possible infectious etiology of corneal thinning.  Pt seen and examined at bedside.   Pt denies any travel hx to suggest exposure to zoonotic infections or endemic mycoses    Active Medications--  acetaminophen   Tablet .. 650 milliGRAM(s) Oral every 6 hours PRN  atorvastatin 10 milliGRAM(s) Oral at bedtime  cyanocobalamin 1000 MICROGram(s) Oral daily  dextrose 40% Gel 15 Gram(s) Oral once PRN  dextrose 5%. 1000 milliLiter(s) IV Continuous <Continuous>  dextrose 50% Injectable 12.5 Gram(s) IV Push once  dextrose 50% Injectable 25 Gram(s) IV Push once  dextrose 50% Injectable 25 Gram(s) IV Push once  dorzolamide 2%/timolol 0.5% Ophthalmic Solution 1 Drop(s) Left EYE two times a day  enalapril 5 milliGRAM(s) Oral daily  glucagon  Injectable 1 milliGRAM(s) IntraMuscular once PRN  insulin lispro (ADMELOG) corrective regimen sliding scale   SubCutaneous three times a day before meals  insulin lispro (ADMELOG) corrective regimen sliding scale   SubCutaneous at bedtime  moxifloxacin 0.5% Solution 1 Drop(s) Both EYES <User Schedule>  polyethylene glycol 3350 17 Gram(s) Oral daily  senna 2 Tablet(s) Oral at bedtime  tamsulosin 0.4 milliGRAM(s) Oral at bedtime  vancomycin 25 mG/mL Fortified Ophthalmic 1 Drop(s) Both EYES four times a day    Antimicrobials:   vancomycin 25 mG/mL Fortified Ophthalmic 1 Drop(s) Both EYES four times a day    Immunologic:     ROS:  CONSTITUTIONAL: No fevers or chills. No weakness or headache. No weight changes.  EYES/ENT: No visual or hearing changes. No sore throat or throat pain .  NECK: No pain or stiffness  RESPIRATORY: No cough, wheezing, or hemoptysis. No shortness of breath  CARDIOVASCULAR: No chest pain or palpitations  GASTROINTESTINAL: No abdominal pain. No nausea or vomiting. No diarrhea or constipation.  GENITOURINARY: No dysuria, frequency or hematuria  NEUROLOGICAL: No numbness or weakness  SKIN: No itching or rashes  PSYCHIATRIC: Pleasant. Appropriate affect    Allergies: No Known Allergies    PMH -- BPH (benign prostatic hyperplasia)    Hyperlipidemia    HTN (hypertension)    DM (diabetes mellitus)    No pertinent past medical history      PSH -- No significant past surgical history      FH -- No pertinent family history in first degree relatives      Social History --  EtOH: denies   Tobacco: denies   Drug Use: denies     Travel/Environmental/Occupational History:      Physical Exam--  Vital Signs Last 24 Hrs  T(F): 98.1 (2020 04:14), Max: 98.7 (10 Nov 2020 20:35)  HR: 78 (2020 04:14) (53 - 78)  BP: 139/76 (2020 04:14) (139/76 - 158/81)  RR: 17 (2020 04:14) (17 - 18)  SpO2: 96% (2020 04:14) (94% - 99%)  General: nontoxic-appearing, no acute distress  HEENT: NC/AT, L eye covered, R eye closed  Neck: Not rigid. No sense of mass. No LAD  Lungs: Clear bilaterally without rales, wheezing or rhonchi  Heart: Regular rate and rhythm. No murmur, rub or gallop.  Abdomen: Soft. Nondistended. Nontender. Bowel sounds present. No organomegaly.  Back: No spinal tenderness. No costovertebral angle tenderness.  Extremities: No cyanosis or clubbing. No edema.   Skin: Warm. Dry. Good turgor. No rash. No vasculitic stigmata.  Psychiatric: Appropriate affect and mood for situation.     Laboratory & Imaging Data--  CBC:                       12.9   6.15  )-----------( 234      ( 2020 07:09 )             39.6     CMP:     133<L>  |  96  |  11  ----------------------------<  131<H>  3.9   |  27  |  0.73    Ca    9.6      2020 07:08    TPro  6.7  /  Alb  x   /  TBili  x   /  DBili  x   /  AST  x   /  ALT  x   /  AlkPhos  x       LIVER FUNCTIONS - ( 2020 09:51 )  Alb: x     / Pro: 6.7 g/dL / ALK PHOS: x     / ALT: x     / AST: x     / GGT: x           Urinalysis Basic - ( 2020 09:15 )    Color: Yellow / Appearance: Turbid / S.016 / pH: x  Gluc: x / Ketone: Negative  / Bili: Negative / Urobili: <2 mg/dL   Blood: x / Protein: Negative / Nitrite: Negative   Leuk Esterase: Negative / RBC: x / WBC x   Sq Epi: x / Non Sq Epi: x / Bacteria: x      Microbiology:     Radiology:  < from: MR Pelvis Bony Only w/wo IV Cont (11.10.20 @ 14:41) >    EXAM:  MR PELVIS BONY ONLY WAW IC                            PROCEDURE DATE:  11/10/2020            INTERPRETATION:  EXAMINATION: MRI of the bony pelvis with and without contrast    CLINICAL INFORMATION: Weight loss. Lytic lesions on CT    TECHNIQUE: Multiplanar, multisequential MR imaging was performed. This includes T1-weighted images after the administration of 6 mL of intravenous gadolinium. 1.5 mL were discarded.    FINDINGS: There is no fracture or osteonecrosis. There is advanced right hip osteoarthritis subchondral cystic change at the anterosuperior aspect of the acetabulum. There is moderate left hip osteoarthritis with subchondral cystic change at the anterosuperior aspect of the left acetabulum. There are mild degenerative changes at the sacroiliac joints bilaterally. There is lower lumbar spondylosis. Please see report from dedicated MRI of the lumbar spine performed the previous day for further evaluation of the findings in the imaged portion of the lumbar spine. No suspicious enhancing osseous lesions are demonstrated. There are no acute tendon or muscle tears. There is a small to moderate amount fluid within the right iliopsoas bursa.    The prostate is. There is diffuse thickening of the bladder wall with trabeculation. Please see report from CT of the abdomen and pelvis performed 2020 for further evaluation of the findings in the imaged portion of the pelvis.    IMPRESSION: No suspicious osseous lesions are demonstrated.  Advanced right hip osteoarthritis and moderate left hip osteoarthritis. Lower lumbar spondylosis.  Right iliopsoas bursitis.                LIGIA BARBOSA MD; Attending Radiologist  This document has been electronically signed. Nov 10 2020  2:54PM    < end of copied text >  < from: MR Lumbar Spine w/ IV Cont (20 @ 23:42) >    EXAM:  MR SPINE LUMBAR IC                            PROCEDURE DATE:  2020            INTERPRETATION:  Clinical indication: Possible lytic lesion on CT scan.    MRI of the lumbar spine was performed using sagittal T1-T2 and T2-weighted sequence fat suppression. Sagittal STIR sequences performed as well. The patient was injected with approximately 6 cc of Gadavist IV with 1.5 cc of contrast discarded. Sagittal T1-weighted sequence performed fat suppression. Axial T1-weighted sequence was performed.    Mild loss of the normal lumbar lordosis is seen.    There is a grade 1 anterolisthesis seen involving L4 on L5.    Schmorl's nodes are seen throughout the lower thoracic lumbar spine region which is secondary to degenerative change    Disc desiccation is seen involving the T11-12, T12-L1, L1-2, L2-3 and L3-4 levels. These findings are secondary to chronic degenerative change    T12-L1: Bilateral hypertrophic facet changes are seen without significant, as of the spinal canal or either neural foramen.    L1-2: Bilateral hypertrophic facet changes seen. Moderate narrowing of both neural foramen    L2-3: Disc bulge and bilateral hypertrophic facet changes seen. Moderate narrowing of the spinal canal. Moderate to severe narrowing of both neural foramen    L3-4: Disc bulge and bilateral hypertrophic facet changes. Hypertrophic change is seen involving both ligamentum flavum flavum. Moderate narrowing of the spinal canal.    L4-5: Disc bulge and bilateral hypertrophic facet changes seen. Moderate narrowing of the right spinal canal is seen.    L5-S1: Disc bulge and bilateral hypertrophic facet changes are seen. No significant compromise of the spinal canal or either neural foramen.    The conus ends at L2 and appears normal    Evaluation of the paraspinal soft tissues appear normal.    No abnormal areas of enhancement seen.    IMPRESSION: Degenerative changes as described above.                ODALIS JAMES M.D., ATTENDING RADIOLOGIST  This document has been electronically signed. Nov 10 2020 10:38AM    < end of copied text >  < from: CT Abdomen and Pelvis w/ IV Cont (20 @ 15:56) >    EXAM:  CT ABDOMEN AND PELVIS IC                          EXAM:  CT CHEST IC                            PROCEDURE DATE:  2020            INTERPRETATION:  CLINICAL INFORMATION: Weight loss, evaluate for malignancy. Severe progressive weight loss, poor appetite, corneal melting, anemia, constipation.    COMPARISON: None.    PROCEDURE:  CT of the Chest, Abdomen and Pelvis was performed with intravenous contrast.  Intravenous contrast: 90 ml Omnipaque 350. 10 ml discarded.  Oral contrast: None.  Sagittal and coronal reformats were performed.    FINDINGS: Motion limited evaluation.      CHEST:  LUNGS AND LARGE AIRWAYS: Patent central airways. No pulmonary nodules.  PLEURA: No pleural effusion.  VESSELS: Atherosclerotic changes of the aorta and coronary arteries.  HEART: Heart size is normal. No pericardial effusion. Coronary artery calcifications.  MEDIASTINUM AND PETRONA: No lymphadenopathy.  CHEST WALL AND LOWER NECK: Within normal limits.    ABDOMEN AND PELVIS:  LIVER: Numerous small hypodensities within the liver, too small to completely characterize.  BILE DUCTS: Normal caliber.  GALLBLADDER: Borderline gallbladder wall thickening.  SPLEEN: Within normal limits.  PANCREAS: Within normal limits.  ADRENALS: Mild nodularity of the left adrenal gland.  KIDNEYS/URETERS: 2 mm left renal midpole nonobstructing calcification.    BLADDER: Diffuse bladder wall thickening and trabeculations.  REPRODUCTIVE ORGANS: There is markedly enlarged with significant mass effect on the urinary bladder.    BOWEL: Moderate amount of stool throughout colon. No bowel obstruction. Appendix is not visualized. No evidence of inflammation in the pericecal region.  PERITONEUM: No ascites.  VESSELS: Atherosclerotic changes.  RETROPERITONEUM/LYMPH NODES: No lymphadenopathy.  ABDOMINAL WALL: Within normal limits.  BONES: Diffuse osteopenia which can limit evaluation. Diffuse degenerative changes. Mild thoracolumbar scoliosis.    IMPRESSION:    Motion limited evaluation.    The prostate gland is markedly enlarged with significant mass effect on the urinary bladder. The urinary bladder has diffuse wall thickening and trabeculations. Correlate clinically for infection or other processes. Lytic lesions are noted within the right acetabulum and L5, consider malignant process.    Moderate amount of stool throughout colon without proximal obstruction.              SOFIA SHEA MD; Resident Radiology  This document has been electronically signed.  RAFA YBARRA MD; Attending Radiologist  This documenthas been electronically signed. 2020  4:21PM    < end of copied text >

## 2020-11-12 LAB
% ALBUMIN: 53.1 % — SIGNIFICANT CHANGE UP
% ALPHA 1: 4 % — SIGNIFICANT CHANGE UP
% ALPHA 2: 13.1 % — SIGNIFICANT CHANGE UP
% BETA: 13.1 % — SIGNIFICANT CHANGE UP
% GAMMA: 16.7 % — SIGNIFICANT CHANGE UP
24R-OH-CALCIDIOL SERPL-MCNC: 32.4 NG/ML — SIGNIFICANT CHANGE UP (ref 30–80)
ACE SERPL-CCNC: 32 U/L — SIGNIFICANT CHANGE UP (ref 14–82)
ALBUMIN SERPL ELPH-MCNC: 3.6 G/DL — SIGNIFICANT CHANGE UP (ref 3.6–5.5)
ALBUMIN/GLOB SERPL ELPH: 1.2 RATIO — SIGNIFICANT CHANGE UP
ALPHA1 GLOB SERPL ELPH-MCNC: 0.3 G/DL — SIGNIFICANT CHANGE UP (ref 0.1–0.4)
ALPHA2 GLOB SERPL ELPH-MCNC: 0.9 G/DL — SIGNIFICANT CHANGE UP (ref 0.5–1)
ANA TITR SER: NEGATIVE — SIGNIFICANT CHANGE UP
ANION GAP SERPL CALC-SCNC: 12 MMOL/L — SIGNIFICANT CHANGE UP (ref 5–17)
ANTI-RIBONUCLEAR PROTEIN: <0.2 AI — SIGNIFICANT CHANGE UP
B-GLOBULIN SERPL ELPH-MCNC: 0.9 G/DL — SIGNIFICANT CHANGE UP (ref 0.5–1)
BUN SERPL-MCNC: 13 MG/DL — SIGNIFICANT CHANGE UP (ref 7–23)
CALCIUM SERPL-MCNC: 9.9 MG/DL — SIGNIFICANT CHANGE UP (ref 8.4–10.5)
CEA SERPL-MCNC: 2.4 NG/ML — SIGNIFICANT CHANGE UP (ref 0–3.8)
CHLORIDE SERPL-SCNC: 96 MMOL/L — SIGNIFICANT CHANGE UP (ref 96–108)
CO2 SERPL-SCNC: 26 MMOL/L — SIGNIFICANT CHANGE UP (ref 22–31)
CREAT SERPL-MCNC: 0.66 MG/DL — SIGNIFICANT CHANGE UP (ref 0.5–1.3)
DSDNA AB SER-ACNC: <12 IU/ML — SIGNIFICANT CHANGE UP
ENA SCL70 AB SER-ACNC: <0.2 AI — SIGNIFICANT CHANGE UP
ENA SM AB FLD QL: 0.2 AI — SIGNIFICANT CHANGE UP
GAMMA GLOBULIN: 1.1 G/DL — SIGNIFICANT CHANGE UP (ref 0.6–1.6)
GAMMA INTERFERON BACKGROUND BLD IA-ACNC: 0.01 IU/ML — SIGNIFICANT CHANGE UP
GLUCOSE BLDC GLUCOMTR-MCNC: 187 MG/DL — HIGH (ref 70–99)
GLUCOSE BLDC GLUCOMTR-MCNC: 196 MG/DL — HIGH (ref 70–99)
GLUCOSE BLDC GLUCOMTR-MCNC: 227 MG/DL — HIGH (ref 70–99)
GLUCOSE BLDC GLUCOMTR-MCNC: 250 MG/DL — HIGH (ref 70–99)
GLUCOSE SERPL-MCNC: 186 MG/DL — HIGH (ref 70–99)
INTERPRETATION SERPL IFE-IMP: SIGNIFICANT CHANGE UP
M TB IFN-G BLD-IMP: NEGATIVE — SIGNIFICANT CHANGE UP
M TB IFN-G CD4+ BCKGRND COR BLD-ACNC: 0 IU/ML — SIGNIFICANT CHANGE UP
M TB IFN-G CD4+CD8+ BCKGRND COR BLD-ACNC: -0.01 IU/ML — SIGNIFICANT CHANGE UP
MAGNESIUM SERPL-MCNC: 2.1 MG/DL — SIGNIFICANT CHANGE UP (ref 1.6–2.6)
NIGHT BLUE STAIN TISS: SIGNIFICANT CHANGE UP
POTASSIUM SERPL-MCNC: 4.5 MMOL/L — SIGNIFICANT CHANGE UP (ref 3.5–5.3)
POTASSIUM SERPL-SCNC: 4.5 MMOL/L — SIGNIFICANT CHANGE UP (ref 3.5–5.3)
PROT ?TM UR-MCNC: 6 MG/DL — SIGNIFICANT CHANGE UP (ref 0–12)
PROT PATTERN SERPL ELPH-IMP: SIGNIFICANT CHANGE UP
QUANT TB PLUS MITOGEN MINUS NIL: 2.17 IU/ML — SIGNIFICANT CHANGE UP
SODIUM SERPL-SCNC: 134 MMOL/L — LOW (ref 135–145)
SPECIMEN SOURCE: SIGNIFICANT CHANGE UP

## 2020-11-12 PROCEDURE — 99232 SBSQ HOSP IP/OBS MODERATE 35: CPT | Mod: GC

## 2020-11-12 PROCEDURE — 99222 1ST HOSP IP/OBS MODERATE 55: CPT | Mod: GC

## 2020-11-12 RX ORDER — TAMSULOSIN HYDROCHLORIDE 0.4 MG/1
0.4 CAPSULE ORAL AT BEDTIME
Refills: 0 | Status: DISCONTINUED | OUTPATIENT
Start: 2020-11-12 | End: 2020-11-12

## 2020-11-12 RX ORDER — VANCOMYCIN HCL 1 G
1 VIAL (EA) INTRAVENOUS
Refills: 0 | Status: DISCONTINUED | OUTPATIENT
Start: 2020-11-12 | End: 2020-11-18

## 2020-11-12 RX ORDER — INSULIN LISPRO 100/ML
VIAL (ML) SUBCUTANEOUS
Refills: 0 | Status: DISCONTINUED | OUTPATIENT
Start: 2020-11-12 | End: 2020-11-18

## 2020-11-12 RX ORDER — INSULIN LISPRO 100/ML
VIAL (ML) SUBCUTANEOUS AT BEDTIME
Refills: 0 | Status: DISCONTINUED | OUTPATIENT
Start: 2020-11-12 | End: 2020-11-18

## 2020-11-12 RX ORDER — INSULIN GLARGINE 100 [IU]/ML
4 INJECTION, SOLUTION SUBCUTANEOUS AT BEDTIME
Refills: 0 | Status: DISCONTINUED | OUTPATIENT
Start: 2020-11-12 | End: 2020-11-13

## 2020-11-12 RX ORDER — INSULIN LISPRO 100/ML
3 VIAL (ML) SUBCUTANEOUS
Refills: 0 | Status: DISCONTINUED | OUTPATIENT
Start: 2020-11-12 | End: 2020-11-14

## 2020-11-12 RX ADMIN — Medication 1 DROP(S): at 13:24

## 2020-11-12 RX ADMIN — PREGABALIN 1000 MICROGRAM(S): 225 CAPSULE ORAL at 13:29

## 2020-11-12 RX ADMIN — SENNA PLUS 2 TABLET(S): 8.6 TABLET ORAL at 21:53

## 2020-11-12 RX ADMIN — Medication 3 UNIT(S): at 17:51

## 2020-11-12 RX ADMIN — Medication 1 DROP(S): at 21:53

## 2020-11-12 RX ADMIN — Medication 40 MILLIGRAM(S): at 21:53

## 2020-11-12 RX ADMIN — TAMSULOSIN HYDROCHLORIDE 0.4 MILLIGRAM(S): 0.4 CAPSULE ORAL at 21:53

## 2020-11-12 RX ADMIN — ATORVASTATIN CALCIUM 10 MILLIGRAM(S): 80 TABLET, FILM COATED ORAL at 21:53

## 2020-11-12 RX ADMIN — Medication 3 UNIT(S): at 13:23

## 2020-11-12 RX ADMIN — Medication 5 MILLIGRAM(S): at 05:46

## 2020-11-12 RX ADMIN — DORZOLAMIDE HYDROCHLORIDE TIMOLOL MALEATE 1 DROP(S): 20; 5 SOLUTION/ DROPS OPHTHALMIC at 16:58

## 2020-11-12 RX ADMIN — Medication 4: at 13:23

## 2020-11-12 RX ADMIN — Medication 1 DROP(S): at 05:46

## 2020-11-12 RX ADMIN — Medication 1 DROP(S): at 05:45

## 2020-11-12 RX ADMIN — Medication 1 DROP(S): at 09:14

## 2020-11-12 RX ADMIN — Medication 1: at 09:13

## 2020-11-12 RX ADMIN — Medication 4: at 17:51

## 2020-11-12 RX ADMIN — INSULIN GLARGINE 4 UNIT(S): 100 INJECTION, SOLUTION SUBCUTANEOUS at 21:54

## 2020-11-12 RX ADMIN — POLYETHYLENE GLYCOL 3350 17 GRAM(S): 17 POWDER, FOR SOLUTION ORAL at 13:30

## 2020-11-12 RX ADMIN — FAMOTIDINE 20 MILLIGRAM(S): 10 INJECTION INTRAVENOUS at 13:29

## 2020-11-12 NOTE — PROGRESS NOTE ADULT - SUBJECTIVE AND OBJECTIVE BOX
[facility] DEPARTMENT OF OPHTHALMOLOGY  ------------------------------------------------------------------------------  Syeda LALA3 MD  Pager: [phone]  ---------------------------------------  80 year old male with PMH of DM, HTN, HLD, chronic conjunctivitis; presented from ophthalmologist's office (Dr. Warren) for surgery of the right eye (POD 3 corneal patch graft) due to progressive corneal thinning OU.     Interval History: No acute events overnight. Patient reports continued discomfort. No pain. Denies changes in vision.     MEDICATIONS  (STANDING):  atorvastatin 10 milliGRAM(s) Oral at bedtime  cyanocobalamin 1000 MICROGram(s) Oral daily  dextrose 5%. 1000 milliLiter(s) (50 mL/Hr) IV Continuous <Continuous>  dextrose 50% Injectable 12.5 Gram(s) IV Push once  dextrose 50% Injectable 25 Gram(s) IV Push once  dextrose 50% Injectable 25 Gram(s) IV Push once  dorzolamide 2%/timolol 0.5% Ophthalmic Solution 1 Drop(s) Left EYE two times a day  enalapril 5 milliGRAM(s) Oral daily  insulin lispro (ADMELOG) corrective regimen sliding scale   SubCutaneous three times a day before meals  insulin lispro (ADMELOG) corrective regimen sliding scale   SubCutaneous at bedtime  moxifloxacin 0.5% Solution 1 Drop(s) Both EYES <User Schedule>  polyethylene glycol 3350 17 Gram(s) Oral daily  senna 2 Tablet(s) Oral at bedtime  tamsulosin 0.4 milliGRAM(s) Oral at bedtime  vancomycin 25 mG/mL Fortified Ophthalmic 1 Drop(s) Both EYES four times a day    MEDICATIONS  (PRN):  acetaminophen   Tablet .. 650 milliGRAM(s) Oral every 6 hours PRN Mild Pain (1 - 3)  dextrose 40% Gel 15 Gram(s) Oral once PRN Blood Glucose LESS THAN 70 milliGRAM(s)/deciLiter  glucagon  Injectable 1 milliGRAM(s) IntraMuscular once PRN Glucose <70 milliGRAM(s)/deciLiter      VITALS: T(C): 36.8 (11-10-20 @ 13:11)  T(F): 98.3 (11-10-20 @ 13:11), Max: 98.3 (11-10-20 @ 13:11)  HR: 53 (11-10-20 @ 13:11) (52 - 57)  BP: 144/82 (11-10-20 @ 13:11) (118/73 - 144/82)  RR:  (17 - 17)  SpO2:  (99% - 100%)  Wt(kg): --    Ophthalmology Exam:  VA: HM OD, CF OS  Pupils: poor view 2/2 corneal changes, appears minimally reactive OU  Ttono: 12 OU  Extraocular movements (EOMs): Grossly appear full OU, no pain, no diplopia    Slit Lamp Exam:   External: Clear shield in place OD  Lids/Lashes/Lacrimal Ducts: Flat OU Inferior lids noted to be slightly ectropic OU, eyelids crusted shut with significant discharge  Sclera/Conjunctiva: 2+ injection OD 1+ injection OS  Cornea: Patch graft in place, well sutured, (1 loose suture appreciated) laura negative; epithelial defect resolved today with thinning temporal to Graft. Some punctate heme in graft, OD ; OS with corneal glue in place and BCL in place (when BCL removed, corneal glue came off but area of previous thinning significantly improved and Laura neg), No signs of iNfection, ulcer OS  Anterior Chamber: D+F OU, No hypopyon appreciated OU, formed OS  Iris: poor view but appears Flat OU  Lens: poor view OU    Radiology:  mazin< from: MR Pelvis Bony Only w/wo IV Cont (11.10.20 @ 14:41) >  The prostate is. There is diffuse thickening of the bladder wall with trabeculation. Please see report from CT of the abdomen and pelvis performed 11/8/2020 for further evaluation of the findings in the imaged portion of the pelvis.    IMPRESSION: No suspicious osseous lesions are demonstrated.  Advanced right hip osteoarthritis and moderate left hip osteoarthritis. Lower lumbar spondylosis.  Right iliopsoas bursitis.    < end of copied text >  < from: MR Lumbar Spine w/ IV Cont (11.09.20 @ 23:42) >  IMPRESSION: Degenerative changes as described above.    < end of copied text >  < from: CT Abdomen and Pelvis w/ IV Cont (11.08.20 @ 15:56) >  BLADDER: Diffuse bladder wall thickening and trabeculations.  REPRODUCTIVE ORGANS: There is markedly enlarged with significant mass effect on the urinary bladder.    < end of copied text >  < from: CT Abdomen and Pelvis w/ IV Cont (11.08.20 @ 15:56) >  IMPRESSION:    Motion limited evaluation.    The prostate gland is markedly enlarged with significant mass effect on the urinary bladder. The urinary bladder has diffuse wall thickening and trabeculations. Correlate clinically for infection or other processes. Lytic lesions are noted within the right acetabulum and L5, consider malignant process.    < end of copied text >      Assessment and Plan:   · Assessment	  80y male w/ pmhx/ochx of  DM, HTN, HLD, chronic conjunctivitis and 60 lb weight loss with progressive corneal thinning of both eyes, now POD 3 s/p corneal patch graft of the right eye. Epi defect improved OD. Today with significant thinning OS with small punctate area of K thinning down to about 10%.    # corneal thinning of both eyes, now POD 4 s/p corneal patch graft of the right eye  - patch graft of right eye in place; epi defect resolved  - Today with regluing of OS, BCL in place  - No Appearance of Infectious Infiltrate, Ulcer, Or hypopyon appreciated.  - moxifloxacin QID and fortified Vancomycin QID to the right eye  - c/w PO prednisone, dosing as per primary team given improvement with prednisone  - given recent weight loss and b/l nature of corneal pathology, malignancy workup to evaluate for underlying disease process.  - appreciate rheumatology consult given Recent history of Weight-loss, and Corneal Melting  - appreciate heme/onc eval  - CT with vertebral lytic lesion - Enlarged Prostate and Bladder wall. 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 Warren.    Outpatient follow-up: Patient should follow-up with his/her ophthalmologist or with [facility] Department of Ophthalmology within 1 week of after discharge at:    [address]  [place] [address] [phone]

## 2020-11-12 NOTE — CONSULT NOTE ADULT - ASSESSMENT
81 yo M with DM2, HTN, HLD, chronic conjunctivitis admitted from ophthalmologist's office for R. eye patch corneal graft repair due to progressive corneal thinning now s/p surgery. History also notable for significant weight loss with unclear etiology. Oncology consulted to evaluate if underlying malignancy present.     #R/O malignancy   81 yo M with DM2, HTN, HLD, chronic conjunctivitis admitted from ophthalmologist's office for R. eye patch corneal graft repair due to progressive corneal thinning now s/p surgery. History also notable for significant weight loss with unclear etiology. Oncology consulted to evaluate if underlying malignancy present.     #R/O malignancy  - no clear evidence of malignancy noted based on current available data  - possible lytic lesion on initial imaging however not seen on MRI  - there were also liver lesions noted; would consider MRI abdomen to further evaluate these  - prostate enlarged but PSA wnl; urology following, likely BPH  - appreciate rheum and ID input, ongoing workup to elucidate weight loss underway but so far unrevealing   - GI following, considering endoscopic evaluation but likely low yield   - no clear evidence of malignant focus at present, continue care per primary team     Rock Castellanos, PGY-6  Hematology-Oncology Fellow  [phone] ([place]) [phone] ([facility])

## 2020-11-12 NOTE — PROGRESS NOTE ADULT - PROBLEM SELECTOR PLAN 1
Progressive corneal melting of both eyes now with concern for rheumatologic vs malignant vs infectious cause.   sp OR patch graft repair R eye at [facility].  sp corneal glue left eye  moxifloxacin qid to both eyes  malignancy work up, onc consult  MRI pelvis did not see osseous lesion  CT abd enlarge prostate but PSA wnl, unlikely prostate CA per urology recs  optho following  rheum work up undergoing, scleroderma neg, smith ab neg, myeloperoxidase and proteinase neg, THADDEUS neg, c3c4 neg, vit d nml  ID workup undergoing, syphillis neg, lyme neg, hep b panel nml  make NPO for now incase needs to go to OR

## 2020-11-12 NOTE — PHARMACOTHERAPY INTERVENTION NOTE - COMMENTS
79 yo M with DM A1C 6.0. Currently on admelog low dose correctional scale and recently started on prednisone 40mg by mouth Q24H for corneal thinning/melting. BG in past 24 hours were 187, 223, 202, 239. Would recommend adding lantus 4 units SQ HS, admelog 3 units SQ TID, and increase to moderate correctional scale per steroid-induced hyperglycemia protocol.    Scout Hutchins, PharmD, BCPS  [phone]

## 2020-11-12 NOTE — PROGRESS NOTE ADULT - SUBJECTIVE AND OBJECTIVE BOX
Patient is a 80y old  Male who presents with a chief complaint of Right eye progressive corneal thinning (2020 11:55)      SUBJECTIVE / OVERNIGHT EVENTS:    Patient seen and examined.   both eyes with guards on. denies complaints. no cp sob.     Vital Signs Last 24 Hrs  T(C): 36.3 (2020 12:02), Max: 36.4 (2020 20:45)  T(F): 97.3 (2020 12:02), Max: 97.6 (2020 20:45)  HR: 72 (2020 12:) (65 - 72)  BP: 103/66 (:) (103/66 - 146/82)  BP(mean): --  RR: 18 (:) (18 - 18)  SpO2: 99% (2020 12:) (98% - 99%)  I&O's Summary    2020 07:  -  2020 07:00  --------------------------------------------------------  IN: 1320 mL / OUT: 1150 mL / NET: 170 mL    2020 07:01  -  2020 13:07  --------------------------------------------------------  IN: 620 mL / OUT: 0 mL / NET: 620 mL        PE:  GENERAL: NAD, AAOx3  HEAD:  Atraumatic, Normocephalic, eye patches protecting eyes  CHEST/LUNG: CTABL, No wheeze  HEART: Regular rate and rhythm; no murmur  ABDOMEN: Soft, Nontender, Nondistended; Bowel sounds present  EXTREMITIES:  2+ Peripheral Pulses, No clubbing, cyanosis, or edema  SKIN: No rashes or lesions  NEURO: No focal deficits    LABS:                        12.9   6.15  )-----------( 234      ( 2020 07:09 )             39.6     12    134<L>  |  96  |  13  ----------------------------<  186<H>  4.5   |  26  |  0.66    Ca    9.9      2020 06:54  Mg     2.1         TPro  6.7  /  Alb  x   /  TBili  x   /  DBili  x   /  AST  x   /  ALT  x   /  AlkPhos  x         CAPILLARY BLOOD GLUCOSE      POCT Blood Glucose.: 250 mg/dL (2020 12:29)  POCT Blood Glucose.: 187 mg/dL (2020 08:32)  POCT Blood Glucose.: 223 mg/dL (2020 21:41)  POCT Blood Glucose.: 202 mg/dL (2020 17:00)        Urinalysis Basic - ( 2020 09:15 )    Color: Yellow / Appearance: Turbid / S.016 / pH: x  Gluc: x / Ketone: Negative  / Bili: Negative / Urobili: <2 mg/dL   Blood: x / Protein: Negative / Nitrite: Negative   Leuk Esterase: Negative / RBC: 0 /HPF / WBC 0 /HPF   Sq Epi: x / Non Sq Epi: 0 /HPF / Bacteria: Few        RADIOLOGY & ADDITIONAL TESTS:    Imaging Personally Reviewed:  [x] YES  [ ] NO    Consultant(s) Notes Reviewed:  [x] YES  [ ] NO    MEDICATIONS  (STANDING):  atorvastatin 10 milliGRAM(s) Oral at bedtime  cyanocobalamin 1000 MICROGram(s) Oral daily  dextrose 5%. 1000 milliLiter(s) (50 mL/Hr) IV Continuous <Continuous>  dextrose 50% Injectable 12.5 Gram(s) IV Push once  dextrose 50% Injectable 25 Gram(s) IV Push once  dextrose 50% Injectable 25 Gram(s) IV Push once  dorzolamide 2%/timolol 0.5% Ophthalmic Solution 1 Drop(s) Left EYE two times a day  enalapril 5 milliGRAM(s) Oral daily  famotidine    Tablet 20 milliGRAM(s) Oral daily  insulin glargine Injectable (LANTUS) 4 Unit(s) SubCutaneous at bedtime  insulin lispro (ADMELOG) corrective regimen sliding scale.   SubCutaneous three times a day before meals  insulin lispro (ADMELOG) corrective regimen sliding scale.   SubCutaneous at bedtime  insulin lispro Injectable (ADMELOG) 3 Unit(s) SubCutaneous three times a day before meals  moxifloxacin 0.5% Solution 1 Drop(s) Both EYES <User Schedule>  polyethylene glycol 3350 17 Gram(s) Oral daily  predniSONE   Tablet 40 milliGRAM(s) Oral every 24 hours  senna 2 Tablet(s) Oral at bedtime  tamsulosin 0.4 milliGRAM(s) Oral at bedtime  vancomycin 25 mG/mL Fortified Ophthalmic 1 Drop(s) Both EYES four times a day    MEDICATIONS  (PRN):  acetaminophen   Tablet .. 650 milliGRAM(s) Oral every 6 hours PRN Mild Pain (1 - 3)  dextrose 40% Gel 15 Gram(s) Oral once PRN Blood Glucose LESS THAN 70 milliGRAM(s)/deciLiter  glucagon  Injectable 1 milliGRAM(s) IntraMuscular once PRN Glucose <70 milliGRAM(s)/deciLiter      Care Discussed with Consultants/Other Providers [x] YES  [ ] NO    HEALTH ISSUES - PROBLEM Dx:  Prophylactic measure  Prophylactic measure    Benign prostatic hyperplasia, unspecified whether lower urinary tract symptoms present  Benign prostatic hyperplasia, unspecified whether lower urinary tract symptoms present    Hyperlipidemia, unspecified hyperlipidemia type  Hyperlipidemia, unspecified hyperlipidemia type    Essential hypertension  Essential hypertension    Type 2 diabetes mellitus without complication, without long-term current use of insulin  Type 2 diabetes mellitus without complication, without long-term current use of insulin    Weight loss, unintentional  Weight loss, unintentional    Preoperative examination  Preoperative examination    Corneal thinning of right eye  Corneal thinning of right eye

## 2020-11-12 NOTE — PROGRESS NOTE ADULT - SUBJECTIVE AND OBJECTIVE BOX
BARNEY ERICKSON  [id number]    INTERVAL HPI/OVERNIGHT EVENTS:    MEDICATIONS  (STANDING):  atorvastatin 10 milliGRAM(s) Oral at bedtime  cyanocobalamin 1000 MICROGram(s) Oral daily  dextrose 5%. 1000 milliLiter(s) (50 mL/Hr) IV Continuous <Continuous>  dextrose 50% Injectable 12.5 Gram(s) IV Push once  dextrose 50% Injectable 25 Gram(s) IV Push once  dextrose 50% Injectable 25 Gram(s) IV Push once  dorzolamide 2%/timolol 0.5% Ophthalmic Solution 1 Drop(s) Left EYE two times a day  enalapril 5 milliGRAM(s) Oral daily  famotidine    Tablet 20 milliGRAM(s) Oral daily  insulin glargine Injectable (LANTUS) 4 Unit(s) SubCutaneous at bedtime  insulin lispro (ADMELOG) corrective regimen sliding scale.   SubCutaneous three times a day before meals  insulin lispro (ADMELOG) corrective regimen sliding scale.   SubCutaneous at bedtime  insulin lispro Injectable (ADMELOG) 3 Unit(s) SubCutaneous three times a day before meals  moxifloxacin 0.5% Solution 1 Drop(s) Both EYES <User Schedule>  polyethylene glycol 3350 17 Gram(s) Oral daily  predniSONE   Tablet 40 milliGRAM(s) Oral every 24 hours  senna 2 Tablet(s) Oral at bedtime  tamsulosin 0.4 milliGRAM(s) Oral at bedtime  vancomycin 25 mG/mL Fortified Ophthalmic 1 Drop(s) Both EYES four times a day    MEDICATIONS  (PRN):  acetaminophen   Tablet .. 650 milliGRAM(s) Oral every 6 hours PRN Mild Pain (1 - 3)  dextrose 40% Gel 15 Gram(s) Oral once PRN Blood Glucose LESS THAN 70 milliGRAM(s)/deciLiter  glucagon  Injectable 1 milliGRAM(s) IntraMuscular once PRN Glucose <70 milliGRAM(s)/deciLiter      Allergies    No Known Allergies    Intolerances        Review of Systems:   General: No fevers/chills, no fatigue  HEENT: No blurry vision, dysphagia, or odynophagia  CVS: No CP/palpitations  Resp: No SOB/wheezing  GI: No N/V/C/D/abdominal pain  MSK:   Skin: No new rashes  Neuro: No headaches      Vital Signs Last 24 Hrs  T(C): 36.3 (2020 12:02), Max: 36.4 (2020 20:45)  T(F): 97.3 (2020 12:02), Max: 97.6 (2020 20:45)  HR: 72 (2020 12:02) (65 - 72)  BP: 103/66 (2020 12:02) (103/66 - 146/82)  BP(mean): --  RR: 18 (2020 12:02) (18 - 18)  SpO2: 99% (2020 12:02) (98% - 99%)    Physical Exam:  General: NAD  HEENT: EOMI, MMM  Cardio: +S1/S2, RRR  Resp: CTA b/l  GI: +BS, soft, NT/ND  MSK:  Neuro: AAOx3  Psych: wnl    LABS:                        12.9   6.15  )-----------( 234      ( 2020 07:09 )             39.6     11-12    134<L>  |  96  |  13  ----------------------------<  186<H>  4.5   |  26  |  0.66    Ca    9.9      2020 06:54  Mg     2.1     12    TPro  6.7  /  Alb  x   /  TBili  x   /  DBili  x   /  AST  x   /  ALT  x   /  AlkPhos  x   11-11      Urinalysis Basic - ( 2020 09:15 )    Color: Yellow / Appearance: Turbid / S.016 / pH: x  Gluc: x / Ketone: Negative  / Bili: Negative / Urobili: <2 mg/dL   Blood: x / Protein: Negative / Nitrite: Negative   Leuk Esterase: Negative / RBC: 0 /HPF / WBC 0 /HPF   Sq Epi: x / Non Sq Epi: 0 /HPF / Bacteria: Few          RADIOLOGY & ADDITIONAL TESTS:   BARNEY ERICKSON  [id number]    INTERVAL HPI/OVERNIGHT EVENTS: Reporting mild pain in left eye, currently both eyes covered with shield. s/p corneal glue per opthalmology team to left eye on . Denies headache, joint pains, rashes, or other concerns at this time.     MEDICATIONS  (STANDING):  atorvastatin 10 milliGRAM(s) Oral at bedtime  cyanocobalamin 1000 MICROGram(s) Oral daily  dextrose 5%. 1000 milliLiter(s) (50 mL/Hr) IV Continuous <Continuous>  dextrose 50% Injectable 12.5 Gram(s) IV Push once  dextrose 50% Injectable 25 Gram(s) IV Push once  dextrose 50% Injectable 25 Gram(s) IV Push once  dorzolamide 2%/timolol 0.5% Ophthalmic Solution 1 Drop(s) Left EYE two times a day  enalapril 5 milliGRAM(s) Oral daily  famotidine    Tablet 20 milliGRAM(s) Oral daily  insulin glargine Injectable (LANTUS) 4 Unit(s) SubCutaneous at bedtime  insulin lispro (ADMELOG) corrective regimen sliding scale.   SubCutaneous three times a day before meals  insulin lispro (ADMELOG) corrective regimen sliding scale.   SubCutaneous at bedtime  insulin lispro Injectable (ADMELOG) 3 Unit(s) SubCutaneous three times a day before meals  moxifloxacin 0.5% Solution 1 Drop(s) Both EYES <User Schedule>  polyethylene glycol 3350 17 Gram(s) Oral daily  predniSONE   Tablet 40 milliGRAM(s) Oral every 24 hours  senna 2 Tablet(s) Oral at bedtime  tamsulosin 0.4 milliGRAM(s) Oral at bedtime  vancomycin 25 mG/mL Fortified Ophthalmic 1 Drop(s) Both EYES four times a day    MEDICATIONS  (PRN):  acetaminophen   Tablet .. 650 milliGRAM(s) Oral every 6 hours PRN Mild Pain (1 - 3)  dextrose 40% Gel 15 Gram(s) Oral once PRN Blood Glucose LESS THAN 70 milliGRAM(s)/deciLiter  glucagon  Injectable 1 milliGRAM(s) IntraMuscular once PRN Glucose <70 milliGRAM(s)/deciLiter      Allergies    No Known Allergies    Intolerances            Vital Signs Last 24 Hrs  T(C): 36.3 (2020 12:02), Max: 36.4 (2020 20:45)  T(F): 97.3 (2020 12:02), Max: 97.6 (2020 20:45)  HR: 72 (2020 12:02) (65 - 72)  BP: 103/66 (2020 12:02) (103/66 - 146/82)  BP(mean): --  RR: 18 (2020 12:02) (18 - 18)  SpO2: 99% (2020 12:02) (98% - 99%)    Physical Exam:  General: Lying in bed, NAD  HEENT: Both eyes covered in patch/shield.   CVS: +S1/S2, RRR, no murmurs/rubs/gallops  Resp: CTA b/l. No crackles/wheezing  GI: Soft, NT/ND +BS  MSK: no joint synovitis and normal ROM in all joints.     LABS:                        12.9   6.15  )-----------( 234      ( 2020 07:09 )             39.6         134<L>  |  96  |  13  ----------------------------<  186<H>  4.5   |  26  |  0.66    Ca    9.9      2020 06:54  Mg     2.1         TPro  6.7  /  Alb  x   /  TBili  x   /  DBili  x   /  AST  x   /  ALT  x   /  AlkPhos  x   -      Urinalysis Basic - ( 2020 09:15 )    Color: Yellow / Appearance: Turbid / S.016 / pH: x  Gluc: x / Ketone: Negative  / Bili: Negative / Urobili: <2 mg/dL   Blood: x / Protein: Negative / Nitrite: Negative   Leuk Esterase: Negative / RBC: 0 /HPF / WBC 0 /HPF   Sq Epi: x / Non Sq Epi: 0 /HPF / Bacteria: Few          RADIOLOGY & ADDITIONAL TESTS:      CT chest/abdomen/pelvis 20  CHEST:  LUNGS AND LARGE AIRWAYS: Patent central airways. No pulmonary nodules.  PLEURA: No pleural effusion.  VESSELS: Atherosclerotic changes of the aorta and coronary arteries.  HEART: Heart size is normal. No pericardial effusion. Coronary artery calcifications.  MEDIASTINUM AND PETRONA: No lymphadenopathy.  CHEST WALL AND LOWER NECK: Within normal limits.    ABDOMEN AND PELVIS:  LIVER: Numerous small hypodensities within the liver, too small to completely characterize.  BILE DUCTS: Normal caliber.  GALLBLADDER: Borderline gallbladder wall thickening.  SPLEEN: Within normal limits.  PANCREAS: Within normal limits.  ADRENALS: Mild nodularity of the left adrenal gland.  KIDNEYS/URETERS: 2 mm left renal midpole nonobstructing calcification.    BLADDER: Diffuse bladder wall thickening and trabeculations.  REPRODUCTIVE ORGANS: There is markedly enlarged with significant mass effect on the urinary bladder.    BOWEL: Moderate amount of stool throughout colon. No bowel obstruction. Appendix is not visualized. No evidence of inflammation in the pericecal region.  PERITONEUM: No ascites.  VESSELS: Atherosclerotic changes.  RETROPERITONEUM/LYMPH NODES: No lymphadenopathy.  ABDOMINAL WALL: Within normal limits.  BONES: Diffuse osteopenia which can limit evaluation. Diffuse degenerative changes. Mild thoracolumbar scoliosis.    IMPRESSION:    Motion limited evaluation.    The prostate gland is markedly enlarged with significant mass effect on the urinary bladder. The urinary bladder has diffuse wall thickening and trabeculations. Correlate clinically for infection or other processes. Lytic lesions are noted within the right acetabulum and L5, consider malignant process.    Moderate amount of stool throughout colon without proximal obstruction.        MRI Lumbar Spine 20  INTERPRETATION:  Clinical indication: Possible lytic lesion on CT scan.    MRI of the lumbar spine was performed using sagittal T1-T2 and T2-weighted sequence fat suppression. Sagittal STIR sequences performed as well. The patient was injected with approximately 6 cc of Gadavist IV with 1.5 cc of contrast discarded. Sagittal T1-weighted sequence performed fat suppression. Axial T1-weighted sequence was performed.    Mild loss of the normal lumbar lordosis is seen.    There is a grade 1 anterolisthesis seen involving L4 on L5.    Schmorl's nodes are seen throughout the lower thoracic lumbar spine region which is secondary to degenerative change    Disc desiccation is seen involving the T11-12, T12-L1, L1-2, L2-3 and L3-4 levels. These findings are secondary to chronic degenerative change    T12-L1: Bilateral hypertrophic facet changes are seen without significant, as of the spinal canal or either neural foramen.    L1-2: Bilateral hypertrophic facet changes seen. Moderate narrowing of both neural foramen    L2-3: Disc bulge and bilateral hypertrophic facet changes seen. Moderate narrowing of the spinal canal. Moderate to severe narrowing of both neural foramen    L3-4: Disc bulge and bilateral hypertrophic facet changes. Hypertrophic change is seen involving both ligamentum flavum flavum. Moderate narrowing of the spinal canal.    L4-5: Disc bulge and bilateral hypertrophic facet changes seen. Moderate narrowing of the right spinal canal is seen.    L5-S1: Disc bulge and bilateral hypertrophic facet changes are seen. No significant compromise of the spinal canal or either neural foramen.    The conus ends at L2 and appears normal    Evaluation of the paraspinal soft tissues appear normal.    No abnormal areas of enhancement seen.    IMPRESSION: Degenerative changes as described above.      MRI Pelvis 11/10/20  FINDINGS: There is no fracture or osteonecrosis. There is advanced right hip osteoarthritis subchondral cystic change at the anterosuperior aspect of the acetabulum. There is moderate left hip osteoarthritis with subchondral cystic change at the anterosuperior aspect of the left acetabulum. There are mild degenerative changes at the sacroiliac joints bilaterally. There is lower lumbar spondylosis. Please see report from dedicated MRI of the lumbar spine performed the previous day for further evaluation of the findings in the imaged portion of the lumbar spine. No suspicious enhancing osseous lesions are demonstrated. There are no acute tendon or muscle tears. There is a small to moderate amount fluid within the right iliopsoas bursa.    The prostate is. There is diffuse thickening of the bladder wall with trabeculation. Please see report from CT of the abdomen and pelvis performed 2020 for further evaluation of the findings in the imaged portion of the pelvis.    IMPRESSION: No suspicious osseous lesions are demonstrated.  Advanced right hip osteoarthritis and moderate left hip osteoarthritis. Lower lumbar spondylosis.  Right iliopsoas bursitis.       BARNEY ERICKSON  [id number]    INTERVAL HPI/OVERNIGHT EVENTS: Reporting mild pain in left eye, currently both eyes covered with shield. s/p corneal glue per opthalmology team to left eye on . Denies headache, joint pains, rashes, or other concerns at this time.     MEDICATIONS  (STANDING):  atorvastatin 10 milliGRAM(s) Oral at bedtime  cyanocobalamin 1000 MICROGram(s) Oral daily  dextrose 5%. 1000 milliLiter(s) (50 mL/Hr) IV Continuous <Continuous>  dextrose 50% Injectable 12.5 Gram(s) IV Push once  dextrose 50% Injectable 25 Gram(s) IV Push once  dextrose 50% Injectable 25 Gram(s) IV Push once  dorzolamide 2%/timolol 0.5% Ophthalmic Solution 1 Drop(s) Left EYE two times a day  enalapril 5 milliGRAM(s) Oral daily  famotidine    Tablet 20 milliGRAM(s) Oral daily  insulin glargine Injectable (LANTUS) 4 Unit(s) SubCutaneous at bedtime  insulin lispro (ADMELOG) corrective regimen sliding scale.   SubCutaneous three times a day before meals  insulin lispro (ADMELOG) corrective regimen sliding scale.   SubCutaneous at bedtime  insulin lispro Injectable (ADMELOG) 3 Unit(s) SubCutaneous three times a day before meals  moxifloxacin 0.5% Solution 1 Drop(s) Both EYES <User Schedule>  polyethylene glycol 3350 17 Gram(s) Oral daily  predniSONE   Tablet 40 milliGRAM(s) Oral every 24 hours  senna 2 Tablet(s) Oral at bedtime  tamsulosin 0.4 milliGRAM(s) Oral at bedtime  vancomycin 25 mG/mL Fortified Ophthalmic 1 Drop(s) Both EYES four times a day    MEDICATIONS  (PRN):  acetaminophen   Tablet .. 650 milliGRAM(s) Oral every 6 hours PRN Mild Pain (1 - 3)  dextrose 40% Gel 15 Gram(s) Oral once PRN Blood Glucose LESS THAN 70 milliGRAM(s)/deciLiter  glucagon  Injectable 1 milliGRAM(s) IntraMuscular once PRN Glucose <70 milliGRAM(s)/deciLiter      Allergies    No Known Allergies    Intolerances            Vital Signs Last 24 Hrs  T(C): 36.3 (2020 12:02), Max: 36.4 (2020 20:45)  T(F): 97.3 (2020 12:02), Max: 97.6 (2020 20:45)  HR: 72 (2020 12:02) (65 - 72)  BP: 103/66 (2020 12:02) (103/66 - 146/82)  BP(mean): --  RR: 18 (2020 12:02) (18 - 18)  SpO2: 99% (2020 12:02) (98% - 99%)    Physical Exam:  General: Lying in bed, NAD  HEENT: Both eyes covered in patch/shield.   CVS: +S1/S2, RRR, no murmurs/rubs/gallops  Resp: CTA b/l. No crackles/wheezing  GI: Soft, NT/ND +BS  MSK: no joint synovitis and normal ROM in all joints.     LABS:                        12.9   6.15  )-----------( 234      ( 2020 07:09 )             39.6     12    134<L>  |  96  |  13  ----------------------------<  186<H>  4.5   |  26  |  0.66    Ca    9.9      2020 06:54  Mg     2.1         TPro  6.7  /  Alb  x   /  TBili  x   /  DBili  x   /  AST  x   /  ALT  x   /  AlkPhos  x   11-      Urinalysis Basic - ( 2020 09:15 )    Color: Yellow / Appearance: Turbid / S.016 / pH: x  Gluc: x / Ketone: Negative  / Bili: Negative / Urobili: <2 mg/dL   Blood: x / Protein: Negative / Nitrite: Negative   Leuk Esterase: Negative / RBC: 0 /HPF / WBC 0 /HPF   Sq Epi: x / Non Sq Epi: 0 /HPF / Bacteria: Few    RF <10, CCP 26 (mildly high)  THADDEUS negative  Nobles and RNP antibodies negative  Complements C3 101 (normal) and C4 30 (normal)  Sjogrens antibodies negative  c-ANCA and p-ANCA negative with MPO/PR3 negative   Scleroderma antibody negative  Vitamin D 1,25-OH 31.5 (normal) and 25-OH 32.4 (normal)      RADIOLOGY & ADDITIONAL TESTS:      CT chest/abdomen/pelvis 20  CHEST:  LUNGS AND LARGE AIRWAYS: Patent central airways. No pulmonary nodules.  PLEURA: No pleural effusion.  VESSELS: Atherosclerotic changes of the aorta and coronary arteries.  HEART: Heart size is normal. No pericardial effusion. Coronary artery calcifications.  MEDIASTINUM AND PETRONA: No lymphadenopathy.  CHEST WALL AND LOWER NECK: Within normal limits.    ABDOMEN AND PELVIS:  LIVER: Numerous small hypodensities within the liver, too small to completely characterize.  BILE DUCTS: Normal caliber.  GALLBLADDER: Borderline gallbladder wall thickening.  SPLEEN: Within normal limits.  PANCREAS: Within normal limits.  ADRENALS: Mild nodularity of the left adrenal gland.  KIDNEYS/URETERS: 2 mm left renal midpole nonobstructing calcification.    BLADDER: Diffuse bladder wall thickening and trabeculations.  REPRODUCTIVE ORGANS: There is markedly enlarged with significant mass effect on the urinary bladder.    BOWEL: Moderate amount of stool throughout colon. No bowel obstruction. Appendix is not visualized. No evidence of inflammation in the pericecal region.  PERITONEUM: No ascites.  VESSELS: Atherosclerotic changes.  RETROPERITONEUM/LYMPH NODES: No lymphadenopathy.  ABDOMINAL WALL: Within normal limits.  BONES: Diffuse osteopenia which can limit evaluation. Diffuse degenerative changes. Mild thoracolumbar scoliosis.    IMPRESSION:    Motion limited evaluation.    The prostate gland is markedly enlarged with significant mass effect on the urinary bladder. The urinary bladder has diffuse wall thickening and trabeculations. Correlate clinically for infection or other processes. Lytic lesions are noted within the right acetabulum and L5, consider malignant process.    Moderate amount of stool throughout colon without proximal obstruction.        MRI Lumbar Spine 20  INTERPRETATION:  Clinical indication: Possible lytic lesion on CT scan.    MRI of the lumbar spine was performed using sagittal T1-T2 and T2-weighted sequence fat suppression. Sagittal STIR sequences performed as well. The patient was injected with approximately 6 cc of Gadavist IV with 1.5 cc of contrast discarded. Sagittal T1-weighted sequence performed fat suppression. Axial T1-weighted sequence was performed.    Mild loss of the normal lumbar lordosis is seen.    There is a grade 1 anterolisthesis seen involving L4 on L5.    Schmorl's nodes are seen throughout the lower thoracic lumbar spine region which is secondary to degenerative change    Disc desiccation is seen involving the T11-12, T12-L1, L1-2, L2-3 and L3-4 levels. These findings are secondary to chronic degenerative change    T12-L1: Bilateral hypertrophic facet changes are seen without significant, as of the spinal canal or either neural foramen.    L1-2: Bilateral hypertrophic facet changes seen. Moderate narrowing of both neural foramen    L2-3: Disc bulge and bilateral hypertrophic facet changes seen. Moderate narrowing of the spinal canal. Moderate to severe narrowing of both neural foramen    L3-4: Disc bulge and bilateral hypertrophic facet changes. Hypertrophic change is seen involving both ligamentum flavum flavum. Moderate narrowing of the spinal canal.    L4-5: Disc bulge and bilateral hypertrophic facet changes seen. Moderate narrowing of the right spinal canal is seen.    L5-S1: Disc bulge and bilateral hypertrophic facet changes are seen. No significant compromise of the spinal canal or either neural foramen.    The conus ends at L2 and appears normal    Evaluation of the paraspinal soft tissues appear normal.    No abnormal areas of enhancement seen.    IMPRESSION: Degenerative changes as described above.      MRI Pelvis 11/10/20  FINDINGS: There is no fracture or osteonecrosis. There is advanced right hip osteoarthritis subchondral cystic change at the anterosuperior aspect of the acetabulum. There is moderate left hip osteoarthritis with subchondral cystic change at the anterosuperior aspect of the left acetabulum. There are mild degenerative changes at the sacroiliac joints bilaterally. There is lower lumbar spondylosis. Please see report from dedicated MRI of the lumbar spine performed the previous day for further evaluation of the findings in the imaged portion of the lumbar spine. No suspicious enhancing osseous lesions are demonstrated. There are no acute tendon or muscle tears. There is a small to moderate amount fluid within the right iliopsoas bursa.    The prostate is. There is diffuse thickening of the bladder wall with trabeculation. Please see report from CT of the abdomen and pelvis performed 2020 for further evaluation of the findings in the imaged portion of the pelvis.    IMPRESSION: No suspicious osseous lesions are demonstrated.  Advanced right hip osteoarthritis and moderate left hip osteoarthritis. Lower lumbar spondylosis.  Right iliopsoas bursitis.

## 2020-11-12 NOTE — CHART NOTE - NSCHARTNOTEFT_GEN_A_CORE
Nutrition Follow Up Note  Patient seen for: Malnutrition follow up on [facility]    Chart reviewed, events noted.  Per chart, pt is 79 y/o male with PMH: DM, HTN, HLD, BPH, chronic conjunctivitis, presented from ophthalmologist's office for surgery of the right eye with progressive corneal thinning, S/P corneal patch graft of the right eye (11/07), doing well postoperatively.     Source: medical record, nurse, PCA    Diet: Regular, Consistent Carbohydrate (Evening Snack)  Supplement Feeding Modality: Oral  Glucerna Shake Cans or Servings Per day: 3    Frequency: Daily  Noted pt diet just switched to NPO after lunch today for procedure.    Per discussion with PCA, pt consumed 100% of breakfast this morning and 2 Glucerna shakes. Noted pt needs to be fed.  RD explained that Glucerna shakes are supposed to be consumed throughout course of day as supplements to meal; PCA reports pt was very hungry this morning and was asking to consume. No GI distress noted at this time.  Last bowel movement 11/11 x1 per chart review.     PO intake: >75% at most meals per flow sheets    Source for PO intake: flowsheets, PCA/nurse reports    Daily Weights: No new weights to assess at this time; will continue to monitor    Pertinent Medications: MEDICATIONS  (STANDING):  atorvastatin 10 milliGRAM(s) Oral at bedtime  cyanocobalamin 1000 MICROGram(s) Oral daily  dextrose 5%. 1000 milliLiter(s) (50 mL/Hr) IV Continuous <Continuous>  dextrose 50% Injectable 12.5 Gram(s) IV Push once  dextrose 50% Injectable 25 Gram(s) IV Push once  dextrose 50% Injectable 25 Gram(s) IV Push once  dorzolamide 2%/timolol 0.5% Ophthalmic Solution 1 Drop(s) Left EYE two times a day  enalapril 5 milliGRAM(s) Oral daily  famotidine    Tablet 20 milliGRAM(s) Oral daily  insulin glargine Injectable (LANTUS) 4 Unit(s) SubCutaneous at bedtime  insulin lispro (ADMELOG) corrective regimen sliding scale.   SubCutaneous three times a day before meals  insulin lispro (ADMELOG) corrective regimen sliding scale.   SubCutaneous at bedtime  insulin lispro Injectable (ADMELOG) 3 Unit(s) SubCutaneous three times a day before meals  moxifloxacin 0.5% Solution 1 Drop(s) Both EYES <User Schedule>  polyethylene glycol 3350 17 Gram(s) Oral daily  predniSONE   Tablet 40 milliGRAM(s) Oral every 24 hours  senna 2 Tablet(s) Oral at bedtime  tamsulosin 0.4 milliGRAM(s) Oral at bedtime  vancomycin 25 mG/mL Fortified Ophthalmic 1 Drop(s) Both EYES four times a day    MEDICATIONS  (PRN):  acetaminophen   Tablet .. 650 milliGRAM(s) Oral every 6 hours PRN Mild Pain (1 - 3)  dextrose 40% Gel 15 Gram(s) Oral once PRN Blood Glucose LESS THAN 70 milliGRAM(s)/deciLiter  glucagon  Injectable 1 milliGRAM(s) IntraMuscular once PRN Glucose <70 milliGRAM(s)/deciLiter    Pertinent Labs: 11-12 @ 06:54: Na 134<L>, BUN 13, Cr 0.66, <H>, K+ 4.5    Finger Sticks:  POCT Blood Glucose.: 187 mg/dL (11-12 @ 08:32)  POCT Blood Glucose.: 223 mg/dL (11-11 @ 21:41)  POCT Blood Glucose.: 202 mg/dL (11-11 @ 17:00)  POCT Blood Glucose.: 239 mg/dL (11-11 @ 12:16)      Skin per nursing documentation: no pressure injuries per flow sheets  Edema: none noted per flow sheets    Estimated Needs: [x] no change since previous assessment based on dosing weight of 60.7 kg:  Estimated energy needs (30-35 kim/kg): 3560-7629 calories  Estimated protein needs (1.2-1.4 gm): 72-85 gm  Estimated fluid needs (30-35 ml/kg): 4836-4581 ml    Previous Nutrition Diagnosis: Malnutrition  Nutrition Diagnosis is: ongoing, being addressed with adequate PO intake, assistance at meal times, oral nutrition supplements    New Nutrition Diagnosis: none at this time    Interventions/ Recommendations:  1) Recommend to continue current consistent carbohydrate diet as tolerated  2) Recommend to continue with Glucerna supplements 3x/day to augment current PO intake and optimize nutritional status  3) Recommend nursing to continue to provide total feeding assistance at meals to encourage adequate intake while inpatient     Monitoring and Evaluation:   Continue to monitor Nutritional intake, Tolerance to diet prescription, weights, labs, skin integrity    RD remains available upon request and will follow up per protocol  Jennifer Nobles MS, RD, CDN pgr #[phone]

## 2020-11-12 NOTE — PROGRESS NOTE ADULT - PROBLEM SELECTOR PLAN 2
Reported 60lbs weight loss over the past year with poor PO intake and constipation. severe malnutrition  encourage po, protein supplements  CT chest/abd/pelvis with contrast for malignancy work up - small hypodense lesions liver, prostate enlargement, lytic lesions hip and l5  MRI lumbar and pelvis did not see osseous lesions  prostate enlarged-bladder thickening likely BPH, PSA normal, pt without urinary symptoms  Last colonoscopy about 5 years ago per wife was within normal.   currently is not candidate for endoscopy given worsening corneal melting  CEA wnl

## 2020-11-12 NOTE — CONSULT NOTE ADULT - SUBJECTIVE AND OBJECTIVE BOX
HPI:  Majority of history obtained from chart review. Pt is an 80 year old male with PMHx of DM, HTN, HLD, chronic conjunctivitis who presented from ophthalmologist's office for surgery of the right eye. Pt started to notice vision loss of the right eye approximately one week ago PTA and was diagnosed with corneal abrasion. He was evaluated by optho and advised to get admitted for corneal thinning. Interestingly, pt has had progressive weight loss of about 60 pounds. This is due to poor po intake. Apparently has limited functional status at baseline. He was taken to OR and had patch corneal graft repair. He reports rectal pain and constipation but unclear how mentation. He has been undergoing malignancy workup with CT imaging with suspected lytic lesion in right acetabulum. However, MRI did not show any suspicious lesion to biopsy. He does have enlarged prostate gland but PSA wnl.     14 point ROS otherwise negative    PAST MEDICAL & SURGICAL HISTORY:  BPH (benign prostatic hyperplasia)  Hyperlipidemia  HTN (hypertension)  DM (diabetes mellitus)  No significant past surgical history        Allergies    No Known Allergies  Intolerances        MEDICATIONS  (STANDING):  atorvastatin 10 milliGRAM(s) Oral at bedtime  cyanocobalamin 1000 MICROGram(s) Oral daily  dextrose 5%. 1000 milliLiter(s) (50 mL/Hr) IV Continuous <Continuous>  dextrose 50% Injectable 12.5 Gram(s) IV Push once  dextrose 50% Injectable 25 Gram(s) IV Push once  dextrose 50% Injectable 25 Gram(s) IV Push once  dorzolamide 2%/timolol 0.5% Ophthalmic Solution 1 Drop(s) Left EYE two times a day  enalapril 5 milliGRAM(s) Oral daily  famotidine    Tablet 20 milliGRAM(s) Oral daily  insulin glargine Injectable (LANTUS) 4 Unit(s) SubCutaneous at bedtime  insulin lispro (ADMELOG) corrective regimen sliding scale.   SubCutaneous three times a day before meals  insulin lispro (ADMELOG) corrective regimen sliding scale.   SubCutaneous at bedtime  insulin lispro Injectable (ADMELOG) 3 Unit(s) SubCutaneous three times a day before meals  moxifloxacin 0.5% Solution 1 Drop(s) Both EYES <User Schedule>  polyethylene glycol 3350 17 Gram(s) Oral daily  predniSONE   Tablet 40 milliGRAM(s) Oral every 24 hours  senna 2 Tablet(s) Oral at bedtime  tamsulosin 0.4 milliGRAM(s) Oral at bedtime  vancomycin 25 mG/mL Fortified Ophthalmic 1 Drop(s) Both EYES four times a day    MEDICATIONS  (PRN):  acetaminophen   Tablet .. 650 milliGRAM(s) Oral every 6 hours PRN Mild Pain (1 - 3)  dextrose 40% Gel 15 Gram(s) Oral once PRN Blood Glucose LESS THAN 70 milliGRAM(s)/deciLiter  glucagon  Injectable 1 milliGRAM(s) IntraMuscular once PRN Glucose <70 milliGRAM(s)/deciLiter      FAMILY HISTORY:  No pertinent family history in first degree relatives        SOCIAL HISTORY: No EtOH, no tobacco        VITALS:   T(F): 97.3 (11-12-20 @ 12:02), Max: 97.6 (11-11-20 @ 20:45)  HR: 72 (11-12-20 @ 12:02)  BP: 103/66 (11-12-20 @ 12:02)  RR: 18 (11-12-20 @ 12:02)  SpO2: 99% (11-12-20 @ 12:02)  Wt(kg): --    PHYSICAL EXAM    GENERAL: NAD, thin frail appearing male   HEAD:  Atraumatic, Normocephalic  EYES: left and right eye covered   NECK: Supple, No JVD  CHEST/LUNG: Clear to auscultation bilaterally  HEART: Regular rate and rhythm; No murmurs, rubs, or gallops  ABDOMEN: Soft, Nontender, Nondistended; Bowel sounds present  EXTREMITIES:  No clubbing, cyanosis, or edema  NEUROLOGY: non-focal  SKIN: No rashes or lesions    LABS:                         12.9   6.15  )-----------( 234      ( 11 Nov 2020 07:09 )             39.6     11-12    134<L>  |  96  |  13  ----------------------------<  186<H>  4.5   |  26  |  0.66    Ca    9.9      12 Nov 2020 06:54  Mg     2.1     11-12    TPro  6.7  /  Alb  x   /  TBili  x   /  DBili  x   /  AST  x   /  ALT  x   /  AlkPhos  x   11-11    Magnesium, Serum: 2.1 mg/dL (11-12 @ 06:54)          IMAGING:

## 2020-11-12 NOTE — PROGRESS NOTE ADULT - SUBJECTIVE AND OBJECTIVE BOX
Further GI workup delayed due to worsening ocular symptoms.  Last colonoscopy  - diverticulosis and small polyp resected.  No prior EGD  CT imaging reviewed with no GI abnormality seen.      PAST MEDICAL & SURGICAL HISTORY:  BPH (benign prostatic hyperplasia)    Hyperlipidemia    HTN (hypertension)    DM (diabetes mellitus)    No significant past surgical history        MEDICATIONS  (STANDING):  atorvastatin 10 milliGRAM(s) Oral at bedtime  cyanocobalamin 1000 MICROGram(s) Oral daily  dextrose 5%. 1000 milliLiter(s) (50 mL/Hr) IV Continuous <Continuous>  dextrose 50% Injectable 12.5 Gram(s) IV Push once  dextrose 50% Injectable 25 Gram(s) IV Push once  dextrose 50% Injectable 25 Gram(s) IV Push once  dorzolamide 2%/timolol 0.5% Ophthalmic Solution 1 Drop(s) Left EYE two times a day  enalapril 5 milliGRAM(s) Oral daily  insulin lispro (ADMELOG) corrective regimen sliding scale   SubCutaneous three times a day before meals  insulin lispro (ADMELOG) corrective regimen sliding scale   SubCutaneous at bedtime  moxifloxacin 0.5% Solution 1 Drop(s) Both EYES <User Schedule>  polyethylene glycol 3350 17 Gram(s) Oral daily  senna 2 Tablet(s) Oral at bedtime  tamsulosin 0.4 milliGRAM(s) Oral at bedtime  vancomycin 25 mG/mL Fortified Ophthalmic 1 Drop(s) Both EYES four times a day    MEDICATIONS  (PRN):  acetaminophen   Tablet .. 650 milliGRAM(s) Oral every 6 hours PRN Mild Pain (1 - 3)  dextrose 40% Gel 15 Gram(s) Oral once PRN Blood Glucose LESS THAN 70 milliGRAM(s)/deciLiter  glucagon  Injectable 1 milliGRAM(s) IntraMuscular once PRN Glucose <70 milliGRAM(s)/deciLiter      Allergies    No Known Allergies    Intolerances        Review of Systems:    General:  ++ loss, fevers, chills, night sweats,fatigue,   CV:  No pain, palpitatioins, hypo/hypertension  Resp:  No dyspnea, cough, tachypnea, wheezing  GI:  see hpi  Muscle:  No pain, weakness  Neuro:  No weakness, tingling, memory problems  Psych:  No fatigue, insomnia, mood problems, depression  Endocrine:  No polyuria, polydypsia, cold/heat intolerance  Heme:  No petechiae, ecchymosis, easy bruisability  Skin:  No rash, tattoos, scars, edema      Vital Signs:  Vital Signs Last 24 Hrs  T(C): 36.4 (2020 04:10), Max: 36.4 (2020 20:45)  T(F): 97.5 (2020 04:10), Max: 97.6 (2020 20:45)  HR: 71 (2020 04:10) (65 - 72)  BP: 121/77 (2020 04:10) (120/78 - 146/82)  BP(mean): --  RR: 18 (2020 04:10) (18 - 18)  SpO2: 98% (2020 04:10) (98% - 99%)  Daily     Daily       PHYSICAL EXAM:    Constitutional: NAD, thin  Respiratory: CTA and P  Cardiovascular: S1 and S2  Gastrointestinal: BS+, soft, NT/ND, neg HSM,  Extremities: No peripheral edema, neg clubing, cyanosis  Vascular: 2+ peripheral pulses  Neurological: A/O x 3, no focal deficits  Psychiatric: Normal mood, normal affect  Skin: No rashes        LABS:                        12.9   6.15  )-----------( 234      ( 2020 07:09 )             39.6     11-12    134<L>  |  96  |  13  ----------------------------<  186<H>  4.5   |  26  |  0.66    Ca    9.9      2020 06:54  Mg     2.1     -12    TPro  6.7  /  Alb  x   /  TBili  x   /  DBili  x   /  AST  x   /  ALT  x   /  AlkPhos  x   11-11    LIVER FUNCTIONS - ( 2020 09:51 )  Alb: x     / Pro: 6.7 g/dL / ALK PHOS: x     / ALT: x     / AST: x     / GGT: x             Urinalysis Basic - ( 2020 09:15 )    Color: Yellow / Appearance: Turbid / S.016 / pH: x  Gluc: x / Ketone: Negative  / Bili: Negative / Urobili: <2 mg/dL   Blood: x / Protein: Negative / Nitrite: Negative   Leuk Esterase: Negative / RBC: 0 /HPF / WBC 0 /HPF   Sq Epi: x / Non Sq Epi: 0 /HPF / Bacteria: Few        RADIOLOGY & ADDITIONAL TESTS:

## 2020-11-12 NOTE — PROGRESS NOTE ADULT - ASSESSMENT
80M with DM2, HTN, HLD, chronic conjunctivitis with recent steroid eye drop use, admitted from ophthalmologist office for R. eye patch corneal graft repair due to progressive corneal thinning. Patient with both eyes affected, R>L. Rheumatology called to evaluate for autoimmune cause of corneal melt. Started on systemic steroids with prednisone per ophthalmology team due to concern for systemic disease on 11/11.     Impression:  Progressive corneal thinning- ddx includes autoimmune vs. malignant (given 60 lb weight loss in past year) vs. infectious. Differential dx can include RA, ANCA associated vasculitis, SLE, sarcoidosis (which can cause scleritis), PAN, Behcets (less likely given lack of recurrent oral/genital ulcerations), relapsing polychondritis (although less likely given no hx of ear or nose inflammation). Infectious etiologies can include HSV, TB, syphilis, and lyme.  However, most labs have resulted negative: infectious tests including hepatitis panel, lyme, and syphilis tests have resulted negative. In terms of autoimmune workup, patient with negative THADDEUS, normal complements, normal Vitamin D levels (1,25 and 25-OH Vitamin D), negative RF, negative c- and p-ANCA, negative Nobles and RNP antibodies, negative Scleroderma. CCP mildly elevated but not supported by any clinical findings (pt with no complaints of joint pain/swelling and no synovitis on exam).      Plan:  - pending remaining serologies including dsDNA.  - f/u ACE level  - f/u quantiferon gold   - f/u conjunctival biopsy/further workup planned by ophthalmology team.       *** INCOMPLETE NOTE ***    Sue Donovan MD PGY4  Rheumatology Fellow  Pager [phone] 80M with DM2, HTN, HLD, chronic conjunctivitis with recent steroid eye drop use, admitted from ophthalmologist office for R. eye patch corneal graft repair due to progressive corneal thinning. Patient with both eyes affected, R>L. Rheumatology called to evaluate for autoimmune cause of corneal melt. Started on systemic steroids with prednisone per ophthalmology team due to concern for systemic disease on 11/11.     Impression:  Progressive corneal thinning- ddx includes autoimmune vs. malignant (given 60 lb weight loss in past year) vs. infectious. Differential dx can include RA, ANCA associated vasculitis, SLE, sarcoidosis (which can cause scleritis), PAN, Behcets (less likely given lack of recurrent oral/genital ulcerations), relapsing polychondritis (although less likely given no hx of ear or nose inflammation). Infectious etiologies can include HSV, TB, syphilis, and lyme.  However, most labs have resulted negative: infectious tests including hepatitis panel, lyme, and syphilis tests have resulted negative. 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 In terms of autoimmune workup, patient with negative THADDEUS, normal complements, normal Vitamin D levels (1,25 and 25-OH Vitamin D), negative RF, negative c- and p-ANCA, negative Nobles and RNP antibodies, negative Scleroderma. CCP mildly elevated but not supported by any clinical findings (pt with no complaints of joint pain/swelling and no synovitis on exam).      Plan:  - pending remaining serologies including dsDNA.  - f/u ACE level  - f/u quantiferon gold   - f/u conjunctival biopsy/further workup planned by ophthalmology team.       *** INCOMPLETE NOTE ***    Sue Donovan MD PGY4  Rheumatology Fellow  Pager [phone] 80M with DM2, HTN, HLD, chronic conjunctivitis with recent steroid eye drop use, admitted from ophthalmologist office for R. eye patch corneal graft repair due to progressive corneal thinning. Patient with both eyes affected, R>L. Rheumatology called to evaluate for autoimmune cause of corneal melt. Started on systemic steroids with prednisone per ophthalmology team due to concern for systemic disease on 11/11.     Impression:  Progressive corneal thinning- ddx includes autoimmune vs. malignant (given 60 lb weight loss in past year) vs. infectious. Differential dx can include RA, ANCA associated vasculitis, SLE, sarcoidosis (which can cause scleritis), PAN, Behcets (less likely given lack of recurrent oral/genital ulcerations), relapsing polychondritis (although less likely given no hx of ear or nose inflammation). Infectious etiologies can include HSV, TB, syphilis, and lyme.  However, most labs have resulted negative: infectious tests including hepatitis panel, lyme, and syphilis tests have resulted negative. 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 In terms of autoimmune workup, patient with negative THADDEUS, normal complements, normal Vitamin D levels (1,25 and 25-OH Vitamin D), negative RF, negative c- and p-ANCA, negative Nobles and RNP antibodies, negative Scleroderma. CCP mildly elevated but not supported by any clinical findings (pt with no complaints of joint pain/swelling and no synovitis on exam).      Plan:  - pending remaining serologies including dsDNA.  - f/u ACE level  - f/u quantiferon gold   - f/u conjunctival biopsy/further workup planned by ophthalmology team.   - c/w steroids per ophthalmology team    Discussed with attending, Dr. Marco Donovan MD PGY4  Rheumatology Fellow  Pager [phone]

## 2020-11-12 NOTE — PROGRESS NOTE ADULT - ATTENDING COMMENTS
Infectious Diseases will continue to follow. Please call with any questions.   Yeimi Caputo M.D.  [facility], Division of Infectious Diseases [phone]  For over the weekend and after hours, please call [phone]

## 2020-11-12 NOTE — PROGRESS NOTE ADULT - ASSESSMENT
79 yo M w/ significant weight loss   Closely followed by Dr Milligan his private GI. Last colonoscopy 2015 w small colon polyp  Benign CT without GI pathology.  Would confer with Onc regarding role for further malignancy workup and need for endoscopic evaluation, which I feel is likely low yield.   Would consider non invasive testing / PET as alternative.

## 2020-11-12 NOTE — PROGRESS NOTE ADULT - ASSESSMENT
80M with DM2, HTN, HLD, chronic conjunctivitis with recent steroid eye drop use, admitted from ophthalmologist office for R. eye patch corneal graft repair due to progressive corneal thinning. Patient with both eyes affected, R>L. ID called to evaluate for possible infectious etiologies of corneal melt.     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

## 2020-11-12 NOTE — CONSULT NOTE ADULT - ATTENDING COMMENTS
Infectious Diseases will continue to follow. Please call with any questions.   Yeimi Caputo M.D.  [facility], Division of Infectious Diseases [phone]  For over the weekend and after hours, please call [phone]
Pt with delirium from hospitalization: denies he has any weight loss or change in health. He feels he is well and can have work up as outpatient. On exam, cachectic male with eye patches over both eyes; lungs clear anterior; liver and spleen edge nonpalpable. CT of the chest/ abd/ pelvis along with MRi of the pelvis reviewed. No anemia or elevated PSA. Subcentimeter liver lesions noted; he denies any prior CT prior to this hospitalization. Currently no clear etiology for his weight loss; would obtain MRi of the liver to further characterize liver lesions.
Pt seen/examined.  Case discussed with housestaff/PA team.  Agree with above note history, physical and assessment/plan.  PSA is low, therefore suspect BPH rather than prostate cancer in absence of other findings that would be highly suspicious for metastatic prostate cancer (ie LAD, osteoblastic lesions)  Suggest alpha blockers for mild LUTS  Bone biopsy as per primary team if significant suspicion
as above

## 2020-11-12 NOTE — CHART NOTE - NSCHARTNOTEFT_GEN_A_CORE
Unable to reach primary team for patient despite multiple attempts.     Please order Vitamin A serum level as has been found in literature to be associated with corneal melts. Please order Vitamin A serum level as has been found in literature to be associated with corneal melts.    Make patient NPO for now.

## 2020-11-13 ENCOUNTER — RESULT REVIEW (OUTPATIENT)
Age: 80
End: 2020-11-13

## 2020-11-13 LAB
ANION GAP SERPL CALC-SCNC: 9 MMOL/L — SIGNIFICANT CHANGE UP (ref 5–17)
BUN SERPL-MCNC: 17 MG/DL — SIGNIFICANT CHANGE UP (ref 7–23)
CALCIUM SERPL-MCNC: 9.6 MG/DL — SIGNIFICANT CHANGE UP (ref 8.4–10.5)
CHLORIDE SERPL-SCNC: 97 MMOL/L — SIGNIFICANT CHANGE UP (ref 96–108)
CO2 SERPL-SCNC: 26 MMOL/L — SIGNIFICANT CHANGE UP (ref 22–31)
CREAT SERPL-MCNC: 0.86 MG/DL — SIGNIFICANT CHANGE UP (ref 0.5–1.3)
GLUCOSE BLDC GLUCOMTR-MCNC: 117 MG/DL — HIGH (ref 70–99)
GLUCOSE BLDC GLUCOMTR-MCNC: 145 MG/DL — HIGH (ref 70–99)
GLUCOSE BLDC GLUCOMTR-MCNC: 156 MG/DL — HIGH (ref 70–99)
GLUCOSE BLDC GLUCOMTR-MCNC: 281 MG/DL — HIGH (ref 70–99)
GLUCOSE SERPL-MCNC: 236 MG/DL — HIGH (ref 70–99)
HCT VFR BLD CALC: 37.2 % — LOW (ref 39–50)
HGB BLD-MCNC: 12.4 G/DL — LOW (ref 13–17)
MCHC RBC-ENTMCNC: 29.5 PG — SIGNIFICANT CHANGE UP (ref 27–34)
MCHC RBC-ENTMCNC: 33.3 GM/DL — SIGNIFICANT CHANGE UP (ref 32–36)
MCV RBC AUTO: 88.4 FL — SIGNIFICANT CHANGE UP (ref 80–100)
NRBC # BLD: 0 /100 WBCS — SIGNIFICANT CHANGE UP (ref 0–0)
PLATELET # BLD AUTO: 218 K/UL — SIGNIFICANT CHANGE UP (ref 150–400)
POTASSIUM SERPL-MCNC: 4.7 MMOL/L — SIGNIFICANT CHANGE UP (ref 3.5–5.3)
POTASSIUM SERPL-SCNC: 4.7 MMOL/L — SIGNIFICANT CHANGE UP (ref 3.5–5.3)
RBC # BLD: 4.21 M/UL — SIGNIFICANT CHANGE UP (ref 4.2–5.8)
RBC # FLD: 13.2 % — SIGNIFICANT CHANGE UP (ref 10.3–14.5)
SODIUM SERPL-SCNC: 132 MMOL/L — LOW (ref 135–145)
WBC # BLD: 6.02 K/UL — SIGNIFICANT CHANGE UP (ref 3.8–10.5)
WBC # FLD AUTO: 6.02 K/UL — SIGNIFICANT CHANGE UP (ref 3.8–10.5)

## 2020-11-13 PROCEDURE — 88360 TUMOR IMMUNOHISTOCHEM/MANUAL: CPT | Mod: 26

## 2020-11-13 PROCEDURE — 88342 IMHCHEM/IMCYTCHM 1ST ANTB: CPT | Mod: 26,59

## 2020-11-13 PROCEDURE — 88307 TISSUE EXAM BY PATHOLOGIST: CPT | Mod: 26

## 2020-11-13 PROCEDURE — 88341 IMHCHEM/IMCYTCHM EA ADD ANTB: CPT | Mod: 26,59

## 2020-11-13 PROCEDURE — 88364 INSITU HYBRIDIZATION (FISH): CPT | Mod: 26

## 2020-11-13 PROCEDURE — 74183 MRI ABD W/O CNTR FLWD CNTR: CPT | Mod: 26

## 2020-11-13 PROCEDURE — 99232 SBSQ HOSP IP/OBS MODERATE 35: CPT | Mod: GC

## 2020-11-13 PROCEDURE — 88365 INSITU HYBRIDIZATION (FISH): CPT | Mod: 26,59

## 2020-11-13 PROCEDURE — 88305 TISSUE EXAM BY PATHOLOGIST: CPT | Mod: 26

## 2020-11-13 PROCEDURE — 68100 BIOPSY CONJUNCTIVA: CPT | Mod: RT,79

## 2020-11-13 RX ORDER — HUMAN INSULIN 100 [IU]/ML
4 INJECTION, SUSPENSION SUBCUTANEOUS AT BEDTIME
Refills: 0 | Status: DISCONTINUED | OUTPATIENT
Start: 2020-11-13 | End: 2020-11-14

## 2020-11-13 RX ADMIN — Medication 5 MILLIGRAM(S): at 05:51

## 2020-11-13 RX ADMIN — Medication 1 DROP(S): at 13:02

## 2020-11-13 RX ADMIN — Medication 3 UNIT(S): at 17:56

## 2020-11-13 RX ADMIN — PREGABALIN 1000 MICROGRAM(S): 225 CAPSULE ORAL at 13:02

## 2020-11-13 RX ADMIN — Medication 1 DROP(S): at 23:34

## 2020-11-13 RX ADMIN — Medication 1 DROP(S): at 05:52

## 2020-11-13 RX ADMIN — Medication 40 MILLIGRAM(S): at 23:35

## 2020-11-13 RX ADMIN — Medication 3 UNIT(S): at 09:15

## 2020-11-13 RX ADMIN — HUMAN INSULIN 4 UNIT(S): 100 INJECTION, SUSPENSION SUBCUTANEOUS at 23:58

## 2020-11-13 RX ADMIN — SENNA PLUS 2 TABLET(S): 8.6 TABLET ORAL at 23:35

## 2020-11-13 RX ADMIN — POLYETHYLENE GLYCOL 3350 17 GRAM(S): 17 POWDER, FOR SOLUTION ORAL at 13:02

## 2020-11-13 RX ADMIN — Medication 1 DROP(S): at 17:58

## 2020-11-13 RX ADMIN — Medication 1 DROP(S): at 18:00

## 2020-11-13 RX ADMIN — FAMOTIDINE 20 MILLIGRAM(S): 10 INJECTION INTRAVENOUS at 13:02

## 2020-11-13 RX ADMIN — Medication 3 UNIT(S): at 13:01

## 2020-11-13 RX ADMIN — ATORVASTATIN CALCIUM 10 MILLIGRAM(S): 80 TABLET, FILM COATED ORAL at 23:33

## 2020-11-13 RX ADMIN — Medication 6: at 09:15

## 2020-11-13 RX ADMIN — TAMSULOSIN HYDROCHLORIDE 0.4 MILLIGRAM(S): 0.4 CAPSULE ORAL at 23:35

## 2020-11-13 RX ADMIN — Medication 1 DROP(S): at 00:26

## 2020-11-13 RX ADMIN — Medication 1 DROP(S): at 05:51

## 2020-11-13 RX ADMIN — Medication 2: at 13:00

## 2020-11-13 NOTE — PROGRESS NOTE ADULT - ASSESSMENT
80M with DM2, HTN, HLD, chronic conjunctivitis with recent steroid eye drop use, admitted from ophthalmologist office for R. eye patch corneal graft repair due to progressive corneal thinning. Patient with both eyes affected, R>L. Rheumatology called to evaluate for autoimmune cause of corneal melt. Started on systemic steroids with prednisone per ophthalmology team due to concern for systemic disease on 11/11.     Impression:  Progressive corneal thinning- ddx includes autoimmune vs. malignant (given 60 lb weight loss in past year) vs. infectious. Differential dx can include RA, ANCA associated vasculitis, SLE, sarcoidosis (which can cause scleritis), PAN, Behcets (less likely given lack of recurrent oral/genital ulcerations), relapsing polychondritis (although less likely given no hx of ear or nose inflammation). Infectious etiologies can include HSV, TB, syphilis, and lyme.  However, all autoimmune tests resulted negative: infectious tests including hepatitis panel, Quantiferon for TB, lyme, and syphilis tests have resulted negative. 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 In terms of autoimmune workup, patient with negative THADDEUS and dsDNA, negative Nobles and RNP antibodies, normal complements, normal ACE and Vitamin D levels (1,25 and 25-OH Vitamin D), negative RF, negative c- and p-ANCA, negative Scleroderma. CCP mildly elevated but not supported by any clinical findings (pt with no complaints of joint pain/swelling and no synovitis on exam).      Plan:  - further workup planned by ophthalmology team.   - c/w steroids per ophthalmology team    Will sign off at this time. Please call with further questions.     Discussed with attending, Dr. Marco Donovan MD PGY4  Rheumatology Fellow  Pager [phone] 80M with DM2, HTN, HLD, chronic conjunctivitis with recent steroid eye drop use, admitted from ophthalmologist office for R. eye patch corneal graft repair due to progressive corneal thinning. Patient with both eyes affected, R>L. Rheumatology called to evaluate for autoimmune cause of corneal melt. Started on systemic steroids with prednisone per ophthalmology team due to concern for systemic disease on 11/11.     Impression:  Progressive corneal thinning- ddx includes autoimmune vs. malignant (given 60 lb weight loss in past year) vs. infectious. Differential dx can include RA, ANCA associated vasculitis, SLE, sarcoidosis (which can cause scleritis), PAN, Behcets (less likely given lack of recurrent oral/genital ulcerations), relapsing polychondritis (although less likely given no hx of ear or nose inflammation). Infectious etiologies can include HSV, TB, syphilis, and lyme.  However, all autoimmune tests resulted negative: infectious tests including hepatitis panel, Quantiferon for TB, lyme, and syphilis tests have resulted negative. 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 In terms of autoimmune workup, patient with negative THADDEUS and dsDNA, negative Nobles and RNP antibodies, normal complements, normal ACE and Vitamin D levels (1,25 and 25-OH Vitamin D), negative RF, negative c- and p-ANCA, negative Scleroderma. CCP mildly elevated but not supported by any clinical findings (pt with no complaints of joint pain/swelling and no synovitis on exam).      Plan:  - further workup planned by ophthalmology team.   - c/w steroids per ophthalmology team    Discussed with attending, Dr. Marco Donovan MD PGY4  Rheumatology Fellow  Pager [phone] 80M with DM2, HTN, HLD, chronic conjunctivitis with recent steroid eye drop use, admitted from ophthalmologist office for R. eye patch corneal graft repair due to progressive corneal thinning. Patient with both eyes affected, R>L. Rheumatology called to evaluate for autoimmune cause of corneal melt. Started on systemic steroids with prednisone per ophthalmology team due to concern for systemic disease on 11/11.     Impression:  Progressive corneal thinning- ddx includes autoimmune vs. malignant (given 60 lb weight loss in past year) vs. infectious. Differential dx can include RA, ANCA associated vasculitis, SLE, sarcoidosis (which can cause scleritis), PAN, Behcets (less likely given lack of recurrent oral/genital ulcerations), relapsing polychondritis (although less likely given no hx of ear or nose inflammation). Infectious etiologies can include HSV, TB, syphilis, and lyme.  However, all autoimmune tests resulted negative: infectious tests including hepatitis panel, Quantiferon for TB, lyme, and syphilis tests have resulted negative. 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 In terms of autoimmune workup, patient with negative THADDEUS and dsDNA, negative Nobles and RNP antibodies, normal complements, normal ACE and Vitamin D levels (1,25 and 25-OH Vitamin D), negative RF, negative c- and p-ANCA, negative Scleroderma. CCP mildly elevated but not supported by any clinical findings (pt with no complaints of joint pain/swelling and no synovitis on exam).      Plan:  - further workup planned by ophthalmology team.   -follow up corneal biopsy report  - c/w steroids per ophthalmology team and will reassess     Discussed with attending, Dr. Marco Donovan MD PGY4  Rheumatology Fellow  Pager [phone]

## 2020-11-13 NOTE — PROGRESS NOTE ADULT - SUBJECTIVE AND OBJECTIVE BOX
[facility] DEPARTMENT OF OPHTHALMOLOGY  ------------------------------------------------------------------------------  Syeda LALA3 MD  Pager: [phone]  ---------------------------------------  80 year old male with PMH of DM, HTN, HLD, chronic conjunctivitis; presented from ophthalmologist's office (Dr. Warren) for surgery of the right eye (POD 4 corneal patch graft) due to progressive corneal thinning OU.     Interval History: No acute events overnight. Patient reports continued discomfort. No pain. Denies changes in vision.     MEDICATIONS  (STANDING):  atorvastatin 10 milliGRAM(s) Oral at bedtime  cyanocobalamin 1000 MICROGram(s) Oral daily  dextrose 5%. 1000 milliLiter(s) (50 mL/Hr) IV Continuous <Continuous>  dextrose 50% Injectable 12.5 Gram(s) IV Push once  dextrose 50% Injectable 25 Gram(s) IV Push once  dextrose 50% Injectable 25 Gram(s) IV Push once  dorzolamide 2%/timolol 0.5% Ophthalmic Solution 1 Drop(s) Left EYE two times a day  enalapril 5 milliGRAM(s) Oral daily  insulin lispro (ADMELOG) corrective regimen sliding scale   SubCutaneous three times a day before meals  insulin lispro (ADMELOG) corrective regimen sliding scale   SubCutaneous at bedtime  moxifloxacin 0.5% Solution 1 Drop(s) Both EYES <User Schedule>  polyethylene glycol 3350 17 Gram(s) Oral daily  senna 2 Tablet(s) Oral at bedtime  tamsulosin 0.4 milliGRAM(s) Oral at bedtime  vancomycin 25 mG/mL Fortified Ophthalmic 1 Drop(s) Both EYES four times a day    MEDICATIONS  (PRN):  acetaminophen   Tablet .. 650 milliGRAM(s) Oral every 6 hours PRN Mild Pain (1 - 3)  dextrose 40% Gel 15 Gram(s) Oral once PRN Blood Glucose LESS THAN 70 milliGRAM(s)/deciLiter  glucagon  Injectable 1 milliGRAM(s) IntraMuscular once PRN Glucose <70 milliGRAM(s)/deciLiter      VITALS: T(C): 36.8 (11-10-20 @ 13:11)  T(F): 98.3 (11-10-20 @ 13:11), Max: 98.3 (11-10-20 @ 13:11)  HR: 53 (11-10-20 @ 13:11) (52 - 57)  BP: 144/82 (11-10-20 @ 13:11) (118/73 - 144/82)  RR:  (17 - 17)  SpO2:  (99% - 100%)  Wt(kg): --    Ophthalmology Exam:  VA: HM OD, CF OS  Pupils: poor view 2/2 corneal changes, appears minimally reactive OU  Ttono: 12 OU  Extraocular movements (EOMs): Grossly appear full OU, no pain, no diplopia    Slit Lamp Exam:   External: Clear shield in place OD  Lids/Lashes/Lacrimal Ducts: Flat OU Inferior lids noted to be slightly ectropic OU, eyelids crusted shut with significant discharge  Sclera/Conjunctiva: 2+ injection OD 1+ injection OS  Cornea: Patch graft in place, well sutured, (1 loose suture appreciated) laura negative; epithelial defect resolved today with thinning temporal to Graft. Some punctate heme in graft, OD ; OS with corneal glue in place and BCL in place (when BCL removed, corneal glue came off but area of previous thinning significantly improved and Laura neg), No signs of iNfection, ulcer OS  Anterior Chamber: D+F OU, No hypopyon appreciated OU, formed OS  Iris: poor view but appears Flat OU  Lens: poor view OU    Radiology:  mazin< from: MR Pelvis Bony Only w/wo IV Cont (11.10.20 @ 14:41) >  The prostate is. There is diffuse thickening of the bladder wall with trabeculation. Please see report from CT of the abdomen and pelvis performed 11/8/2020 for further evaluation of the findings in the imaged portion of the pelvis.    IMPRESSION: No suspicious osseous lesions are demonstrated.  Advanced right hip osteoarthritis and moderate left hip osteoarthritis. Lower lumbar spondylosis.  Right iliopsoas bursitis.    < end of copied text >  < from: MR Lumbar Spine w/ IV Cont (11.09.20 @ 23:42) >  IMPRESSION: Degenerative changes as described above.    < end of copied text >  < from: CT Abdomen and Pelvis w/ IV Cont (11.08.20 @ 15:56) >  BLADDER: Diffuse bladder wall thickening and trabeculations.  REPRODUCTIVE ORGANS: There is markedly enlarged with significant mass effect on the urinary bladder.    < end of copied text >  < from: CT Abdomen and Pelvis w/ IV Cont (11.08.20 @ 15:56) >  IMPRESSION:    Motion limited evaluation.    The prostate gland is markedly enlarged with significant mass effect on the urinary bladder. The urinary bladder has diffuse wall thickening and trabeculations. Correlate clinically for infection or other processes. Lytic lesions are noted within the right acetabulum and L5, consider malignant process.    < end of copied text >      Assessment and Plan:   · Assessment	  80y male w/ pmhx/ochx of  DM, HTN, HLD, chronic conjunctivitis and 60 lb weight loss with progressive corneal thinning of both eyes, now POD 3 s/p corneal patch graft of the right eye. Epi defect improved OD. Today with significant thinning OS with small punctate area of K thinning down to about 10%.    # corneal thinning of both eyes, now POD 4 s/p corneal patch graft of the right eye  - patch graft of right eye in place; epi defect resolved  - Today with regluing of OS, BCL in place  - No Appearance of Infectious Infiltrate, Ulcer, Or hypopyon appreciated.  - moxifloxacin QID and fortified Vancomycin QID to the right eye  - c/w PO prednisone, dosing as per primary team given improvement with prednisone  - given recent weight loss and b/l nature of corneal pathology, malignancy workup to evaluate for underlying disease process.  - appreciate rheumatology consult given Recent history of Weight-loss, and Corneal Melting  - appreciate heme/onc eval  - CT with vertebral lytic lesion - Enlarged Prostate and Bladder wall. 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 Warren.    Outpatient follow-up: Patient should follow-up with his/her ophthalmologist or with [facility] Department of Ophthalmology within 1 week of after discharge at:    [address]  [place] [address] [phone] [facility] DEPARTMENT OF OPHTHALMOLOGY  ------------------------------------------------------------------------------  Syeda LALA3 MD  Pager: [phone]  ---------------------------------------  80 year old male with PMH of DM, HTN, HLD, chronic conjunctivitis; presented from ophthalmologist's office (Dr. Warren) for surgery of the right eye (POD 4 corneal patch graft) due to progressive corneal thinning OU.     Interval History: No acute events overnight. Patient reports continued discomfort. No pain. Denies changes in vision.     MEDICATIONS  (STANDING):  atorvastatin 10 milliGRAM(s) Oral at bedtime  cyanocobalamin 1000 MICROGram(s) Oral daily  dextrose 5%. 1000 milliLiter(s) (50 mL/Hr) IV Continuous <Continuous>  dextrose 50% Injectable 12.5 Gram(s) IV Push once  dextrose 50% Injectable 25 Gram(s) IV Push once  dextrose 50% Injectable 25 Gram(s) IV Push once  dorzolamide 2%/timolol 0.5% Ophthalmic Solution 1 Drop(s) Left EYE two times a day  enalapril 5 milliGRAM(s) Oral daily  insulin lispro (ADMELOG) corrective regimen sliding scale   SubCutaneous three times a day before meals  insulin lispro (ADMELOG) corrective regimen sliding scale   SubCutaneous at bedtime  moxifloxacin 0.5% Solution 1 Drop(s) Both EYES <User Schedule>  polyethylene glycol 3350 17 Gram(s) Oral daily  senna 2 Tablet(s) Oral at bedtime  tamsulosin 0.4 milliGRAM(s) Oral at bedtime  vancomycin 25 mG/mL Fortified Ophthalmic 1 Drop(s) Both EYES four times a day    MEDICATIONS  (PRN):  acetaminophen   Tablet .. 650 milliGRAM(s) Oral every 6 hours PRN Mild Pain (1 - 3)  dextrose 40% Gel 15 Gram(s) Oral once PRN Blood Glucose LESS THAN 70 milliGRAM(s)/deciLiter  glucagon  Injectable 1 milliGRAM(s) IntraMuscular once PRN Glucose <70 milliGRAM(s)/deciLiter      VITALS: T(C): 36.8 (11-10-20 @ 13:11)  T(F): 98.3 (11-10-20 @ 13:11), Max: 98.3 (11-10-20 @ 13:11)  HR: 53 (11-10-20 @ 13:11) (52 - 57)  BP: 144/82 (11-10-20 @ 13:11) (118/73 - 144/82)  RR:  (17 - 17)  SpO2:  (99% - 100%)  Wt(kg): --    Ophthalmology Exam:  VA: HM OD, CF OS  Pupils: poor view 2/2 corneal changes, appears minimally reactive OU  Ttono: 19 OU  Extraocular movements (EOMs): Grossly appear full OU, no pain, no diplopia    Slit Lamp Exam:   External: Clear shield in place OD  Lids/Lashes/Lacrimal Ducts: Flat OU Inferior lids noted to be slightly ectropic OU with deep fornices, eyelids crusted shut with significant discharge  Sclera/Conjunctiva: 2+ injection OD 1+ injection OS  Cornea: Patch graft in place, well sutured, (1 loose suture appreciated) laura negative; no epithelial defect; thinning temporal to Graft. Some punctate heme in graft, OD ; OS with corneal glue in place and BCL in place; no infiltrate  Anterior Chamber: D+F OU, No hypopyon appreciated OU, formed OU  Iris: poor view but appears Flat OU  Lens: poor view OU    Radiology:  mazin< from: MR Pelvis Bony Only w/wo IV Cont (11.10.20 @ 14:41) >  The prostate is. There is diffuse thickening of the bladder wall with trabeculation. Please see report from CT of the abdomen and pelvis performed 11/8/2020 for further evaluation of the findings in the imaged portion of the pelvis.    IMPRESSION: No suspicious osseous lesions are demonstrated.  Advanced right hip osteoarthritis and moderate left hip osteoarthritis. Lower lumbar spondylosis.  Right iliopsoas bursitis.    < end of copied text >  < from: MR Lumbar Spine w/ IV Cont (11.09.20 @ 23:42) >  IMPRESSION: Degenerative changes as described above.    < end of copied text >  < from: CT Abdomen and Pelvis w/ IV Cont (11.08.20 @ 15:56) >  BLADDER: Diffuse bladder wall thickening and trabeculations.  REPRODUCTIVE ORGANS: There is markedly enlarged with significant mass effect on the urinary bladder.    < end of copied text >  < from: CT Abdomen and Pelvis w/ IV Cont (11.08.20 @ 15:56) >  IMPRESSION:    Motion limited evaluation.    The prostate gland is markedly enlarged with significant mass effect on the urinary bladder. The urinary bladder has diffuse wall thickening and trabeculations. Correlate clinically for infection or other processes. Lytic lesions are noted within the right acetabulum and L5, consider malignant process.    < end of copied text >      Assessment and Plan:   · Assessment	  80y male w/ pmhx/ochx of  DM, HTN, HLD, chronic conjunctivitis and 60 lb weight loss with progressive corneal thinning of both eyes, now POD 3 s/p corneal patch graft of the right eye. Epi defect improved OD. Today with significant thinning OS with small punctate area of K thinning down to about 10%.    # corneal thinning of both eyes, now POD 4 s/p corneal patch graft of the right eye  - patch graft of right eye in place; no epi defect today  - Glue in place OS; prior desmetocele well sealed with glue and AC deep  - No Appearance of Infectious Infiltrate, Ulcer, Or hypopyon appreciated.  - moxifloxacin QID and fortified Vancomycin QID to the right eye  - c/w PO prednisone, dosing as per primary team given improvement with prednisone  - Conjunctival biopsy taken today OD, consent obtained by patient, will f/u results  - given recent weight loss and b/l nature of corneal pathology, malignancy workup to evaluate for underlying disease process.  - appreciate rheumatology consult given Recent history of Weight-loss, and Corneal Melting  - appreciate heme/onc eval  - CT with vertebral lytic lesion - Enlarged Prostate and Bladder wall. 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 Warren.    Outpatient follow-up: Patient should follow-up with his/her ophthalmologist or with [facility] Department of Ophthalmology within 1 week of after discharge at:    [address]  [place] [address]  [phone] [facility] DEPARTMENT OF OPHTHALMOLOGY  ------------------------------------------------------------------------------  Syeda LALA3 MD  Pager: [phone]  ---------------------------------------  80 year old male with PMH of DM, HTN, HLD, chronic conjunctivitis; presented from ophthalmologist's office (Dr. Warren) for surgery of the right eye (POD 4 corneal patch graft) due to progressive corneal thinning OU.     Interval History: No acute events overnight. Patient reports continued discomfort. No pain. Denies changes in vision.     MEDICATIONS  (STANDING):  atorvastatin 10 milliGRAM(s) Oral at bedtime  cyanocobalamin 1000 MICROGram(s) Oral daily  dextrose 5%. 1000 milliLiter(s) (50 mL/Hr) IV Continuous <Continuous>  dextrose 50% Injectable 12.5 Gram(s) IV Push once  dextrose 50% Injectable 25 Gram(s) IV Push once  dextrose 50% Injectable 25 Gram(s) IV Push once  dorzolamide 2%/timolol 0.5% Ophthalmic Solution 1 Drop(s) Left EYE two times a day  enalapril 5 milliGRAM(s) Oral daily  insulin lispro (ADMELOG) corrective regimen sliding scale   SubCutaneous three times a day before meals  insulin lispro (ADMELOG) corrective regimen sliding scale   SubCutaneous at bedtime  moxifloxacin 0.5% Solution 1 Drop(s) Both EYES <User Schedule>  polyethylene glycol 3350 17 Gram(s) Oral daily  senna 2 Tablet(s) Oral at bedtime  tamsulosin 0.4 milliGRAM(s) Oral at bedtime  vancomycin 25 mG/mL Fortified Ophthalmic 1 Drop(s) Both EYES four times a day    MEDICATIONS  (PRN):  acetaminophen   Tablet .. 650 milliGRAM(s) Oral every 6 hours PRN Mild Pain (1 - 3)  dextrose 40% Gel 15 Gram(s) Oral once PRN Blood Glucose LESS THAN 70 milliGRAM(s)/deciLiter  glucagon  Injectable 1 milliGRAM(s) IntraMuscular once PRN Glucose <70 milliGRAM(s)/deciLiter      VITALS: T(C): 36.8 (11-10-20 @ 13:11)  T(F): 98.3 (11-10-20 @ 13:11), Max: 98.3 (11-10-20 @ 13:11)  HR: 53 (11-10-20 @ 13:11) (52 - 57)  BP: 144/82 (11-10-20 @ 13:11) (118/73 - 144/82)  RR:  (17 - 17)  SpO2:  (99% - 100%)  Wt(kg): --    Ophthalmology Exam:  VA: HM OD, CF OS  Pupils: poor view 2/2 corneal changes, appears minimally reactive OU  Ttono: 19 OU  Extraocular movements (EOMs): Grossly appear full OU, no pain, no diplopia    Slit Lamp Exam:   External: Clear shield in place OD  Lids/Lashes/Lacrimal Ducts: Flat OU Inferior lids noted to be slightly ectropic OU with deep fornices, eyelids crusted shut with significant discharge  Sclera/Conjunctiva: 2+ injection OD 1+ injection OS  Cornea: Patch graft in place, well sutured, (1 loose suture appreciated) laura negative; no epithelial defect; thinning temporal to Graft. Some punctate heme in graft, OD ; OS with corneal glue in place and BCL in place; no infiltrate  Anterior Chamber: D+F OU, No hypopyon appreciated OU, formed OU  Iris: poor view but appears Flat OU  Lens: poor view OU    Radiology:  mazin< from: MR Pelvis Bony Only w/wo IV Cont (11.10.20 @ 14:41) >  The prostate is. There is diffuse thickening of the bladder wall with trabeculation. Please see report from CT of the abdomen and pelvis performed 11/8/2020 for further evaluation of the findings in the imaged portion of the pelvis.    IMPRESSION: No suspicious osseous lesions are demonstrated.  Advanced right hip osteoarthritis and moderate left hip osteoarthritis. Lower lumbar spondylosis.  Right iliopsoas bursitis.    < end of copied text >  < from: MR Lumbar Spine w/ IV Cont (11.09.20 @ 23:42) >  IMPRESSION: Degenerative changes as described above.    < end of copied text >  < from: CT Abdomen and Pelvis w/ IV Cont (11.08.20 @ 15:56) >  BLADDER: Diffuse bladder wall thickening and trabeculations.  REPRODUCTIVE ORGANS: There is markedly enlarged with significant mass effect on the urinary bladder.    < end of copied text >  < from: CT Abdomen and Pelvis w/ IV Cont (11.08.20 @ 15:56) >  IMPRESSION:    Motion limited evaluation.    The prostate gland is markedly enlarged with significant mass effect on the urinary bladder. The urinary bladder has diffuse wall thickening and trabeculations. Correlate clinically for infection or other processes. Lytic lesions are noted within the right acetabulum and L5, consider malignant process.    < end of copied text >      Assessment and Plan:   · Assessment	  80y male w/ pmhx/ochx of  DM, HTN, HLD, chronic conjunctivitis and 60 lb weight loss with progressive corneal thinning of both eyes, now POD 3 s/p corneal patch graft of the right eye. Epi defect improved OD. Today with significant thinning OS with small punctate area of K thinning down to about 10%.    # corneal thinning of both eyes, now POD 4 s/p corneal patch graft of the right eye  - patch graft of right eye in place; no epi defect today  - Glue in place OS; prior desmetocele well sealed with glue and AC deep  - No Appearance of Infectious Infiltrate, Ulcer, Or hypopyon appreciated.  - moxifloxacin QID and fortified Vancomycin QID to the right eye  - restart moxiflox QID to the left eye   - c/w PO prednisone, dosing as per primary team given improvement with prednisone  - Conjunctival biopsy taken today OD, consent obtained by patient, will f/u results  - given recent weight loss and b/l nature of corneal pathology, malignancy workup to evaluate for underlying disease process.  - appreciate rheumatology consult given Recent history of Weight-loss, and Corneal Melting  - appreciate heme/onc eval  - CT with vertebral lytic lesion - Enlarged Prostate and Bladder wall. 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 Warren.    Outpatient follow-up: Patient should follow-up with his/her ophthalmologist or with [facility] Department of Ophthalmology within 1 week of after discharge at:    [address]  [place], [place] [id number] [phone]

## 2020-11-13 NOTE — PROGRESS NOTE ADULT - SUBJECTIVE AND OBJECTIVE BOX
Patient is a 80y old  Male who presents with a chief complaint of Right eye progressive corneal thinning (12 Nov 2020 18:19)      SUBJECTIVE / OVERNIGHT EVENTS:    Patient seen and examined. very frustrated. does not want any further testing. does not understand why all these tests are being done.       Vital Signs Last 24 Hrs  T(C): 36.3 (13 Nov 2020 04:41), Max: 36.7 (12 Nov 2020 20:49)  T(F): 97.4 (13 Nov 2020 04:41), Max: 98.1 (12 Nov 2020 20:49)  HR: 89 (13 Nov 2020 04:41) (69 - 89)  BP: 122/83 (13 Nov 2020 04:41) (103/66 - 122/83)  BP(mean): --  RR: 18 (13 Nov 2020 04:41) (18 - 18)  SpO2: 99% (13 Nov 2020 04:41) (99% - 100%)  I&O's Summary    12 Nov 2020 07:01  -  13 Nov 2020 07:00  --------------------------------------------------------  IN: 1160 mL / OUT: 900 mL / NET: 260 mL        PE:  GENERAL: NAD, AAOx2  HEAD:  Atraumatic, Normocephalic, bl eye patches protecting eyes  CHEST/LUNG: CTABL, No wheeze  HEART: Regular rate and rhythm; no murmur  ABDOMEN: Soft, Nontender, Nondistended; Bowel sounds present  EXTREMITIES:  2+ Peripheral Pulses, No clubbing, cyanosis, or edema  SKIN: No rashes or lesions  NEURO: No focal deficits    LABS:                        12.4   6.02  )-----------( 218      ( 13 Nov 2020 06:41 )             37.2     11-13    132<L>  |  97  |  17  ----------------------------<  236<H>  4.7   |  26  |  0.86    Ca    9.6      13 Nov 2020 06:41  Mg     2.1     11-12    TPro  6.7  /  Alb  3.6  /  TBili  x   /  DBili  x   /  AST  x   /  ALT  x   /  AlkPhos  x   11-11      CAPILLARY BLOOD GLUCOSE      POCT Blood Glucose.: 281 mg/dL (13 Nov 2020 08:31)  POCT Blood Glucose.: 196 mg/dL (12 Nov 2020 21:30)  POCT Blood Glucose.: 227 mg/dL (12 Nov 2020 17:13)  POCT Blood Glucose.: 250 mg/dL (12 Nov 2020 12:29)            RADIOLOGY & ADDITIONAL TESTS:    Imaging Personally Reviewed:  [x] YES  [ ] NO    Consultant(s) Notes Reviewed:  [x] YES  [ ] NO    MEDICATIONS  (STANDING):  atorvastatin 10 milliGRAM(s) Oral at bedtime  cyanocobalamin 1000 MICROGram(s) Oral daily  dextrose 5%. 1000 milliLiter(s) (50 mL/Hr) IV Continuous <Continuous>  dextrose 50% Injectable 12.5 Gram(s) IV Push once  dextrose 50% Injectable 25 Gram(s) IV Push once  dextrose 50% Injectable 25 Gram(s) IV Push once  dorzolamide 2%/timolol 0.5% Ophthalmic Solution 1 Drop(s) Left EYE two times a day  enalapril 5 milliGRAM(s) Oral daily  famotidine    Tablet 20 milliGRAM(s) Oral daily  insulin glargine Injectable (LANTUS) 4 Unit(s) SubCutaneous at bedtime  insulin lispro (ADMELOG) corrective regimen sliding scale.   SubCutaneous three times a day before meals  insulin lispro (ADMELOG) corrective regimen sliding scale.   SubCutaneous at bedtime  insulin lispro Injectable (ADMELOG) 3 Unit(s) SubCutaneous three times a day before meals  moxifloxacin 0.5% Solution 1 Drop(s) Right EYE <User Schedule>  polyethylene glycol 3350 17 Gram(s) Oral daily  predniSONE   Tablet 40 milliGRAM(s) Oral every 24 hours  senna 2 Tablet(s) Oral at bedtime  tamsulosin 0.4 milliGRAM(s) Oral at bedtime  vancomycin 25 mG/mL Fortified Ophthalmic 1 Drop(s) Right EYE four times a day    MEDICATIONS  (PRN):  acetaminophen   Tablet .. 650 milliGRAM(s) Oral every 6 hours PRN Mild Pain (1 - 3)  dextrose 40% Gel 15 Gram(s) Oral once PRN Blood Glucose LESS THAN 70 milliGRAM(s)/deciLiter  glucagon  Injectable 1 milliGRAM(s) IntraMuscular once PRN Glucose <70 milliGRAM(s)/deciLiter      Care Discussed with Consultants/Other Providers [x] YES  [ ] NO    HEALTH ISSUES - PROBLEM Dx:  Prophylactic measure  Prophylactic measure    Benign prostatic hyperplasia, unspecified whether lower urinary tract symptoms present  Benign prostatic hyperplasia, unspecified whether lower urinary tract symptoms present    Hyperlipidemia, unspecified hyperlipidemia type  Hyperlipidemia, unspecified hyperlipidemia type    Essential hypertension  Essential hypertension    Type 2 diabetes mellitus without complication, without long-term current use of insulin  Type 2 diabetes mellitus without complication, without long-term current use of insulin    Weight loss, unintentional  Weight loss, unintentional    Preoperative examination  Preoperative examination    Corneal thinning of right eye  Corneal thinning of right eye

## 2020-11-13 NOTE — PROGRESS NOTE ADULT - SUBJECTIVE AND OBJECTIVE BOX
BARNEY ERICKSON  [id number]    INTERVAL HPI/OVERNIGHT EVENTS: No acute events.     MEDICATIONS  (STANDING):  atorvastatin 10 milliGRAM(s) Oral at bedtime  cyanocobalamin 1000 MICROGram(s) Oral daily  dextrose 5%. 1000 milliLiter(s) (50 mL/Hr) IV Continuous <Continuous>  dextrose 50% Injectable 12.5 Gram(s) IV Push once  dextrose 50% Injectable 25 Gram(s) IV Push once  dextrose 50% Injectable 25 Gram(s) IV Push once  dorzolamide 2%/timolol 0.5% Ophthalmic Solution 1 Drop(s) Left EYE two times a day  enalapril 5 milliGRAM(s) Oral daily  famotidine    Tablet 20 milliGRAM(s) Oral daily  insulin glargine Injectable (LANTUS) 4 Unit(s) SubCutaneous at bedtime  insulin lispro (ADMELOG) corrective regimen sliding scale.   SubCutaneous three times a day before meals  insulin lispro (ADMELOG) corrective regimen sliding scale.   SubCutaneous at bedtime  insulin lispro Injectable (ADMELOG) 3 Unit(s) SubCutaneous three times a day before meals  moxifloxacin 0.5% Solution 1 Drop(s) Right EYE <User Schedule>  polyethylene glycol 3350 17 Gram(s) Oral daily  predniSONE   Tablet 40 milliGRAM(s) Oral every 24 hours  senna 2 Tablet(s) Oral at bedtime  tamsulosin 0.4 milliGRAM(s) Oral at bedtime  vancomycin 25 mG/mL Fortified Ophthalmic 1 Drop(s) Right EYE four times a day    MEDICATIONS  (PRN):  acetaminophen   Tablet .. 650 milliGRAM(s) Oral every 6 hours PRN Mild Pain (1 - 3)  dextrose 40% Gel 15 Gram(s) Oral once PRN Blood Glucose LESS THAN 70 milliGRAM(s)/deciLiter  glucagon  Injectable 1 milliGRAM(s) IntraMuscular once PRN Glucose <70 milliGRAM(s)/deciLiter      Allergies    No Known Allergies    Intolerances          Vital Signs Last 24 Hrs  T(C): 36.3 (13 Nov 2020 13:23), Max: 36.7 (12 Nov 2020 20:49)  T(F): 97.4 (13 Nov 2020 13:23), Max: 98.1 (12 Nov 2020 20:49)  HR: 78 (13 Nov 2020 13:23) (69 - 89)  BP: 132/79 (13 Nov 2020 13:23) (115/71 - 132/79)  BP(mean): --  RR: 20 (13 Nov 2020 13:23) (18 - 20)  SpO2: 98% (13 Nov 2020 13:23) (98% - 100%)    Physical Exam:  General: Lying in bed, NAD  HEENT: Both eyes covered in patch/shield.   CVS: +S1/S2, RRR, no murmurs/rubs/gallops  Resp: CTA b/l. No crackles/wheezing  GI: Soft, NT/ND +BS  MSK: no joint synovitis and normal ROM in all joints.   LABS:                        12.4   6.02  )-----------( 218      ( 13 Nov 2020 06:41 )             37.2     11-13    132<L>  |  97  |  17  ----------------------------<  236<H>  4.7   |  26  |  0.86    Ca    9.6      13 Nov 2020 06:41  Mg     2.1     11-12    RF <10, CCP 26 (mildly high)  THADDEUS negative, dsDNA negative   Nobles and RNP antibodies negative  Complements C3 101 (normal) and C4 30 (normal)  Sjogrens antibodies negative  c-ANCA and p-ANCA negative with MPO/PR3 negative   Scleroderma antibody negative  Vitamin D 1,25-OH 31.5 (normal) and 25-OH 32.4 (normal)  Quantiferon Gold negative  ACE level 32 (normal)          RADIOLOGY & ADDITIONAL TESTS:

## 2020-11-13 NOTE — PROGRESS NOTE ADULT - SUBJECTIVE AND OBJECTIVE BOX
[facility], Division of Infectious Diseases  YULI Arroyo Y. Patel, S. Shah  [phone]    Name: BARNEY ERICKSON  Age: 80y  Gender: Male  MRN: [id number]  Note Date: 11-13-20    Interval History:  Patient seen and examined at bedside. No acute overnight events.     Notes reviewed    Antibiotics:  vancomycin 25 mG/mL Fortified Ophthalmic 1 Drop(s) Right EYE four times a day      Medications:  acetaminophen   Tablet .. 650 milliGRAM(s) Oral every 6 hours PRN  atorvastatin 10 milliGRAM(s) Oral at bedtime  cyanocobalamin 1000 MICROGram(s) Oral daily  dextrose 40% Gel 15 Gram(s) Oral once PRN  dextrose 5%. 1000 milliLiter(s) IV Continuous <Continuous>  dextrose 50% Injectable 12.5 Gram(s) IV Push once  dextrose 50% Injectable 25 Gram(s) IV Push once  dextrose 50% Injectable 25 Gram(s) IV Push once  dorzolamide 2%/timolol 0.5% Ophthalmic Solution 1 Drop(s) Left EYE two times a day  enalapril 5 milliGRAM(s) Oral daily  famotidine    Tablet 20 milliGRAM(s) Oral daily  glucagon  Injectable 1 milliGRAM(s) IntraMuscular once PRN  insulin glargine Injectable (LANTUS) 4 Unit(s) SubCutaneous at bedtime  insulin lispro (ADMELOG) corrective regimen sliding scale.   SubCutaneous three times a day before meals  insulin lispro (ADMELOG) corrective regimen sliding scale.   SubCutaneous at bedtime  insulin lispro Injectable (ADMELOG) 3 Unit(s) SubCutaneous three times a day before meals  moxifloxacin 0.5% Solution 1 Drop(s) Right EYE <User Schedule>  polyethylene glycol 3350 17 Gram(s) Oral daily  predniSONE   Tablet 40 milliGRAM(s) Oral every 24 hours  senna 2 Tablet(s) Oral at bedtime  tamsulosin 0.4 milliGRAM(s) Oral at bedtime  vancomycin 25 mG/mL Fortified Ophthalmic 1 Drop(s) Right EYE four times a day      Review of Systems:  A 10-point review of systems was obtained.     Pertinent positives and negatives--  Constitutional: No fevers. No Chills. No Rigors.   Cardiovascular: No chest pain. No palpitations.  Respiratory: No shortness of breath. No cough.  Gastrointestinal: No nausea or vomiting. No diarrhea or constipation.   Psychiatric: Pleasant. Appropriate affect.    Review of systems otherwise negative except as previously noted.    Allergies: No Known Allergies    For details regarding the patient's past medical history, social history, family history, and other miscellaneous elements, please refer the initial infectious diseases consultation and/or the admitting history and physical examination for this admission.    Objective:  Vitals:   T(C): 36.3 (11-13-20 @ 04:41), Max: 36.7 (11-12-20 @ 20:49)  HR: 89 (11-13-20 @ 04:41) (69 - 89)  BP: 122/83 (11-13-20 @ 04:41) (115/71 - 122/83)  RR: 18 (11-13-20 @ 04:41) (18 - 18)  SpO2: 99% (11-13-20 @ 04:41) (99% - 100%)    Physical Examination:  General: no acute distress  HEENT: NC/AT, unable to do eye exam on pt  Neck: supple, no palpable LAD  Cardio: S1, S2 heard, RRR, no murmurs  Resp: breath sounds heard bilaterally, no rales, wheezes or rhonchi  Abd: soft, NT, ND, + bowel sounds  Neuro: AAOx3, no obvious focal deficits  Ext: no edema or cyanosis  Skin: warm, dry, no visible rash    Laboratory Studies:  CBC:                       12.4   6.02  )-----------( 218      ( 13 Nov 2020 06:41 )             37.2     CMP: 11-13    132<L>  |  97  |  17  ----------------------------<  236<H>  4.7   |  26  |  0.86    Ca    9.6      13 Nov 2020 06:41  Mg     2.1     11-12          Microbiology:    Culture - Blood (collected 11-12-20 @ 09:20)  Source: .Blood Blood-Peripheral  Preliminary Report (11-13-20 @ 10:03):    No growth to date.    Culture - Blood (collected 11-12-20 @ 09:20)  Source: .Blood Blood-Peripheral  Preliminary Report (11-13-20 @ 10:03):    No growth to date.    Culture - Acid Fast - Blood (collected 11-12-20 @ 09:16)  Source: .Blood Blood ISOLATOR TUBE      Radiology:  reviewed

## 2020-11-13 NOTE — PROGRESS NOTE ADULT - PROBLEM SELECTOR PLAN 2
Reported 60lbs weight loss over the past year with poor PO intake and constipation. severe malnutrition  encourage po, protein supplements  CT chest/abd/pelvis with contrast for malignancy work up - small hypodense lesions liver, prostate enlargement, lytic lesions hip and l5  MRI lumbar and pelvis did not see osseous lesions  prostate enlarged-bladder thickening likely BPH, PSA normal, pt without urinary symptoms  Last colonoscopy about 5 years ago per wife was within normal.   currently is not candidate for endoscopy given worsening corneal melting, dw wife  CEA wnl  MRI liver

## 2020-11-13 NOTE — PROGRESS NOTE ADULT - ASSESSMENT
80M with DM2, HTN, HLD, chronic conjunctivitis with recent steroid eye drop use, admitted from ophthalmologist office for R. eye patch corneal graft repair due to progressive corneal thinning. Patient with both eyes affected, R>L. ID called to evaluate for possible infectious etiologies of corneal melt.     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

## 2020-11-13 NOTE — PROGRESS NOTE ADULT - ATTENDING COMMENTS
Infectious Diseases will continue to follow. Please call with any questions.   Yeimi Caputo M.D.  [facility], Division of Infectious Diseases [phone]  For over the weekend and after hours, please call [phone]

## 2020-11-13 NOTE — PROGRESS NOTE ADULT - PROBLEM SELECTOR PLAN 1
Progressive corneal melting of both eyes now with concern for rheumatologic vs malignant vs infectious cause.   sp OR patch graft repair R eye at [facility] 11/7  sp corneal glue left eye 11/11  moxifloxacin qid to both eyes, vanco drops  appreciate onc recs, MRI liver ordered  MRI pelvis did not see osseous lesion seen on CT  CT abd enlarge prostate, PSA wnl, unlikely prostate CA per urology recs  rheum work up undergoing, scleroderma neg, smith ab neg, myeloperoxidase and proteinase neg, THADDEUS neg, c3c4 neg, vit d nml, dsdna neg, ace neg  ID workup undergoing, syphillis neg, lyme neg, hep b panel nml, quant TB neg  ophtho following Progressive corneal melting of both eyes now with concern for rheumatologic vs malignant vs infectious cause.   sp OR patch graft repair R eye at [facility] 11/7  sp corneal glue left eye 11/11  moxifloxacin qid to both eyes, vanco drops  appreciate onc recs, MRI liver ordered  MRI pelvis did not see osseous lesion seen on CT  CT abd enlarge prostate, PSA wnl, unlikely prostate CA per urology recs  rheum work up undergoing, scleroderma neg, smith ab neg, myeloperoxidase and proteinase neg, THADDEUS neg, c3c4 neg, vit d nml, dsdna neg, ace neg  ID workup undergoing, syphillis neg, lyme neg, hep b panel nml, quant TB neg  ophtho following  cont prednisone 40mg

## 2020-11-13 NOTE — PROGRESS NOTE ADULT - ATTENDING COMMENTS
80M with corneal melt of bilateral eyes with weakly positive CCP, no stigmata of rheumatoid arthritis.  Recommend infectious workup, steroids per opthalmology team.  Will follow up biopsy results.    Dr. Adwoa Lara  Rheumatology Attending  [phone]  [email or web address]

## 2020-11-14 LAB
ANION GAP SERPL CALC-SCNC: 8 MMOL/L — SIGNIFICANT CHANGE UP (ref 5–17)
BUN SERPL-MCNC: 17 MG/DL — SIGNIFICANT CHANGE UP (ref 7–23)
CALCIUM SERPL-MCNC: 9.4 MG/DL — SIGNIFICANT CHANGE UP (ref 8.4–10.5)
CHLORIDE SERPL-SCNC: 95 MMOL/L — LOW (ref 96–108)
CO2 SERPL-SCNC: 28 MMOL/L — SIGNIFICANT CHANGE UP (ref 22–31)
CREAT SERPL-MCNC: 0.75 MG/DL — SIGNIFICANT CHANGE UP (ref 0.5–1.3)
GLUCOSE BLDC GLUCOMTR-MCNC: 125 MG/DL — HIGH (ref 70–99)
GLUCOSE BLDC GLUCOMTR-MCNC: 176 MG/DL — HIGH (ref 70–99)
GLUCOSE BLDC GLUCOMTR-MCNC: 238 MG/DL — HIGH (ref 70–99)
GLUCOSE BLDC GLUCOMTR-MCNC: 255 MG/DL — HIGH (ref 70–99)
GLUCOSE SERPL-MCNC: 229 MG/DL — HIGH (ref 70–99)
HCT VFR BLD CALC: 34.6 % — LOW (ref 39–50)
HGB BLD-MCNC: 11.5 G/DL — LOW (ref 13–17)
MCHC RBC-ENTMCNC: 29.3 PG — SIGNIFICANT CHANGE UP (ref 27–34)
MCHC RBC-ENTMCNC: 33.2 GM/DL — SIGNIFICANT CHANGE UP (ref 32–36)
MCV RBC AUTO: 88.3 FL — SIGNIFICANT CHANGE UP (ref 80–100)
NRBC # BLD: 0 /100 WBCS — SIGNIFICANT CHANGE UP (ref 0–0)
PLATELET # BLD AUTO: 212 K/UL — SIGNIFICANT CHANGE UP (ref 150–400)
POTASSIUM SERPL-MCNC: 4.7 MMOL/L — SIGNIFICANT CHANGE UP (ref 3.5–5.3)
POTASSIUM SERPL-SCNC: 4.7 MMOL/L — SIGNIFICANT CHANGE UP (ref 3.5–5.3)
RBC # BLD: 3.92 M/UL — LOW (ref 4.2–5.8)
RBC # FLD: 13.2 % — SIGNIFICANT CHANGE UP (ref 10.3–14.5)
SODIUM SERPL-SCNC: 131 MMOL/L — LOW (ref 135–145)
WBC # BLD: 5.39 K/UL — SIGNIFICANT CHANGE UP (ref 3.8–10.5)
WBC # FLD AUTO: 5.39 K/UL — SIGNIFICANT CHANGE UP (ref 3.8–10.5)

## 2020-11-14 RX ORDER — HUMAN INSULIN 100 [IU]/ML
5 INJECTION, SUSPENSION SUBCUTANEOUS AT BEDTIME
Refills: 0 | Status: DISCONTINUED | OUTPATIENT
Start: 2020-11-14 | End: 2020-11-15

## 2020-11-14 RX ORDER — INSULIN LISPRO 100/ML
4 VIAL (ML) SUBCUTANEOUS
Refills: 0 | Status: DISCONTINUED | OUTPATIENT
Start: 2020-11-14 | End: 2020-11-15

## 2020-11-14 RX ADMIN — SENNA PLUS 2 TABLET(S): 8.6 TABLET ORAL at 21:15

## 2020-11-14 RX ADMIN — Medication 1 DROP(S): at 06:49

## 2020-11-14 RX ADMIN — Medication 1 DROP(S): at 11:21

## 2020-11-14 RX ADMIN — DORZOLAMIDE HYDROCHLORIDE TIMOLOL MALEATE 1 DROP(S): 20; 5 SOLUTION/ DROPS OPHTHALMIC at 06:48

## 2020-11-14 RX ADMIN — POLYETHYLENE GLYCOL 3350 17 GRAM(S): 17 POWDER, FOR SOLUTION ORAL at 12:56

## 2020-11-14 RX ADMIN — PREGABALIN 1000 MICROGRAM(S): 225 CAPSULE ORAL at 12:56

## 2020-11-14 RX ADMIN — TAMSULOSIN HYDROCHLORIDE 0.4 MILLIGRAM(S): 0.4 CAPSULE ORAL at 21:35

## 2020-11-14 RX ADMIN — DORZOLAMIDE HYDROCHLORIDE TIMOLOL MALEATE 1 DROP(S): 20; 5 SOLUTION/ DROPS OPHTHALMIC at 17:48

## 2020-11-14 RX ADMIN — Medication 3 UNIT(S): at 09:15

## 2020-11-14 RX ADMIN — Medication 1 DROP(S): at 00:56

## 2020-11-14 RX ADMIN — FAMOTIDINE 20 MILLIGRAM(S): 10 INJECTION INTRAVENOUS at 12:56

## 2020-11-14 RX ADMIN — HUMAN INSULIN 5 UNIT(S): 100 INJECTION, SUSPENSION SUBCUTANEOUS at 21:33

## 2020-11-14 RX ADMIN — Medication 2: at 17:49

## 2020-11-14 RX ADMIN — Medication 1 DROP(S): at 13:23

## 2020-11-14 RX ADMIN — Medication 5 MILLIGRAM(S): at 06:48

## 2020-11-14 RX ADMIN — Medication 4: at 09:15

## 2020-11-14 RX ADMIN — Medication 1 DROP(S): at 21:17

## 2020-11-14 RX ADMIN — Medication 1 DROP(S): at 06:48

## 2020-11-14 RX ADMIN — ATORVASTATIN CALCIUM 10 MILLIGRAM(S): 80 TABLET, FILM COATED ORAL at 21:16

## 2020-11-14 RX ADMIN — Medication 6: at 12:56

## 2020-11-14 RX ADMIN — Medication 40 MILLIGRAM(S): at 21:16

## 2020-11-14 RX ADMIN — Medication 1 DROP(S): at 12:56

## 2020-11-14 RX ADMIN — Medication 1 DROP(S): at 17:48

## 2020-11-14 RX ADMIN — Medication 4 UNIT(S): at 17:49

## 2020-11-14 NOTE — PROGRESS NOTE ADULT - SUBJECTIVE AND OBJECTIVE BOX
Patient is a 80y old  Male who presents with a chief complaint of Right eye progressive corneal thinning (14 Nov 2020 12:07)      SUBJECTIVE / OVERNIGHT EVENTS:    Patient seen and examined.   denies cp sob. no acute events.     Vital Signs Last 24 Hrs  T(C): 36.7 (14 Nov 2020 11:54), Max: 36.9 (14 Nov 2020 05:07)  T(F): 98.1 (14 Nov 2020 11:54), Max: 98.4 (14 Nov 2020 05:07)  HR: 84 (14 Nov 2020 11:54) (63 - 84)  BP: 104/67 (14 Nov 2020 11:54) (104/67 - 132/79)  BP(mean): --  RR: 18 (14 Nov 2020 11:54) (18 - 20)  SpO2: 99% (14 Nov 2020 11:54) (96% - 99%)  I&O's Summary    13 Nov 2020 07:01  -  14 Nov 2020 07:00  --------------------------------------------------------  IN: 100 mL / OUT: 300 mL / NET: -200 mL    14 Nov 2020 07:01  -  14 Nov 2020 13:18  --------------------------------------------------------  IN: 360 mL / OUT: 150 mL / NET: 210 mL        PE:  GENERAL: NAD, AAOx2  HEAD:  Atraumatic, Normocephalic, bl eye patches protecting eyes  CHEST/LUNG: CTABL, No wheeze  HEART: Regular rate and rhythm; no murmur  ABDOMEN: Soft, Nontender, Nondistended; Bowel sounds present  EXTREMITIES:  2+ Peripheral Pulses, No clubbing, cyanosis, or edema  SKIN: No rashes or lesions  NEURO: No focal deficits    LABS:                        11.5   5.39  )-----------( 212      ( 14 Nov 2020 07:04 )             34.6     11-14    131<L>  |  95<L>  |  17  ----------------------------<  229<H>  4.7   |  28  |  0.75    Ca    9.4      14 Nov 2020 07:04        CAPILLARY BLOOD GLUCOSE      POCT Blood Glucose.: 255 mg/dL (14 Nov 2020 12:06)  POCT Blood Glucose.: 238 mg/dL (14 Nov 2020 09:02)  POCT Blood Glucose.: 145 mg/dL (13 Nov 2020 23:49)  POCT Blood Glucose.: 117 mg/dL (13 Nov 2020 17:32)            RADIOLOGY & ADDITIONAL TESTS:    Imaging Personally Reviewed:  [x] YES  [ ] NO    Consultant(s) Notes Reviewed:  [x] YES  [ ] NO    MEDICATIONS  (STANDING):  atorvastatin 10 milliGRAM(s) Oral at bedtime  cyanocobalamin 1000 MICROGram(s) Oral daily  dextrose 5%. 1000 milliLiter(s) (50 mL/Hr) IV Continuous <Continuous>  dextrose 50% Injectable 12.5 Gram(s) IV Push once  dextrose 50% Injectable 25 Gram(s) IV Push once  dextrose 50% Injectable 25 Gram(s) IV Push once  dorzolamide 2%/timolol 0.5% Ophthalmic Solution 1 Drop(s) Left EYE two times a day  enalapril 5 milliGRAM(s) Oral daily  famotidine    Tablet 20 milliGRAM(s) Oral daily  insulin lispro (ADMELOG) corrective regimen sliding scale.   SubCutaneous three times a day before meals  insulin lispro (ADMELOG) corrective regimen sliding scale.   SubCutaneous at bedtime  insulin lispro Injectable (ADMELOG) 4 Unit(s) SubCutaneous three times a day before meals  insulin NPH human recombinant 5 Unit(s) SubCutaneous at bedtime  moxifloxacin 0.5% Solution 1 Drop(s) Both EYES <User Schedule>  polyethylene glycol 3350 17 Gram(s) Oral daily  predniSONE   Tablet 40 milliGRAM(s) Oral every 24 hours  senna 2 Tablet(s) Oral at bedtime  tamsulosin 0.4 milliGRAM(s) Oral at bedtime  vancomycin 25 mG/mL Fortified Ophthalmic 1 Drop(s) Right EYE four times a day    MEDICATIONS  (PRN):  acetaminophen   Tablet .. 650 milliGRAM(s) Oral every 6 hours PRN Mild Pain (1 - 3)  dextrose 40% Gel 15 Gram(s) Oral once PRN Blood Glucose LESS THAN 70 milliGRAM(s)/deciLiter  glucagon  Injectable 1 milliGRAM(s) IntraMuscular once PRN Glucose <70 milliGRAM(s)/deciLiter      Care Discussed with Consultants/Other Providers [x] YES  [ ] NO    HEALTH ISSUES - PROBLEM Dx:  Prophylactic measure  Prophylactic measure    Benign prostatic hyperplasia, unspecified whether lower urinary tract symptoms present  Benign prostatic hyperplasia, unspecified whether lower urinary tract symptoms present    Hyperlipidemia, unspecified hyperlipidemia type  Hyperlipidemia, unspecified hyperlipidemia type    Essential hypertension  Essential hypertension    Type 2 diabetes mellitus without complication, without long-term current use of insulin  Type 2 diabetes mellitus without complication, without long-term current use of insulin    Weight loss, unintentional  Weight loss, unintentional    Preoperative examination  Preoperative examination    Corneal thinning of right eye  Corneal thinning of right eye

## 2020-11-14 NOTE — PROGRESS NOTE ADULT - PROBLEM SELECTOR PLAN 1
Progressive corneal melting of both eyes now with concern for rheumatologic vs malignant vs infectious cause.   sp OR patch graft repair R eye at [facility] 11/7  sp corneal glue left eye 11/11  moxifloxacin qid, vanco right eye  moxiflox left eye  appreciate onc recs, MRI liver ordered, pending read  MRI pelvis did not see osseous lesion seen on CT  CT abd enlarge prostate, PSA wnl, unlikely prostate CA per urology recs  rheum work up undergoing, scleroderma neg, smith ab neg, myeloperoxidase and proteinase neg, THADDEUS neg, c3c4 neg, vit d nml, dsdna neg, ace neg  ID workup undergoing, syphillis neg, lyme neg, hep b panel nml, quant TB neg  ophtho following  cont prednisone 40mg  fu conjunctival biopsy 11/13

## 2020-11-14 NOTE — PROGRESS NOTE ADULT - ASSESSMENT
80y male w/ pmhx/ochx of  DM, HTN, HLD, chronic conjunctivitis and 60 lb weight loss with progressive corneal thinning of both eyes, now POD 5 s/p corneal patch graft of the right eye. Smal 0.7x0.7mm Epi defect present OD. Today with significant thinning OS with small punctate area of K thinning down to about 10%.    # corneal thinning of both eyes, now POD 5 s/p corneal patch graft of the right eye  - patch graft of right eye in place; 0,7x0.7 mm epi defect today  - Glue in place OS; prior desmetocele well sealed with glue and AC deep  - No Appearance of Infectious Infiltrate, Ulcer, Or hypopyon appreciated.  - moxifloxacin QID and fortified Vancomycin QID to the right eye  - c/w moxiflox QID to the left eye   - c/w PO prednisone, dosing as per primary team given improvement with prednisone  - Conjunctival biopsy taken yesterday OD, consent obtained by patient, will f/u results  - given recent weight loss and b/l nature of corneal pathology, malignancy workup to evaluate for underlying disease process.  - appreciate rheumatology consult given Recent history of Weight-loss, and Corneal Melting  - appreciate heme/onc eval  - CT with vertebral lytic lesion - Enlarged Prostate and Bladder wall. Urology reports normal prostate exam  - MRI - Chronic Degenerative Changes - no Lytic Lesions appreciated  - discharge from lashes cleaned gently   - Patient with Unknown Etiology for aggressive Corneal melting In both eyes, indicative of systemic disease, continue with systemic work-up and treatment  - restart vitamin C 1g daily and doxycycline 100mg BID daily as per primary team if no contraindications  - will monitor closely    # h/o glaucoma  - Hold off on cosopt for now    - Findings discussed with pt and primary team  - Clinical photos and care seen and discussed with attending, Dr. Warren.    Outpatient follow-up: Patient should follow-up with his/her ophthalmologist or with [facility] Department of Ophthalmology within 1 week of after discharge at:    [address]  [place] [address]  [phone]

## 2020-11-14 NOTE — PROGRESS NOTE ADULT - PROBLEM SELECTOR PLAN 2
Reported 60lbs weight loss over the past year with poor PO intake and constipation. severe malnutrition  encourage po, protein supplements  CT chest/abd/pelvis with contrast for malignancy work up - small hypodense lesions liver, prostate enlargement, lytic lesions hip and l5  MRI lumbar and pelvis did not see osseous lesions  prostate enlarged-bladder thickening likely BPH, PSA normal, pt without urinary symptoms  Last colonoscopy about 5 years ago per wife was within normal.   currently is not candidate for endoscopy given worsening corneal melting, dw wife  CEA wnl  MRI liver pending read

## 2020-11-14 NOTE — PROGRESS NOTE ADULT - SUBJECTIVE AND OBJECTIVE BOX
[facility] DEPARTMENT OF OPHTHALMOLOGY - INITIAL ADULT CONSULT  -----------------------------------------------------------------------------  Andre Munguia MD PGY-2  -----------------------------------------------------------------------------    HPI:  81 YO M PMH of DM, HTN, HLD, chronic conjunctivitis; presented from ophthalmologist's office (Dr. Warren) for surgery of the right eye (POD 5corneal patch graft) due to progressive corneal thinning OU.     Interval History: Pt seen and examined at bedside. No acute events overnight reported. Patient reports continued discomfort. No pain. Denies changes in vision.     MEDICATIONS  (STANDING):  atorvastatin 10 milliGRAM(s) Oral at bedtime  cyanocobalamin 1000 MICROGram(s) Oral daily  dextrose 5%. 1000 milliLiter(s) (50 mL/Hr) IV Continuous <Continuous>  dextrose 50% Injectable 12.5 Gram(s) IV Push once  dextrose 50% Injectable 25 Gram(s) IV Push once  dextrose 50% Injectable 25 Gram(s) IV Push once  dorzolamide 2%/timolol 0.5% Ophthalmic Solution 1 Drop(s) Left EYE two times a day  enalapril 5 milliGRAM(s) Oral daily  insulin lispro (ADMELOG) corrective regimen sliding scale   SubCutaneous three times a day before meals  insulin lispro (ADMELOG) corrective regimen sliding scale   SubCutaneous at bedtime  moxifloxacin 0.5% Solution 1 Drop(s) Both EYES <User Schedule>  polyethylene glycol 3350 17 Gram(s) Oral daily  senna 2 Tablet(s) Oral at bedtime  tamsulosin 0.4 milliGRAM(s) Oral at bedtime  vancomycin 25 mG/mL Fortified Ophthalmic 1 Drop(s) Both EYES four times a day    MEDICATIONS  (PRN):  acetaminophen   Tablet .. 650 milliGRAM(s) Oral every 6 hours PRN Mild Pain (1 - 3)  dextrose 40% Gel 15 Gram(s) Oral once PRN Blood Glucose LESS THAN 70 milliGRAM(s)/deciLiter  glucagon  Injectable 1 milliGRAM(s) IntraMuscular once PRN Glucose <70 milliGRAM(s)/deciLiter      VITALS: T(C): 36.8 (11-10-20 @ 13:11)  T(F): 98.3 (11-10-20 @ 13:11), Max: 98.3 (11-10-20 @ 13:11)  HR: 53 (11-10-20 @ 13:11) (52 - 57)  BP: 144/82 (11-10-20 @ 13:11) (118/73 - 144/82)  RR:  (17 - 17)  SpO2:  (99% - 100%)  Wt(kg): --    Ophthalmology Exam:  VA: HM OD, CF OS  Pupils: poor view 2/2 corneal changes, appears minimally reactive OU  Ttono: 18 and 16 OU  Extraocular movements (EOMs): Grossly appear full OU, no pain, no diplopia    Slit Lamp Exam:   External: Clear shield in place OD  Lids/Lashes/Lacrimal Ducts: Flat OU Inferior lids noted to be slightly ectropic OU with deep fornices, eyelids crusted shut with significant discharge  Sclera/Conjunctiva: 2+ injection OD 1+ injection OS  Cornea: Patch graft in place, well sutured, (1 loose suture appreciated) laura negative; 0.7x0.7mm epithelial defect; thinning temporal to Graft. Some punctate heme in graft, OD ; OS with corneal glue in place and BCL in place; no infiltrate  Anterior Chamber: D+F OU, No hypopyon appreciated OU, formed OU  Iris: poor view but appears Flat OU  Lens: poor view OU    Radiology:  mazin< from: MR Pelvis Bony Only w/wo IV Cont (11.10.20 @ 14:41) >  The prostate is. There is diffuse thickening of the bladder wall with trabeculation. Please see report from CT of the abdomen and pelvis performed 11/8/2020 for further evaluation of the findings in the imaged portion of the pelvis.    IMPRESSION: No suspicious osseous lesions are demonstrated.  Advanced right hip osteoarthritis and moderate left hip osteoarthritis. Lower lumbar spondylosis.  Right iliopsoas bursitis.    < end of copied text >  < from: MR Lumbar Spine w/ IV Cont (11.09.20 @ 23:42) >  IMPRESSION: Degenerative changes as described above.    < end of copied text >  < from: CT Abdomen and Pelvis w/ IV Cont (11.08.20 @ 15:56) >  BLADDER: Diffuse bladder wall thickening and trabeculations.  REPRODUCTIVE ORGANS: There is markedly enlarged with significant mass effect on the urinary bladder.    < end of copied text >  < from: CT Abdomen and Pelvis w/ IV Cont (11.08.20 @ 15:56) >  IMPRESSION:    Motion limited evaluation.    The prostate gland is markedly enlarged with significant mass effect on the urinary bladder. The urinary bladder has diffuse wall thickening and trabeculations. Correlate clinically for infection or other processes. Lytic lesions are noted within the right acetabulum and L5, consider malignant process.    < end of copied text >

## 2020-11-15 LAB
A FLAVUS AB FLD QL: NEGATIVE — SIGNIFICANT CHANGE UP
A NIGER AB FLD QL: NEGATIVE — SIGNIFICANT CHANGE UP
A NIGER AB FLD QL: NEGATIVE — SIGNIFICANT CHANGE UP
GLUCOSE BLDC GLUCOMTR-MCNC: 113 MG/DL — HIGH (ref 70–99)
GLUCOSE BLDC GLUCOMTR-MCNC: 133 MG/DL — HIGH (ref 70–99)
GLUCOSE BLDC GLUCOMTR-MCNC: 214 MG/DL — HIGH (ref 70–99)
GLUCOSE BLDC GLUCOMTR-MCNC: 216 MG/DL — HIGH (ref 70–99)
HCT VFR BLD CALC: 34.7 % — LOW (ref 39–50)
HGB BLD-MCNC: 11.4 G/DL — LOW (ref 13–17)
MCHC RBC-ENTMCNC: 29.3 PG — SIGNIFICANT CHANGE UP (ref 27–34)
MCHC RBC-ENTMCNC: 32.9 GM/DL — SIGNIFICANT CHANGE UP (ref 32–36)
MCV RBC AUTO: 89.2 FL — SIGNIFICANT CHANGE UP (ref 80–100)
NRBC # BLD: 0 /100 WBCS — SIGNIFICANT CHANGE UP (ref 0–0)
PLATELET # BLD AUTO: 202 K/UL — SIGNIFICANT CHANGE UP (ref 150–400)
RBC # BLD: 3.89 M/UL — LOW (ref 4.2–5.8)
RBC # FLD: 13.1 % — SIGNIFICANT CHANGE UP (ref 10.3–14.5)
WBC # BLD: 6.72 K/UL — SIGNIFICANT CHANGE UP (ref 3.8–10.5)
WBC # FLD AUTO: 6.72 K/UL — SIGNIFICANT CHANGE UP (ref 3.8–10.5)

## 2020-11-15 RX ORDER — HUMAN INSULIN 100 [IU]/ML
6 INJECTION, SUSPENSION SUBCUTANEOUS AT BEDTIME
Refills: 0 | Status: DISCONTINUED | OUTPATIENT
Start: 2020-11-15 | End: 2020-11-18

## 2020-11-15 RX ORDER — INSULIN LISPRO 100/ML
5 VIAL (ML) SUBCUTANEOUS
Refills: 0 | Status: DISCONTINUED | OUTPATIENT
Start: 2020-11-15 | End: 2020-11-18

## 2020-11-15 RX ORDER — ASCORBIC ACID 60 MG
1000 TABLET,CHEWABLE ORAL DAILY
Refills: 0 | Status: DISCONTINUED | OUTPATIENT
Start: 2020-11-15 | End: 2020-11-18

## 2020-11-15 RX ADMIN — Medication 1 DROP(S): at 13:05

## 2020-11-15 RX ADMIN — TAMSULOSIN HYDROCHLORIDE 0.4 MILLIGRAM(S): 0.4 CAPSULE ORAL at 21:28

## 2020-11-15 RX ADMIN — Medication 1 DROP(S): at 00:05

## 2020-11-15 RX ADMIN — Medication 100 MILLIGRAM(S): at 18:55

## 2020-11-15 RX ADMIN — Medication 5 MILLIGRAM(S): at 06:12

## 2020-11-15 RX ADMIN — Medication 1 DROP(S): at 20:41

## 2020-11-15 RX ADMIN — Medication 4 UNIT(S): at 08:57

## 2020-11-15 RX ADMIN — Medication 4: at 08:56

## 2020-11-15 RX ADMIN — Medication 1 DROP(S): at 18:55

## 2020-11-15 RX ADMIN — HUMAN INSULIN 6 UNIT(S): 100 INJECTION, SUSPENSION SUBCUTANEOUS at 21:30

## 2020-11-15 RX ADMIN — Medication 5 UNIT(S): at 18:05

## 2020-11-15 RX ADMIN — DORZOLAMIDE HYDROCHLORIDE TIMOLOL MALEATE 1 DROP(S): 20; 5 SOLUTION/ DROPS OPHTHALMIC at 18:55

## 2020-11-15 RX ADMIN — Medication 4 UNIT(S): at 13:04

## 2020-11-15 RX ADMIN — Medication 1 DROP(S): at 11:19

## 2020-11-15 RX ADMIN — PREGABALIN 1000 MICROGRAM(S): 225 CAPSULE ORAL at 11:31

## 2020-11-15 RX ADMIN — Medication 40 MILLIGRAM(S): at 20:42

## 2020-11-15 RX ADMIN — Medication 0: at 21:30

## 2020-11-15 RX ADMIN — ATORVASTATIN CALCIUM 10 MILLIGRAM(S): 80 TABLET, FILM COATED ORAL at 21:29

## 2020-11-15 RX ADMIN — Medication 1 DROP(S): at 06:13

## 2020-11-15 RX ADMIN — SENNA PLUS 2 TABLET(S): 8.6 TABLET ORAL at 21:28

## 2020-11-15 RX ADMIN — POLYETHYLENE GLYCOL 3350 17 GRAM(S): 17 POWDER, FOR SOLUTION ORAL at 11:20

## 2020-11-15 RX ADMIN — Medication 1 DROP(S): at 06:12

## 2020-11-15 RX ADMIN — FAMOTIDINE 20 MILLIGRAM(S): 10 INJECTION INTRAVENOUS at 11:20

## 2020-11-15 RX ADMIN — Medication 4: at 13:04

## 2020-11-15 RX ADMIN — Medication 1 DROP(S): at 11:18

## 2020-11-15 NOTE — PROGRESS NOTE ADULT - PROBLEM SELECTOR PLAN 1
Progressive corneal melting of both eyes now with concern for rheumatologic vs malignant vs infectious cause.   sp OR patch graft repair R eye at [facility] 11/7  sp corneal glue left eye 11/11  moxifloxacin qid, vanco right eye  moxiflox left eye  appreciate onc recs  MRI pelvis did not see osseous lesion seen on CT  CT abd enlarge prostate, PSA wnl, unlikely prostate CA per urology recs  rheum work up undergoing, scleroderma neg, smith ab neg, myeloperoxidase and proteinase neg, THADDEUS neg, c3c4 neg, vit d nml, dsdna neg, ace neg  ID workup undergoing, syphillis neg, lyme neg, hep b panel nml, quant TB neg  ophtho following  cont prednisone 40mg  fu conjunctival biopsy 11/13

## 2020-11-15 NOTE — PROGRESS NOTE ADULT - ASSESSMENT
80y male w/ pmhx/ochx of  DM, HTN, HLD, chronic conjunctivitis and 60 lb weight loss with progressive corneal thinning of both eyes, now POD 5 s/p corneal patch graft of the right eye. Smal 0.7x0.7mm Epi defect present OD. Today with significant thinning OS with small punctate area of K thinning down to about 10%.    # corneal thinning of both eyes, now POD 5 s/p corneal patch graft of the right eye  - patch graft of right eye in place; 0,7x0.7 mm epi defect today  - Glue in place OS; prior desmetocele well sealed with glue and AC deep  - No Appearance of Infectious Infiltrate, Ulcer, Or hypopyon appreciated.  - moxifloxacin QID and fortified Vancomycin QID to the right eye  - c/w moxiflox QID to the left eye   - c/w PO prednisone, dosing as per primary team given improvement with prednisone  - Conjunctival biopsy taken Friday OD, consent obtained by patient, will f/u results  - given recent weight loss and b/l nature of corneal pathology, malignancy workup to evaluate for underlying disease process.  - appreciate rheumatology consult given Recent history of Weight-loss, and Corneal Melting  - appreciate heme/onc eval  - CT with vertebral lytic lesion - Enlarged Prostate and Bladder wall. Urology reports normal prostate exam  - MRI - Chronic Degenerative Changes - no Lytic Lesions appreciated  - discharge from lashes cleaned gently   - Patient with Unknown Etiology for aggressive Corneal melting In both eyes, indicative of systemic disease, continue with systemic work-up and treatment  - c/w vitamin C 1g daily and doxycycline 100mg BID daily as per primary team if no contraindications  - will monitor closely    # h/o glaucoma  - Hold off on cosopt for now    - Findings discussed with pt and primary team    d/w chief resident Dr. Bennett  d/w attending, Dr. Warren.    Outpatient follow-up: Patient should follow-up with his/her ophthalmologist or with [facility] Department of Ophthalmology within 1 week of after discharge at:    [address]  [place] [address]  [phone]

## 2020-11-15 NOTE — PROGRESS NOTE ADULT - PROBLEM SELECTOR PLAN 2
Reported 60lbs weight loss over the past year with poor PO intake and constipation. severe malnutrition  encourage po, protein supplements  CT chest/abd/pelvis with contrast for malignancy work up - small hypodense lesions liver, prostate enlargement, lytic lesions hip and l5  MRI lumbar and pelvis did not see osseous lesions  prostate enlarged-bladder thickening likely BPH, PSA normal, pt without urinary symptoms  Last colonoscopy about 5 years ago per wife was within normal.   currently is not candidate for endoscopy given worsening corneal melting  CEA wnl  MRI liver shows liver cysts

## 2020-11-15 NOTE — PROGRESS NOTE ADULT - SUBJECTIVE AND OBJECTIVE BOX
Patient is a 80y old  Male who presents with a chief complaint of Right eye progressive corneal thinning (14 Nov 2020 13:18)      SUBJECTIVE / OVERNIGHT EVENTS:    Patient seen and examined. denies cp sob. no acute events. asking to go home.      Vital Signs Last 24 Hrs  T(C): 36.7 (15 Nov 2020 04:10), Max: 36.7 (14 Nov 2020 11:54)  T(F): 98.1 (15 Nov 2020 04:10), Max: 98.1 (14 Nov 2020 11:54)  HR: 68 (15 Nov 2020 04:10) (68 - 84)  BP: 125/75 (15 Nov 2020 04:10) (104/67 - 125/75)  BP(mean): --  RR: 18 (15 Nov 2020 04:10) (18 - 18)  SpO2: 100% (15 Nov 2020 04:10) (99% - 100%)  I&O's Summary    14 Nov 2020 07:01  -  15 Nov 2020 07:00  --------------------------------------------------------  IN: 1320 mL / OUT: 925 mL / NET: 395 mL        PE:  GENERAL: NAD, AAOx2  HEAD:  Atraumatic, Normocephalic, bl eye patches protecting eyes  CHEST/LUNG: CTABL, No wheeze  HEART: Regular rate and rhythm; no murmur  ABDOMEN: Soft, Nontender, Nondistended; Bowel sounds present  EXTREMITIES:  2+ Peripheral Pulses, No clubbing, cyanosis, or edema  SKIN: No rashes or lesions  NEURO: No focal deficits    LABS:                        11.4   6.72  )-----------( 202      ( 15 Nov 2020 06:56 )             34.7     11-14    131<L>  |  95<L>  |  17  ----------------------------<  229<H>  4.7   |  28  |  0.75    Ca    9.4      14 Nov 2020 07:04        CAPILLARY BLOOD GLUCOSE      POCT Blood Glucose.: 214 mg/dL (15 Nov 2020 08:28)  POCT Blood Glucose.: 125 mg/dL (14 Nov 2020 21:18)  POCT Blood Glucose.: 176 mg/dL (14 Nov 2020 17:32)  POCT Blood Glucose.: 255 mg/dL (14 Nov 2020 12:06)            RADIOLOGY & ADDITIONAL TESTS:    Imaging Personally Reviewed:  [x] YES  [ ] NO    Consultant(s) Notes Reviewed:  [x] YES  [ ] NO    MEDICATIONS  (STANDING):  atorvastatin 10 milliGRAM(s) Oral at bedtime  cyanocobalamin 1000 MICROGram(s) Oral daily  dextrose 5%. 1000 milliLiter(s) (50 mL/Hr) IV Continuous <Continuous>  dextrose 50% Injectable 12.5 Gram(s) IV Push once  dextrose 50% Injectable 25 Gram(s) IV Push once  dextrose 50% Injectable 25 Gram(s) IV Push once  dorzolamide 2%/timolol 0.5% Ophthalmic Solution 1 Drop(s) Left EYE two times a day  enalapril 5 milliGRAM(s) Oral daily  famotidine    Tablet 20 milliGRAM(s) Oral daily  insulin lispro (ADMELOG) corrective regimen sliding scale.   SubCutaneous three times a day before meals  insulin lispro (ADMELOG) corrective regimen sliding scale.   SubCutaneous at bedtime  insulin lispro Injectable (ADMELOG) 4 Unit(s) SubCutaneous three times a day before meals  insulin NPH human recombinant 5 Unit(s) SubCutaneous at bedtime  moxifloxacin 0.5% Solution 1 Drop(s) Both EYES <User Schedule>  polyethylene glycol 3350 17 Gram(s) Oral daily  predniSONE   Tablet 40 milliGRAM(s) Oral every 24 hours  senna 2 Tablet(s) Oral at bedtime  tamsulosin 0.4 milliGRAM(s) Oral at bedtime  vancomycin 25 mG/mL Fortified Ophthalmic 1 Drop(s) Right EYE four times a day    MEDICATIONS  (PRN):  acetaminophen   Tablet .. 650 milliGRAM(s) Oral every 6 hours PRN Mild Pain (1 - 3)  dextrose 40% Gel 15 Gram(s) Oral once PRN Blood Glucose LESS THAN 70 milliGRAM(s)/deciLiter  glucagon  Injectable 1 milliGRAM(s) IntraMuscular once PRN Glucose <70 milliGRAM(s)/deciLiter      Care Discussed with Consultants/Other Providers [x] YES  [ ] NO    HEALTH ISSUES - PROBLEM Dx:  Prophylactic measure  Prophylactic measure    Benign prostatic hyperplasia, unspecified whether lower urinary tract symptoms present  Benign prostatic hyperplasia, unspecified whether lower urinary tract symptoms present    Hyperlipidemia, unspecified hyperlipidemia type  Hyperlipidemia, unspecified hyperlipidemia type    Essential hypertension  Essential hypertension    Type 2 diabetes mellitus without complication, without long-term current use of insulin  Type 2 diabetes mellitus without complication, without long-term current use of insulin    Weight loss, unintentional  Weight loss, unintentional    Preoperative examination  Preoperative examination    Corneal thinning of right eye  Corneal thinning of right eye

## 2020-11-15 NOTE — PROGRESS NOTE ADULT - SUBJECTIVE AND OBJECTIVE BOX
[facility] DEPARTMENT OF OPHTHALMOLOGY  ------------------------------------------------------------------------------  Andre Munguia MD PGY-2  ------------------------------------------------------------------------------  79 YO M PMH of DM, HTN, HLD, chronic conjunctivitis; presented from ophthalmologist's office (Dr. Warren) for surgery of the right eye (POD 5corneal patch graft) due to progressive corneal thinning OU.     Interval History: Pt seen and examined at bedside. No acute events overnight reported. Patient reports continued discomfort. No pain. Denies changes in vision.     MEDICATIONS  (STANDING):  atorvastatin 10 milliGRAM(s) Oral at bedtime  cyanocobalamin 1000 MICROGram(s) Oral daily  dextrose 5%. 1000 milliLiter(s) (50 mL/Hr) IV Continuous <Continuous>  dextrose 50% Injectable 12.5 Gram(s) IV Push once  dextrose 50% Injectable 25 Gram(s) IV Push once  dextrose 50% Injectable 25 Gram(s) IV Push once  dorzolamide 2%/timolol 0.5% Ophthalmic Solution 1 Drop(s) Left EYE two times a day  enalapril 5 milliGRAM(s) Oral daily  famotidine    Tablet 20 milliGRAM(s) Oral daily  insulin lispro (ADMELOG) corrective regimen sliding scale.   SubCutaneous three times a day before meals  insulin lispro (ADMELOG) corrective regimen sliding scale.   SubCutaneous at bedtime  insulin lispro Injectable (ADMELOG) 5 Unit(s) SubCutaneous three times a day before meals  insulin NPH human recombinant 6 Unit(s) SubCutaneous at bedtime  moxifloxacin 0.5% Solution 1 Drop(s) Both EYES <User Schedule>  polyethylene glycol 3350 17 Gram(s) Oral daily  predniSONE   Tablet 40 milliGRAM(s) Oral every 24 hours  senna 2 Tablet(s) Oral at bedtime  tamsulosin 0.4 milliGRAM(s) Oral at bedtime  vancomycin 25 mG/mL Fortified Ophthalmic 1 Drop(s) Right EYE four times a day    MEDICATIONS  (PRN):  acetaminophen   Tablet .. 650 milliGRAM(s) Oral every 6 hours PRN Mild Pain (1 - 3)  dextrose 40% Gel 15 Gram(s) Oral once PRN Blood Glucose LESS THAN 70 milliGRAM(s)/deciLiter  glucagon  Injectable 1 milliGRAM(s) IntraMuscular once PRN Glucose <70 milliGRAM(s)/deciLiter      VITALS: T(C): 37.1 (11-15-20 @ 11:39)  T(F): 98.8 (11-15-20 @ 11:39), Max: 98.8 (11-15-20 @ 11:39)  HR: 75 (11-15-20 @ 11:39) (68 - 77)  BP: 117/70 (11-15-20 @ 11:39) (117/70 - 125/75)  RR:  (18 - 18)  SpO2:  (99% - 100%)  Wt(kg): --  General: AAO x 3, appropriate mood and affect    Ophthalmology Exam:  Visual acuity (sc): HM OD, CF OS  Pupils: poor view 2/2 corneal changes, appears minimally reactive OU  Ttono: 17 OU  Extraocular movements (EOMs): Full OU, no pain, no diplopia    Slit Lamp Exam:   External: Clear shield in place OD  Lids/Lashes/Lacrimal Ducts: Flat OU Inferior lids noted to be slightly ectropic OU with deep fornices, eyelids crusted shut with significant discharge  Sclera/Conjunctiva: 2+ injection OD 1+ injection OS  Cornea: Patch graft in place, well sutured, (1 loose suture appreciated) laura negative; 0.7x0.7mm epithelial defect; thinning temporal to Graft. Some punctate heme in graft, OD ; OS with corneal glue in place and BCL in place; no infiltrate  Anterior Chamber: D+F OU, No hypopyon appreciated OU, formed OU  Iris: poor view but appears Flat OU  Lens: poor view OU    Radiology:  mazin< from: MR Pelvis Bony Only w/wo IV Cont (11.10.20 @ 14:41) >  The prostate is. There is diffuse thickening of the bladder wall with trabeculation. Please see report from CT of the abdomen and pelvis performed 11/8/2020 for further evaluation of the findings in the imaged portion of the pelvis.    IMPRESSION: No suspicious osseous lesions are demonstrated.  Advanced right hip osteoarthritis and moderate left hip osteoarthritis. Lower lumbar spondylosis.  Right iliopsoas bursitis.    < end of copied text >  < from: MR Lumbar Spine w/ IV Cont (11.09.20 @ 23:42) >  IMPRESSION: Degenerative changes as described above.    < end of copied text >  < from: CT Abdomen and Pelvis w/ IV Cont (11.08.20 @ 15:56) >  BLADDER: Diffuse bladder wall thickening and trabeculations.  REPRODUCTIVE ORGANS: There is markedly enlarged with significant mass effect on the urinary bladder.    < end of copied text >  < from: CT Abdomen and Pelvis w/ IV Cont (11.08.20 @ 15:56) >  IMPRESSION:    Motion limited evaluation.    The prostate gland is markedly enlarged with significant mass effect on the urinary bladder. The urinary bladder has diffuse wall thickening and trabeculations. Correlate clinically for infection or other processes. Lytic lesions are noted within the right acetabulum and L5, consider malignant process.    < end of copied text >

## 2020-11-16 DIAGNOSIS — F43.21 ADJUSTMENT DISORDER WITH DEPRESSED MOOD: ICD-10-CM

## 2020-11-16 DIAGNOSIS — F03.90 UNSPECIFIED DEMENTIA WITHOUT BEHAVIORAL DISTURBANCE: ICD-10-CM

## 2020-11-16 LAB
CREATININE, URINE RESULT: 45 MG/DL — SIGNIFICANT CHANGE UP
GLUCOSE BLDC GLUCOMTR-MCNC: 161 MG/DL — HIGH (ref 70–99)
GLUCOSE BLDC GLUCOMTR-MCNC: 180 MG/DL — HIGH (ref 70–99)
GLUCOSE BLDC GLUCOMTR-MCNC: 203 MG/DL — HIGH (ref 70–99)
PROT ?TM UR-MCNC: 4 MG/DL — SIGNIFICANT CHANGE UP (ref 0–12)

## 2020-11-16 PROCEDURE — 99223 1ST HOSP IP/OBS HIGH 75: CPT

## 2020-11-16 RX ADMIN — POLYETHYLENE GLYCOL 3350 17 GRAM(S): 17 POWDER, FOR SOLUTION ORAL at 11:46

## 2020-11-16 RX ADMIN — TAMSULOSIN HYDROCHLORIDE 0.4 MILLIGRAM(S): 0.4 CAPSULE ORAL at 21:44

## 2020-11-16 RX ADMIN — Medication 100 MILLIGRAM(S): at 06:56

## 2020-11-16 RX ADMIN — Medication 1 DROP(S): at 00:52

## 2020-11-16 RX ADMIN — PREGABALIN 1000 MICROGRAM(S): 225 CAPSULE ORAL at 11:48

## 2020-11-16 RX ADMIN — Medication 40 MILLIGRAM(S): at 21:43

## 2020-11-16 RX ADMIN — Medication 2: at 17:43

## 2020-11-16 RX ADMIN — DORZOLAMIDE HYDROCHLORIDE TIMOLOL MALEATE 1 DROP(S): 20; 5 SOLUTION/ DROPS OPHTHALMIC at 06:55

## 2020-11-16 RX ADMIN — DORZOLAMIDE HYDROCHLORIDE TIMOLOL MALEATE 1 DROP(S): 20; 5 SOLUTION/ DROPS OPHTHALMIC at 17:26

## 2020-11-16 RX ADMIN — Medication 1 DROP(S): at 11:48

## 2020-11-16 RX ADMIN — FAMOTIDINE 20 MILLIGRAM(S): 10 INJECTION INTRAVENOUS at 11:45

## 2020-11-16 RX ADMIN — Medication 1 DROP(S): at 06:54

## 2020-11-16 RX ADMIN — Medication 1000 MILLIGRAM(S): at 11:45

## 2020-11-16 RX ADMIN — ATORVASTATIN CALCIUM 10 MILLIGRAM(S): 80 TABLET, FILM COATED ORAL at 21:44

## 2020-11-16 RX ADMIN — HUMAN INSULIN 6 UNIT(S): 100 INJECTION, SUSPENSION SUBCUTANEOUS at 21:45

## 2020-11-16 RX ADMIN — Medication 4: at 09:11

## 2020-11-16 RX ADMIN — SENNA PLUS 2 TABLET(S): 8.6 TABLET ORAL at 21:44

## 2020-11-16 RX ADMIN — Medication 1 DROP(S): at 09:16

## 2020-11-16 RX ADMIN — Medication 5 UNIT(S): at 17:43

## 2020-11-16 RX ADMIN — Medication 5 UNIT(S): at 09:12

## 2020-11-16 RX ADMIN — Medication 1 DROP(S): at 06:56

## 2020-11-16 RX ADMIN — Medication 100 MILLIGRAM(S): at 17:26

## 2020-11-16 RX ADMIN — Medication 5 MILLIGRAM(S): at 06:55

## 2020-11-16 RX ADMIN — Medication 1 DROP(S): at 21:44

## 2020-11-16 NOTE — BEHAVIORAL HEALTH ASSESSMENT NOTE - NSBHCHARTREVIEWINVESTIGATE_PSY_A_CORE FT
e< from: 12 Lead ECG (11.06.20 @ 15:49) >    Ventricular Rate 56 BPM    Atrial Rate 56 BPM    P-R Interval 182 ms    QRS Duration 116 ms    Q-T Interval 430 ms    QTC Calculation(Bazett) 414 ms    P Axis 78 degrees    R Axis -65 degrees    T Axis 34 degrees    Diagnosis Line SINUS BRADYCARDIA  LEFT AXIS DEVIATION  RIGHT BUNDLE BRANCH BLOCK LEFT ANTERIOR HEMIBLOCK  SEPTAL INFARCT (CITED ON OR BEFORE 24-OCT-2019)  ABNORMAL ECG  WHEN COMPARED WITH ECG OF 24-OCT-2019 21:25,  NO SIGNIFICANT CHANGE WAS FOUND  Confirmed by MD CALIXTO, ETHEL ([id number]) on 11/7/2020 7:46:02 PM    < end of copied text >

## 2020-11-16 NOTE — PROGRESS NOTE ADULT - SUBJECTIVE AND OBJECTIVE BOX
[facility], Division of Infectious Diseases  YULI Arroyo Y. Patel, S. Shah  [phone]  after hours and weekends [phone]    Name: BARNEY ERICKSON  Age: 80y  Gender: Male  MRN: [id number]    Interval History--  Notes reviewed  you have to help me, I feel awful all day   people say one thing and do another.  its a timing thing      Allergies    No Known Allergies    Intolerances        Medications--  Antibiotics:  doxycycline hyclate Capsule 100 milliGRAM(s) Oral every 12 hours  vancomycin 25 mG/mL Fortified Ophthalmic 1 Drop(s) Right EYE four times a day    Immunologic:    Other:  acetaminophen   Tablet .. PRN  ascorbic acid  atorvastatin  cyanocobalamin  dextrose 40% Gel PRN  dextrose 5%.  dextrose 50% Injectable  dextrose 50% Injectable  dextrose 50% Injectable  dorzolamide 2%/timolol 0.5% Ophthalmic Solution  enalapril  famotidine    Tablet  glucagon  Injectable PRN  insulin lispro (ADMELOG) corrective regimen sliding scale.  insulin lispro (ADMELOG) corrective regimen sliding scale.  insulin lispro Injectable (ADMELOG)  insulin NPH human recombinant  moxifloxacin 0.5% Solution  polyethylene glycol 3350  predniSONE   Tablet  senna  tamsulosin      Review of Systems--  A 10-point review of systems was obtained.     unable to obtain     Review of systems otherwise negative except as previously noted.    Physical Examination--  Vital Signs: T(F): 98 (11-16-20 @ 03:22), Max: 98 (11-16-20 @ 03:22)  HR: 66 (11-16-20 @ 03:22)  BP: 112/71 (11-16-20 @ 03:22)  RR: 18 (11-16-20 @ 03:22)  SpO2: 100% (11-16-20 @ 03:22)  Wt(kg): --  General: Nontoxic-appearing Male in no acute distress.  HEENT: AT/NC. eyes taped shut   Neck: Not rigid. No sense of mass.  Nodes: None palpable.  Lungs: Clear bilaterally without rales, wheezing or rhonchi  Heart: Regular rate and rhythm.  Abdomen: Bowel sounds present and normoactive. Soft. Nondistended. Nontender.   Extremities: No cyanosis or clubbing. No edema.   Skin: Warm. Dry. Good turgor. No rash. No vasculitic stigmata.  Psychiatric: agitated confused       Laboratory Studies--  CBC                        11.4   6.72  )-----------( 202      ( 15 Nov 2020 06:56 )             34.7       Chemistries          Culture Data    Culture - Blood (collected 12 Nov 2020 09:20)  Source: .Blood Blood-Peripheral  Preliminary Report (13 Nov 2020 10:03):    No growth to date.    Culture - Blood (collected 12 Nov 2020 09:20)  Source: .Blood Blood-Peripheral  Preliminary Report (13 Nov 2020 10:03):    No growth to date.    Culture - Acid Fast - Blood (collected 12 Nov 2020 09:16)  Source: .Blood Blood ISOLATOR TUBE  Preliminary Report (14 Nov 2020 15:04):    Culture is being performed.        Acid Fast Bacilli Smear:   Gram stain and/or acid fast smears, unless directly sent, will not be   performed due to interfering ingredients in the isolator tube causing   false negative results. (11.12.20 @ 09:16) Treponema Pallidum Antibody Interpretation: Negative: A    Surgical Final Report   Final Diagnosis   1 Cornea, right eye, cornea patch graft   - Fragments of denuded, moderately edematous corneal stroma   of irregular thickness and shape, with rare foci showing the   presence of attenuated surface epithelium.   - Scattered red blood cells and an occasional neutrophil is   noted within the stroma   - No micro-organisms are identified in the current   specimen.

## 2020-11-16 NOTE — CHART NOTE - NSCHARTNOTEFT_GEN_A_CORE
MRI shows that liver lesions more consistent with hepatic cysts. No evidence of malignancy at present. Continue workup per rheumatology and infectious disease. No further oncologic workup planned. Oncology to sign off, please reconsult as needed.    Rock Castellanos, PGY-6  Hematology-Oncology Fellow  [phone] ([place]) [phone] ([facility])

## 2020-11-16 NOTE — PROGRESS NOTE ADULT - ASSESSMENT
80M with DM2, HTN, HLD, chronic conjunctivitis with recent steroid eye drop use, admitted from ophthalmologist office for R. eye patch corneal graft repair due to progressive corneal thinning. Patient with both eyes affected, R>L. ID called to evaluate for possible infectious etiologies of corneal melt.     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

## 2020-11-16 NOTE — PROGRESS NOTE ADULT - SUBJECTIVE AND OBJECTIVE BOX
[facility] DEPARTMENT OF OPHTHALMOLOGY  ------------------------------------------------------------------------------  Syeda Hurtado PGY-3 MD  [phone]  ------------------------------------------------------------------------------  81 YO M PMH of DM, HTN, HLD, chronic conjunctivitis; presented from ophthalmologist's office (Dr. Warren) for surgery of the right eye (POD 5corneal patch graft) due to progressive corneal thinning OU.     Interval History: Pt seen and examined at bedside. No acute events overnight reported. Patient reports continued discomfort. No pain. Denies changes in vision.     MEDICATIONS  (STANDING):  atorvastatin 10 milliGRAM(s) Oral at bedtime  cyanocobalamin 1000 MICROGram(s) Oral daily  dextrose 5%. 1000 milliLiter(s) (50 mL/Hr) IV Continuous <Continuous>  dextrose 50% Injectable 12.5 Gram(s) IV Push once  dextrose 50% Injectable 25 Gram(s) IV Push once  dextrose 50% Injectable 25 Gram(s) IV Push once  dorzolamide 2%/timolol 0.5% Ophthalmic Solution 1 Drop(s) Left EYE two times a day  enalapril 5 milliGRAM(s) Oral daily  famotidine    Tablet 20 milliGRAM(s) Oral daily  insulin lispro (ADMELOG) corrective regimen sliding scale.   SubCutaneous three times a day before meals  insulin lispro (ADMELOG) corrective regimen sliding scale.   SubCutaneous at bedtime  insulin lispro Injectable (ADMELOG) 5 Unit(s) SubCutaneous three times a day before meals  insulin NPH human recombinant 6 Unit(s) SubCutaneous at bedtime  moxifloxacin 0.5% Solution 1 Drop(s) Both EYES <User Schedule>  polyethylene glycol 3350 17 Gram(s) Oral daily  predniSONE   Tablet 40 milliGRAM(s) Oral every 24 hours  senna 2 Tablet(s) Oral at bedtime  tamsulosin 0.4 milliGRAM(s) Oral at bedtime  vancomycin 25 mG/mL Fortified Ophthalmic 1 Drop(s) Right EYE four times a day    MEDICATIONS  (PRN):  acetaminophen   Tablet .. 650 milliGRAM(s) Oral every 6 hours PRN Mild Pain (1 - 3)  dextrose 40% Gel 15 Gram(s) Oral once PRN Blood Glucose LESS THAN 70 milliGRAM(s)/deciLiter  glucagon  Injectable 1 milliGRAM(s) IntraMuscular once PRN Glucose <70 milliGRAM(s)/deciLiter      VITALS: T(C): 37.1 (11-15-20 @ 11:39)  T(F): 98.8 (11-15-20 @ 11:39), Max: 98.8 (11-15-20 @ 11:39)  HR: 75 (11-15-20 @ 11:39) (68 - 77)  BP: 117/70 (11-15-20 @ 11:39) (117/70 - 125/75)  RR:  (18 - 18)  SpO2:  (99% - 100%)  Wt(kg): --  General: AAO x 3, appropriate mood and affect    Ophthalmology Exam:  Visual acuity (sc): HM OD, CF OS  Pupils: poor view 2/2 corneal changes, appears minimally reactive OU  Ttono: 17, 18  Extraocular movements (EOMs): Full OU, no pain, no diplopia    Slit Lamp Exam:   External: Clear shield in place OD  Lids/Lashes/Lacrimal Ducts: Flat OU Inferior lids noted to be slightly ectropic OU with deep fornices, eyelids crusted shut with significant discharge  Sclera/Conjunctiva: 2+ injection OD 1+ injection OS  Cornea: Patch graft in place, well sutured, (1 loose suture appreciated) laura negative; no epithelial defect; thinning temporal to Graft. Some punctate heme in graft, OD ; OS with corneal glue in place and BCL in place; no infiltrate  Anterior Chamber: D+F OU, No hypopyon appreciated OU, formed OU  Iris: poor view but appears Flat OU  Lens: poor view OU    Radiology:  mazin< from: MR Pelvis Bony Only w/wo IV Cont (11.10.20 @ 14:41) >  The prostate is. There is diffuse thickening of the bladder wall with trabeculation. Please see report from CT of the abdomen and pelvis performed 11/8/2020 for further evaluation of the findings in the imaged portion of the pelvis.    IMPRESSION: No suspicious osseous lesions are demonstrated.  Advanced right hip osteoarthritis and moderate left hip osteoarthritis. Lower lumbar spondylosis.  Right iliopsoas bursitis.    < end of copied text >  < from: MR Lumbar Spine w/ IV Cont (11.09.20 @ 23:42) >  IMPRESSION: Degenerative changes as described above.    < end of copied text >  < from: CT Abdomen and Pelvis w/ IV Cont (11.08.20 @ 15:56) >  BLADDER: Diffuse bladder wall thickening and trabeculations.  REPRODUCTIVE ORGANS: There is markedly enlarged with significant mass effect on the urinary bladder.    < end of copied text >  < from: CT Abdomen and Pelvis w/ IV Cont (11.08.20 @ 15:56) >  IMPRESSION:    Motion limited evaluation.    The prostate gland is markedly enlarged with significant mass effect on the urinary bladder. The urinary bladder has diffuse wall thickening and trabeculations. Correlate clinically for infection or other processes. Lytic lesions are noted within the right acetabulum and L5, consider malignant process.    < end of copied text >    Assessment and Plan:   · Assessment	  80y male w/ pmhx/ochx of  DM, HTN, HLD, chronic conjunctivitis and 60 lb weight loss with progressive corneal thinning of both eyes, s/p corneal patch graft of the right eye. OD with patch graft in place, no epi defect. OS with glue in place, chamber deep, no corneal thinning.    # corneal thinning of both eyes, s/p corneal patch graft of the right eye and corneal gluing of the left eye  - patch graft of right eye in place; no epi defect today  - Glue in place OS; prior desmetocele well sealed with glue and AC deep  - No Appearance of Infectious Infiltrate, Ulcer, Or hypopyon appreciated  - c/w moxifloxacin QID and fortified Vancomycin QID to the right eye  - c/w moxiflox QID to the left eye   - c/w PO prednisone, dosing as per primary team given improvement with prednisone  - Conjunctival biopsy taken Friday OD, will f/u results  - given recent weight loss and b/l nature of corneal pathology, malignancy workup was done to evaluate for underlying disease process, unrevealing to date  - appreciate rheumatology evaluation  - appreciate heme/onc evaluation  - discharge from lashes cleaned gently   - c/w vitamin C 1g daily and doxycycline 100mg BID daily as per primary team if no contraindications  - stable for discharge tomorrow   - will monitor closely    # h/o glaucoma  - Hold off on cosopt for now    Findings discussed with pt and primary team    DW Dr. Warren (cornea attending)    Outpatient follow-up: Patient should follow-up with his/her ophthalmologist or with [facility] Department of Ophthalmology within 2-3 days of discharge at:    [address]  [place] [address]  [phone]

## 2020-11-16 NOTE — PROGRESS NOTE ADULT - SUBJECTIVE AND OBJECTIVE BOX
Patient is a 80y old  Male who presents with a chief complaint of Right eye progressive corneal thinning (15 Nov 2020 17:46)      SUBJECTIVE / OVERNIGHT EVENTS:    Patient seen and examined. denies acute events. states he thinks vision is better but not sure.      Vital Signs Last 24 Hrs  T(C): 36.7 (16 Nov 2020 03:22), Max: 37.1 (15 Nov 2020 11:39)  T(F): 98 (16 Nov 2020 03:22), Max: 98.8 (15 Nov 2020 11:39)  HR: 66 (16 Nov 2020 03:22) (66 - 79)  BP: 112/71 (16 Nov 2020 03:22) (112/71 - 162/87)  BP(mean): --  RR: 18 (16 Nov 2020 03:22) (18 - 18)  SpO2: 100% (16 Nov 2020 03:22) (99% - 100%)  I&O's Summary    15 Nov 2020 07:01  -  16 Nov 2020 07:00  --------------------------------------------------------  IN: 960 mL / OUT: 1500 mL / NET: -540 mL    16 Nov 2020 07:01  -  16 Nov 2020 11:30  --------------------------------------------------------  IN: 240 mL / OUT: 300 mL / NET: -60 mL        PE:  GENERAL: NAD, AAOx2  HEAD:  Atraumatic, Normocephalic, bl eye patches protecting eyes  CHEST/LUNG: CTABL, No wheeze  HEART: Regular rate and rhythm; no murmur  ABDOMEN: Soft, Nontender, Nondistended; Bowel sounds present  EXTREMITIES:  2+ Peripheral Pulses, No clubbing, cyanosis, or edema  SKIN: No rashes or lesions  NEURO: No focal deficits    LABS:                        11.4   6.72  )-----------( 202      ( 15 Nov 2020 06:56 )             34.7             CAPILLARY BLOOD GLUCOSE      POCT Blood Glucose.: 203 mg/dL (16 Nov 2020 08:13)  POCT Blood Glucose.: 113 mg/dL (15 Nov 2020 21:25)  POCT Blood Glucose.: 133 mg/dL (15 Nov 2020 17:33)  POCT Blood Glucose.: 216 mg/dL (15 Nov 2020 12:14)            RADIOLOGY & ADDITIONAL TESTS:    Imaging Personally Reviewed:  [x] YES  [ ] NO    Consultant(s) Notes Reviewed:  [x] YES  [ ] NO    MEDICATIONS  (STANDING):  ascorbic acid 1000 milliGRAM(s) Oral daily  atorvastatin 10 milliGRAM(s) Oral at bedtime  cyanocobalamin 1000 MICROGram(s) Oral daily  dextrose 5%. 1000 milliLiter(s) (50 mL/Hr) IV Continuous <Continuous>  dextrose 50% Injectable 12.5 Gram(s) IV Push once  dextrose 50% Injectable 25 Gram(s) IV Push once  dextrose 50% Injectable 25 Gram(s) IV Push once  dorzolamide 2%/timolol 0.5% Ophthalmic Solution 1 Drop(s) Left EYE two times a day  doxycycline hyclate Capsule 100 milliGRAM(s) Oral every 12 hours  enalapril 5 milliGRAM(s) Oral daily  famotidine    Tablet 20 milliGRAM(s) Oral daily  insulin lispro (ADMELOG) corrective regimen sliding scale.   SubCutaneous three times a day before meals  insulin lispro (ADMELOG) corrective regimen sliding scale.   SubCutaneous at bedtime  insulin lispro Injectable (ADMELOG) 5 Unit(s) SubCutaneous three times a day before meals  insulin NPH human recombinant 6 Unit(s) SubCutaneous at bedtime  moxifloxacin 0.5% Solution 1 Drop(s) Both EYES <User Schedule>  polyethylene glycol 3350 17 Gram(s) Oral daily  predniSONE   Tablet 40 milliGRAM(s) Oral every 24 hours  senna 2 Tablet(s) Oral at bedtime  tamsulosin 0.4 milliGRAM(s) Oral at bedtime  vancomycin 25 mG/mL Fortified Ophthalmic 1 Drop(s) Right EYE four times a day    MEDICATIONS  (PRN):  acetaminophen   Tablet .. 650 milliGRAM(s) Oral every 6 hours PRN Mild Pain (1 - 3)  dextrose 40% Gel 15 Gram(s) Oral once PRN Blood Glucose LESS THAN 70 milliGRAM(s)/deciLiter  glucagon  Injectable 1 milliGRAM(s) IntraMuscular once PRN Glucose <70 milliGRAM(s)/deciLiter      Care Discussed with Consultants/Other Providers [x] YES  [ ] NO    HEALTH ISSUES - PROBLEM Dx:  Prophylactic measure  Prophylactic measure    Benign prostatic hyperplasia, unspecified whether lower urinary tract symptoms present  Benign prostatic hyperplasia, unspecified whether lower urinary tract symptoms present    Hyperlipidemia, unspecified hyperlipidemia type  Hyperlipidemia, unspecified hyperlipidemia type    Essential hypertension  Essential hypertension    Type 2 diabetes mellitus without complication, without long-term current use of insulin  Type 2 diabetes mellitus without complication, without long-term current use of insulin    Weight loss, unintentional  Weight loss, unintentional    Preoperative examination  Preoperative examination    Corneal thinning of right eye  Corneal thinning of right eye

## 2020-11-16 NOTE — BEHAVIORAL HEALTH ASSESSMENT NOTE - CASE SUMMARY
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 Psychiatry called for capacity to refuse [facility]. Pt is AOA x 1-2, exhibits significant cognitive deficits of poor short term memory, executive dysfunction and poor attention and concentration. no si/hi, but feels depressed regarding medical issues, and blindness. pt lacks capacity to refuse [facility], due to his inability to appreciate need for rehab. pt cannot verbalize risks of not going to rehab, due to severe cognitive deficits. this decision to be deferred to pt's HCP.

## 2020-11-16 NOTE — PROGRESS NOTE ADULT - PROBLEM SELECTOR PLAN 2
Reported 60lbs weight loss over the past year with poor PO intake and constipation. severe malnutrition  encourage po, protein supplements  Last colonoscopy about 5 years ago per wife was within normal.   currently, not candidate for endoscopy given worsening corneal melting and concern for increased pressure during procedure  CEA wnl  MRI liver shows liver cysts  see above

## 2020-11-16 NOTE — PROGRESS NOTE ADULT - PROBLEM SELECTOR PROBLEM 5
Hyperlipidemia, unspecified hyperlipidemia type Patient with one or more new problems requiring additional work-up/treatment.

## 2020-11-16 NOTE — BEHAVIORAL HEALTH ASSESSMENT NOTE - NSBHCHARTREVIEWVS_PSY_A_CORE FT
Vital Signs Last 24 Hrs  T(C): 36.8 (16 Nov 2020 13:54), Max: 36.8 (16 Nov 2020 13:54)  T(F): 98.2 (16 Nov 2020 13:54), Max: 98.2 (16 Nov 2020 13:54)  HR: 76 (16 Nov 2020 13:54) (66 - 79)  BP: 120/77 (16 Nov 2020 13:54) (112/71 - 162/87)  BP(mean): --  RR: 18 (16 Nov 2020 13:54) (18 - 18)  SpO2: 99% (16 Nov 2020 13:54) (99% - 100%)

## 2020-11-16 NOTE — BEHAVIORAL HEALTH ASSESSMENT NOTE - HPI (INCLUDE ILLNESS QUALITY, SEVERITY, DURATION, TIMING, CONTEXT, MODIFYING FACTORS, ASSOCIATED SIGNS AND SYMPTOMS)
81 y/o male with history of DM2, HTN, HLD, Chronic conjuctivitis presented initially on 11/6 from ophthalmology office (Dr. Warren) for surgery of the Right eye due to progressive corneal thinning bilaterally s/p corneal patch of the Right eye graft. Patient remained inpatient for systemic workup of unexplained bilateral aggressive corneal melting. Treated with antibiotics.   On 11/16, Psychiatry consulted for decision making capacity given patient refusing going to rehab after prolonged hospitalization.     Patient interviewed at bedside. Patient reports he is in the hospital for "eye problems" and difficulty seeing. Reports mood mildly depressed given acute worsening of visual complaints. States he does feel some anxiety about functioning given not being able to see. Though does not endorse any thoughts of hurting himself or anyone else. Denies hearing any voices and denies any visual hallucinations. Denies history of smoking, EtOH use, or any other illicit substances. Reports he is eating and sleeping well.   Collateral obtained from son who reports patient has been having 1.5 years of progressive personality changes.     On interview, patient unable to express why he needs to go to rehab and what difficulties he may face if he does not go to rehab. 81 y/o male with history of DM2, HTN, HLD, Chronic conjuctivitis presented initially on 11/6 from ophthalmology office (Dr. Warren) for surgery of the Right eye due to progressive corneal thinning bilaterally s/p corneal patch of the Right eye graft. Patient remained inpatient for systemic workup of unexplained bilateral aggressive corneal melting. Treated with antibiotics.   On 11/16, Psychiatry consulted for decision making capacity given patient refusing going to rehab after prolonged hospitalization.     Patient interviewed at bedside. Patient reports he is in the hospital for "eye problems" and difficulty seeing. Reports mood mildly depressed given acute worsening of visual complaints. States he does feel some anxiety about functioning given not being able to see. Though does not endorse any thoughts of hurting himself or anyone else. Denies hearing any voices and denies any visual hallucinations. Denies history of smoking, EtOH use, or any other illicit substances. Reports he is eating and sleeping well. Pt during evaluation is AOA x 1-2, exhibits significant cognitive deficits, poor attention and concentration, poor short term recall.   Collateral obtained from son who reports patient has been having 1.5 years of progressive personality changes and memory problems. Has never been formally dx'd with dementia.    On interview, patient unable to express why he needs to go to rehab and what difficulties he may face if he does not go to rehab.

## 2020-11-16 NOTE — PROGRESS NOTE ADULT - PROBLEM SELECTOR PLAN 1
Progressive corneal melting of both eyes now with concern for rheumatologic vs malignant vs infectious cause.   sp OR patch graft repair R eye at [facility] 11/7  sp corneal glue left eye 11/11  moxifloxacin qid, vanco right eye, moxiflox left eye  appreciate onc recs, no further oncology work up   MRI pelvis did not see osseous lesion seen on CT  CT abd enlarge prostate, PSA wnl, unlikely prostate CA per urology recs  MRI liver liver cysts  rheum work up scleroderma neg, smith ab neg, myeloperoxidase and proteinase neg, THADDEUS neg, c3c4 neg, vit d nml, dsdna neg, ace neg  ID workup undergoing, syphillis neg, lyme neg, hep b panel nml, quant TB neg, aspergillus AB neg  cont prednisone 40mg  fu conjunctival biopsy 11/13  fu ophtho recs Progressive corneal melting of both eyes now with concern for rheumatologic vs malignant vs infectious cause.   sp OR patch graft repair R eye at [facility] 11/7  sp corneal glue left eye 11/11  moxifloxacin qid both eye, vanco right eye  appreciate onc recs, no further oncology work up   MRI pelvis did not see osseous lesion seen on CT  CT abd enlarge prostate, PSA wnl, unlikely prostate CA per urology recs  MRI liver liver cysts  rheum work up scleroderma neg, smith ab neg, myeloperoxidase and proteinase neg, THADDEUS neg, c3c4 neg, vit d nml, dsdna neg, ace neg  ID workup undergoing, syphillis neg, lyme neg, hep b panel nml, quant TB neg, aspergillus AB neg  cont prednisone 40mg  cont doxy, vitamin c  fu conjunctival biopsy 11/13  fu ophtho recs

## 2020-11-16 NOTE — BEHAVIORAL HEALTH ASSESSMENT NOTE - SUMMARY
81 y/o male with history of DM2, HTN, HLD, Chronic conjuctivitis presented initially on 11/6 from ophthalmology office (Dr. Warren) for surgery of the Right eye due to progressive corneal thinning bilaterally s/p corneal patch of the Right eye graft with prolonged hospitalization for systemic work-up of aggressive bilateral corneal melting. Patient appears to demonstrate mildly depressed mood with no thoughts of SI/HI. Has progressive cognitive decline with personality changes for 1.5 years per family with objective evidence of cognitive impairment on bedside examination.     Patient is unable to express rationale for why rehabilitation facility is needed in his case nor is he able to address how NOT going to rehab may negatively affect his life.

## 2020-11-16 NOTE — BEHAVIORAL HEALTH ASSESSMENT NOTE - NSBHCONSULTRECOMMENDOTHER_PSY_A_CORE FT
Patient lacks capacity to refuse short term rehab at this time (unable to express risks of not going to rehab, unable to state the benefits with pursuing rehabilitation services)   This decision should be deferred to patient's HCP.     For evaluation of suspected underlying dementia, patient should follow-up with outpatient Neurology. -Patient lacks capacity to refuse short term rehab at this time (unable to express risks of not going to rehab, unable to state the benefits with pursuing rehabilitation services)   -This decision should be deferred to patient's HCP.     -For evaluation of suspected underlying dementia, patient should follow-up with outpatient Neurology.

## 2020-11-16 NOTE — BEHAVIORAL HEALTH ASSESSMENT NOTE - ADDITIONAL DETAILS / COMMENTS
Unable to recall 3 words after 1 minute  Able to spell THINK forward but not backwards  Hertel thinking with literal interpretation of various metaphors presented.

## 2020-11-17 LAB
CULTURE RESULTS: SIGNIFICANT CHANGE UP
CULTURE RESULTS: SIGNIFICANT CHANGE UP
GLUCOSE BLDC GLUCOMTR-MCNC: 159 MG/DL — HIGH (ref 70–99)
GLUCOSE BLDC GLUCOMTR-MCNC: 161 MG/DL — HIGH (ref 70–99)
GLUCOSE BLDC GLUCOMTR-MCNC: 166 MG/DL — HIGH (ref 70–99)
GLUCOSE BLDC GLUCOMTR-MCNC: 215 MG/DL — HIGH (ref 70–99)
HEMATOPATHOLOGY REPORT: SIGNIFICANT CHANGE UP
HEMATOPATHOLOGY REPORT: SIGNIFICANT CHANGE UP
SPECIMEN SOURCE: SIGNIFICANT CHANGE UP
SPECIMEN SOURCE: SIGNIFICANT CHANGE UP
TM INTERPRETATION: SIGNIFICANT CHANGE UP

## 2020-11-17 RX ORDER — MOXIFLOXACIN HCL 0.5 %
1 DROPS OPHTHALMIC (EYE)
Qty: 1 | Refills: 0
Start: 2020-11-17 | End: 2020-12-16

## 2020-11-17 RX ADMIN — Medication 1 DROP(S): at 01:26

## 2020-11-17 RX ADMIN — SENNA PLUS 2 TABLET(S): 8.6 TABLET ORAL at 21:53

## 2020-11-17 RX ADMIN — Medication 1000 MILLIGRAM(S): at 11:41

## 2020-11-17 RX ADMIN — DORZOLAMIDE HYDROCHLORIDE TIMOLOL MALEATE 1 DROP(S): 20; 5 SOLUTION/ DROPS OPHTHALMIC at 06:57

## 2020-11-17 RX ADMIN — POLYETHYLENE GLYCOL 3350 17 GRAM(S): 17 POWDER, FOR SOLUTION ORAL at 11:40

## 2020-11-17 RX ADMIN — ATORVASTATIN CALCIUM 10 MILLIGRAM(S): 80 TABLET, FILM COATED ORAL at 21:52

## 2020-11-17 RX ADMIN — Medication 100 MILLIGRAM(S): at 06:28

## 2020-11-17 RX ADMIN — Medication 5 UNIT(S): at 08:30

## 2020-11-17 RX ADMIN — Medication 1 DROP(S): at 11:41

## 2020-11-17 RX ADMIN — Medication 2: at 08:30

## 2020-11-17 RX ADMIN — Medication 1 DROP(S): at 13:03

## 2020-11-17 RX ADMIN — FAMOTIDINE 20 MILLIGRAM(S): 10 INJECTION INTRAVENOUS at 11:40

## 2020-11-17 RX ADMIN — Medication 100 MILLIGRAM(S): at 17:27

## 2020-11-17 RX ADMIN — Medication 1 DROP(S): at 20:23

## 2020-11-17 RX ADMIN — PREGABALIN 1000 MICROGRAM(S): 225 CAPSULE ORAL at 11:40

## 2020-11-17 RX ADMIN — HUMAN INSULIN 6 UNIT(S): 100 INJECTION, SUSPENSION SUBCUTANEOUS at 21:53

## 2020-11-17 RX ADMIN — Medication 5 UNIT(S): at 17:26

## 2020-11-17 RX ADMIN — TAMSULOSIN HYDROCHLORIDE 0.4 MILLIGRAM(S): 0.4 CAPSULE ORAL at 21:53

## 2020-11-17 RX ADMIN — Medication 40 MILLIGRAM(S): at 20:23

## 2020-11-17 RX ADMIN — DORZOLAMIDE HYDROCHLORIDE TIMOLOL MALEATE 1 DROP(S): 20; 5 SOLUTION/ DROPS OPHTHALMIC at 17:27

## 2020-11-17 RX ADMIN — Medication 5 UNIT(S): at 13:02

## 2020-11-17 RX ADMIN — Medication 1 DROP(S): at 06:31

## 2020-11-17 RX ADMIN — Medication 1 DROP(S): at 17:27

## 2020-11-17 RX ADMIN — Medication 5 MILLIGRAM(S): at 06:28

## 2020-11-17 RX ADMIN — Medication 4: at 13:02

## 2020-11-17 RX ADMIN — Medication 1 DROP(S): at 06:29

## 2020-11-17 RX ADMIN — Medication 1 DROP(S): at 11:42

## 2020-11-17 RX ADMIN — Medication 0: at 21:53

## 2020-11-17 RX ADMIN — Medication 2: at 17:26

## 2020-11-17 NOTE — PROGRESS NOTE ADULT - SUBJECTIVE AND OBJECTIVE BOX
Patient is a 80y old  Male who presents with a chief complaint of Right eye progressive corneal thinning (17 Nov 2020 10:46)      SUBJECTIVE / OVERNIGHT EVENTS:    Patient seen and examined. denies complaints. no acute events.      Vital Signs Last 24 Hrs  T(C): 36.2 (17 Nov 2020 11:03), Max: 36.8 (16 Nov 2020 13:54)  T(F): 97.1 (17 Nov 2020 11:03), Max: 98.2 (16 Nov 2020 13:54)  HR: 68 (17 Nov 2020 11:03) (63 - 76)  BP: 144/74 (17 Nov 2020 11:03) (108/69 - 144/74)  BP(mean): --  RR: 17 (17 Nov 2020 11:03) (17 - 18)  SpO2: 100% (17 Nov 2020 11:03) (99% - 100%)  I&O's Summary    16 Nov 2020 07:01  -  17 Nov 2020 07:00  --------------------------------------------------------  IN: 600 mL / OUT: 600 mL / NET: 0 mL    17 Nov 2020 07:01  -  17 Nov 2020 12:18  --------------------------------------------------------  IN: 480 mL / OUT: 400 mL / NET: 80 mL        PE:  GENERAL: NAD, AAOx2  HEAD:  Atraumatic, Normocephalic, bl eye patches protecting eyes  CHEST/LUNG: CTABL, No wheeze  HEART: Regular rate and rhythm; no murmur  ABDOMEN: Soft, Nontender, Nondistended; Bowel sounds present  EXTREMITIES:  2+ Peripheral Pulses, No clubbing, cyanosis, or edema  SKIN: No rashes or lesions  NEURO: No focal deficits    LABS:            CAPILLARY BLOOD GLUCOSE      POCT Blood Glucose.: 166 mg/dL (17 Nov 2020 08:16)  POCT Blood Glucose.: 161 mg/dL (16 Nov 2020 21:38)  POCT Blood Glucose.: 180 mg/dL (16 Nov 2020 17:34)            RADIOLOGY & ADDITIONAL TESTS:    Imaging Personally Reviewed:  [x] YES  [ ] NO    Consultant(s) Notes Reviewed:  [x] YES  [ ] NO    MEDICATIONS  (STANDING):  ascorbic acid 1000 milliGRAM(s) Oral daily  atorvastatin 10 milliGRAM(s) Oral at bedtime  cyanocobalamin 1000 MICROGram(s) Oral daily  dextrose 5%. 1000 milliLiter(s) (50 mL/Hr) IV Continuous <Continuous>  dextrose 50% Injectable 25 Gram(s) IV Push once  dextrose 50% Injectable 12.5 Gram(s) IV Push once  dextrose 50% Injectable 25 Gram(s) IV Push once  dorzolamide 2%/timolol 0.5% Ophthalmic Solution 1 Drop(s) Left EYE two times a day  doxycycline hyclate Capsule 100 milliGRAM(s) Oral every 12 hours  enalapril 5 milliGRAM(s) Oral daily  famotidine    Tablet 20 milliGRAM(s) Oral daily  insulin lispro (ADMELOG) corrective regimen sliding scale.   SubCutaneous three times a day before meals  insulin lispro (ADMELOG) corrective regimen sliding scale.   SubCutaneous at bedtime  insulin lispro Injectable (ADMELOG) 5 Unit(s) SubCutaneous three times a day before meals  insulin NPH human recombinant 6 Unit(s) SubCutaneous at bedtime  moxifloxacin 0.5% Solution 1 Drop(s) Both EYES <User Schedule>  polyethylene glycol 3350 17 Gram(s) Oral daily  predniSONE   Tablet 40 milliGRAM(s) Oral every 24 hours  senna 2 Tablet(s) Oral at bedtime  tamsulosin 0.4 milliGRAM(s) Oral at bedtime  vancomycin 25 mG/mL Fortified Ophthalmic 1 Drop(s) Right EYE four times a day    MEDICATIONS  (PRN):  acetaminophen   Tablet .. 650 milliGRAM(s) Oral every 6 hours PRN Mild Pain (1 - 3)  dextrose 40% Gel 15 Gram(s) Oral once PRN Blood Glucose LESS THAN 70 milliGRAM(s)/deciLiter  glucagon  Injectable 1 milliGRAM(s) IntraMuscular once PRN Glucose <70 milliGRAM(s)/deciLiter      Care Discussed with Consultants/Other Providers [x] YES  [ ] NO    HEALTH ISSUES - PROBLEM Dx:  Dementia    Adjustment disorder with depressed mood    Prophylactic measure  Prophylactic measure    Benign prostatic hyperplasia, unspecified whether lower urinary tract symptoms present  Benign prostatic hyperplasia, unspecified whether lower urinary tract symptoms present    Hyperlipidemia, unspecified hyperlipidemia type  Hyperlipidemia, unspecified hyperlipidemia type    Essential hypertension  Essential hypertension    Type 2 diabetes mellitus without complication, without long-term current use of insulin  Type 2 diabetes mellitus without complication, without long-term current use of insulin    Weight loss, unintentional  Weight loss, unintentional  work up to date has been negative  follow up with PMD after discharge    Preoperative examination  Preoperative examination    Corneal thinning of right eye  Corneal thinning of right eye

## 2020-11-17 NOTE — PROGRESS NOTE ADULT - SUBJECTIVE AND OBJECTIVE BOX
[facility] DEPARTMENT OF OPHTHALMOLOGY  ------------------------------------------------------------------------------  Andre Munguia MD PGY-2  ------------------------------------------------------------------------------    79 YO M PMH of DM, HTN, HLD, chronic conjunctivitis; presented from ophthalmologist's office (Dr. Warren) for surgery of the right eye (POD 6 corneal patch graft) due to progressive corneal thinning OU.     Interval History: Pt seen and examined at bedside. No acute events overnight reported. Patient reports continued discomfort. No pain. Denies changes in vision.     MEDICATIONS  (STANDING):  ascorbic acid 1000 milliGRAM(s) Oral daily  atorvastatin 10 milliGRAM(s) Oral at bedtime  cyanocobalamin 1000 MICROGram(s) Oral daily  dextrose 5%. 1000 milliLiter(s) (50 mL/Hr) IV Continuous <Continuous>  dextrose 50% Injectable 25 Gram(s) IV Push once  dextrose 50% Injectable 12.5 Gram(s) IV Push once  dextrose 50% Injectable 25 Gram(s) IV Push once  dorzolamide 2%/timolol 0.5% Ophthalmic Solution 1 Drop(s) Left EYE two times a day  doxycycline hyclate Capsule 100 milliGRAM(s) Oral every 12 hours  enalapril 5 milliGRAM(s) Oral daily  famotidine    Tablet 20 milliGRAM(s) Oral daily  insulin lispro (ADMELOG) corrective regimen sliding scale.   SubCutaneous three times a day before meals  insulin lispro (ADMELOG) corrective regimen sliding scale.   SubCutaneous at bedtime  insulin lispro Injectable (ADMELOG) 5 Unit(s) SubCutaneous three times a day before meals  insulin NPH human recombinant 6 Unit(s) SubCutaneous at bedtime  moxifloxacin 0.5% Solution 1 Drop(s) Both EYES <User Schedule>  polyethylene glycol 3350 17 Gram(s) Oral daily  predniSONE   Tablet 40 milliGRAM(s) Oral every 24 hours  senna 2 Tablet(s) Oral at bedtime  tamsulosin 0.4 milliGRAM(s) Oral at bedtime  vancomycin 25 mG/mL Fortified Ophthalmic 1 Drop(s) Right EYE four times a day    MEDICATIONS  (PRN):  acetaminophen   Tablet .. 650 milliGRAM(s) Oral every 6 hours PRN Mild Pain (1 - 3)  dextrose 40% Gel 15 Gram(s) Oral once PRN Blood Glucose LESS THAN 70 milliGRAM(s)/deciLiter  glucagon  Injectable 1 milliGRAM(s) IntraMuscular once PRN Glucose <70 milliGRAM(s)/deciLiter      VITALS: T(C): 36.4 (11-17-20 @ 04:38)  T(F): 97.6 (11-17-20 @ 04:38), Max: 98.2 (11-16-20 @ 13:54)  HR: 63 (11-17-20 @ 04:38) (63 - 76)  BP: 129/76 (11-17-20 @ 04:38) (108/69 - 129/76)  RR:  (17 - 18)  SpO2:  (99% - 100%)  Wt(kg): --  General: AAO x 3, appropriate mood and affect    Ophthalmology Exam:  Visual acuity (sc): HM OD, CF OS  Pupils: poor view 2/2 corneal changes, appears minimally reactive OU  Ttono: 17, 18  Extraocular movements (EOMs): Full OU, no pain, no diplopia     Pen Lamp Exam:   External: Clear shield in place OD  Lids/Lashes/Lacrimal Ducts: Flat OU Inferior lids noted to be slightly ectropic OU with deep fornices, eyelids crusted shut with significant discharge  Sclera/Conjunctiva: 2+ injection OD 1+ injection OS  Cornea: Patch graft in place, well sutured, (1 loose suture appreciated) laura negative; no epithelial defect; thinning temporal to Graft. Some punctate heme in graft, OD ; OS with corneal glue in place and BCL in place; no infiltrate  Anterior Chamber: D+F OU, No hypopyon appreciated OU, formed OU  Iris: poor view but appears Flat OU  Lens: poor view OU    Radiology:  MR abdomen 11/13/2020  IMPRESSION:  Hepatic cysts.    DANIA HARDY MD; Fellow Radiology  This document has been electronically signed.  LUIS YU MD; Attending Radiologist  This document has been electronically signed. Nov 14 2020 2:08PM      mazin< from: MR Pelvis Bony Only w/wo IV Cont (11.10.20 @ 14:41) >  The prostate is. There is diffuse thickening of the bladder wall with trabeculation. Please see report from CT of the abdomen and pelvis performed 11/8/2020 for further evaluation of the findings in the imaged portion of the pelvis.    IMPRESSION: No suspicious osseous lesions are demonstrated.  Advanced right hip osteoarthritis and moderate left hip osteoarthritis. Lower lumbar spondylosis.  Right iliopsoas bursitis.    < end of copied text >  < from: MR Lumbar Spine w/ IV Cont (11.09.20 @ 23:42) >  IMPRESSION: Degenerative changes as described above.    < end of copied text >  < from: CT Abdomen and Pelvis w/ IV Cont (11.08.20 @ 15:56) >  BLADDER: Diffuse bladder wall thickening and trabeculations.  REPRODUCTIVE ORGANS: There is markedly enlarged with significant mass effect on the urinary bladder.    < end of copied text >  < from: CT Abdomen and Pelvis w/ IV Cont (11.08.20 @ 15:56) >  IMPRESSION:    Motion limited evaluation.    The prostate gland is markedly enlarged with significant mass effect on the urinary bladder. The urinary bladder has diffuse wall thickening and trabeculations. Correlate clinically for infection or other processes. Lytic lesions are noted within the right acetabulum and L5, consider malignant process.    < end of copied text >

## 2020-11-17 NOTE — PROGRESS NOTE ADULT - SUBJECTIVE AND OBJECTIVE BOX
BARNEY ERICKSON  [id number]    INTERVAL HPI/OVERNIGHT EVENTS:    MEDICATIONS  (STANDING):  ascorbic acid 1000 milliGRAM(s) Oral daily  atorvastatin 10 milliGRAM(s) Oral at bedtime  cyanocobalamin 1000 MICROGram(s) Oral daily  dextrose 5%. 1000 milliLiter(s) (50 mL/Hr) IV Continuous <Continuous>  dextrose 50% Injectable 25 Gram(s) IV Push once  dextrose 50% Injectable 12.5 Gram(s) IV Push once  dextrose 50% Injectable 25 Gram(s) IV Push once  dorzolamide 2%/timolol 0.5% Ophthalmic Solution 1 Drop(s) Left EYE two times a day  doxycycline hyclate Capsule 100 milliGRAM(s) Oral every 12 hours  enalapril 5 milliGRAM(s) Oral daily  famotidine    Tablet 20 milliGRAM(s) Oral daily  insulin lispro (ADMELOG) corrective regimen sliding scale.   SubCutaneous three times a day before meals  insulin lispro (ADMELOG) corrective regimen sliding scale.   SubCutaneous at bedtime  insulin lispro Injectable (ADMELOG) 5 Unit(s) SubCutaneous three times a day before meals  insulin NPH human recombinant 6 Unit(s) SubCutaneous at bedtime  moxifloxacin 0.5% Solution 1 Drop(s) Both EYES <User Schedule>  polyethylene glycol 3350 17 Gram(s) Oral daily  predniSONE   Tablet 40 milliGRAM(s) Oral every 24 hours  senna 2 Tablet(s) Oral at bedtime  tamsulosin 0.4 milliGRAM(s) Oral at bedtime  vancomycin 25 mG/mL Fortified Ophthalmic 1 Drop(s) Right EYE four times a day    MEDICATIONS  (PRN):  acetaminophen   Tablet .. 650 milliGRAM(s) Oral every 6 hours PRN Mild Pain (1 - 3)  dextrose 40% Gel 15 Gram(s) Oral once PRN Blood Glucose LESS THAN 70 milliGRAM(s)/deciLiter  glucagon  Injectable 1 milliGRAM(s) IntraMuscular once PRN Glucose <70 milliGRAM(s)/deciLiter      Allergies    No Known Allergies    Intolerances        Review of Systems:   General: No fevers/chills, no fatigue  HEENT: No blurry vision, dysphagia, or odynophagia  CVS: No CP/palpitations  Resp: No SOB/wheezing  GI: No N/V/C/D/abdominal pain  MSK:   Skin: No new rashes  Neuro: No headaches      Vital Signs Last 24 Hrs  T(C): 36.2 (17 Nov 2020 11:03), Max: 36.6 (16 Nov 2020 20:46)  T(F): 97.1 (17 Nov 2020 11:03), Max: 97.8 (16 Nov 2020 20:46)  HR: 68 (17 Nov 2020 11:03) (63 - 70)  BP: 144/74 (17 Nov 2020 11:03) (108/69 - 144/74)  BP(mean): --  RR: 17 (17 Nov 2020 11:03) (17 - 18)  SpO2: 100% (17 Nov 2020 11:03) (99% - 100%)    Physical Exam:  General: NAD  HEENT: EOMI, MMM  Cardio: +S1/S2, RRR  Resp: CTA b/l  GI: +BS, soft, NT/ND  MSK:  Neuro: AAOx3  Psych: wnl    LABS:                  RADIOLOGY & ADDITIONAL TESTS:   BARNEY ERICKSON  [id number]    INTERVAL HPI/OVERNIGHT EVENTS:    No new complaints  Eyes are covered by a clear patch    MEDICATIONS  (STANDING):  ascorbic acid 1000 milliGRAM(s) Oral daily  atorvastatin 10 milliGRAM(s) Oral at bedtime  cyanocobalamin 1000 MICROGram(s) Oral daily  dextrose 5%. 1000 milliLiter(s) (50 mL/Hr) IV Continuous <Continuous>  dextrose 50% Injectable 25 Gram(s) IV Push once  dextrose 50% Injectable 12.5 Gram(s) IV Push once  dextrose 50% Injectable 25 Gram(s) IV Push once  dorzolamide 2%/timolol 0.5% Ophthalmic Solution 1 Drop(s) Left EYE two times a day  doxycycline hyclate Capsule 100 milliGRAM(s) Oral every 12 hours  enalapril 5 milliGRAM(s) Oral daily  famotidine    Tablet 20 milliGRAM(s) Oral daily  insulin lispro (ADMELOG) corrective regimen sliding scale.   SubCutaneous three times a day before meals  insulin lispro (ADMELOG) corrective regimen sliding scale.   SubCutaneous at bedtime  insulin lispro Injectable (ADMELOG) 5 Unit(s) SubCutaneous three times a day before meals  insulin NPH human recombinant 6 Unit(s) SubCutaneous at bedtime  moxifloxacin 0.5% Solution 1 Drop(s) Both EYES <User Schedule>  polyethylene glycol 3350 17 Gram(s) Oral daily  predniSONE   Tablet 40 milliGRAM(s) Oral every 24 hours  senna 2 Tablet(s) Oral at bedtime  tamsulosin 0.4 milliGRAM(s) Oral at bedtime  vancomycin 25 mG/mL Fortified Ophthalmic 1 Drop(s) Right EYE four times a day    MEDICATIONS  (PRN):  acetaminophen   Tablet .. 650 milliGRAM(s) Oral every 6 hours PRN Mild Pain (1 - 3)  dextrose 40% Gel 15 Gram(s) Oral once PRN Blood Glucose LESS THAN 70 milliGRAM(s)/deciLiter  glucagon  Injectable 1 milliGRAM(s) IntraMuscular once PRN Glucose <70 milliGRAM(s)/deciLiter      Allergies    No Known Allergies    Intolerances         Vital Signs Last 24 Hrs  T(C): 36.2 (17 Nov 2020 11:03), Max: 36.6 (16 Nov 2020 20:46)  T(F): 97.1 (17 Nov 2020 11:03), Max: 97.8 (16 Nov 2020 20:46)  HR: 68 (17 Nov 2020 11:03) (63 - 70)  BP: 144/74 (17 Nov 2020 11:03) (108/69 - 144/74)  BP(mean): --  RR: 17 (17 Nov 2020 11:03) (17 - 18)  SpO2: 100% (17 Nov 2020 11:03) (99% - 100%)    Physical Exam:  General: NAD  HEENT:  clear shields in place  Cardio: +S1/S2, RRR  Resp: CTA b/l  GI: +BS, soft, NT/ND  MSK: no synovitis, no subcutaneous nodules  Neuro: AAOx3  Psych: wnl    LABS:      RF - negative  Cyclic Citrullinated Peptide AB (11.07.20 @ 11:44)   CCP Antibody IgG Interpretation: Weak Positive Method: EIA   Cyclic Citrullinated Peptide Ab, IGG Reference Range:   Units   20 – 39 Weak Positive   40 – 80 Moderate Positive   >80 Strong Positive   Sjogren's Syndrome Antibodies (11.07.20 @ 11:44)   Anti SS-A Antibody: 0.3 AI   Anti SS-B Antibody: 0.6: Fluorescent Bead Immunoassay   Reference Ranges for SS-A AND SS-B:   <1.0 AI (negative)   > or =1.0 AI (positive)   Reference values apply to all ages. AI Anti-Nuclear Antibody in AM (11.11.20 @ 09:40)   Anti Nuclear Factor Titer: Negative       Historical Values  Anti-Nuclear Antibody in AM (11.11.20 @ 09:40)   Anti Nuclear Factor Titer: Negative   Anti-Nuclear Antibody in AM (11.07.20 @ 11:44)   Anti Nuclear Factor Titer: Negative           RADIOLOGY & ADDITIONAL TESTS:

## 2020-11-17 NOTE — PROGRESS NOTE ADULT - NUTRITIONAL ASSESSMENT
This patient has been assessed with a concern for Malnutrition and has been determined to have a diagnosis/diagnoses of Severe protein-calorie malnutrition and Underweight/BMI < 19.    This patient is being managed with:   Diet Clear Liquid-  Entered: Nov 11 2020  9:03AM    
This patient has been assessed with a concern for Malnutrition and has been determined to have a diagnosis/diagnoses of Severe protein-calorie malnutrition and Underweight/BMI < 19.    This patient is being managed with:   Diet NPO-  Except Medications  With Ice Chips/Sips of Water  Entered: Nov 12 2020 11:05AM    
This patient has been assessed with a concern for Malnutrition and has been determined to have a diagnosis/diagnoses of Severe protein-calorie malnutrition and Underweight/BMI < 19.    This patient is being managed with:   Diet Regular-  Consistent Carbohydrate {Evening Snack} (CSTCHOSN)  Supplement Feeding Modality:  Oral  Glucerna Shake Cans or Servings Per Day:  3       Frequency:  Daily  Entered: Nov 12 2020 11:06AM    
This patient has been assessed with a concern for Malnutrition and has been determined to have a diagnosis/diagnoses of Severe protein-calorie malnutrition and Underweight/BMI < 19.    This patient is being managed with:   Diet Regular-  Consistent Carbohydrate {Evening Snack} (CSTCHOSN)  Supplement Feeding Modality:  Oral  Glucerna Shake Cans or Servings Per Day:  3       Frequency:  Daily  Entered: Nov 9 2020  2:37PM    Diet Regular-  Consistent Carbohydrate {No Snacks} (CSTCHO)  DASH/TLC {Sodium & Cholesterol Restricted} (DASH)  Entered: Nov 8 2020  2:38PM    The following pending diet order is being considered for treatment of Severe protein-calorie malnutrition and Underweight/BMI < 19:null
This patient has been assessed with a concern for Malnutrition and has been determined to have a diagnosis/diagnoses of Severe protein-calorie malnutrition and Underweight/BMI < 19.    This patient is being managed with:   Diet NPO-  Except Medications  With Ice Chips/Sips of Water  Entered: Nov 12 2020 11:05AM    
This patient has been assessed with a concern for Malnutrition and has been determined to have a diagnosis/diagnoses of Severe protein-calorie malnutrition and Underweight/BMI < 19.    This patient is being managed with:   Diet NPO-  Except Medications  With Ice Chips/Sips of Water  Entered: Nov 12 2020 11:05AM    
This patient has been assessed with a concern for Malnutrition and has been determined to have a diagnosis/diagnoses of Severe protein-calorie malnutrition and Underweight/BMI < 19.    This patient is being managed with:   Diet Regular-  Consistent Carbohydrate {Evening Snack} (CSTCHOSN)  Supplement Feeding Modality:  Oral  Glucerna Shake Cans or Servings Per Day:  3       Frequency:  Daily  Entered: Nov 11 2020  9:04AM    
This patient has been assessed with a concern for Malnutrition and has been determined to have a diagnosis/diagnoses of Severe protein-calorie malnutrition and Underweight/BMI < 19.    This patient is being managed with:   Diet Regular-  Consistent Carbohydrate {Evening Snack} (CSTCHOSN)  Supplement Feeding Modality:  Oral  Glucerna Shake Cans or Servings Per Day:  3       Frequency:  Daily  Entered: Nov 12 2020 11:06AM    
This patient has been assessed with a concern for Malnutrition and has been determined to have a diagnosis/diagnoses of Severe protein-calorie malnutrition and Underweight/BMI < 19.    This patient is being managed with:   Diet Regular-  Consistent Carbohydrate {Evening Snack} (CSTCHOSN)  Supplement Feeding Modality:  Oral  Glucerna Shake Cans or Servings Per Day:  3       Frequency:  Daily  Entered: Nov 9 2020  2:37PM    
This patient has been assessed with a concern for Malnutrition and has been determined to have a diagnosis/diagnoses of Severe protein-calorie malnutrition and Underweight/BMI < 19.    This patient is being managed with:   Diet Regular-  Consistent Carbohydrate {Evening Snack} (CSTCHOSN)  Supplement Feeding Modality:  Oral  Glucerna Shake Cans or Servings Per Day:  3       Frequency:  Daily  Entered: Nov 12 2020 11:06AM    
This patient has been assessed with a concern for Malnutrition and has been determined to have a diagnosis/diagnoses of Severe protein-calorie malnutrition and Underweight/BMI < 19.    This patient is being managed with:   Diet NPO-  Except Medications  With Ice Chips/Sips of Water  Entered: Nov 12 2020 11:05AM    
This patient has been assessed with a concern for Malnutrition and has been determined to have a diagnosis/diagnoses of Severe protein-calorie malnutrition and Underweight/BMI < 19.    This patient is being managed with:   Diet Regular-  Consistent Carbohydrate {Evening Snack} (CSTCHOSN)  Supplement Feeding Modality:  Oral  Glucerna Shake Cans or Servings Per Day:  3       Frequency:  Daily  Entered: Nov 12 2020 11:06AM    
This patient has been assessed with a concern for Malnutrition and has been determined to have a diagnosis/diagnoses of Severe protein-calorie malnutrition and Underweight/BMI < 19.    This patient is being managed with:   Diet Regular-  Consistent Carbohydrate {Evening Snack} (CSTCHOSN)  Supplement Feeding Modality:  Oral  Glucerna Shake Cans or Servings Per Day:  3       Frequency:  Daily  Entered: Nov 9 2020  2:37PM    Diet Regular-  Consistent Carbohydrate {No Snacks} (CSTCHO)  DASH/TLC {Sodium & Cholesterol Restricted} (DASH)  Entered: Nov 8 2020  2:38PM    The following pending diet order is being considered for treatment of Severe protein-calorie malnutrition and Underweight/BMI < 19:null
This patient has been assessed with a concern for Malnutrition and has been determined to have a diagnosis/diagnoses of Severe protein-calorie malnutrition and Underweight/BMI < 19.    This patient is being managed with:   Diet Regular-  Consistent Carbohydrate {Evening Snack} (CSTCHOSN)  Supplement Feeding Modality:  Oral  Glucerna Shake Cans or Servings Per Day:  3       Frequency:  Daily  Entered: Nov 9 2020  2:37PM    Diet Regular-  Consistent Carbohydrate {No Snacks} (CSTCHO)  DASH/TLC {Sodium & Cholesterol Restricted} (DASH)  Entered: Nov 8 2020  2:38PM    The following pending diet order is being considered for treatment of Severe protein-calorie malnutrition and Underweight/BMI < 19:null

## 2020-11-17 NOTE — PROGRESS NOTE ADULT - SUBJECTIVE AND OBJECTIVE BOX
[facility], Division of Infectious Diseases  YULI Arroyo Y. Patel, S. Shah  [phone]    Name: BARNEY ERICKSON  Age: 80y  Gender: Male  MRN: [id number]  Note Date: 11-17-20    Interval History:  Patient seen and examined at bedside.   No acute overnight events.   Notes reviewed    Antibiotics:  doxycycline hyclate Capsule 100 milliGRAM(s) Oral every 12 hours  vancomycin 25 mG/mL Fortified Ophthalmic 1 Drop(s) Right EYE four times a day      Medications:  acetaminophen   Tablet .. 650 milliGRAM(s) Oral every 6 hours PRN  ascorbic acid 1000 milliGRAM(s) Oral daily  atorvastatin 10 milliGRAM(s) Oral at bedtime  cyanocobalamin 1000 MICROGram(s) Oral daily  dextrose 40% Gel 15 Gram(s) Oral once PRN  dextrose 5%. 1000 milliLiter(s) IV Continuous <Continuous>  dextrose 50% Injectable 25 Gram(s) IV Push once  dextrose 50% Injectable 12.5 Gram(s) IV Push once  dextrose 50% Injectable 25 Gram(s) IV Push once  dorzolamide 2%/timolol 0.5% Ophthalmic Solution 1 Drop(s) Left EYE two times a day  doxycycline hyclate Capsule 100 milliGRAM(s) Oral every 12 hours  enalapril 5 milliGRAM(s) Oral daily  famotidine    Tablet 20 milliGRAM(s) Oral daily  glucagon  Injectable 1 milliGRAM(s) IntraMuscular once PRN  insulin lispro (ADMELOG) corrective regimen sliding scale.   SubCutaneous three times a day before meals  insulin lispro (ADMELOG) corrective regimen sliding scale.   SubCutaneous at bedtime  insulin lispro Injectable (ADMELOG) 5 Unit(s) SubCutaneous three times a day before meals  insulin NPH human recombinant 6 Unit(s) SubCutaneous at bedtime  moxifloxacin 0.5% Solution 1 Drop(s) Both EYES <User Schedule>  polyethylene glycol 3350 17 Gram(s) Oral daily  predniSONE   Tablet 40 milliGRAM(s) Oral every 24 hours  senna 2 Tablet(s) Oral at bedtime  tamsulosin 0.4 milliGRAM(s) Oral at bedtime  vancomycin 25 mG/mL Fortified Ophthalmic 1 Drop(s) Right EYE four times a day      Review of Systems:  A 10-point review of systems was obtained.     Pertinent positives and negatives--  Constitutional: No fevers. No Chills. No Rigors.   Cardiovascular: No chest pain. No palpitations.  Respiratory: No shortness of breath. No cough.  Gastrointestinal: No nausea or vomiting. No diarrhea or constipation.   Psychiatric: Pleasant. Appropriate affect.    Review of systems otherwise negative except as previously noted.    Allergies: No Known Allergies    For details regarding the patient's past medical history, social history, family history, and other miscellaneous elements, please refer the initial infectious diseases consultation and/or the admitting history and physical examination for this admission.    Objective:  Vitals:   T(C): 36.4 (11-17-20 @ 04:38), Max: 36.8 (11-16-20 @ 13:54)  HR: 63 (11-17-20 @ 04:38) (63 - 76)  BP: 129/76 (11-17-20 @ 04:38) (108/69 - 129/76)  RR: 17 (11-17-20 @ 04:38) (17 - 18)  SpO2: 99% (11-17-20 @ 04:38) (99% - 100%)    Physical Examination:  General: no acute distress  HEENT: eye covered, unable to exam  Neck: supple, no palpable LAD  Cardio: S1, S2 heard, RRR, no murmurs  Resp: breath sounds heard bilaterally, no rales, wheezes or rhonchi  Abd: soft, NT, ND, + bowel sounds  Neuro: AAOx3, no obvious focal deficits  Ext: no edema or cyanosis      Laboratory Studies:  CBC:   CMP:           Microbiology:    Radiology:  reviewed

## 2020-11-17 NOTE — PROGRESS NOTE ADULT - ASSESSMENT
80M with DM2, HTN, HLD, chronic conjunctivitis with recent steroid eye drop use, admitted from ophthalmologist office for R. eye patch corneal graft repair due to progressive corneal thinning. Patient with both eyes affected, R>L. ID called to evaluate for possible infectious etiologies of corneal melt.     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

## 2020-11-17 NOTE — PROGRESS NOTE ADULT - ASSESSMENT
Progressive corneal thinning-     although ddx includes autoimmune vs.   infectious  = evaluation for  Infectious etiologies can include HSV, TB, syphilis, and lyme, hepatitis panel, Quantiferon for TB, lyme, and syphilis tests have resulted negative. HSV1 IgG positive indicating   prior but not recent infection.   = no clinical evidence of inflammatory arthritis , vasculitis, sarcoidosis, RP . Furthermore serological evaluation for  ANCA , THADDEUS, specific ab( dsDNA, negative Smith and RNP antibodies, normal complements, normal ACE and Vitamin D levels (1,25 and 25-OH Vitamin D), negative RF, negative c- and p-ANCA- all negative.  only mildly elevated CCP ab were found, but in the absence of arthritis - diagnosis of RA is difficult to make. Nevertheless in very rare cases scleritis is described observed in the absence of arthritis  , but distinct rheumatological diagnosis could not be established at this time    Plan:  - continue steroids as per ophthalmology team.   - please recall for rheumatology consult if addistional help with immunosuppresive therapy needed         Discussed with attending, Dr. Heather Donovan MD PGY4  Rheumatology Fellow  Pager [phone]

## 2020-11-17 NOTE — PROGRESS NOTE ADULT - REASON FOR ADMISSION
Right eye progressive corneal thinning

## 2020-11-17 NOTE — PROGRESS NOTE ADULT - ASSESSMENT
80y male w/ pmhx/ochx of  DM, HTN, HLD, chronic conjunctivitis and 60 lb weight loss with progressive corneal thinning of both eyes, s/p corneal patch graft of the right eye. OD with patch graft (POD 6) in place, no epi defect. OS with glue in place, chamber deep, no corneal thinning.    # corneal thinning of both eyes, s/p corneal patch graft of the right eye and corneal gluing of the left eye  - patch graft of right eye in place; no epi defect seen today  - Glue in place OS; prior desmetocele well sealed with glue and AC deep  - No Appearance of Infectious Infiltrate, Ulcer, Or hypopyon appreciated  - c/w moxifloxacin QID and fortified Vancomycin QID to the right eye  - c/w moxiflox QID to the left eye   - c/w PO prednisone, dosing as per primary team given improvement with prednisone  - Conjunctival biopsy taken Friday OD, will f/u results - results not back  - given recent weight loss and b/l nature of corneal pathology, malignancy workup was done to evaluate for underlying disease process, unrevealing to date - oncology signed off  - appreciate rheumatology evaluation  - appreciate heme/onc evaluation - oncology signed off  - discharge from lashes cleaned gently   - c/w vitamin C 1g daily and doxycycline 100mg BID daily as per primary team if no contraindications  - stable for discharge today  - will monitor closely    # h/o glaucoma  - Hold off on cosopt for now    Findings discussed with pt and primary team      Outpatient follow-up: Patient should follow-up with his/her ophthalmologist or with [facility] Department of Ophthalmology within 2-3 days of discharge at:    [address]  [place] [address]  [phone]

## 2020-11-17 NOTE — PROGRESS NOTE ADULT - ATTENDING COMMENTS
Infectious Diseases will sign off at this time; please call back with any questions.   Yeimi Caputo M.D.  [facility], Division of Infectious Diseases [phone]  For over the weekend and after hours, please call [phone]

## 2020-11-17 NOTE — PROGRESS NOTE ADULT - PROBLEM SELECTOR PLAN 1
Progressive corneal melting of both eyes now with concern for rheumatologic vs malignant vs infectious cause.   sp OR patch graft repair R eye at [facility] 11/7  sp corneal glue left eye 11/11  moxifloxacin qid both eye, vanco right eye  appreciate onc recs, no further oncology work up   MRI pelvis did not see osseous lesion seen on CT  CT abd enlarge prostate, PSA wnl, unlikely prostate CA per urology recs  MRI liver liver cysts  rheum work up scleroderma neg, smith ab neg, myeloperoxidase and proteinase neg, THADDEUS neg, c3c4 neg, vit d nml, dsdna neg, ace neg  ID workup undergoing, syphillis neg, lyme neg, hep b panel nml, quant TB neg, aspergillus AB neg  cont prednisone 40mg  cont doxy, vitamin c  fu conjunctival biopsy 11/13  ophtho cleared for DC  outpt fu with optho in 2-3 days  fu optho re steroids

## 2020-11-17 NOTE — PROGRESS NOTE ADULT - ATTENDING COMMENTS
plan of care dw Mrs Pastor over the phone  she requested discharge tomorrow morning so she can arrange for patient to sleep on the first floor  anticipate DC in AM    Dr. Erickson will take over care tomorrow.  Please contact with any questions or concerns [phone].

## 2020-11-18 VITALS
OXYGEN SATURATION: 100 % | DIASTOLIC BLOOD PRESSURE: 78 MMHG | TEMPERATURE: 98 F | HEART RATE: 89 BPM | RESPIRATION RATE: 17 BRPM | SYSTOLIC BLOOD PRESSURE: 117 MMHG

## 2020-11-18 LAB
ANION GAP SERPL CALC-SCNC: 9 MMOL/L — SIGNIFICANT CHANGE UP (ref 5–17)
BUN SERPL-MCNC: 25 MG/DL — HIGH (ref 7–23)
CALCIUM SERPL-MCNC: 9.4 MG/DL — SIGNIFICANT CHANGE UP (ref 8.4–10.5)
CHLORIDE SERPL-SCNC: 96 MMOL/L — SIGNIFICANT CHANGE UP (ref 96–108)
CO2 SERPL-SCNC: 26 MMOL/L — SIGNIFICANT CHANGE UP (ref 22–31)
CREAT SERPL-MCNC: 0.75 MG/DL — SIGNIFICANT CHANGE UP (ref 0.5–1.3)
GLUCOSE BLDC GLUCOMTR-MCNC: 199 MG/DL — HIGH (ref 70–99)
GLUCOSE BLDC GLUCOMTR-MCNC: 232 MG/DL — HIGH (ref 70–99)
GLUCOSE SERPL-MCNC: 233 MG/DL — HIGH (ref 70–99)
HCT VFR BLD CALC: 35.6 % — LOW (ref 39–50)
HGB BLD-MCNC: 12 G/DL — LOW (ref 13–17)
MCHC RBC-ENTMCNC: 29.1 PG — SIGNIFICANT CHANGE UP (ref 27–34)
MCHC RBC-ENTMCNC: 33.7 GM/DL — SIGNIFICANT CHANGE UP (ref 32–36)
MCV RBC AUTO: 86.4 FL — SIGNIFICANT CHANGE UP (ref 80–100)
NRBC # BLD: 0 /100 WBCS — SIGNIFICANT CHANGE UP (ref 0–0)
PLATELET # BLD AUTO: 217 K/UL — SIGNIFICANT CHANGE UP (ref 150–400)
POTASSIUM SERPL-MCNC: 4.5 MMOL/L — SIGNIFICANT CHANGE UP (ref 3.5–5.3)
POTASSIUM SERPL-SCNC: 4.5 MMOL/L — SIGNIFICANT CHANGE UP (ref 3.5–5.3)
RBC # BLD: 4.12 M/UL — LOW (ref 4.2–5.8)
RBC # FLD: 13.2 % — SIGNIFICANT CHANGE UP (ref 10.3–14.5)
SODIUM SERPL-SCNC: 131 MMOL/L — LOW (ref 135–145)
WBC # BLD: 6.56 K/UL — SIGNIFICANT CHANGE UP (ref 3.8–10.5)
WBC # FLD AUTO: 6.56 K/UL — SIGNIFICANT CHANGE UP (ref 3.8–10.5)

## 2020-11-18 PROCEDURE — 87116 MYCOBACTERIA CULTURE: CPT

## 2020-11-18 PROCEDURE — 87040 BLOOD CULTURE FOR BACTERIA: CPT

## 2020-11-18 PROCEDURE — 82962 GLUCOSE BLOOD TEST: CPT

## 2020-11-18 PROCEDURE — 86850 RBC ANTIBODY SCREEN: CPT

## 2020-11-18 PROCEDURE — 97129 THER IVNTJ 1ST 15 MIN: CPT

## 2020-11-18 PROCEDURE — 85610 PROTHROMBIN TIME: CPT

## 2020-11-18 PROCEDURE — 86901 BLOOD TYPING SEROLOGIC RH(D): CPT

## 2020-11-18 PROCEDURE — 86036 ANCA SCREEN EACH ANTIBODY: CPT

## 2020-11-18 PROCEDURE — 86803 HEPATITIS C AB TEST: CPT

## 2020-11-18 PROCEDURE — 86038 ANTINUCLEAR ANTIBODIES: CPT

## 2020-11-18 PROCEDURE — 82533 TOTAL CORTISOL: CPT

## 2020-11-18 PROCEDURE — 83550 IRON BINDING TEST: CPT

## 2020-11-18 PROCEDURE — 86140 C-REACTIVE PROTEIN: CPT

## 2020-11-18 PROCEDURE — V2785: CPT

## 2020-11-18 PROCEDURE — 86160 COMPLEMENT ANTIGEN: CPT

## 2020-11-18 PROCEDURE — 84166 PROTEIN E-PHORESIS/URINE/CSF: CPT

## 2020-11-18 PROCEDURE — 99285 EMERGENCY DEPT VISIT HI MDM: CPT | Mod: 25

## 2020-11-18 PROCEDURE — 86618 LYME DISEASE ANTIBODY: CPT

## 2020-11-18 PROCEDURE — 82728 ASSAY OF FERRITIN: CPT

## 2020-11-18 PROCEDURE — 72197 MRI PELVIS W/O & W/DYE: CPT

## 2020-11-18 PROCEDURE — 82378 CARCINOEMBRYONIC ANTIGEN: CPT

## 2020-11-18 PROCEDURE — 85014 HEMATOCRIT: CPT

## 2020-11-18 PROCEDURE — 82652 VIT D 1 25-DIHYDROXY: CPT

## 2020-11-18 PROCEDURE — 84156 ASSAY OF PROTEIN URINE: CPT

## 2020-11-18 PROCEDURE — 82164 ANGIOTENSIN I ENZYME TEST: CPT

## 2020-11-18 PROCEDURE — 83615 LACTATE (LD) (LDH) ENZYME: CPT

## 2020-11-18 PROCEDURE — 72149 MRI LUMBAR SPINE W/DYE: CPT

## 2020-11-18 PROCEDURE — 86900 BLOOD TYPING SEROLOGIC ABO: CPT

## 2020-11-18 PROCEDURE — 83605 ASSAY OF LACTIC ACID: CPT

## 2020-11-18 PROCEDURE — 86335 IMMUNFIX E-PHORSIS/URINE/CSF: CPT

## 2020-11-18 PROCEDURE — 85025 COMPLETE CBC W/AUTO DIFF WBC: CPT

## 2020-11-18 PROCEDURE — 74177 CT ABD & PELVIS W/CONTRAST: CPT

## 2020-11-18 PROCEDURE — 86696 HERPES SIMPLEX TYPE 2 TEST: CPT

## 2020-11-18 PROCEDURE — 88365 INSITU HYBRIDIZATION (FISH): CPT

## 2020-11-18 PROCEDURE — 88304 TISSUE EXAM BY PATHOLOGIST: CPT

## 2020-11-18 PROCEDURE — 85652 RBC SED RATE AUTOMATED: CPT

## 2020-11-18 PROCEDURE — 88341 IMHCHEM/IMCYTCHM EA ADD ANTB: CPT

## 2020-11-18 PROCEDURE — 83540 ASSAY OF IRON: CPT

## 2020-11-18 PROCEDURE — 86780 TREPONEMA PALLIDUM: CPT

## 2020-11-18 PROCEDURE — 82803 BLOOD GASES ANY COMBINATION: CPT

## 2020-11-18 PROCEDURE — 82947 ASSAY GLUCOSE BLOOD QUANT: CPT

## 2020-11-18 PROCEDURE — 84132 ASSAY OF SERUM POTASSIUM: CPT

## 2020-11-18 PROCEDURE — 82306 VITAMIN D 25 HYDROXY: CPT

## 2020-11-18 PROCEDURE — 84145 PROCALCITONIN (PCT): CPT

## 2020-11-18 PROCEDURE — 99232 SBSQ HOSP IP/OBS MODERATE 35: CPT | Mod: GC

## 2020-11-18 PROCEDURE — 82330 ASSAY OF CALCIUM: CPT

## 2020-11-18 PROCEDURE — 84590 ASSAY OF VITAMIN A: CPT

## 2020-11-18 PROCEDURE — 86606 ASPERGILLUS ANTIBODY: CPT

## 2020-11-18 PROCEDURE — 97162 PT EVAL MOD COMPLEX 30 MIN: CPT

## 2020-11-18 PROCEDURE — 86480 TB TEST CELL IMMUN MEASURE: CPT

## 2020-11-18 PROCEDURE — 97110 THERAPEUTIC EXERCISES: CPT

## 2020-11-18 PROCEDURE — 80053 COMPREHEN METABOLIC PANEL: CPT

## 2020-11-18 PROCEDURE — 88360 TUMOR IMMUNOHISTOCHEM/MANUAL: CPT

## 2020-11-18 PROCEDURE — 86704 HEP B CORE ANTIBODY TOTAL: CPT

## 2020-11-18 PROCEDURE — 82746 ASSAY OF FOLIC ACID SERUM: CPT

## 2020-11-18 PROCEDURE — 87205 SMEAR GRAM STAIN: CPT

## 2020-11-18 PROCEDURE — 84155 ASSAY OF PROTEIN SERUM: CPT

## 2020-11-18 PROCEDURE — 82607 VITAMIN B-12: CPT

## 2020-11-18 PROCEDURE — 86769 SARS-COV-2 COVID-19 ANTIBODY: CPT

## 2020-11-18 PROCEDURE — 86706 HEP B SURFACE ANTIBODY: CPT

## 2020-11-18 PROCEDURE — G0103: CPT

## 2020-11-18 PROCEDURE — 84100 ASSAY OF PHOSPHORUS: CPT

## 2020-11-18 PROCEDURE — 83516 IMMUNOASSAY NONANTIBODY: CPT

## 2020-11-18 PROCEDURE — 97165 OT EVAL LOW COMPLEX 30 MIN: CPT

## 2020-11-18 PROCEDURE — 86431 RHEUMATOID FACTOR QUANT: CPT

## 2020-11-18 PROCEDURE — 74183 MRI ABD W/O CNTR FLWD CNTR: CPT

## 2020-11-18 PROCEDURE — 84165 PROTEIN E-PHORESIS SERUM: CPT

## 2020-11-18 PROCEDURE — 97535 SELF CARE MNGMENT TRAINING: CPT

## 2020-11-18 PROCEDURE — 93005 ELECTROCARDIOGRAM TRACING: CPT

## 2020-11-18 PROCEDURE — 82784 ASSAY IGA/IGD/IGG/IGM EACH: CPT

## 2020-11-18 PROCEDURE — 88305 TISSUE EXAM BY PATHOLOGIST: CPT

## 2020-11-18 PROCEDURE — 86225 DNA ANTIBODY NATIVE: CPT

## 2020-11-18 PROCEDURE — 88307 TISSUE EXAM BY PATHOLOGIST: CPT

## 2020-11-18 PROCEDURE — 97530 THERAPEUTIC ACTIVITIES: CPT

## 2020-11-18 PROCEDURE — 87340 HEPATITIS B SURFACE AG IA: CPT

## 2020-11-18 PROCEDURE — 97116 GAIT TRAINING THERAPY: CPT

## 2020-11-18 PROCEDURE — 83735 ASSAY OF MAGNESIUM: CPT

## 2020-11-18 PROCEDURE — 84295 ASSAY OF SERUM SODIUM: CPT

## 2020-11-18 PROCEDURE — U0003: CPT

## 2020-11-18 PROCEDURE — 88342 IMHCHEM/IMCYTCHM 1ST ANTB: CPT

## 2020-11-18 PROCEDURE — 82435 ASSAY OF BLOOD CHLORIDE: CPT

## 2020-11-18 PROCEDURE — 85027 COMPLETE CBC AUTOMATED: CPT

## 2020-11-18 PROCEDURE — 85018 HEMOGLOBIN: CPT

## 2020-11-18 PROCEDURE — A9585: CPT

## 2020-11-18 PROCEDURE — 85730 THROMBOPLASTIN TIME PARTIAL: CPT

## 2020-11-18 PROCEDURE — 86334 IMMUNOFIX E-PHORESIS SERUM: CPT

## 2020-11-18 PROCEDURE — 86235 NUCLEAR ANTIGEN ANTIBODY: CPT

## 2020-11-18 PROCEDURE — 81001 URINALYSIS AUTO W/SCOPE: CPT

## 2020-11-18 PROCEDURE — 88312 SPECIAL STAINS GROUP 1: CPT

## 2020-11-18 PROCEDURE — 80048 BASIC METABOLIC PNL TOTAL CA: CPT

## 2020-11-18 PROCEDURE — 86695 HERPES SIMPLEX TYPE 1 TEST: CPT

## 2020-11-18 PROCEDURE — 83036 HEMOGLOBIN GLYCOSYLATED A1C: CPT

## 2020-11-18 PROCEDURE — 86200 CCP ANTIBODY: CPT

## 2020-11-18 PROCEDURE — 84443 ASSAY THYROID STIM HORMONE: CPT

## 2020-11-18 PROCEDURE — 71260 CT THORAX DX C+: CPT

## 2020-11-18 RX ORDER — OFLOXACIN 0.3 %
1 DROPS OPHTHALMIC (EYE)
Qty: 0 | Refills: 0 | DISCHARGE

## 2020-11-18 RX ORDER — METFORMIN HYDROCHLORIDE 850 MG/1
1 TABLET ORAL
Qty: 0 | Refills: 0 | DISCHARGE
Start: 2020-11-18

## 2020-11-18 RX ORDER — ASCORBIC ACID 60 MG
1 TABLET,CHEWABLE ORAL
Qty: 30 | Refills: 0
Start: 2020-11-18 | End: 2020-12-17

## 2020-11-18 RX ORDER — FAMOTIDINE 10 MG/ML
1 INJECTION INTRAVENOUS
Qty: 30 | Refills: 0
Start: 2020-11-18 | End: 2020-12-17

## 2020-11-18 RX ORDER — DORZOLAMIDE HYDROCHLORIDE TIMOLOL MALEATE 20; 5 MG/ML; MG/ML
1 SOLUTION/ DROPS OPHTHALMIC
Qty: 0 | Refills: 0 | DISCHARGE

## 2020-11-18 RX ORDER — LOTEPREDNOL ETABONATE 2 MG/ML
1 SUSPENSION/ DROPS OPHTHALMIC
Qty: 0 | Refills: 0 | DISCHARGE

## 2020-11-18 RX ADMIN — Medication 5 MILLIGRAM(S): at 05:50

## 2020-11-18 RX ADMIN — Medication 100 MILLIGRAM(S): at 05:49

## 2020-11-18 RX ADMIN — DORZOLAMIDE HYDROCHLORIDE TIMOLOL MALEATE 1 DROP(S): 20; 5 SOLUTION/ DROPS OPHTHALMIC at 05:49

## 2020-11-18 RX ADMIN — Medication 2: at 09:17

## 2020-11-18 RX ADMIN — Medication 1 DROP(S): at 05:48

## 2020-11-18 RX ADMIN — Medication 5 UNIT(S): at 09:17

## 2020-11-18 RX ADMIN — Medication 1 DROP(S): at 09:18

## 2020-11-18 RX ADMIN — Medication 1 DROP(S): at 00:53

## 2020-11-18 RX ADMIN — Medication 1 DROP(S): at 05:49

## 2020-11-18 NOTE — CHART NOTE - NSCHARTNOTESELECT_GEN_ALL_CORE
Malnutrition Notification
CT C/A/P Cancelled/Event Note
Discharge note - opthalmology recommendations
Event Note
Event Note/Ophthalmology
Event Note/Rheumatology
Nutrition Services
Off Service Note/Oncology
Ophthalmology
Ophtho/Event Note
vision/Event Note

## 2020-11-18 NOTE — CHART NOTE - NSCHARTNOTEFT_GEN_A_CORE
As discussed with opthalmology, Dr. Syeda Hurtado, the patient should be discharged on Moxifloxacin drops four times a day to both eyes, prednisone 40mg po daily (opthalmology to taper outpatient), Doxycycline (opthalmology to decide on end date as outpatient), Vitamin C 1000mg po daily, and no other eye drops at this time.  Also, Dr. Hurtado states that patient should wear bilateral eye shields, especially while sleeping.  She also states that patient can gently clean eye lids with saline and washcloth nightly to remove eye crust.    Request from Dr. Erickson to facilitate patient discharge.  Dr. Erickson agrees with all of the above opthalmology recommendations.  Medication reconciliation reviewed, revised, and resolved with Dr. Erickson, who has medically cleared patient for discharge with follow up as advised.  Please refer to discharge note for detailed hospital course.

## 2020-11-19 LAB
% GAMMA, URINE: 13.5 % — SIGNIFICANT CHANGE UP
ALBUMIN 24H MFR UR ELPH: 14.3 % — SIGNIFICANT CHANGE UP
ALPHA1 GLOB 24H MFR UR ELPH: 37.6 % — SIGNIFICANT CHANGE UP
ALPHA2 GLOB 24H MFR UR ELPH: 23 % — SIGNIFICANT CHANGE UP
B-GLOBULIN 24H MFR UR ELPH: 11.6 % — SIGNIFICANT CHANGE UP
INTERPRETATION 24H UR IFE-IMP: SIGNIFICANT CHANGE UP
INTERPRETATION 24H UR IFE-IMP: SIGNIFICANT CHANGE UP
M PROTEIN 24H UR ELPH-MRATE: SIGNIFICANT CHANGE UP
PROT ?TM UR-MCNC: 6 MG/DL — SIGNIFICANT CHANGE UP (ref 0–12)
PROT PATTERN 24H UR ELPH-IMP: SIGNIFICANT CHANGE UP
TOTAL VOLUME - 24 HOUR: SIGNIFICANT CHANGE UP ML
URINE CREATININE CALCULATION: SIGNIFICANT CHANGE UP G/24 H (ref 1–2)
VIT A SERPL-MCNC: 25.5 UG/DL — SIGNIFICANT CHANGE UP (ref 22–69.5)

## 2020-11-24 ENCOUNTER — APPOINTMENT (OUTPATIENT)
Dept: OPHTHALMOLOGY | Facility: CLINIC | Age: 80
End: 2020-11-24

## 2020-12-06 ENCOUNTER — NON-APPOINTMENT (OUTPATIENT)
Age: 80
End: 2020-12-06

## 2020-12-07 ENCOUNTER — APPOINTMENT (OUTPATIENT)
Dept: OPHTHALMOLOGY | Facility: CLINIC | Age: 80
End: 2020-12-07
Payer: MEDICARE

## 2020-12-07 ENCOUNTER — NON-APPOINTMENT (OUTPATIENT)
Age: 80
End: 2020-12-07

## 2020-12-07 PROCEDURE — 65286 REPAIR OF EYE WOUND: CPT | Mod: LT,79

## 2020-12-07 PROCEDURE — 92012 INTRM OPH EXAM EST PATIENT: CPT | Mod: 24,25

## 2020-12-08 ENCOUNTER — APPOINTMENT (OUTPATIENT)
Dept: OPHTHALMOLOGY | Facility: CLINIC | Age: 80
End: 2020-12-08

## 2020-12-13 ENCOUNTER — INPATIENT (INPATIENT)
Facility: HOSPITAL | Age: 80
LOS: 24 days | Discharge: NURSING, REHAB OR LONG-TERM CARE | End: 2021-01-07
Attending: HOSPITALIST | Admitting: HOSPITALIST
Payer: MEDICARE

## 2020-12-13 VITALS
RESPIRATION RATE: 18 BRPM | SYSTOLIC BLOOD PRESSURE: 139 MMHG | OXYGEN SATURATION: 96 % | DIASTOLIC BLOOD PRESSURE: 100 MMHG | TEMPERATURE: 98 F | HEART RATE: 69 BPM

## 2020-12-13 DIAGNOSIS — I10 ESSENTIAL (PRIMARY) HYPERTENSION: ICD-10-CM

## 2020-12-13 DIAGNOSIS — E11.69 TYPE 2 DIABETES MELLITUS WITH OTHER SPECIFIED COMPLICATION: ICD-10-CM

## 2020-12-13 DIAGNOSIS — G30.9 ALZHEIMER'S DISEASE, UNSPECIFIED: ICD-10-CM

## 2020-12-13 DIAGNOSIS — E43 UNSPECIFIED SEVERE PROTEIN-CALORIE MALNUTRITION: ICD-10-CM

## 2020-12-13 DIAGNOSIS — R45.1 RESTLESSNESS AND AGITATION: ICD-10-CM

## 2020-12-13 DIAGNOSIS — H11.9 UNSPECIFIED DISORDER OF CONJUNCTIVA: ICD-10-CM

## 2020-12-13 DIAGNOSIS — G93.41 METABOLIC ENCEPHALOPATHY: ICD-10-CM

## 2020-12-13 DIAGNOSIS — Z29.9 ENCOUNTER FOR PROPHYLACTIC MEASURES, UNSPECIFIED: ICD-10-CM

## 2020-12-13 LAB
ADD ON TEST-SPECIMEN IN LAB: SIGNIFICANT CHANGE UP
ALBUMIN SERPL ELPH-MCNC: 3.8 G/DL — SIGNIFICANT CHANGE UP (ref 3.3–5)
ALP SERPL-CCNC: 52 U/L — SIGNIFICANT CHANGE UP (ref 40–120)
ALT FLD-CCNC: 16 U/L — SIGNIFICANT CHANGE UP (ref 4–41)
ANION GAP SERPL CALC-SCNC: 11 MMOL/L — SIGNIFICANT CHANGE UP (ref 7–14)
APPEARANCE UR: ABNORMAL
AST SERPL-CCNC: 11 U/L — SIGNIFICANT CHANGE UP (ref 4–40)
B-OH-BUTYR SERPL-SCNC: 0.5 MMOL/L — HIGH (ref 0–0.4)
BASOPHILS # BLD AUTO: 0.01 K/UL — SIGNIFICANT CHANGE UP (ref 0–0.2)
BASOPHILS NFR BLD AUTO: 0.3 % — SIGNIFICANT CHANGE UP (ref 0–2)
BILIRUB SERPL-MCNC: 0.5 MG/DL — SIGNIFICANT CHANGE UP (ref 0.2–1.2)
BILIRUB UR-MCNC: NEGATIVE — SIGNIFICANT CHANGE UP
BLOOD GAS VENOUS COMPREHENSIVE RESULT: SIGNIFICANT CHANGE UP
BLOOD GAS VENOUS COMPREHENSIVE RESULT: SIGNIFICANT CHANGE UP
BUN SERPL-MCNC: 16 MG/DL — SIGNIFICANT CHANGE UP (ref 7–23)
CALCIUM SERPL-MCNC: 9.6 MG/DL — SIGNIFICANT CHANGE UP (ref 8.4–10.5)
CHLORIDE SERPL-SCNC: 99 MMOL/L — SIGNIFICANT CHANGE UP (ref 98–107)
CO2 SERPL-SCNC: 26 MMOL/L — SIGNIFICANT CHANGE UP (ref 22–31)
COLOR SPEC: YELLOW — SIGNIFICANT CHANGE UP
CREAT SERPL-MCNC: 0.91 MG/DL — SIGNIFICANT CHANGE UP (ref 0.5–1.3)
DIFF PNL FLD: NEGATIVE — SIGNIFICANT CHANGE UP
EOSINOPHIL # BLD AUTO: 0.04 K/UL — SIGNIFICANT CHANGE UP (ref 0–0.5)
EOSINOPHIL NFR BLD AUTO: 1.2 % — SIGNIFICANT CHANGE UP (ref 0–6)
GLUCOSE SERPL-MCNC: 129 MG/DL — HIGH (ref 70–99)
GLUCOSE UR QL: NEGATIVE — SIGNIFICANT CHANGE UP
HCT VFR BLD CALC: 39.2 % — SIGNIFICANT CHANGE UP (ref 39–50)
HGB BLD-MCNC: 13 G/DL — SIGNIFICANT CHANGE UP (ref 13–17)
IANC: 2.36 K/UL — SIGNIFICANT CHANGE UP (ref 1.5–8.5)
IMM GRANULOCYTES NFR BLD AUTO: 0.3 % — SIGNIFICANT CHANGE UP (ref 0–1.5)
KETONES UR-MCNC: ABNORMAL
LEUKOCYTE ESTERASE UR-ACNC: NEGATIVE — SIGNIFICANT CHANGE UP
LYMPHOCYTES # BLD AUTO: 0.79 K/UL — LOW (ref 1–3.3)
LYMPHOCYTES # BLD AUTO: 22.8 % — SIGNIFICANT CHANGE UP (ref 13–44)
MCHC RBC-ENTMCNC: 28.4 PG — SIGNIFICANT CHANGE UP (ref 27–34)
MCHC RBC-ENTMCNC: 33.2 GM/DL — SIGNIFICANT CHANGE UP (ref 32–36)
MCV RBC AUTO: 85.8 FL — SIGNIFICANT CHANGE UP (ref 80–100)
MONOCYTES # BLD AUTO: 0.26 K/UL — SIGNIFICANT CHANGE UP (ref 0–0.9)
MONOCYTES NFR BLD AUTO: 7.5 % — SIGNIFICANT CHANGE UP (ref 2–14)
NEUTROPHILS # BLD AUTO: 2.36 K/UL — SIGNIFICANT CHANGE UP (ref 1.8–7.4)
NEUTROPHILS NFR BLD AUTO: 67.9 % — SIGNIFICANT CHANGE UP (ref 43–77)
NITRITE UR-MCNC: NEGATIVE — SIGNIFICANT CHANGE UP
NRBC # BLD: 0 /100 WBCS — SIGNIFICANT CHANGE UP
NRBC # FLD: 0 K/UL — SIGNIFICANT CHANGE UP
PH UR: 8 — SIGNIFICANT CHANGE UP (ref 5–8)
PLATELET # BLD AUTO: 142 K/UL — LOW (ref 150–400)
POTASSIUM SERPL-MCNC: 3.7 MMOL/L — SIGNIFICANT CHANGE UP (ref 3.5–5.3)
POTASSIUM SERPL-SCNC: 3.7 MMOL/L — SIGNIFICANT CHANGE UP (ref 3.5–5.3)
PROT SERPL-MCNC: 6.2 G/DL — SIGNIFICANT CHANGE UP (ref 6–8.3)
PROT UR-MCNC: ABNORMAL
RBC # BLD: 4.57 M/UL — SIGNIFICANT CHANGE UP (ref 4.2–5.8)
RBC # FLD: 12.9 % — SIGNIFICANT CHANGE UP (ref 10.3–14.5)
SARS-COV-2 RNA SPEC QL NAA+PROBE: SIGNIFICANT CHANGE UP
SODIUM SERPL-SCNC: 136 MMOL/L — SIGNIFICANT CHANGE UP (ref 135–145)
SP GR SPEC: 1.02 — SIGNIFICANT CHANGE UP (ref 1.01–1.02)
TROPONIN T, HIGH SENSITIVITY RESULT: 23 NG/L — SIGNIFICANT CHANGE UP
TROPONIN T, HIGH SENSITIVITY RESULT: 24 NG/L — SIGNIFICANT CHANGE UP
UROBILINOGEN FLD QL: ABNORMAL
WBC # BLD: 3.47 K/UL — LOW (ref 3.8–10.5)
WBC # FLD AUTO: 3.47 K/UL — LOW (ref 3.8–10.5)

## 2020-12-13 PROCEDURE — 71046 X-RAY EXAM CHEST 2 VIEWS: CPT | Mod: 26

## 2020-12-13 PROCEDURE — 99223 1ST HOSP IP/OBS HIGH 75: CPT

## 2020-12-13 PROCEDURE — 99285 EMERGENCY DEPT VISIT HI MDM: CPT | Mod: CS

## 2020-12-13 PROCEDURE — 70450 CT HEAD/BRAIN W/O DYE: CPT | Mod: 26

## 2020-12-13 RX ORDER — TAMSULOSIN HYDROCHLORIDE 0.4 MG/1
0.4 CAPSULE ORAL AT BEDTIME
Refills: 0 | Status: DISCONTINUED | OUTPATIENT
Start: 2020-12-13 | End: 2020-12-19

## 2020-12-13 RX ORDER — ASCORBIC ACID 60 MG
1000 TABLET,CHEWABLE ORAL DAILY
Refills: 0 | Status: DISCONTINUED | OUTPATIENT
Start: 2020-12-13 | End: 2020-12-13

## 2020-12-13 RX ORDER — PREGABALIN 225 MG/1
1000 CAPSULE ORAL DAILY
Refills: 0 | Status: DISCONTINUED | OUTPATIENT
Start: 2020-12-13 | End: 2020-12-19

## 2020-12-13 RX ORDER — MOXIFLOXACIN HCL 0.5 %
1 DROPS OPHTHALMIC (EYE)
Refills: 0 | Status: DISCONTINUED | OUTPATIENT
Start: 2020-12-13 | End: 2020-12-13

## 2020-12-13 RX ORDER — HYDRALAZINE HCL 50 MG
2.5 TABLET ORAL ONCE
Refills: 0 | Status: DISCONTINUED | OUTPATIENT
Start: 2020-12-13 | End: 2020-12-13

## 2020-12-13 RX ORDER — INSULIN LISPRO 100/ML
VIAL (ML) SUBCUTANEOUS AT BEDTIME
Refills: 0 | Status: DISCONTINUED | OUTPATIENT
Start: 2020-12-13 | End: 2020-12-15

## 2020-12-13 RX ORDER — ATORVASTATIN CALCIUM 80 MG/1
10 TABLET, FILM COATED ORAL DAILY
Refills: 0 | Status: DISCONTINUED | OUTPATIENT
Start: 2020-12-13 | End: 2020-12-13

## 2020-12-13 RX ORDER — DEXTROSE 50 % IN WATER 50 %
12.5 SYRINGE (ML) INTRAVENOUS ONCE
Refills: 0 | Status: DISCONTINUED | OUTPATIENT
Start: 2020-12-13 | End: 2020-12-17

## 2020-12-13 RX ORDER — ATORVASTATIN CALCIUM 80 MG/1
10 TABLET, FILM COATED ORAL AT BEDTIME
Refills: 0 | Status: DISCONTINUED | OUTPATIENT
Start: 2020-12-13 | End: 2020-12-19

## 2020-12-13 RX ORDER — DEXTROSE 50 % IN WATER 50 %
15 SYRINGE (ML) INTRAVENOUS ONCE
Refills: 0 | Status: DISCONTINUED | OUTPATIENT
Start: 2020-12-13 | End: 2020-12-17

## 2020-12-13 RX ORDER — ASPIRIN/CALCIUM CARB/MAGNESIUM 324 MG
81 TABLET ORAL DAILY
Refills: 0 | Status: DISCONTINUED | OUTPATIENT
Start: 2020-12-13 | End: 2020-12-17

## 2020-12-13 RX ORDER — DEXTROSE 50 % IN WATER 50 %
25 SYRINGE (ML) INTRAVENOUS ONCE
Refills: 0 | Status: DISCONTINUED | OUTPATIENT
Start: 2020-12-13 | End: 2020-12-17

## 2020-12-13 RX ORDER — LANOLIN ALCOHOL/MO/W.PET/CERES
3 CREAM (GRAM) TOPICAL AT BEDTIME
Refills: 0 | Status: DISCONTINUED | OUTPATIENT
Start: 2020-12-13 | End: 2020-12-19

## 2020-12-13 RX ORDER — INSULIN LISPRO 100/ML
VIAL (ML) SUBCUTANEOUS
Refills: 0 | Status: DISCONTINUED | OUTPATIENT
Start: 2020-12-13 | End: 2020-12-15

## 2020-12-13 RX ORDER — ASCORBIC ACID 60 MG
1000 TABLET,CHEWABLE ORAL DAILY
Refills: 0 | Status: DISCONTINUED | OUTPATIENT
Start: 2020-12-13 | End: 2020-12-19

## 2020-12-13 RX ORDER — SODIUM CHLORIDE 9 MG/ML
1000 INJECTION INTRAMUSCULAR; INTRAVENOUS; SUBCUTANEOUS ONCE
Refills: 0 | Status: COMPLETED | OUTPATIENT
Start: 2020-12-13 | End: 2020-12-13

## 2020-12-13 RX ORDER — SODIUM CHLORIDE 9 MG/ML
1000 INJECTION, SOLUTION INTRAVENOUS
Refills: 0 | Status: DISCONTINUED | OUTPATIENT
Start: 2020-12-13 | End: 2020-12-17

## 2020-12-13 RX ORDER — ENOXAPARIN SODIUM 100 MG/ML
40 INJECTION SUBCUTANEOUS DAILY
Refills: 0 | Status: DISCONTINUED | OUTPATIENT
Start: 2020-12-13 | End: 2021-01-07

## 2020-12-13 RX ORDER — FAMOTIDINE 10 MG/ML
20 INJECTION INTRAVENOUS
Refills: 0 | Status: DISCONTINUED | OUTPATIENT
Start: 2020-12-13 | End: 2020-12-16

## 2020-12-13 RX ORDER — DORZOLAMIDE HYDROCHLORIDE TIMOLOL MALEATE 20; 5 MG/ML; MG/ML
1 SOLUTION/ DROPS OPHTHALMIC
Refills: 0 | Status: DISCONTINUED | OUTPATIENT
Start: 2020-12-13 | End: 2020-12-15

## 2020-12-13 RX ORDER — HYDRALAZINE HCL 50 MG
2.5 TABLET ORAL ONCE
Refills: 0 | Status: COMPLETED | OUTPATIENT
Start: 2020-12-13 | End: 2020-12-13

## 2020-12-13 RX ORDER — SENNA PLUS 8.6 MG/1
2 TABLET ORAL AT BEDTIME
Refills: 0 | Status: DISCONTINUED | OUTPATIENT
Start: 2020-12-13 | End: 2020-12-19

## 2020-12-13 RX ORDER — HALOPERIDOL DECANOATE 100 MG/ML
2.5 INJECTION INTRAMUSCULAR ONCE
Refills: 0 | Status: COMPLETED | OUTPATIENT
Start: 2020-12-13 | End: 2020-12-13

## 2020-12-13 RX ORDER — HALOPERIDOL DECANOATE 100 MG/ML
1 INJECTION INTRAMUSCULAR ONCE
Refills: 0 | Status: COMPLETED | OUTPATIENT
Start: 2020-12-13 | End: 2020-12-13

## 2020-12-13 RX ORDER — MOXIFLOXACIN HCL 0.5 %
1 DROPS OPHTHALMIC (EYE)
Refills: 0 | Status: DISCONTINUED | OUTPATIENT
Start: 2020-12-13 | End: 2021-01-07

## 2020-12-13 RX ORDER — GLUCAGON INJECTION, SOLUTION 0.5 MG/.1ML
1 INJECTION, SOLUTION SUBCUTANEOUS ONCE
Refills: 0 | Status: DISCONTINUED | OUTPATIENT
Start: 2020-12-13 | End: 2021-01-07

## 2020-12-13 RX ORDER — LANOLIN ALCOHOL/MO/W.PET/CERES
3 CREAM (GRAM) TOPICAL ONCE
Refills: 0 | Status: COMPLETED | OUTPATIENT
Start: 2020-12-13 | End: 2020-12-13

## 2020-12-13 RX ORDER — POLYETHYLENE GLYCOL 3350 17 G/17G
17 POWDER, FOR SOLUTION ORAL DAILY
Refills: 0 | Status: DISCONTINUED | OUTPATIENT
Start: 2020-12-13 | End: 2020-12-19

## 2020-12-13 RX ADMIN — HALOPERIDOL DECANOATE 2.5 MILLIGRAM(S): 100 INJECTION INTRAMUSCULAR at 09:06

## 2020-12-13 RX ADMIN — Medication 5 MILLIGRAM(S): at 17:00

## 2020-12-13 RX ADMIN — Medication 2.5 MILLIGRAM(S): at 14:25

## 2020-12-13 RX ADMIN — Medication 1 DROP(S): at 18:09

## 2020-12-13 RX ADMIN — Medication 1000 MILLIGRAM(S): at 14:25

## 2020-12-13 RX ADMIN — SODIUM CHLORIDE 1000 MILLILITER(S): 9 INJECTION INTRAMUSCULAR; INTRAVENOUS; SUBCUTANEOUS at 07:55

## 2020-12-13 RX ADMIN — Medication 100 MILLIGRAM(S): at 17:00

## 2020-12-13 RX ADMIN — FAMOTIDINE 20 MILLIGRAM(S): 10 INJECTION INTRAVENOUS at 17:01

## 2020-12-13 RX ADMIN — Medication 3 MILLIGRAM(S): at 21:42

## 2020-12-13 RX ADMIN — DORZOLAMIDE HYDROCHLORIDE TIMOLOL MALEATE 1 DROP(S): 20; 5 SOLUTION/ DROPS OPHTHALMIC at 18:09

## 2020-12-13 RX ADMIN — ATORVASTATIN CALCIUM 10 MILLIGRAM(S): 80 TABLET, FILM COATED ORAL at 21:42

## 2020-12-13 RX ADMIN — Medication 81 MILLIGRAM(S): at 14:25

## 2020-12-13 RX ADMIN — PREGABALIN 1000 MICROGRAM(S): 225 CAPSULE ORAL at 14:25

## 2020-12-13 RX ADMIN — HALOPERIDOL DECANOATE 1 MILLIGRAM(S): 100 INJECTION INTRAMUSCULAR at 20:36

## 2020-12-13 RX ADMIN — Medication 5 MILLIGRAM(S): at 10:05

## 2020-12-13 NOTE — ED PROVIDER NOTE - OBJECTIVE STATEMENT
Pertinent PMH/PSH/FHx/SHx and Review of Systems contained within:  81yo M pmh of htn, hld, DM, chronic conjunctivitis, dementia, bibems for increased agitation, weakness, and decreased po intake.  Pt unable to contribute to HPI at this time.

## 2020-12-13 NOTE — H&P ADULT - ASSESSMENT
80 year old male with PMH of DM, HTN, HLD, chronic conjunctivitis s/p corneal surg on steroids p/w  worsening agitation and FTT

## 2020-12-13 NOTE — H&P ADULT - HISTORY OF PRESENT ILLNESS
HPI:    80 year old male with PMH of DM, HTN, HLD, chronic conjunctivitis s/p corneal surg 2020 at [facility] on steroid taper, p/w worsening agitation. As per son pt is AAOX2 and walks with a waker at baseline.  Since discharge from [facility] pt has been refusing to get out of the bed, eating less, not taking meds. He has also becomes more confused, not recognizing family and screaming at times. Son also reports unintentional weight loss of pt, ~20lb in the past 3 months.  No fever, cough, sob, N/V/D or pain issues.       PAST MEDICAL & SURGICAL HISTORY:  BPH (benign prostatic hyperplasia)    DM (diabetes mellitus)    HTN (hypertension)    Hyperlipidemia.    PAST SURGICAL HISTORY:  No significant past surgical history.    No pertinent family history in first degree relatives.    No Pertinent Family History in first degree relatives of: Cancer or  rheumatological conditions.    Social History:  Social History (marital status, living situation, occupation, tobacco use,  alcohol and drug use, and sexual history): Remote history of smoking quit > 50  years ago. Retired . Rare alcohol use.        Review of Systems:   CONSTITUTIONAL: No fever +weight loss, or fatigue  EYES: No eye pain + visual disturbances, or discharge  ENMT:  No difficulty hearing, tinnitus, vertigo; No sinus or throat pain  NECK: No pain or stiffness  BREASTS: No pain, masses, or nipple discharge  RESPIRATORY: No cough, wheezing, chills or hemoptysis; No shortness of breath  CARDIOVASCULAR: No chest pain, palpitations, dizziness, or leg swelling  GASTROINTESTINAL: No abdominal or epigastric pain. No nausea, vomiting, or hematemesis; No diarrhea or constipation. No melena or hematochezia.  GENITOURINARY: No dysuria, frequency, hematuria, or incontinence  NEUROLOGICAL: No headaches, memory loss, loss of strength, numbness, or tremors  SKIN: +bruises  LYMPH NODES: No enlarged glands  ENDOCRINE: No heat or cold intolerance; No hair loss  MUSCULOSKELETAL: No joint pain or swelling; No muscle, back, or extremity pain  PSYCHIATRIC: No depression, anxiety, mood swings, or difficulty sleeping  HEME/LYMPH: No easy bruising, or bleeding gums  ALLERGY AND IMMUNOLOGIC: No hives or eczema    Allergies    No Known Allergies    Intolerances      MEDICATIONS  (STANDING):  ascorbic acid 1000 milliGRAM(s) Oral daily  aspirin enteric coated 81 milliGRAM(s) Oral daily  atorvastatin 10 milliGRAM(s) Oral at bedtime  cyanocobalamin 1000 MICROGram(s) Oral daily  dextrose 40% Gel 15 Gram(s) Oral once  dextrose 5%. 1000 milliLiter(s) (50 mL/Hr) IV Continuous <Continuous>  dextrose 5%. 1000 milliLiter(s) (100 mL/Hr) IV Continuous <Continuous>  dextrose 50% Injectable 25 Gram(s) IV Push once  dextrose 50% Injectable 12.5 Gram(s) IV Push once  dextrose 50% Injectable 25 Gram(s) IV Push once  dorzolamide 2%/timolol 0.5% Ophthalmic Solution 1 Drop(s) Both EYES two times a day  doxycycline hyclate Capsule 100 milliGRAM(s) Oral every 12 hours  enalapril 10 milliGRAM(s) Oral daily  enoxaparin Injectable 40 milliGRAM(s) SubCutaneous daily  famotidine    Tablet 20 milliGRAM(s) Oral two times a day  glucagon  Injectable 1 milliGRAM(s) IntraMuscular once  hydrALAZINE Injectable 2.5 milliGRAM(s) IV Push once  insulin lispro (ADMELOG) corrective regimen sliding scale   SubCutaneous three times a day before meals  insulin lispro (ADMELOG) corrective regimen sliding scale   SubCutaneous at bedtime  moxifloxacin 0.5% Ophthalmic Solution - Peds 1 Drop(s) Both EYES four times a day  predniSONE   Tablet 30 milliGRAM(s) Oral daily  tamsulosin 0.4 milliGRAM(s) Oral at bedtime    MEDICATIONS  (PRN):  polyethylene glycol 3350 17 Gram(s) Oral daily PRN Constipation  senna 2 Tablet(s) Oral at bedtime PRN Constipation      T(C): 36.6 (20 @ 09:30), Max: 36.9 (20 @ 06:31)  HR: 89 (20 @ 12:40) (64 - 89)  BP: 169/113 (20 @ 12:42) (139/100 - 174/112)  RR: 18 (20 @ 12:40) (14 - 18)  SpO2: 100% (20 @ 12:40) (96% - 100%)    CAPILLARY BLOOD GLUCOSE      POCT Blood Glucose.: 142 mg/dL (13 Dec 2020 12:56)  POCT Blood Glucose.: 126 mg/dL (13 Dec 2020 06:10)    I&O's Summary      PHYSICAL EXAM:  GENERAL: NAD, cachetic   HEAD:  Atraumatic, Normocephalic  EYES: both eyes covered with patch. no discharge  NECK: Supple, No elevated JVD  CHEST/LUNG: Clear to auscultation bilaterally; No wheeze  HEART: Regular rate and rhythm; No murmurs, rubs, or gallops  ABDOMEN: Soft, Nontender, Nondistended; Bowel sounds present  EXTREMITIES:  2+ Peripheral Pulses, No clubbing, cyanosis, or edema  PSYCH: AAOx2 (self, hospital)   NEUROLOGY: CN II-XII grossly intact, moving all extremities  SKIN: stage 2 sacral decub     LABS:                        13.0   3.47  )-----------( 142      ( 13 Dec 2020 07:03 )             39.2         136  |  99  |  16  ----------------------------<  129<H>  3.7   |  26  |  0.91    Ca    9.6      13 Dec 2020 07:03    TPro  6.2  /  Alb  3.8  /  TBili  0.5  /  DBili  x   /  AST  11  /  ALT  16  /  AlkPhos  52  12-13          Urinalysis Basic - ( 13 Dec 2020 07:59 )    Color: Yellow / Appearance: Slightly Turbid / S.024 / pH: x  Gluc: x / Ketone: Small  / Bili: Negative / Urobili: 3 mg/dL   Blood: x / Protein: 30 mg/dL / Nitrite: Negative   Leuk Esterase: Negative / RBC: 3 /HPF / WBC 1 /HPF   Sq Epi: x / Non Sq Epi: 1 /HPF / Bacteria: Few          RADIOLOGY & ADDITIONAL TESTS:    ECG Personally Reviewed - SR, incomplete RBBB    Imaging Personally Reviewed:       HPI:    80 year old male with PMH of DM, HTN, HLD, chronic conjunctivitis s/p corneal surg 2020 at [facility] on steroid taper, p/w worsening agitation. As per son pt is AAOX2 and walks with a waker at baseline.  Since discharge from [facility] pt has been refusing to get out of the bed, eating less, not taking meds. He has also becomes more confused, not recognizing family and screaming at times. Son also reports unintentional weight loss of pt, ~60lb in the past year.  No fever, cough, sob, N/V/D or pain issues.       PAST MEDICAL & SURGICAL HISTORY:  BPH (benign prostatic hyperplasia)    DM (diabetes mellitus)    HTN (hypertension)    Hyperlipidemia.    PAST SURGICAL HISTORY:  No significant past surgical history.    No pertinent family history in first degree relatives.    No Pertinent Family History in first degree relatives of: Cancer or  rheumatological conditions.    Social History:  Social History (marital status, living situation, occupation, tobacco use,  alcohol and drug use, and sexual history): Remote history of smoking quit > 50  years ago. Retired . Rare alcohol use.        Review of Systems:   CONSTITUTIONAL: No fever +weight loss, or fatigue  EYES: No eye pain + visual disturbances, or discharge  ENMT:  No difficulty hearing, tinnitus, vertigo; No sinus or throat pain  NECK: No pain or stiffness  BREASTS: No pain, masses, or nipple discharge  RESPIRATORY: No cough, wheezing, chills or hemoptysis; No shortness of breath  CARDIOVASCULAR: No chest pain, palpitations, dizziness, or leg swelling  GASTROINTESTINAL: No abdominal or epigastric pain. No nausea, vomiting, or hematemesis; No diarrhea or constipation. No melena or hematochezia.  GENITOURINARY: No dysuria, frequency, hematuria, or incontinence  NEUROLOGICAL: No headaches, memory loss, loss of strength, numbness, or tremors  SKIN: +bruises  LYMPH NODES: No enlarged glands  ENDOCRINE: No heat or cold intolerance; No hair loss  MUSCULOSKELETAL: No joint pain or swelling; No muscle, back, or extremity pain  PSYCHIATRIC: No depression, anxiety, mood swings, or difficulty sleeping  HEME/LYMPH: No easy bruising, or bleeding gums  ALLERGY AND IMMUNOLOGIC: No hives or eczema    Allergies    No Known Allergies    Intolerances      MEDICATIONS  (STANDING):  ascorbic acid 1000 milliGRAM(s) Oral daily  aspirin enteric coated 81 milliGRAM(s) Oral daily  atorvastatin 10 milliGRAM(s) Oral at bedtime  cyanocobalamin 1000 MICROGram(s) Oral daily  dextrose 40% Gel 15 Gram(s) Oral once  dextrose 5%. 1000 milliLiter(s) (50 mL/Hr) IV Continuous <Continuous>  dextrose 5%. 1000 milliLiter(s) (100 mL/Hr) IV Continuous <Continuous>  dextrose 50% Injectable 25 Gram(s) IV Push once  dextrose 50% Injectable 12.5 Gram(s) IV Push once  dextrose 50% Injectable 25 Gram(s) IV Push once  dorzolamide 2%/timolol 0.5% Ophthalmic Solution 1 Drop(s) Both EYES two times a day  doxycycline hyclate Capsule 100 milliGRAM(s) Oral every 12 hours  enalapril 10 milliGRAM(s) Oral daily  enoxaparin Injectable 40 milliGRAM(s) SubCutaneous daily  famotidine    Tablet 20 milliGRAM(s) Oral two times a day  glucagon  Injectable 1 milliGRAM(s) IntraMuscular once  hydrALAZINE Injectable 2.5 milliGRAM(s) IV Push once  insulin lispro (ADMELOG) corrective regimen sliding scale   SubCutaneous three times a day before meals  insulin lispro (ADMELOG) corrective regimen sliding scale   SubCutaneous at bedtime  moxifloxacin 0.5% Ophthalmic Solution - Peds 1 Drop(s) Both EYES four times a day  predniSONE   Tablet 30 milliGRAM(s) Oral daily  tamsulosin 0.4 milliGRAM(s) Oral at bedtime    MEDICATIONS  (PRN):  polyethylene glycol 3350 17 Gram(s) Oral daily PRN Constipation  senna 2 Tablet(s) Oral at bedtime PRN Constipation      T(C): 36.6 (20 @ 09:30), Max: 36.9 (20 @ 06:31)  HR: 89 (20 @ 12:40) (64 - 89)  BP: 169/113 (20 @ 12:42) (139/100 - 174/112)  RR: 18 (20 @ 12:40) (14 - 18)  SpO2: 100% (20 @ 12:40) (96% - 100%)    CAPILLARY BLOOD GLUCOSE      POCT Blood Glucose.: 142 mg/dL (13 Dec 2020 12:56)  POCT Blood Glucose.: 126 mg/dL (13 Dec 2020 06:10)    I&O's Summary      PHYSICAL EXAM:  GENERAL: NAD, cachetic   HEAD:  Atraumatic, Normocephalic  EYES: both eyes covered with patch. no discharge  NECK: Supple, No elevated JVD  CHEST/LUNG: Clear to auscultation bilaterally; No wheeze  HEART: Regular rate and rhythm; No murmurs, rubs, or gallops  ABDOMEN: Soft, Nontender, Nondistended; Bowel sounds present  EXTREMITIES:  2+ Peripheral Pulses, No clubbing, cyanosis, or edema  PSYCH: AAOx2 (self, hospital)   NEUROLOGY: CN II-XII grossly intact, moving all extremities  SKIN: stage 2 sacral decub     LABS:                        13.0   3.47  )-----------( 142      ( 13 Dec 2020 07:03 )             39.2         136  |  99  |  16  ----------------------------<  129<H>  3.7   |  26  |  0.91    Ca    9.6      13 Dec 2020 07:03    TPro  6.2  /  Alb  3.8  /  TBili  0.5  /  DBili  x   /  AST  11  /  ALT  16  /  AlkPhos  52  12-13          Urinalysis Basic - ( 13 Dec 2020 07:59 )    Color: Yellow / Appearance: Slightly Turbid / S.024 / pH: x  Gluc: x / Ketone: Small  / Bili: Negative / Urobili: 3 mg/dL   Blood: x / Protein: 30 mg/dL / Nitrite: Negative   Leuk Esterase: Negative / RBC: 3 /HPF / WBC 1 /HPF   Sq Epi: x / Non Sq Epi: 1 /HPF / Bacteria: Few          RADIOLOGY & ADDITIONAL TESTS:    ECG Personally Reviewed - SR, incomplete RBBB    Imaging Personally Reviewed:

## 2020-12-13 NOTE — ED ADULT NURSE NOTE - CHIEF COMPLAINT QUOTE
History of dementia, presents to ED for increased agitation.  Per family, patient's behavior has changed and he is more agitated than at baseline.  Denies any s/sx at this time.  Recently had cataract surgery.  Contact sonWinston at [phone] for additional medical information and updates.

## 2020-12-13 NOTE — ED ADULT TRIAGE NOTE - CHIEF COMPLAINT QUOTE
History of dementia, presents to ED for increased agitation.  Per family, patient's behavior has changed and he is more agitated than at baseline.  Denies any s/sx at this time.  Recently had cataract surgery.  Contact sonWinston at [phone] for additional medical information and updates.

## 2020-12-13 NOTE — ED PROVIDER NOTE - ATTENDING CONTRIBUTION TO CARE
History and physical above obtained and documented (or dictated) by me (attending physician).  Resident involved in case for assistance with disposition and may document involvement as necessary via progress note(s).   -Dr. Fontanez

## 2020-12-13 NOTE — ED PROVIDER NOTE - PROGRESS NOTE DETAILS
Reny Greer PGY-1 80 year old male with PMH of DM, HTN, HLD, chronic conjunctivitis as per chart review pt was admitted within last month for cataract surg at [facility]. pt found to not have capacity due to worsening dementia.   spoke with son - as per son pt couldn't go to bathroom, states he couldn't move, was screaming and son called ems. pt lives with wife who takes care of him. currently working on application for home health aide/nurse. pt has not been taking meds for past week. worsening agitation past 1.5 weeks.   Uses walker at baseline only moves few steps. losing weight. Francois, PGY3- sign out from night team. CTH/serologies non actionable. Admit to Dr. Brothers of [facility], (PCP Ronny Bucio). Son and family aware of plan.

## 2020-12-13 NOTE — ED PROVIDER NOTE - PHYSICAL EXAMINATION
Gen: cachectic  Head: NC, AT,  EOMI, normal lids/conjunctiva  ENT:  normal hearing, patent oropharynx without erythema/exudate  Neck: +supple, no tenderness/meningismus/JVD, +Trachea midline  Chest: no chest wall tenderness, equal chest rise  Pulm: Bilateral BS, normal resp effort, no wheeze/stridor/retractions  CV: RRR, no M/R/G, +dist pulses  Abd: +BS, soft, NT/ND  Rectal: deferred  Mskel: no edema/erythema/cyanosis  Neuro: AAOx1, moves all extremities, follows some basic commands

## 2020-12-13 NOTE — H&P ADULT - PROBLEM SELECTOR PLAN 1
DDx inc.  progressive dementia 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 no e/o acute intracranial pathology or infection   -CTH unremarkable  -UA, CXR negative  -cont to monitor

## 2020-12-13 NOTE — H&P ADULT - PROBLEM SELECTOR PROBLEM 5
Alzheimer's dementia with behavioral disturbance, unspecified timing of dementia onset Severe protein-calorie malnutrition

## 2020-12-13 NOTE — ED ADULT NURSE NOTE - OBJECTIVE STATEMENT
pt received to room 12, A&Ox1 hx dementia, unknown baseline, presents with increased agitation per family. pt states he lives at home with his wife Maia and ambulates with a cane at baseline. pt calm and cooperative with RN, exhibiting echolalia. pt denies medical complaints. [id number] stage 2 noted to sacrum.  pt had recent bilateral cataract surgery and had eye patches applied to both eyes. MD Greer at bedside for evaluation.

## 2020-12-13 NOTE — H&P ADULT - PROBLEM SELECTOR PLAN 2
-s/p corneal patch 11/2020 at [facility]  -c/w prednisone 30, doxy 100 and moxifloxacin eye drops as per opthalmology clinic note 12/11  -opthal consult in am

## 2020-12-13 NOTE — H&P ADULT - PROBLEM SELECTOR PLAN 6
BMI<19, 60lb weight loss in 1 year  -had malignancy w/u last month, pan CT/MR, EGD unremarkable  -c/w dietary supplements -Lovenox sc

## 2020-12-13 NOTE — ED PROVIDER NOTE - DISPOSITION TYPE
Surgeon's Initial Post Op Note





- Surgeon's Notes


Surgeon: Israel Rivas MD


Assistant: None


Pre-Operative Diagnosis: ascites


Operative Findings: small volume ascites


Post-Operative Diagnosis: same


Operation Performed: us guided paracentesis


Specimen/Specimens Removed: 900 cc straw colored fluid removed


Estimated Blood Loss: EBL {In ML}: 0


Date of Surgery/Procedure: 04/30/18


Time of Surgery/Procedure: 11:15 ADMIT

## 2020-12-13 NOTE — ED ADULT NURSE NOTE - INTERVENTIONS DEFINITIONS
Call bell, personal items and telephone within reach/Instruct patient to call for assistance/Stretcher in lowest position, wheels locked, appropriate side rails in place/[place] to call system/Non-slip footwear when patient is off stretcher/Physically safe environment: no spills, clutter or unnecessary equipment/Monitor for mental status changes and reorient to person, place, and time/Monitor gait and stability

## 2020-12-13 NOTE — H&P ADULT - PROBLEM SELECTOR PLAN 5
BMI<19, 60lb weight loss in 1 year  -had malignancy w/u last month, pan CT/MR, EGD unremarkable  -c/w dietary supplements

## 2020-12-14 ENCOUNTER — APPOINTMENT (OUTPATIENT)
Dept: OPHTHALMOLOGY | Facility: CLINIC | Age: 80
End: 2020-12-14

## 2020-12-14 LAB
ALBUMIN SERPL ELPH-MCNC: 3.8 G/DL — SIGNIFICANT CHANGE UP (ref 3.3–5)
ALP SERPL-CCNC: 56 U/L — SIGNIFICANT CHANGE UP (ref 40–120)
ALT FLD-CCNC: 20 U/L — SIGNIFICANT CHANGE UP (ref 4–41)
ANION GAP SERPL CALC-SCNC: 16 MMOL/L — HIGH (ref 7–14)
AST SERPL-CCNC: 14 U/L — SIGNIFICANT CHANGE UP (ref 4–40)
BASOPHILS # BLD AUTO: 0.01 K/UL — SIGNIFICANT CHANGE UP (ref 0–0.2)
BASOPHILS NFR BLD AUTO: 0.2 % — SIGNIFICANT CHANGE UP (ref 0–2)
BILIRUB SERPL-MCNC: 0.8 MG/DL — SIGNIFICANT CHANGE UP (ref 0.2–1.2)
BUN SERPL-MCNC: 12 MG/DL — SIGNIFICANT CHANGE UP (ref 7–23)
CALCIUM SERPL-MCNC: 9.5 MG/DL — SIGNIFICANT CHANGE UP (ref 8.4–10.5)
CHLORIDE SERPL-SCNC: 96 MMOL/L — LOW (ref 98–107)
CO2 SERPL-SCNC: 20 MMOL/L — LOW (ref 22–31)
CREAT SERPL-MCNC: 0.69 MG/DL — SIGNIFICANT CHANGE UP (ref 0.5–1.3)
CULTURE RESULTS: NO GROWTH — SIGNIFICANT CHANGE UP
EOSINOPHIL # BLD AUTO: 0.03 K/UL — SIGNIFICANT CHANGE UP (ref 0–0.5)
EOSINOPHIL NFR BLD AUTO: 0.6 % — SIGNIFICANT CHANGE UP (ref 0–6)
FOLATE SERPL-MCNC: 19.4 NG/ML — HIGH (ref 3.1–17.5)
GLUCOSE SERPL-MCNC: 134 MG/DL — HIGH (ref 70–99)
HCT VFR BLD CALC: 40.8 % — SIGNIFICANT CHANGE UP (ref 39–50)
HGB BLD-MCNC: 13.7 G/DL — SIGNIFICANT CHANGE UP (ref 13–17)
IANC: 3.66 K/UL — SIGNIFICANT CHANGE UP (ref 1.5–8.5)
IMM GRANULOCYTES NFR BLD AUTO: 0.2 % — SIGNIFICANT CHANGE UP (ref 0–1.5)
LYMPHOCYTES # BLD AUTO: 0.64 K/UL — LOW (ref 1–3.3)
LYMPHOCYTES # BLD AUTO: 13.7 % — SIGNIFICANT CHANGE UP (ref 13–44)
MAGNESIUM SERPL-MCNC: 1.7 MG/DL — SIGNIFICANT CHANGE UP (ref 1.6–2.6)
MCHC RBC-ENTMCNC: 28.5 PG — SIGNIFICANT CHANGE UP (ref 27–34)
MCHC RBC-ENTMCNC: 33.6 GM/DL — SIGNIFICANT CHANGE UP (ref 32–36)
MCV RBC AUTO: 84.8 FL — SIGNIFICANT CHANGE UP (ref 80–100)
MONOCYTES # BLD AUTO: 0.32 K/UL — SIGNIFICANT CHANGE UP (ref 0–0.9)
MONOCYTES NFR BLD AUTO: 6.9 % — SIGNIFICANT CHANGE UP (ref 2–14)
NEUTROPHILS # BLD AUTO: 3.66 K/UL — SIGNIFICANT CHANGE UP (ref 1.8–7.4)
NEUTROPHILS NFR BLD AUTO: 78.4 % — HIGH (ref 43–77)
NRBC # BLD: 0 /100 WBCS — SIGNIFICANT CHANGE UP
NRBC # FLD: 0 K/UL — SIGNIFICANT CHANGE UP
PHOSPHATE SERPL-MCNC: 4 MG/DL — SIGNIFICANT CHANGE UP (ref 2.5–4.5)
PLATELET # BLD AUTO: 148 K/UL — LOW (ref 150–400)
POTASSIUM SERPL-MCNC: 3.9 MMOL/L — SIGNIFICANT CHANGE UP (ref 3.5–5.3)
POTASSIUM SERPL-SCNC: 3.9 MMOL/L — SIGNIFICANT CHANGE UP (ref 3.5–5.3)
PROT SERPL-MCNC: 6.7 G/DL — SIGNIFICANT CHANGE UP (ref 6–8.3)
RBC # BLD: 4.81 M/UL — SIGNIFICANT CHANGE UP (ref 4.2–5.8)
RBC # FLD: 12.8 % — SIGNIFICANT CHANGE UP (ref 10.3–14.5)
SODIUM SERPL-SCNC: 132 MMOL/L — LOW (ref 135–145)
SPECIMEN SOURCE: SIGNIFICANT CHANGE UP
VIT B12 SERPL-MCNC: 2000 PG/ML — HIGH (ref 200–900)
WBC # BLD: 4.67 K/UL — SIGNIFICANT CHANGE UP (ref 3.8–10.5)
WBC # FLD AUTO: 4.67 K/UL — SIGNIFICANT CHANGE UP (ref 3.8–10.5)

## 2020-12-14 PROCEDURE — 99232 SBSQ HOSP IP/OBS MODERATE 35: CPT | Mod: 24

## 2020-12-14 RX ORDER — METFORMIN HYDROCHLORIDE 850 MG/1
1 TABLET ORAL
Qty: 0 | Refills: 0 | DISCHARGE

## 2020-12-14 RX ORDER — DORZOLAMIDE HYDROCHLORIDE TIMOLOL MALEATE 20; 5 MG/ML; MG/ML
1 SOLUTION/ DROPS OPHTHALMIC
Qty: 0 | Refills: 0 | DISCHARGE

## 2020-12-14 RX ADMIN — Medication 1000 MILLIGRAM(S): at 12:04

## 2020-12-14 RX ADMIN — FAMOTIDINE 20 MILLIGRAM(S): 10 INJECTION INTRAVENOUS at 05:11

## 2020-12-14 RX ADMIN — Medication 100 MILLIGRAM(S): at 17:39

## 2020-12-14 RX ADMIN — Medication 81 MILLIGRAM(S): at 12:04

## 2020-12-14 RX ADMIN — Medication 1 DROP(S): at 05:11

## 2020-12-14 RX ADMIN — Medication 1: at 17:38

## 2020-12-14 RX ADMIN — ENOXAPARIN SODIUM 40 MILLIGRAM(S): 100 INJECTION SUBCUTANEOUS at 12:04

## 2020-12-14 RX ADMIN — Medication 30 MILLIGRAM(S): at 05:11

## 2020-12-14 RX ADMIN — DORZOLAMIDE HYDROCHLORIDE TIMOLOL MALEATE 1 DROP(S): 20; 5 SOLUTION/ DROPS OPHTHALMIC at 17:39

## 2020-12-14 RX ADMIN — PREGABALIN 1000 MICROGRAM(S): 225 CAPSULE ORAL at 12:05

## 2020-12-14 RX ADMIN — Medication 3 MILLIGRAM(S): at 21:18

## 2020-12-14 RX ADMIN — TAMSULOSIN HYDROCHLORIDE 0.4 MILLIGRAM(S): 0.4 CAPSULE ORAL at 21:18

## 2020-12-14 RX ADMIN — Medication 100 MILLIGRAM(S): at 05:11

## 2020-12-14 RX ADMIN — FAMOTIDINE 20 MILLIGRAM(S): 10 INJECTION INTRAVENOUS at 17:42

## 2020-12-14 RX ADMIN — ATORVASTATIN CALCIUM 10 MILLIGRAM(S): 80 TABLET, FILM COATED ORAL at 21:18

## 2020-12-14 RX ADMIN — Medication 10 MILLIGRAM(S): at 05:11

## 2020-12-14 RX ADMIN — Medication 1 DROP(S): at 00:46

## 2020-12-14 RX ADMIN — Medication 1 DROP(S): at 16:30

## 2020-12-14 RX ADMIN — DORZOLAMIDE HYDROCHLORIDE TIMOLOL MALEATE 1 DROP(S): 20; 5 SOLUTION/ DROPS OPHTHALMIC at 05:11

## 2020-12-14 NOTE — PROGRESS NOTE ADULT - ATTENDING COMMENTS
PT: rehab     - Dr. LUCHO Brothers ([facility])  - [phone]

## 2020-12-14 NOTE — DIETITIAN INITIAL EVALUATION ADULT. - CHIEF COMPLAINT
80 year old male with PMH of DM, HTN, HLD, chronic conjunctivitis s/p corneal surg 11/2020 at [facility] on steroid taper, p/w worsening agitation. As per son pt is AAOX2 and walks with a waker at baseline.  Since discharge from [facility] pt has been refusing to get out of the bed, eating less, not taking meds. He has also becomes more confused, not recognizing family and screaming at times. Son also reports unintentional weight loss of pt, ~60lb in the past year.  No fever, cough, sob, N/V/D or pain issues.

## 2020-12-14 NOTE — CONSULT NOTE ADULT - SUBJECTIVE AND OBJECTIVE BOX
[facility] DEPARTMENT OF OPHTHALMOLOGY - INITIAL ADULT CONSULT  ------------------------------------------------------------------------------------------------------  Syeda Hurtado MD PGY-3  Pager: [phone]  ------------------------------------------------------------------------------------------------------    HPI: 80 year old male with PMH of DM, HTN, HLD admitted for worsening agitation and confusion at home. The patient follows at [facility] Ophthalmology for progressive thinning of the bilateral corneas, for which he had a patch graft OD during prior admission and required gluing multiple times OS, most recently 1 week ago in the office. Uncertain cause of corneal melting. During prior hospitalization, full work-up was done for malignant etiology, but none found.     PAST MEDICAL & SURGICAL HISTORY:  BPH (benign prostatic hyperplasia)  DM (diabetes mellitus)  HTN (hypertension)  Hyperlipidemia.    FAMILY HISTORY: No ophthalmic history in family    SOCIAL HISTORY: history of smoking many years ago     NKDA    Review of Systems:   CONSTITUTIONAL: No fever +weight loss, or fatigue  EYES: No eye pain, + discharge  ENMT:  No difficulty hearing, tinnitus, vertigo; No sinus or throat pain  NECK: No pain or stiffness  BREASTS: No pain, masses, or nipple discharge  RESPIRATORY: No cough, wheezing, chills or hemoptysis; No shortness of breath  CARDIOVASCULAR: No chest pain, palpitations, dizziness, or leg swelling  GASTROINTESTINAL: No abdominal or epigastric pain. No nausea, vomiting, or hematemesis; No diarrhea or constipation. No melena or hematochezia.  GENITOURINARY: No dysuria, frequency, hematuria, or incontinence  NEUROLOGICAL: No headaches, memory loss, loss of strength, numbness, or tremors  SKIN: +bruises  LYMPH NODES: No enlarged glands  ENDOCRINE: No heat or cold intolerance; No hair loss  MUSCULOSKELETAL: No joint pain or swelling; No muscle, back, or extremity pain  PSYCHIATRIC: No depression, anxiety, mood swings, or difficulty sleeping  HEME/LYMPH: No easy bruising, or bleeding gums  ALLERGY AND IMMUNOLOGIC: No hives or eczema      MEDICATIONS  (STANDING):  ascorbic acid 1000 milliGRAM(s) Oral daily  aspirin enteric coated 81 milliGRAM(s) Oral daily  atorvastatin 10 milliGRAM(s) Oral at bedtime  cyanocobalamin 1000 MICROGram(s) Oral daily  dextrose 40% Gel 15 Gram(s) Oral once  dextrose 5%. 1000 milliLiter(s) (50 mL/Hr) IV Continuous <Continuous>  dextrose 5%. 1000 milliLiter(s) (100 mL/Hr) IV Continuous <Continuous>  dextrose 50% Injectable 25 Gram(s) IV Push once  dextrose 50% Injectable 12.5 Gram(s) IV Push once  dextrose 50% Injectable 25 Gram(s) IV Push once  dorzolamide 2%/timolol 0.5% Ophthalmic Solution 1 Drop(s) Both EYES two times a day  doxycycline hyclate Capsule 100 milliGRAM(s) Oral every 12 hours  enalapril 10 milliGRAM(s) Oral daily  enoxaparin Injectable 40 milliGRAM(s) SubCutaneous daily  famotidine    Tablet 20 milliGRAM(s) Oral two times a day  glucagon  Injectable 1 milliGRAM(s) IntraMuscular once  hydrALAZINE Injectable 2.5 milliGRAM(s) IV Push once  insulin lispro (ADMELOG) corrective regimen sliding scale   SubCutaneous three times a day before meals  insulin lispro (ADMELOG) corrective regimen sliding scale   SubCutaneous at bedtime  moxifloxacin 0.5% Ophthalmic Solution - Peds 1 Drop(s) Both EYES four times a day  predniSONE   Tablet 30 milliGRAM(s) Oral daily  tamsulosin 0.4 milliGRAM(s) Oral at bedtime    MEDICATIONS  (PRN):  polyethylene glycol 3350 17 Gram(s) Oral daily PRN Constipation  senna 2 Tablet(s) Oral at bedtime PRN Constipation    T(C): 36.6 (12-13-20 @ 09:30), Max: 36.9 (12-13-20 @ 06:31)  HR: 89 (12-13-20 @ 12:40) (64 - 89)  BP: 169/113 (12-13-20 @ 12:42) (139/100 - 174/112)  RR: 18 (12-13-20 @ 12:40) (14 - 18)  SpO2: 100% (12-13-20 @ 12:40) (96% - 100%)    Alert and oriented x 0-1 ; appropriate mood and affect    Ophthalmology Exam:  Visual acuity (sc): ED due to MS  Pupils: PERRL OU, no APD  Ttono: Soft OU  Extraocular movements (EOMs): Full OU, no pain, no diplopia  Confrontational Visual Field (CVF): ED due to MS  Color Plates: ED due to poor vision    Pen Light Exam (PLE)  External: Clear shield in place OU  Lids/Lashes/Lacrimal Ducts: Flat OU Inferior lids noted to be slightly ectropic OU with deep fornices, eyelids crusted shut with significant discharge (which was cleaned gently with gauze and saline)  Sclera/Conjunctiva: 1+ injection OU  Cornea: OD with patch graft in place, well sutured, laura negative, small area of staining temporal to the graft; OS with corneal glue present centrally and BCL in place  Anterior Chamber: D+F OU, No hypopyon appreciated OU, formed OU  Iris: poor view but appears Flat OU  Lens: poor view OU     [facility] DEPARTMENT OF OPHTHALMOLOGY - INITIAL ADULT CONSULT  ------------------------------------------------------------------------------------------------------  Syeda Hurtado MD PGY-3  Pager: [phone]  ------------------------------------------------------------------------------------------------------    HPI: 80 year old male with PMH of DM, HTN, HLD admitted for worsening agitation and confusion at home. The patient follows at [facility] Ophthalmology for progressive thinning of the bilateral corneas, for which he had a patch graft OD during prior admission and required gluing multiple times OS, most recently 1 week ago in the office. Uncertain cause of corneal melting. During prior hospitalization, full work-up was done for malignant etiology, but none found.     PAST MEDICAL & SURGICAL HISTORY:  BPH (benign prostatic hyperplasia)  DM (diabetes mellitus)  HTN (hypertension)  Hyperlipidemia.    FAMILY HISTORY: No ophthalmic history in family, no glaucoma    SOCIAL HISTORY: history of smoking many years ago     All: NKDA    Review of Systems:   CONSTITUTIONAL: No fever +weight loss, or fatigue  EYES: No eye pain, + discharge  ENMT:  No difficulty hearing, tinnitus, vertigo; No sinus or throat pain  NECK: No pain or stiffness  BREASTS: No pain, masses, or nipple discharge  RESPIRATORY: No cough, wheezing, chills or hemoptysis; No shortness of breath  CARDIOVASCULAR: No chest pain, palpitations, dizziness, or leg swelling  GASTROINTESTINAL: No abdominal or epigastric pain. No nausea, vomiting, or hematemesis; No diarrhea or constipation. No melena or hematochezia.  GENITOURINARY: No dysuria, frequency, hematuria, or incontinence  NEUROLOGICAL: No headaches, memory loss, loss of strength, numbness, or tremors  SKIN: +bruises  LYMPH NODES: No enlarged glands  ENDOCRINE: No heat or cold intolerance; No hair loss  MUSCULOSKELETAL: No joint pain or swelling; No muscle, back, or extremity pain  PSYCHIATRIC: No depression, anxiety, mood swings, or difficulty sleeping  HEME/LYMPH: No easy bruising, or bleeding gums  ALLERGY AND IMMUNOLOGIC: No hives or eczema      MEDICATIONS  (STANDING):  ascorbic acid 1000 milliGRAM(s) Oral daily  aspirin enteric coated 81 milliGRAM(s) Oral daily  atorvastatin 10 milliGRAM(s) Oral at bedtime  cyanocobalamin 1000 MICROGram(s) Oral daily  dextrose 40% Gel 15 Gram(s) Oral once  dextrose 5%. 1000 milliLiter(s) (50 mL/Hr) IV Continuous <Continuous>  dextrose 5%. 1000 milliLiter(s) (100 mL/Hr) IV Continuous <Continuous>  dextrose 50% Injectable 25 Gram(s) IV Push once  dextrose 50% Injectable 12.5 Gram(s) IV Push once  dextrose 50% Injectable 25 Gram(s) IV Push once  dorzolamide 2%/timolol 0.5% Ophthalmic Solution 1 Drop(s) Both EYES two times a day  doxycycline hyclate Capsule 100 milliGRAM(s) Oral every 12 hours  enalapril 10 milliGRAM(s) Oral daily  enoxaparin Injectable 40 milliGRAM(s) SubCutaneous daily  famotidine    Tablet 20 milliGRAM(s) Oral two times a day  glucagon  Injectable 1 milliGRAM(s) IntraMuscular once  hydrALAZINE Injectable 2.5 milliGRAM(s) IV Push once  insulin lispro (ADMELOG) corrective regimen sliding scale   SubCutaneous three times a day before meals  insulin lispro (ADMELOG) corrective regimen sliding scale   SubCutaneous at bedtime  moxifloxacin 0.5% Ophthalmic Solution - Peds 1 Drop(s) Both EYES four times a day  predniSONE   Tablet 30 milliGRAM(s) Oral daily  tamsulosin 0.4 milliGRAM(s) Oral at bedtime    MEDICATIONS  (PRN):  polyethylene glycol 3350 17 Gram(s) Oral daily PRN Constipation  senna 2 Tablet(s) Oral at bedtime PRN Constipation    T(C): 36.6 (12-13-20 @ 09:30), Max: 36.9 (12-13-20 @ 06:31)  HR: 89 (12-13-20 @ 12:40) (64 - 89)  BP: 169/113 (12-13-20 @ 12:42) (139/100 - 174/112)  RR: 18 (12-13-20 @ 12:40) (14 - 18)  SpO2: 100% (12-13-20 @ 12:40) (96% - 100%)    Alert and oriented x 0-1 ; appropriate mood and affect    Ophthalmology Exam:  Visual acuity (sc): ED due to MS  Pupils: PERRL OU, no APD  Ttono: Soft OU  Extraocular movements (EOMs): Full OU, no pain, no diplopia  Confrontational Visual Field (CVF): ED due to MS  Color Plates: ED due to poor vision    Pen Light Exam (PLE)  External: Clear shield in place OU  Lids/Lashes/Lacrimal Ducts: Flat OU Inferior lids noted to be slightly ectropic OU with deep fornices, eyelids crusted shut with significant discharge (which was cleaned gently with gauze and saline)  Sclera/Conjunctiva: 1+ injection OU  Cornea: OD with patch graft in place, well sutured, laura negative, small area of staining temporal to the graft; OS with corneal glue present centrally and BCL in place  Anterior Chamber: D+F OU, No hypopyon appreciated OU, formed OU  Iris: poor view but appears Flat OU  Lens: poor view OU

## 2020-12-14 NOTE — PHYSICAL THERAPY INITIAL EVALUATION ADULT - PERTINENT HX OF CURRENT PROBLEM, REHAB EVAL
Trey is 80 year old male admitted with history of DM, HTN, HLD, chronic conjuctivitis, s/p cervial surgery 11/2020, presents with worsening agitation.

## 2020-12-14 NOTE — DIETITIAN INITIAL EVALUATION ADULT. - ADD RECOMMEND
1) Monitor weights, PO intake/diet tolerance, skin integrity, pertinent labs. 2) Obtain bedside swallow assessment to determine most appropriate diet and liquid consistencies prior to diet advancement given current mental status / poor PO Intake as noted PTA.  3) If appropriate, would suggest Glucerna Therapeutic Nutrition 240mls 3x daily (660kcals, 30g protein) when diet advances.

## 2020-12-14 NOTE — PROGRESS NOTE ADULT - ASSESSMENT
80 year old male with PMH of DM, HTN, HLD, chronic conjunctivitis s/p corneal surg on steroids p/w worsening agitation and FTT.

## 2020-12-14 NOTE — DIETITIAN INITIAL EVALUATION ADULT. - PERTINENT MEDS FT
MEDICATIONS  (STANDING):  ascorbic acid 1000 milliGRAM(s) Oral daily  aspirin enteric coated 81 milliGRAM(s) Oral daily  atorvastatin 10 milliGRAM(s) Oral at bedtime  cyanocobalamin 1000 MICROGram(s) Oral daily  dextrose 40% Gel 15 Gram(s) Oral once  dextrose 5%. 1000 milliLiter(s) (50 mL/Hr) IV Continuous <Continuous>  dextrose 5%. 1000 milliLiter(s) (100 mL/Hr) IV Continuous <Continuous>  dextrose 50% Injectable 25 Gram(s) IV Push once  dextrose 50% Injectable 12.5 Gram(s) IV Push once  dextrose 50% Injectable 25 Gram(s) IV Push once  dorzolamide 2%/timolol 0.5% Ophthalmic Solution 1 Drop(s) Both EYES two times a day  doxycycline hyclate Capsule 100 milliGRAM(s) Oral every 12 hours  enalapril 10 milliGRAM(s) Oral daily  enoxaparin Injectable 40 milliGRAM(s) SubCutaneous daily  famotidine    Tablet 20 milliGRAM(s) Oral two times a day  glucagon  Injectable 1 milliGRAM(s) IntraMuscular once  insulin lispro (ADMELOG) corrective regimen sliding scale   SubCutaneous three times a day before meals  insulin lispro (ADMELOG) corrective regimen sliding scale   SubCutaneous at bedtime  melatonin 3 milliGRAM(s) Oral at bedtime  moxifloxacin 0.5% Solution 1 Drop(s) Both EYES <User Schedule>  predniSONE   Tablet 30 milliGRAM(s) Oral daily  tamsulosin 0.4 milliGRAM(s) Oral at bedtime    MEDICATIONS  (PRN):  polyethylene glycol 3350 17 Gram(s) Oral daily PRN Constipation  senna 2 Tablet(s) Oral at bedtime PRN Constipation

## 2020-12-14 NOTE — CONSULT NOTE ADULT - ASSESSMENT
80y male w/ pmhx/ochx of  DM, HTN, HLD, chronic conjunctivitis and 60 lb weight loss with progressive corneal thinning of both eyes, s/p corneal patch graft of the right eye. OD with patch graft in place, with temporal epi defect. OS with glue in place, chamber deep, no corneal thinning.    # Corneal thinning of both eyes, s/p corneal patch graft of the right eye and corneal gluing of the left eye  - Currently no evidence of corneal perforation. No infectious infiltrate or ulceration appreciated  - c/w moxiflox QID to both eyes  - c/w PO prednisone currently at 30 mg, per primary team. Patient with acute mental status change possibly related to steroids; if need to be held for this reason, please let ophthalmology know  - Conjunctival biopsy taken Friday OD, will f/u results - results not back  - given recent weight loss and b/l nature of corneal pathology, malignancy workup was done to evaluate for underlying disease process, unrevealing to date   - c/w vitamin C 1g daily and doxycycline 100mg BID daily as per primary team if no contraindications  - recommend daily vitamin A supplementation given vitamin A deficiency is associated with corneal melting and patient with poor PO intake  - ophthalmology will monitor closely  - findings discussed with patient and primary team    # h/o glaucoma  - Hold off on cosopt for now    DW Dr. Warren (cornea specialist)    Outpatient follow-up: Patient should follow-up with his/her ophthalmologist or with [facility] Department of Ophthalmology within 2-3 days of discharge at:    [address]  [place] [address]  [phone]   80y male w/ pmhx/ochx of  DM, HTN, HLD, chronic conjunctivitis and 60 lb weight loss with progressive corneal thinning of both eyes, s/p corneal patch graft of the right eye. OD with patch graft in place, with temporal epi defect. OS with glue in place, chamber deep, no corneal thinning.    # Corneal thinning of both eyes, s/p corneal patch graft of the right eye and corneal gluing of the left eye  - Currently no evidence of corneal perforation. No infectious infiltrate or ulceration appreciated  - c/w moxiflox QID to both eyes  - c/w PO prednisone currently at 30 mg, per primary team. Patient with acute mental status change possibly related to steroids; if need to be held for this reason, please let ophthalmology know  - Conjunctival biopsy taken Friday OD, will f/u results - results not back  - given recent weight loss and b/l nature of corneal pathology, malignancy workup was done to evaluate for underlying disease process, unrevealing to date   - c/w vitamin C 1g daily and doxycycline 100mg BID daily as per primary team if no contraindications  - recommend daily vitamin A supplementation given vitamin A deficiency is associated with corneal melting and patient with poor PO intake  - ophthalmology will monitor closely  - findings discussed with patient and primary team    # h/o glaucoma  - Hold off on cosopt for now    DW Dr. Warren (cornea specialist), plan per Dr. Warren    Outpatient follow-up: Patient should follow-up with his/her ophthalmologist or with [facility] Department of Ophthalmology within 2-3 days of discharge at:    [address]  [place] [address]  [phone]   80y male w/ pmhx/ochx of  DM, HTN, HLD, chronic conjunctivitis and 60 lb weight loss with progressive corneal thinning of both eyes, s/p corneal patch graft of the right eye. OD with patch graft in place, with temporal epi defect. OS with glue in place, chamber deep, no corneal thinning.    # Corneal thinning of both eyes, s/p corneal patch graft of the right eye and corneal gluing of the left eye  - Currently no evidence of corneal perforation. No infectious infiltrate or ulceration appreciated  - c/w moxiflox QID to both eyes  - c/w PO prednisone currently at 30 mg, per primary team. Patient with acute mental status change possibly related to steroids; if need to be held for this reason, please let ophthalmology know  - given recent weight loss and b/l nature of corneal pathology, malignancy workup was done to evaluate for underlying disease process, unrevealing to date   - c/w vitamin C 1g daily and doxycycline 100mg BID daily as per primary team if no contraindications  - recommend daily vitamin A supplementation given vitamin A deficiency is associated with corneal melting and patient with poor PO intake  - prior conjunctival biopsy with lymphoplasmocytic infiltrate, consistent with chronic inflammatory process  - ophthalmology will monitor closely  - findings discussed with patient and primary team    # h/o glaucoma  - Hold off on cosopt for now    DW Dr. Warren (cornea specialist), plan per Dr. Warren    Outpatient follow-up: Patient should follow-up with his/her ophthalmologist or with [facility] Department of Ophthalmology within 2-3 days of discharge at:    [address]  [place] [address]  [phone]

## 2020-12-14 NOTE — DIETITIAN INITIAL EVALUATION ADULT. - PROBLEM SELECTOR PLAN 2
-s/p corneal patch 11/2020 at [facility]  -c/w prednisone 30, doxy 100 and moxifloxacin eye drops as per opthalmology clinic note 12/11  -opthal consult in am

## 2020-12-14 NOTE — DIETITIAN INITIAL EVALUATION ADULT. - OTHER INFO
RD visited with patient for requested consult.  Patient was unable to participate in diet interview process, was unable to appropriately answer questions.  Spoke to RN and PA for collateral --- suggested to obtain a bedside swallow assessment prior to plans for diet advancement due to mental status.  Per RN, no GI distress i.e. nausea, vomiting, diarrhea. RD visited with patient for requested consult.  Patient was unable to participate in diet interview process, was unable to appropriately answer questions.  Spoke to RN and PA for collateral --- suggested to obtain a bedside swallow assessment prior to plans for diet advancement due to mental status.  Per RN, no GI distress i.e. nausea, vomiting, diarrhea. Last RD note reviewed 11/12/20 - wt during that time 60.8kg; wt loss since last hospitalization.

## 2020-12-14 NOTE — PROGRESS NOTE ADULT - SUBJECTIVE AND OBJECTIVE BOX
SUBJECTIVE / OVERNIGHT EVENTS:  slow speech  confused  denied pain  awake        --------------------------------------------------------------------------------------------  LABS:                        13.7   4.67  )-----------( 148      ( 14 Dec 2020 07:30 )             40.8     12-14    132<L>  |  96<L>  |  12  ----------------------------<  134<H>  3.9   |  20<L>  |  0.69    Ca    9.5      14 Dec 2020 07:30  Phos  4.0     12-14  Mg     1.7     12-14    TPro  6.7  /  Alb  3.8  /  TBili  0.8  /  DBili  x   /  AST  14  /  ALT  20  /  AlkPhos  56  12-14      CAPILLARY BLOOD GLUCOSE      POCT Blood Glucose.: 117 mg/dL (14 Dec 2020 22:07)  POCT Blood Glucose.: 171 mg/dL (14 Dec 2020 17:30)  POCT Blood Glucose.: 137 mg/dL (14 Dec 2020 12:03)  POCT Blood Glucose.: 148 mg/dL (14 Dec 2020 08:15)  POCT Blood Glucose.: 150 mg/dL (13 Dec 2020 22:40)        Urinalysis Basic - ( 13 Dec 2020 07:59 )    Color: Yellow / Appearance: Slightly Turbid / S.024 / pH: x  Gluc: x / Ketone: Small  / Bili: Negative / Urobili: 3 mg/dL   Blood: x / Protein: 30 mg/dL / Nitrite: Negative   Leuk Esterase: Negative / RBC: 3 /HPF / WBC 1 /HPF   Sq Epi: x / Non Sq Epi: 1 /HPF / Bacteria: Few        RADIOLOGY & ADDITIONAL TESTS:    Imaging Personally Reviewed:  [x] YES  [ ] NO    Consultant(s) Notes Reviewed:  [x] YES  [ ] NO    MEDICATIONS  (STANDING):  ascorbic acid 1000 milliGRAM(s) Oral daily  aspirin enteric coated 81 milliGRAM(s) Oral daily  atorvastatin 10 milliGRAM(s) Oral at bedtime  cyanocobalamin 1000 MICROGram(s) Oral daily  dextrose 40% Gel 15 Gram(s) Oral once  dextrose 5%. 1000 milliLiter(s) (50 mL/Hr) IV Continuous <Continuous>  dextrose 5%. 1000 milliLiter(s) (100 mL/Hr) IV Continuous <Continuous>  dextrose 50% Injectable 25 Gram(s) IV Push once  dextrose 50% Injectable 12.5 Gram(s) IV Push once  dextrose 50% Injectable 25 Gram(s) IV Push once  dorzolamide 2%/timolol 0.5% Ophthalmic Solution 1 Drop(s) Both EYES two times a day  doxycycline hyclate Capsule 100 milliGRAM(s) Oral every 12 hours  enalapril 10 milliGRAM(s) Oral daily  enoxaparin Injectable 40 milliGRAM(s) SubCutaneous daily  famotidine    Tablet 20 milliGRAM(s) Oral two times a day  glucagon  Injectable 1 milliGRAM(s) IntraMuscular once  insulin lispro (ADMELOG) corrective regimen sliding scale   SubCutaneous three times a day before meals  insulin lispro (ADMELOG) corrective regimen sliding scale   SubCutaneous at bedtime  melatonin 3 milliGRAM(s) Oral at bedtime  moxifloxacin 0.5% Solution 1 Drop(s) Both EYES <User Schedule>  predniSONE   Tablet 30 milliGRAM(s) Oral daily  tamsulosin 0.4 milliGRAM(s) Oral at bedtime    MEDICATIONS  (PRN):  polyethylene glycol 3350 17 Gram(s) Oral daily PRN Constipation  senna 2 Tablet(s) Oral at bedtime PRN Constipation      Care Discussed with Consultants/Other Providers [x] YES  [ ] NO    Vital Signs Last 24 Hrs  T(C): 36.6 (14 Dec 2020 21:16), Max: 36.6 (14 Dec 2020 21:16)  T(F): 97.9 (14 Dec 2020 21:16), Max: 97.9 (14 Dec 2020 21:16)  HR: 71 (14 Dec 2020 21:16) (71 - 89)  BP: 140/89 (14 Dec 2020 21:20) (140/89 - 159/100)  BP(mean): --  RR: 18 (14 Dec 2020 21:16) (18 - 18)  SpO2: 97% (14 Dec 2020 21:16) (97% - 100%)  I&O's Summary    13 Dec 2020 07:01  -  14 Dec 2020 07:00  --------------------------------------------------------  IN: 0 mL / OUT: 900 mL / NET: -900 mL      PHYSICAL EXAM:  GENERAL: NAD, thin cachetic elderly, comfortable  HEAD:  Atraumatic, Normocephalic  EYES: right eye patch in place  NECK: Supple, No JVD  CHEST/LUNG: Clear to auscultation bilaterally; No wheeze  HEART: Regular rate and rhythm; No murmurs, rubs, or gallops  ABDOMEN: Soft, Nontender, Nondistended; Bowel sounds present  Neuro: AAOx1-2, slow words,   EXTREMITIES:  2+ Peripheral Pulses, No clubbing, cyanosis, or edema  SKIN: No rashes or lesions

## 2020-12-14 NOTE — DIETITIAN INITIAL EVALUATION ADULT. - PERTINENT LABORATORY DATA
12-14 Na132 mmol/L<L> Glu 134 mg/dL<H> K+ 3.9 mmol/L Cr  0.69 mg/dL BUN 12 mg/dL 12-14 Phos 4.0 mg/dL 12-14 Alb 3.8 g/dL    CAPILLARY BLOOD GLUCOSE  POCT Blood Glucose.: 137 mg/dL (14 Dec 2020 12:03)  POCT Blood Glucose.: 148 mg/dL (14 Dec 2020 08:15)  POCT Blood Glucose.: 150 mg/dL (13 Dec 2020 22:40)  POCT Blood Glucose.: 138 mg/dL (13 Dec 2020 17:21)    A1C with Estimated Average Glucose (12.13.20 @ 07:03)  6.6%;   Estimated Average Glucose: 143 mg/dL

## 2020-12-15 LAB
ALBUMIN SERPL ELPH-MCNC: 4 G/DL — SIGNIFICANT CHANGE UP (ref 3.3–5)
ALP SERPL-CCNC: 56 U/L — SIGNIFICANT CHANGE UP (ref 40–120)
ALT FLD-CCNC: 20 U/L — SIGNIFICANT CHANGE UP (ref 4–41)
ANION GAP SERPL CALC-SCNC: 14 MMOL/L — SIGNIFICANT CHANGE UP (ref 7–14)
AST SERPL-CCNC: 16 U/L — SIGNIFICANT CHANGE UP (ref 4–40)
B-OH-BUTYR SERPL-SCNC: 0.6 MMOL/L — HIGH (ref 0–0.4)
BILIRUB SERPL-MCNC: 0.8 MG/DL — SIGNIFICANT CHANGE UP (ref 0.2–1.2)
BUN SERPL-MCNC: 15 MG/DL — SIGNIFICANT CHANGE UP (ref 7–23)
CALCIUM SERPL-MCNC: 9.6 MG/DL — SIGNIFICANT CHANGE UP (ref 8.4–10.5)
CHLORIDE SERPL-SCNC: 96 MMOL/L — LOW (ref 98–107)
CO2 SERPL-SCNC: 22 MMOL/L — SIGNIFICANT CHANGE UP (ref 22–31)
CREAT SERPL-MCNC: 0.68 MG/DL — SIGNIFICANT CHANGE UP (ref 0.5–1.3)
GLUCOSE SERPL-MCNC: 117 MG/DL — HIGH (ref 70–99)
HCT VFR BLD CALC: 42.7 % — SIGNIFICANT CHANGE UP (ref 39–50)
HGB BLD-MCNC: 13.9 G/DL — SIGNIFICANT CHANGE UP (ref 13–17)
MAGNESIUM SERPL-MCNC: 2 MG/DL — SIGNIFICANT CHANGE UP (ref 1.6–2.6)
MCHC RBC-ENTMCNC: 28.7 PG — SIGNIFICANT CHANGE UP (ref 27–34)
MCHC RBC-ENTMCNC: 32.6 GM/DL — SIGNIFICANT CHANGE UP (ref 32–36)
MCV RBC AUTO: 88 FL — SIGNIFICANT CHANGE UP (ref 80–100)
NRBC # BLD: 0 /100 WBCS — SIGNIFICANT CHANGE UP
NRBC # FLD: 0 K/UL — SIGNIFICANT CHANGE UP
PHOSPHATE SERPL-MCNC: 3.8 MG/DL — SIGNIFICANT CHANGE UP (ref 2.5–4.5)
PLATELET # BLD AUTO: 132 K/UL — LOW (ref 150–400)
POTASSIUM SERPL-MCNC: 3.8 MMOL/L — SIGNIFICANT CHANGE UP (ref 3.5–5.3)
POTASSIUM SERPL-SCNC: 3.8 MMOL/L — SIGNIFICANT CHANGE UP (ref 3.5–5.3)
PROT SERPL-MCNC: 6.7 G/DL — SIGNIFICANT CHANGE UP (ref 6–8.3)
RBC # BLD: 4.85 M/UL — SIGNIFICANT CHANGE UP (ref 4.2–5.8)
RBC # FLD: 12.8 % — SIGNIFICANT CHANGE UP (ref 10.3–14.5)
SODIUM SERPL-SCNC: 132 MMOL/L — LOW (ref 135–145)
WBC # BLD: 5.29 K/UL — SIGNIFICANT CHANGE UP (ref 3.8–10.5)
WBC # FLD AUTO: 5.29 K/UL — SIGNIFICANT CHANGE UP (ref 3.8–10.5)

## 2020-12-15 RX ORDER — SODIUM CHLORIDE 9 MG/ML
1000 INJECTION, SOLUTION INTRAVENOUS
Refills: 0 | Status: DISCONTINUED | OUTPATIENT
Start: 2020-12-15 | End: 2020-12-16

## 2020-12-15 RX ORDER — THIAMINE MONONITRATE (VIT B1) 100 MG
100 TABLET ORAL DAILY
Refills: 0 | Status: DISCONTINUED | OUTPATIENT
Start: 2020-12-15 | End: 2020-12-16

## 2020-12-15 RX ADMIN — SODIUM CHLORIDE 60 MILLILITER(S): 9 INJECTION, SOLUTION INTRAVENOUS at 15:11

## 2020-12-15 RX ADMIN — Medication 1 DROP(S): at 13:04

## 2020-12-15 RX ADMIN — SODIUM CHLORIDE 60 MILLILITER(S): 9 INJECTION, SOLUTION INTRAVENOUS at 15:07

## 2020-12-15 RX ADMIN — Medication 110 MILLIGRAM(S): at 23:27

## 2020-12-15 RX ADMIN — ENOXAPARIN SODIUM 40 MILLIGRAM(S): 100 INJECTION SUBCUTANEOUS at 13:04

## 2020-12-15 RX ADMIN — Medication 1 DROP(S): at 17:44

## 2020-12-15 RX ADMIN — Medication 1 DROP(S): at 05:10

## 2020-12-15 RX ADMIN — Medication 100 MILLIGRAM(S): at 05:10

## 2020-12-15 RX ADMIN — Medication 30 MILLIGRAM(S): at 05:10

## 2020-12-15 RX ADMIN — FAMOTIDINE 20 MILLIGRAM(S): 10 INJECTION INTRAVENOUS at 05:11

## 2020-12-15 RX ADMIN — Medication 1: at 17:45

## 2020-12-15 RX ADMIN — Medication 1 DROP(S): at 23:27

## 2020-12-15 RX ADMIN — Medication 100 MILLIGRAM(S): at 15:11

## 2020-12-15 RX ADMIN — Medication 10 MILLIGRAM(S): at 05:10

## 2020-12-15 NOTE — PROGRESS NOTE ADULT - SUBJECTIVE AND OBJECTIVE BOX
SUBJECTIVE / OVERNIGHT EVENTS:  awake  slow speech  seen by optho  denied pain        --------------------------------------------------------------------------------------------  LABS:                        13.9   5.29  )-----------( 132      ( 15 Dec 2020 07:38 )             42.7     12-15    132<L>  |  96<L>  |  15  ----------------------------<  117<H>  3.8   |  22  |  0.68    Ca    9.6      15 Dec 2020 07:38  Phos  3.8     12-15  Mg     2.0     12-15    TPro  6.7  /  Alb  4.0  /  TBili  0.8  /  DBili  x   /  AST  16  /  ALT  20  /  AlkPhos  56  12-15      CAPILLARY BLOOD GLUCOSE      POCT Blood Glucose.: 138 mg/dL (15 Dec 2020 12:14)  POCT Blood Glucose.: 134 mg/dL (15 Dec 2020 08:20)  POCT Blood Glucose.: 117 mg/dL (14 Dec 2020 22:07)  POCT Blood Glucose.: 171 mg/dL (14 Dec 2020 17:30)            RADIOLOGY & ADDITIONAL TESTS:    Imaging Personally Reviewed:  [x] YES  [ ] NO    Consultant(s) Notes Reviewed:  [x] YES  [ ] NO    MEDICATIONS  (STANDING):  ascorbic acid 1000 milliGRAM(s) Oral daily  aspirin enteric coated 81 milliGRAM(s) Oral daily  atorvastatin 10 milliGRAM(s) Oral at bedtime  cyanocobalamin 1000 MICROGram(s) Oral daily  dextrose 40% Gel 15 Gram(s) Oral once  dextrose 5%. 1000 milliLiter(s) (50 mL/Hr) IV Continuous <Continuous>  dextrose 5%. 1000 milliLiter(s) (100 mL/Hr) IV Continuous <Continuous>  dextrose 50% Injectable 25 Gram(s) IV Push once  dextrose 50% Injectable 12.5 Gram(s) IV Push once  dextrose 50% Injectable 25 Gram(s) IV Push once  doxycycline hyclate Capsule 100 milliGRAM(s) Oral every 12 hours  enalapril 10 milliGRAM(s) Oral daily  enoxaparin Injectable 40 milliGRAM(s) SubCutaneous daily  famotidine    Tablet 20 milliGRAM(s) Oral two times a day  glucagon  Injectable 1 milliGRAM(s) IntraMuscular once  insulin lispro (ADMELOG) corrective regimen sliding scale   SubCutaneous three times a day before meals  insulin lispro (ADMELOG) corrective regimen sliding scale   SubCutaneous at bedtime  melatonin 3 milliGRAM(s) Oral at bedtime  moxifloxacin 0.5% Solution 1 Drop(s) Both EYES <User Schedule>  predniSONE   Tablet 30 milliGRAM(s) Oral daily  tamsulosin 0.4 milliGRAM(s) Oral at bedtime    MEDICATIONS  (PRN):  polyethylene glycol 3350 17 Gram(s) Oral daily PRN Constipation  senna 2 Tablet(s) Oral at bedtime PRN Constipation      Care Discussed with Consultants/Other Providers [x] YES  [ ] NO    Vital Signs Last 24 Hrs  T(C): 36.2 (15 Dec 2020 12:22), Max: 36.6 (14 Dec 2020 21:16)  T(F): 97.2 (15 Dec 2020 12:22), Max: 97.9 (14 Dec 2020 21:16)  HR: 72 (15 Dec 2020 12:22) (66 - 72)  BP: 130/88 (15 Dec 2020 12:22) (130/88 - 159/100)  BP(mean): --  RR: 17 (15 Dec 2020 12:22) (17 - 18)  SpO2: 98% (15 Dec 2020 12:22) (94% - 98%)  I&O's Summary      PHYSICAL EXAM:  GENERAL: NAD, thin cachetic elderly, comfortable  HEAD:  Atraumatic, Normocephalic  EYES: right eye patch in place  NECK: Supple, No JVD  CHEST/LUNG: Clear to auscultation bilaterally; No wheeze  HEART: Regular rate and rhythm; No murmurs, rubs, or gallops  ABDOMEN: Soft, Nontender, Nondistended; Bowel sounds present  Neuro: AAOx1-2, slow words, no focal deficit   EXTREMITIES:  2+ Peripheral Pulses, No clubbing, cyanosis, or edema  SKIN: No rashes or lesions

## 2020-12-15 NOTE — PROGRESS NOTE ADULT - PROBLEM SELECTOR PLAN 1
DDx inc.  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 no e/o acute intracranial pathology or infection   -CTH unremarkable  -UA, CXR negative  -cont to monitor DDx inc.  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 no e/o acute intracranial pathology or infection   -CTH unremarkable  -UA, CXR negative  -cont to monitor  - neurology eval. DDx inc.  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 no e/o acute intracranial pathology or infection   -CTH unremarkable  -UA, CXR negative  -cont to monitor  - neurology eval (will consult Dr. Alatorre).    (pt saw Dr. Nissenbaum last year, discussed case. he doesn't come to [facility])

## 2020-12-15 NOTE — PROGRESS NOTE ADULT - ATTENDING COMMENTS
PT: rehab   dc planning.    Noel Cohen will be covering for me starting 12/16/20. He can be reached at  if needed.     - Dr. LUCHO Brothers ([facility])  - [phone] PT: rehab   dc planning.    Noel Cohen will be covering for me starting 12/16/20. He can be reached at  if needed.     D/w the wife Maia. Pt's has been deteriorating this year. Able to walk before but now difficult. Just the two of them at home. She makes foods and help with feeding. now with limited eyesight, things are worse. Deterioration is much worse 2 weeks ago. He has been becoming more confused and agitated. He saw neuro Dr. Michael Nissenbaum 1 year ago, had testing done without clear diagnosis. Malignancy work up recent admission also negative. speech is getting worse. Unclear if Parkinson features.   Neurology eval.     - Dr. LUCHO Brothers ([facility])  - [phone]

## 2020-12-15 NOTE — SWALLOW BEDSIDE ASSESSMENT ADULT - PHARYNGEAL PHASE
no clinically overt signs or symptoms of aspiration Cough post oral intake/Delayed pharyngeal swallow

## 2020-12-16 LAB
ANION GAP SERPL CALC-SCNC: 13 MMOL/L — SIGNIFICANT CHANGE UP (ref 7–14)
BUN SERPL-MCNC: 14 MG/DL — SIGNIFICANT CHANGE UP (ref 7–23)
CALCIUM SERPL-MCNC: 9.6 MG/DL — SIGNIFICANT CHANGE UP (ref 8.4–10.5)
CHLORIDE SERPL-SCNC: 98 MMOL/L — SIGNIFICANT CHANGE UP (ref 98–107)
CHLORIDE UR-SCNC: 39 MMOL/L — SIGNIFICANT CHANGE UP
CO2 SERPL-SCNC: 22 MMOL/L — SIGNIFICANT CHANGE UP (ref 22–31)
CORTIS AM PEAK SERPL-MCNC: 6.7 UG/DL — SIGNIFICANT CHANGE UP (ref 6–18.4)
CREAT SERPL-MCNC: 0.67 MG/DL — SIGNIFICANT CHANGE UP (ref 0.5–1.3)
GLUCOSE SERPL-MCNC: 128 MG/DL — HIGH (ref 70–99)
HCT VFR BLD CALC: 42 % — SIGNIFICANT CHANGE UP (ref 39–50)
HGB BLD-MCNC: 13.9 G/DL — SIGNIFICANT CHANGE UP (ref 13–17)
MAGNESIUM SERPL-MCNC: 1.9 MG/DL — SIGNIFICANT CHANGE UP (ref 1.6–2.6)
MCHC RBC-ENTMCNC: 28.7 PG — SIGNIFICANT CHANGE UP (ref 27–34)
MCHC RBC-ENTMCNC: 33.1 GM/DL — SIGNIFICANT CHANGE UP (ref 32–36)
MCV RBC AUTO: 86.8 FL — SIGNIFICANT CHANGE UP (ref 80–100)
NRBC # BLD: 0 /100 WBCS — SIGNIFICANT CHANGE UP
NRBC # FLD: 0 K/UL — SIGNIFICANT CHANGE UP
OSMOLALITY UR: 906 MOSM/KG — SIGNIFICANT CHANGE UP (ref 50–1200)
PHOSPHATE SERPL-MCNC: 2.5 MG/DL — SIGNIFICANT CHANGE UP (ref 2.5–4.5)
PLATELET # BLD AUTO: 131 K/UL — LOW (ref 150–400)
POTASSIUM SERPL-MCNC: 4.1 MMOL/L — SIGNIFICANT CHANGE UP (ref 3.5–5.3)
POTASSIUM SERPL-SCNC: 4.1 MMOL/L — SIGNIFICANT CHANGE UP (ref 3.5–5.3)
POTASSIUM UR-SCNC: 70.5 MMOL/L — SIGNIFICANT CHANGE UP
RBC # BLD: 4.84 M/UL — SIGNIFICANT CHANGE UP (ref 4.2–5.8)
RBC # FLD: 12.8 % — SIGNIFICANT CHANGE UP (ref 10.3–14.5)
SODIUM SERPL-SCNC: 133 MMOL/L — LOW (ref 135–145)
SODIUM UR-SCNC: 35 MMOL/L — SIGNIFICANT CHANGE UP
WBC # BLD: 5.14 K/UL — SIGNIFICANT CHANGE UP (ref 3.8–10.5)
WBC # FLD AUTO: 5.14 K/UL — SIGNIFICANT CHANGE UP (ref 3.8–10.5)

## 2020-12-16 PROCEDURE — 99024 POSTOP FOLLOW-UP VISIT: CPT

## 2020-12-16 PROCEDURE — 71045 X-RAY EXAM CHEST 1 VIEW: CPT | Mod: 26

## 2020-12-16 RX ORDER — SODIUM CHLORIDE 9 MG/ML
1000 INJECTION, SOLUTION INTRAVENOUS
Refills: 0 | Status: DISCONTINUED | OUTPATIENT
Start: 2020-12-16 | End: 2020-12-17

## 2020-12-16 RX ORDER — THIAMINE MONONITRATE (VIT B1) 100 MG
500 TABLET ORAL DAILY
Refills: 0 | Status: COMPLETED | OUTPATIENT
Start: 2020-12-17 | End: 2020-12-19

## 2020-12-16 RX ORDER — HALOPERIDOL DECANOATE 100 MG/ML
1 INJECTION INTRAMUSCULAR ONCE
Refills: 0 | Status: COMPLETED | OUTPATIENT
Start: 2020-12-16 | End: 2020-12-16

## 2020-12-16 RX ORDER — FAMOTIDINE 10 MG/ML
20 INJECTION INTRAVENOUS ONCE
Refills: 0 | Status: COMPLETED | OUTPATIENT
Start: 2020-12-16 | End: 2020-12-16

## 2020-12-16 RX ADMIN — Medication 1 DROP(S): at 05:27

## 2020-12-16 RX ADMIN — FAMOTIDINE 20 MILLIGRAM(S): 10 INJECTION INTRAVENOUS at 05:26

## 2020-12-16 RX ADMIN — ENOXAPARIN SODIUM 40 MILLIGRAM(S): 100 INJECTION SUBCUTANEOUS at 12:10

## 2020-12-16 RX ADMIN — Medication 110 MILLIGRAM(S): at 23:22

## 2020-12-16 RX ADMIN — Medication 1 DROP(S): at 23:22

## 2020-12-16 RX ADMIN — SODIUM CHLORIDE 60 MILLILITER(S): 9 INJECTION, SOLUTION INTRAVENOUS at 17:40

## 2020-12-16 RX ADMIN — Medication 110 MILLIGRAM(S): at 12:10

## 2020-12-16 RX ADMIN — Medication 25 MILLIGRAM(S): at 05:27

## 2020-12-16 RX ADMIN — Medication 1 DROP(S): at 17:39

## 2020-12-16 RX ADMIN — HALOPERIDOL DECANOATE 1 MILLIGRAM(S): 100 INJECTION INTRAMUSCULAR at 12:21

## 2020-12-16 RX ADMIN — Medication 1 DROP(S): at 12:12

## 2020-12-16 RX ADMIN — Medication 100 MILLIGRAM(S): at 12:11

## 2020-12-16 NOTE — PROGRESS NOTE ADULT - PROBLEM SELECTOR PLAN 1
DDx inc.  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 no e/o acute intracranial pathology or infection   -CTH unremarkable  -UA, CXR negative  -cont to monitor  -neurology eval (will consult Dr. Alatorre).    (pt saw Dr. Nissenbaum last year, discussed case. he doesn't come to [facility])

## 2020-12-16 NOTE — CONSULT NOTE ADULT - SUBJECTIVE AND OBJECTIVE BOX
[facility]        Patient is a 80y old  Male who presents with a chief complaint of AMS (16 Dec 2020 14:25)    Excerpt from H&P,  80 year old male with PMH of DM, HTN, HLD, chronic conjunctivitis s/p corneal surg 2020 at [facility] on steroid taper, p/w worsening agitation. As per son pt is AAOX2 and walks with a waker at baseline.  Since discharge from [facility] pt has been refusing to get out of the bed, eating less, not taking meds. He has also becomes more confused, not recognizing family and screaming at times. Son also reports unintentional weight loss of pt, ~60lb in the past year.  No fever, cough, sob, N/V/D or pain issues.       PAST MEDICAL & SURGICAL HISTORY:  BPH (benign prostatic hyperplasia)    DM (diabetes mellitus)    HTN (hypertension)    Hyperlipidemia.    PAST SURGICAL HISTORY:  No significant past surgical history.    No pertinent family history in first degree relatives.    No Pertinent Family History in first degree relatives of: Cancer or  rheumatological conditions.    Social History:  Social History (marital status, living situation, occupation, tobacco use,  alcohol and drug use, and sexual history): Remote history of smoking quit > 50  years ago. Retired . Rare alcohol use.        Review of Systems:   CONSTITUTIONAL: No fever +weight loss, or fatigue  EYES: No eye pain + visual disturbances, or discharge  ENMT:  No difficulty hearing, tinnitus, vertigo; No sinus or throat pain  NECK: No pain or stiffness  BREASTS: No pain, masses, or nipple discharge  RESPIRATORY: No cough, wheezing, chills or hemoptysis; No shortness of breath  CARDIOVASCULAR: No chest pain, palpitations, dizziness, or leg swelling  GASTROINTESTINAL: No abdominal or epigastric pain. No nausea, vomiting, or hematemesis; No diarrhea or constipation. No melena or hematochezia.  GENITOURINARY: No dysuria, frequency, hematuria, or incontinence  NEUROLOGICAL: No headaches, memory loss, loss of strength, numbness, or tremors  SKIN: +bruises  LYMPH NODES: No enlarged glands  ENDOCRINE: No heat or cold intolerance; No hair loss  MUSCULOSKELETAL: No joint pain or swelling; No muscle, back, or extremity pain  PSYCHIATRIC: No depression, anxiety, mood swings, or difficulty sleeping  HEME/LYMPH: No easy bruising, or bleeding gums  ALLERGY AND IMMUNOLOGIC: No hives or eczema    Allergies    No Known Allergies    Intolerances      MEDICATIONS  (STANDING):  ascorbic acid 1000 milliGRAM(s) Oral daily  aspirin enteric coated 81 milliGRAM(s) Oral daily  atorvastatin 10 milliGRAM(s) Oral at bedtime  cyanocobalamin 1000 MICROGram(s) Oral daily  dextrose 40% Gel 15 Gram(s) Oral once  dextrose 5%. 1000 milliLiter(s) (50 mL/Hr) IV Continuous <Continuous>  dextrose 5%. 1000 milliLiter(s) (100 mL/Hr) IV Continuous <Continuous>  dextrose 50% Injectable 25 Gram(s) IV Push once  dextrose 50% Injectable 12.5 Gram(s) IV Push once  dextrose 50% Injectable 25 Gram(s) IV Push once  dorzolamide 2%/timolol 0.5% Ophthalmic Solution 1 Drop(s) Both EYES two times a day  doxycycline hyclate Capsule 100 milliGRAM(s) Oral every 12 hours  enalapril 10 milliGRAM(s) Oral daily  enoxaparin Injectable 40 milliGRAM(s) SubCutaneous daily  famotidine    Tablet 20 milliGRAM(s) Oral two times a day  glucagon  Injectable 1 milliGRAM(s) IntraMuscular once  hydrALAZINE Injectable 2.5 milliGRAM(s) IV Push once  insulin lispro (ADMELOG) corrective regimen sliding scale   SubCutaneous three times a day before meals  insulin lispro (ADMELOG) corrective regimen sliding scale   SubCutaneous at bedtime  moxifloxacin 0.5% Ophthalmic Solution - Peds 1 Drop(s) Both EYES four times a day  predniSONE   Tablet 30 milliGRAM(s) Oral daily  tamsulosin 0.4 milliGRAM(s) Oral at bedtime    MEDICATIONS  (PRN):  polyethylene glycol 3350 17 Gram(s) Oral daily PRN Constipation  senna 2 Tablet(s) Oral at bedtime PRN Constipation      T(C): 36.6 (20 @ 09:30), Max: 36.9 (20 @ 06:31)  HR: 89 (20 @ 12:40) (64 - 89)  BP: 169/113 (20 @ 12:42) (139/100 - 174/112)  RR: 18 (20 @ 12:40) (14 - 18)  SpO2: 100% (20 @ 12:40) (96% - 100%)    CAPILLARY BLOOD GLUCOSE      POCT Blood Glucose.: 142 mg/dL (13 Dec 2020 12:56)  POCT Blood Glucose.: 126 mg/dL (13 Dec 2020 06:10)    I&O's Summary      PHYSICAL EXAM:  GENERAL: NAD, cachetic   HEAD:  Atraumatic, Normocephalic  EYES: both eyes covered with patch. no discharge  NECK: Supple, No elevated JVD  CHEST/LUNG: Clear to auscultation bilaterally; No wheeze  HEART: Regular rate and rhythm; No murmurs, rubs, or gallops  ABDOMEN: Soft, Nontender, Nondistended; Bowel sounds present  EXTREMITIES:  2+ Peripheral Pulses, No clubbing, cyanosis, or edema  PSYCH: AAOx2 (self, hospital)   NEUROLOGY: CN II-XII grossly intact, moving all extremities  SKIN: stage 2 sacral decub     LABS:                        13.0   3.47  )-----------( 142      ( 13 Dec 2020 07:03 )             39.2         136  |  99  |  16  ----------------------------<  129<H>  3.7   |  26  |  0.91    Ca    9.6      13 Dec 2020 07:03    TPro  6.2  /  Alb  3.8  /  TBili  0.5  /  DBili  x   /  AST  11  /  ALT  16  /  AlkPhos  52  12-          Urinalysis Basic - ( 13 Dec 2020 07:59 )    Color: Yellow / Appearance: Slightly Turbid / S.024 / pH: x  Gluc: x / Ketone: Small  / Bili: Negative / Urobili: 3 mg/dL   Blood: x / Protein: 30 mg/dL / Nitrite: Negative   Leuk Esterase: Negative / RBC: 3 /HPF / WBC 1 /HPF   Sq Epi: x / Non Sq Epi: 1 /HPF / Bacteria: Few          RADIOLOGY & ADDITIONAL TESTS:    ECG Personally Reviewed - SR, incomplete RBBB    Imaging Personally Reviewed:       (13 Dec 2020 13:46)           *****PAST MEDICAL / Surgical  HISTORY:  PAST MEDICAL & SURGICAL HISTORY:  BPH (benign prostatic hyperplasia)    Hyperlipidemia    HTN (hypertension)    DM (diabetes mellitus)    No significant past surgical history             *****FAMILY HISTORY:  FAMILY HISTORY:  No pertinent family history in first degree relatives             *****SOCIAL HISTORY:  Alcohol: None  Smoking: None         *****ALLERGIES:   Allergies    No Known Allergies    Intolerances             *****MEDICATIONS: current medication reviewed and documented.   MEDICATIONS  (STANDING):  ascorbic acid 1000 milliGRAM(s) Oral daily  aspirin enteric coated 81 milliGRAM(s) Oral daily  atorvastatin 10 milliGRAM(s) Oral at bedtime  cyanocobalamin 1000 MICROGram(s) Oral daily  dextrose 40% Gel 15 Gram(s) Oral once  dextrose 5% + sodium chloride 0.9%. 1000 milliLiter(s) (60 mL/Hr) IV Continuous <Continuous>  dextrose 5%. 1000 milliLiter(s) (50 mL/Hr) IV Continuous <Continuous>  dextrose 5%. 1000 milliLiter(s) (100 mL/Hr) IV Continuous <Continuous>  dextrose 50% Injectable 25 Gram(s) IV Push once  dextrose 50% Injectable 12.5 Gram(s) IV Push once  dextrose 50% Injectable 25 Gram(s) IV Push once  doxycycline IVPB 100 milliGRAM(s) IV Intermittent every 12 hours  enalapril 10 milliGRAM(s) Oral daily  enoxaparin Injectable 40 milliGRAM(s) SubCutaneous daily  glucagon  Injectable 1 milliGRAM(s) IntraMuscular once  melatonin 3 milliGRAM(s) Oral at bedtime  moxifloxacin 0.5% Solution 1 Drop(s) Both EYES <User Schedule>  tamsulosin 0.4 milliGRAM(s) Oral at bedtime  thiamine Injectable 100 milliGRAM(s) IV Push daily    MEDICATIONS  (PRN):  polyethylene glycol 3350 17 Gram(s) Oral daily PRN Constipation  senna 2 Tablet(s) Oral at bedtime PRN Constipation           *****REVIEW OF SYSTEM:  GEN: no fever, no chills, no pain  RESP: no SOB, no cough, no sputum  CVS: no chest pain, no palpitations, no edema  GI: no abdominal pain, no nausea, no vomiting, no constipation, no diarrhea  : no dysurea, no frequency, no hematurea  Neuro: no headache, no dizziness  PSYCH: no anxiety, no depression  Derm : no itching, no rash         *****VITAL SIGNS:  T(C): 37.1 (20 @ 13:51), Max: 37.4 (12-15-20 @ 22:07)  HR: 77 (20 @ 13:51) (71 - 87)  BP: 116/62 (20 @ 13:51) (114/68 - 117/77)  RR: 16 (20 @ 13:51) (16 - 17)  SpO2: 99% (20 @ 13:51) (99% - 100%)  Wt(kg): --    12-15 @ 07:01  -   @ 07:00  --------------------------------------------------------  IN: 0 mL / OUT: 400 mL / NET: -400 mL             *****PHYSICAL EXAM:   Alert oriented x1   Attention comprehension are poor . Able to name, repeat,  without any difficulty.   Able to follow 1-2   step commands easily       limited eye mvt of the [place]   able to count fingers with R eye  left eye closed and tearing excessively when opened   No facial asymmetry   Tongue is midline   Palate elevates symmetrically   Moving all 4 ext symmetrically antigravity   no double stimulation extinction   sensation is grossly symmetric  Gait : not assessed.  B/L down going toes               *****LAB AND IMAGIN.9   5.14  )-----------( 131      ( 16 Dec 2020 07:28 )             42.0               -    133<L>  |  98  |  14  ----------------------------<  128<H>  4.1   |  22  |  0.67    Ca    9.6      16 Dec 2020 07:28  Phos  2.5     -  Mg     1.9     -    TPro  6.7  /  Alb  4.0  /  TBili  0.8  /  DBili  x   /  AST  16  /  ALT  20  /  AlkPhos  56  -15                          < from: CT Head No Cont (20 @ 07:19) >  No acute intracranial hemorrhage, mass effect or midline shift.  No CT evidence of acute large territory vascular infarct.  The ventricles and cortical sulci are prominent reflecting parenchymal volume loss.  Scattered hypodensities in the periventricular white matter are nonspecific, but likely sequela of small vessel ischemic disease.    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    Small mucus retention cyst in the right maxillary sinus. The mastoid air cells are well aerated. The native ocular lenses are surgically absent.  No displaced calvarial fracture.    IMPRESSION:  No acute intracranial hemorrhage or mass effect.        < end of copied text >        [All pertinent recent Imaging reports reviewed]         *****A S S E S S M E N T   A N D   P L A N :    80 year old left handed  male with PMH of DM, HTN, HLD, chronic conjunctivitis s/p corneal surg 2020 at [facility] on steroid taper, p/w worsening agitation. As per son pt is AAOX2 and walks with a waker at baseline.  Since discharge from [facility] pt has been refusing to get out of the bed, eating less, not taking meds. He has also becomes more confused, not recognizing family and screaming at times. Son also reports unintentional weight loss of pt, ~60lb in the past year.  No fever, cough, sob, N/V/D or pain issues.     Problem/Recommendations 1:    ams  likely multifactorial metabolic encephalopathy  related to ? pain from recent surgery/vision changes, steroid psychosis, poor po intake, sleep wake  adjustment   disorder worsening underlying cognitive decline.   am cortisol   sleep wake cycle regulation agree with melatonin   thiamine 500 iv x daily 3 days then po 100 daily   mvi daily   depakote 125 bid for agitation while on steroids   ct head microvascular disease       Problem/Recommendations 2:  hyponatremia volume contraction vs. pain related siadh ?   consider urine lytes, urine osm serum osm        ___________________________  Will follow with you.  Thank you,  Yelena Alatorre MD  Diplomate of the American Board of Neurology and Psychiatry.  Diplomate of the American Board of Vascular Neurology.   [facility] ([facility]   Ph: [phone]    Differential diagnosis and plan of care discussed with patient after the evaluation.   Advanced care planning options discussed.   Pain assessed and judicious use of narcotics when appropriate was discussed.  Importance of Fall prevention discussed.  Counseling on Smoking and Alcohol cessation was offered when appropriate.  Counseling on Diet, exercise, and medication compliance was done.   83 minutes spent on the total encounter;  more than 50 % of the visit was spent on counseling  and or coordinating care by the attending physician.    Thank you for allowing me to participate in the care of this maritza patient. Please do not hesitate to call me if you have any questions.     This and subsequent notes were partially created using voice recognition software and will  inherently be subject to errors including those of syntax and sound alike substitutions which may escape proofreading. In such instances original meaning may be extrapolated by contextual derivation.

## 2020-12-16 NOTE — CHART NOTE - NSCHARTNOTEFT_GEN_A_CORE
Patient failed speech and swallow eval x 2. Discussed with patients Wife Maia and son Winston Mtz who are in agreement with nasogastric tube insertion for temporary means of nutrition and medication administration. NGT inserted without issue. Confirmed placement with auscultation, however will confirm radiographically with CXR before usage. Patient tolerated procedure well. Nutrition consult placed for recommendations on tube feeds. Above events and plan discussed with Dr. hCicas.

## 2020-12-16 NOTE — PROGRESS NOTE ADULT - SUBJECTIVE AND OBJECTIVE BOX
[facility] DEPARTMENT OF OPHTHALMOLOGY - PROGRESS NOTE  --------------------------------------------------------------------------------------------  Syeda Hurtado MD PGY-3  Pager: [phone]  ----------------------------------------------------------------------------------------------    80 year old male with PMH of DM, HTN, HLD admitted for worsening agitation and confusion at home. The patient follows at [facility] for progressive thinning of the bilateral corneas, for which he had a patch graft OD during prior admission and required gluing multiple times OS, uncertain cause of corneal melting (prior malignancy work-up was negative).    S: Patient seen and examined at bedside with Dr. Warren. Patient denies any changes in vision.     Alert and oriented x 0-1 ; appropriate mood and affect    Ophthalmology Exam:  Visual acuity (sc): HM OU  Pupils: PERRL OU, no APD  Ttono: Soft OU  Extraocular movements (EOMs): Full OU, no pain, no diplopia  Confrontational Visual Field (CVF): ED due to MS  Color Plates: ED due to poor vision    Pen Light Exam (PLE)  External: Clear shield in place OU  Lids/Lashes/Lacrimal Ducts: Flat OU Inferior lids noted to be slightly ectropic OU with deep fornices, eyelids crusted shut with significant discharge (which was cleaned gently with gauze and saline)  Sclera/Conjunctiva: 1+ injection OU  Cornea: OD with patch graft in place, well sutured, laura negative, no epithelial defect; OS with corneal glue off, central perforation (laura positive) and BCL in place  Anterior Chamber: D+F OU, No hypopyon appreciated OU, formed OU  Iris: poor view but appears Flat OU  Lens: poor view OU      Assessment and Recommendation:   80y male w/ pmhx/ochx of  DM, HTN, HLD, chronic conjunctivitis and 60 lb weight loss with progressive corneal thinning of both eyes, s/p corneal patch graft of the right eye. OD with patch graft in place, without epi defect. OS with deep chamber, but central perforation (laura positive) and no overlying glue. No infectious infiltrate or ulceration.    # Corneal thinning of both eyes, s/p corneal patch graft of the right eye and corneal gluing of the left eye  - At bedside, corneal gluing performed of the left eye. Patient tolerated procedure well, no complications. Corneal glue placed centrally over perforation, well sealed. BCL placed as well.  - c/w moxiflox QID to both eyes  - c/w PO prednisone now at 25 mg. Patient with acute mental status change possibly related to steroids; if need to be held for this reason, please let ophthalmology know. Prednisone has helped slow this patient's corneal melting and thinning, plan for slow taper. For now continue at 25 mg daily, unless medically contraindicated.  - given recent weight loss and b/l nature of corneal pathology, malignancy workup was done to evaluate for underlying disease process, unrevealing to date   - c/w vitamin C 1g daily and doxycycline 100mg BID daily as per primary team if no contraindications  - please re-check serum vitamin A levels  - recommend daily vitamin A supplementation given vitamin A deficiency is associated with corneal melting and patient with poor PO intake  - prior conjunctival biopsy with lymphoplasmocytic infiltrate, consistent with chronic inflammatory process  - ophthalmology will monitor closely  - findings discussed with patient and primary team    Patient was seen and discussed with Dr. Warren (cornea specialist)    Outpatient follow-up: Patient should follow-up with his/her ophthalmologist or with [facility] Department of Ophthalmology within 2-3 days of discharge at:    [address]  [place] [address]  [phone]

## 2020-12-16 NOTE — PROGRESS NOTE ADULT - SUBJECTIVE AND OBJECTIVE BOX
SUBJECTIVE / OVERNIGHT EVENTS:  Agitated this morning  Failed bedisde speech/swallow eval    Vital Signs Last 24 Hrs  T(C): 37.1 (16 Dec 2020 13:51), Max: 37.4 (15 Dec 2020 22:07)  T(F): 98.7 (16 Dec 2020 13:51), Max: 99.3 (15 Dec 2020 22:07)  HR: 77 (16 Dec 2020 13:51) (71 - 87)  BP: 116/62 (16 Dec 2020 13:51) (114/68 - 117/77)  BP(mean): --  RR: 16 (16 Dec 2020 13:51) (16 - 17)  SpO2: 99% (16 Dec 2020 13:51) (99% - 100%)    I&O's Summary    12-15-20 @ 07:01  -  12-16-20 @ 07:00  --------------------------------------------------------  IN: 0 mL / OUT: 400 mL / NET: -400 mL          PHYSICAL EXAM:  GENERAL: NAD, thin cachetic elderly, comfortable  HEAD:  Atraumatic, Normocephalic  EYES: right eye patch in place  NECK: Supple, No JVD  CHEST/LUNG: Clear to auscultation bilaterally; No wheeze  HEART: Regular rate and rhythm; No murmurs, rubs, or gallops  ABDOMEN: Soft, Nontender, Nondistended; Bowel sounds present  Neuro: AAOx1-2, slow words, no focal deficit   EXTREMITIES:  2+ Peripheral Pulses, No clubbing, cyanosis, or edema  SKIN: No rashes or lesions     LABS:                        13.9   5.14  )-----------( 131      ( 16 Dec 2020 07:28 )             42.0     12-16    133<L>  |  98  |  14  ----------------------------<  128<H>  4.1   |  22  |  0.67    Ca    9.6      16 Dec 2020 07:28  Phos  2.5     12-16  Mg     1.9     12-16    TPro  6.7  /  Alb  4.0  /  TBili  0.8  /  DBili  x   /  AST  16  /  ALT  20  /  AlkPhos  56  12-15      CAPILLARY BLOOD GLUCOSE      POCT Blood Glucose.: 193 mg/dL (16 Dec 2020 12:19)  POCT Blood Glucose.: 155 mg/dL (16 Dec 2020 08:13)  POCT Blood Glucose.: 134 mg/dL (16 Dec 2020 05:31)  POCT Blood Glucose.: 121 mg/dL (15 Dec 2020 23:37)  POCT Blood Glucose.: 149 mg/dL (15 Dec 2020 21:56)  POCT Blood Glucose.: 171 mg/dL (15 Dec 2020 16:58)            RADIOLOGY & ADDITIONAL TESTS:    Imaging Personally Reviewed:  [x] YES  [ ] NO    Consultant(s) Notes Reviewed:  [x] YES  [ ] NO

## 2020-12-17 LAB
ANION GAP SERPL CALC-SCNC: 11 MMOL/L — SIGNIFICANT CHANGE UP (ref 7–14)
BUN SERPL-MCNC: 10 MG/DL — SIGNIFICANT CHANGE UP (ref 7–23)
CALCIUM SERPL-MCNC: 9.3 MG/DL — SIGNIFICANT CHANGE UP (ref 8.4–10.5)
CHLORIDE SERPL-SCNC: 102 MMOL/L — SIGNIFICANT CHANGE UP (ref 98–107)
CO2 SERPL-SCNC: 23 MMOL/L — SIGNIFICANT CHANGE UP (ref 22–31)
CREAT SERPL-MCNC: 0.56 MG/DL — SIGNIFICANT CHANGE UP (ref 0.5–1.3)
GLUCOSE SERPL-MCNC: 149 MG/DL — HIGH (ref 70–99)
HCT VFR BLD CALC: 39.7 % — SIGNIFICANT CHANGE UP (ref 39–50)
HGB BLD-MCNC: 13 G/DL — SIGNIFICANT CHANGE UP (ref 13–17)
MAGNESIUM SERPL-MCNC: 1.8 MG/DL — SIGNIFICANT CHANGE UP (ref 1.6–2.6)
MCHC RBC-ENTMCNC: 28.6 PG — SIGNIFICANT CHANGE UP (ref 27–34)
MCHC RBC-ENTMCNC: 32.7 GM/DL — SIGNIFICANT CHANGE UP (ref 32–36)
MCV RBC AUTO: 87.4 FL — SIGNIFICANT CHANGE UP (ref 80–100)
NRBC # BLD: 0 /100 WBCS — SIGNIFICANT CHANGE UP
NRBC # FLD: 0 K/UL — SIGNIFICANT CHANGE UP
OSMOLALITY SERPL: 283 MOSM/KG — SIGNIFICANT CHANGE UP (ref 275–295)
PHOSPHATE SERPL-MCNC: 2.1 MG/DL — LOW (ref 2.5–4.5)
PLATELET # BLD AUTO: 113 K/UL — LOW (ref 150–400)
POTASSIUM SERPL-MCNC: 3.7 MMOL/L — SIGNIFICANT CHANGE UP (ref 3.5–5.3)
POTASSIUM SERPL-SCNC: 3.7 MMOL/L — SIGNIFICANT CHANGE UP (ref 3.5–5.3)
RBC # BLD: 4.54 M/UL — SIGNIFICANT CHANGE UP (ref 4.2–5.8)
RBC # FLD: 12.9 % — SIGNIFICANT CHANGE UP (ref 10.3–14.5)
SODIUM SERPL-SCNC: 136 MMOL/L — SIGNIFICANT CHANGE UP (ref 135–145)
WBC # BLD: 5.52 K/UL — SIGNIFICANT CHANGE UP (ref 3.8–10.5)
WBC # FLD AUTO: 5.52 K/UL — SIGNIFICANT CHANGE UP (ref 3.8–10.5)

## 2020-12-17 RX ORDER — INSULIN LISPRO 100/ML
VIAL (ML) SUBCUTANEOUS EVERY 6 HOURS
Refills: 0 | Status: DISCONTINUED | OUTPATIENT
Start: 2020-12-17 | End: 2021-01-07

## 2020-12-17 RX ORDER — SODIUM,POTASSIUM PHOSPHATES 278-250MG
1 POWDER IN PACKET (EA) ORAL THREE TIMES A DAY
Refills: 0 | Status: COMPLETED | OUTPATIENT
Start: 2020-12-17 | End: 2020-12-19

## 2020-12-17 RX ORDER — DEXTROSE 50 % IN WATER 50 %
15 SYRINGE (ML) INTRAVENOUS ONCE
Refills: 0 | Status: DISCONTINUED | OUTPATIENT
Start: 2020-12-17 | End: 2021-01-07

## 2020-12-17 RX ORDER — DIVALPROEX SODIUM 500 MG/1
125 TABLET, DELAYED RELEASE ORAL
Refills: 0 | Status: DISCONTINUED | OUTPATIENT
Start: 2020-12-17 | End: 2020-12-19

## 2020-12-17 RX ORDER — SODIUM CHLORIDE 9 MG/ML
1000 INJECTION, SOLUTION INTRAVENOUS
Refills: 0 | Status: DISCONTINUED | OUTPATIENT
Start: 2020-12-17 | End: 2021-01-07

## 2020-12-17 RX ORDER — DEXTROSE 50 % IN WATER 50 %
25 SYRINGE (ML) INTRAVENOUS ONCE
Refills: 0 | Status: DISCONTINUED | OUTPATIENT
Start: 2020-12-17 | End: 2021-01-07

## 2020-12-17 RX ORDER — DEXTROSE 50 % IN WATER 50 %
12.5 SYRINGE (ML) INTRAVENOUS ONCE
Refills: 0 | Status: DISCONTINUED | OUTPATIENT
Start: 2020-12-17 | End: 2021-01-07

## 2020-12-17 RX ORDER — ASPIRIN/CALCIUM CARB/MAGNESIUM 324 MG
81 TABLET ORAL DAILY
Refills: 0 | Status: DISCONTINUED | OUTPATIENT
Start: 2020-12-17 | End: 2021-01-02

## 2020-12-17 RX ADMIN — Medication 25 MILLIGRAM(S): at 06:34

## 2020-12-17 RX ADMIN — Medication 81 MILLIGRAM(S): at 13:34

## 2020-12-17 RX ADMIN — ATORVASTATIN CALCIUM 10 MILLIGRAM(S): 80 TABLET, FILM COATED ORAL at 23:06

## 2020-12-17 RX ADMIN — PREGABALIN 1000 MICROGRAM(S): 225 CAPSULE ORAL at 13:30

## 2020-12-17 RX ADMIN — Medication 110 MILLIGRAM(S): at 23:19

## 2020-12-17 RX ADMIN — Medication 2: at 18:12

## 2020-12-17 RX ADMIN — SODIUM CHLORIDE 60 MILLILITER(S): 9 INJECTION, SOLUTION INTRAVENOUS at 04:53

## 2020-12-17 RX ADMIN — Medication 1 PACKET(S): at 13:53

## 2020-12-17 RX ADMIN — Medication 110 MILLIGRAM(S): at 13:45

## 2020-12-17 RX ADMIN — Medication 1 DROP(S): at 13:31

## 2020-12-17 RX ADMIN — Medication 1 DROP(S): at 06:34

## 2020-12-17 RX ADMIN — Medication 1000 MILLIGRAM(S): at 13:30

## 2020-12-17 RX ADMIN — Medication 1 TABLET(S): at 13:29

## 2020-12-17 RX ADMIN — ENOXAPARIN SODIUM 40 MILLIGRAM(S): 100 INJECTION SUBCUTANEOUS at 13:30

## 2020-12-17 RX ADMIN — Medication 1 DROP(S): at 23:07

## 2020-12-17 RX ADMIN — Medication 1 PACKET(S): at 23:06

## 2020-12-17 RX ADMIN — DIVALPROEX SODIUM 125 MILLIGRAM(S): 500 TABLET, DELAYED RELEASE ORAL at 19:00

## 2020-12-17 RX ADMIN — Medication 3 MILLIGRAM(S): at 23:06

## 2020-12-17 RX ADMIN — Medication 105 MILLIGRAM(S): at 13:45

## 2020-12-17 RX ADMIN — Medication 1 DROP(S): at 18:12

## 2020-12-17 RX ADMIN — Medication 10 MILLIGRAM(S): at 06:33

## 2020-12-17 NOTE — DIETITIAN NUTRITION RISK NOTIFICATION - TREATMENT: THE FOLLOWING DIET HAS BEEN RECOMMENDED
Diet, NPO with Tube Feed:   Tube Feeding Modality: Nasogastric  Glucerna 1.5 Bryce (GLUCERNA1.5RTH)  Total Volume for 24 Hours (mL): 1200  Continuous  Starting Tube Feed Rate {mL per Hour}: 20  Increase Tube Feed Rate by (mL): 10     Every 4 hours  Until Goal Tube Feed Rate (mL per Hour): 50  Tube Feed Duration (in Hours): 24  Tube Feed Start Time: 10:00  No Carb Prosource (1pkg = 15gms Protein)     Qty per Day:  1 (12-17-20 @ 11:42) [Active]

## 2020-12-17 NOTE — PROGRESS NOTE ADULT - SUBJECTIVE AND OBJECTIVE BOX
SUBJECTIVE / OVERNIGHT EVENTS:  NGT inserted yesterday, greatly appreciate PA!  Placement confirmed on CXR  TF ordered    Vital Signs Last 24 Hrs  T(C): 36.6 (17 Dec 2020 05:29), Max: 36.7 (16 Dec 2020 22:50)  T(F): 97.8 (17 Dec 2020 05:29), Max: 98.1 (16 Dec 2020 22:50)  HR: 61 (17 Dec 2020 05:29) (61 - 87)  BP: 128/75 (17 Dec 2020 05:29) (128/75 - 146/86)  BP(mean): --  RR: 16 (17 Dec 2020 05:29) (16 - 16)  SpO2: 100% (17 Dec 2020 05:29) (100% - 100%)    I&O's Summary      PHYSICAL EXAM:  GENERAL: NAD, thin cachetic elderly, comfortable  HEAD:  Atraumatic, Normocephalic  EYES: right eye patch in place  NECK: Supple, No JVD  CHEST/LUNG: Clear to auscultation bilaterally; No wheeze  HEART: Regular rate and rhythm; No murmurs, rubs, or gallops  ABDOMEN: Soft, Nontender, Nondistended; Bowel sounds present  Neuro: AAOx1-2, slow words, no focal deficit   EXTREMITIES:  2+ Peripheral Pulses, No clubbing, cyanosis, or edema  SKIN: No rashes or lesions     LABS:                        13.0   5.52  )-----------( 113      ( 17 Dec 2020 06:42 )             39.7     12-17    136  |  102  |  10  ----------------------------<  149<H>  3.7   |  23  |  0.56    Ca    9.3      17 Dec 2020 06:42  Phos  2.1     12-17  Mg     1.8     12-17        CAPILLARY BLOOD GLUCOSE      POCT Blood Glucose.: 185 mg/dL (17 Dec 2020 11:49)  POCT Blood Glucose.: 149 mg/dL (17 Dec 2020 06:37)  POCT Blood Glucose.: 160 mg/dL (16 Dec 2020 23:25)  POCT Blood Glucose.: 192 mg/dL (16 Dec 2020 18:04)            RADIOLOGY & ADDITIONAL TESTS:    Imaging Personally Reviewed:  [x] YES  [ ] NO    Consultant(s) Notes Reviewed:  [x] YES  [ ] NO

## 2020-12-17 NOTE — CHART NOTE - NSCHARTNOTEFT_GEN_A_CORE
RD visited with patient for requested consultation, as NGT was placed and enteral feeds were started, as patient failed swallow assessment.  Patient was asleep at time of visit, PCA at bedside.  Spoke to PA regarding tube feeding recommendations - as initially started on Glucerna 1.2, but not on formulary, therefore, Glucerna 1.5 initiated.    Physical signs of muscle loss appear to be severe since last assessment - severe muscle loss present to clavicles, shoulders, calves, quadriceps.    Source: Patient [ ]    Family [ ]     other [X] PA    Diet, NPO with Tube Feed:   Tube Feeding Modality: Nasogastric  Glucerna 1.5 Bryce (GLUCERNA1.5RTH)  Total Volume for 24 Hours (mL): 1200  Continuous  Starting Tube Feed Rate {mL per Hour}: 20  Increase Tube Feed Rate by (mL): 10     Every 4 hours  Until Goal Tube Feed Rate (mL per Hour): 50  Tube Feed Duration (in Hours): 24  Tube Feed Start Time: 10:00  No Carb Prosource (1pkg = 15gms Protein)     Qty per Day:  1 (12-17-20 @ 11:42)      Current Weight: Weight (kg): 55.7 (12-13 @ 16:34)      Pertinent Medications: ascorbic acid  atorvastatin  cyanocobalamin  dextrose 40% Gel  dextrose 5% + sodium chloride 0.9%.  dextrose 5%.  dextrose 5%.  dextrose 50% Injectable  dextrose 50% Injectable  dextrose 50% Injectable  diVALproex DR  enalapril  glucagon  Injectable  melatonin  multivitamin  potassium phosphate / sodium phosphate Powder (PHOS-NaK)  predniSONE   Tablet  tamsulosin  thiamine IVPB    Pertinent Labs:  12-17 Na136 mmol/L Glu 149 mg/dL<H> K+ 3.7 mmol/L Cr  0.56 mg/dL BUN 10 mg/dL 12-17 Phos 2.1 mg/dL<L> 12-15 Alb 4.0 g/dL    CAPILLARY BLOOD GLUCOSE  POCT Blood Glucose.: 185 mg/dL (17 Dec 2020 11:49)  POCT Blood Glucose.: 149 mg/dL (17 Dec 2020 06:37)  POCT Blood Glucose.: 160 mg/dL (16 Dec 2020 23:25)  POCT Blood Glucose.: 192 mg/dL (16 Dec 2020 18:04)      A1C with Estimated Average Glucose (12.13.20 @ 07:03)  A1C with Estimated Average Glucose Result: 6.6: High Risk (prediabetic)    5.7 - 6.4 %  Diabetic, diagnostic           > 6.5 %  ADA diabetic treatment goal    < 7.0 %  HbA1C values may not accurately reflect mean blood glucose in patients  with Hb variants.  Suggest clinical correlation. %   Estimated Average Glucose: 143 mg/dL      Skin: Stage 1 Pressure Injury to Sacrum    Estimated Needs:   [X ] no change since previous assessment   [ ] recalculated:     Previous Nutrition Diagnosis: Moderate Malnutrition  New Nutrition Diagnosis: Severe Malnutrition related to severe physical findings of muscle loss as noted above; 8.2% weight loss x 1 month       Additional Recommendations:  1) RD visited with patient for requested consultation, as NGT was placed and enteral feeds were started, as patient failed swallow assessment.  Patient was asleep at time of visit, PCA at bedside.  Spoke to PA regarding tube feeding recommendations - as initially started on Glucerna 1.2, but not on formulary, therefore, Glucerna 1.5 initiated.    Physical signs of muscle loss appear to be severe since last assessment - severe muscle loss present to clavicles, shoulders, calves, quadriceps.    Source: Patient [ ]    Family [ ]     other [X] PA    Diet, NPO with Tube Feed:   Tube Feeding Modality: Nasogastric  Glucerna 1.5 Bryce (GLUCERNA1.5RTH)  Total Volume for 24 Hours (mL): 1200  Continuous  Starting Tube Feed Rate {mL per Hour}: 20  Increase Tube Feed Rate by (mL): 10     Every 4 hours  Until Goal Tube Feed Rate (mL per Hour): 50  Tube Feed Duration (in Hours): 24  Tube Feed Start Time: 10:00  No Carb Prosource (1pkg = 15gms Protein)     Qty per Day:  1 (12-17-20 @ 11:42)    Current Weight: Weight (kg): 55.7 (12-13 @ 16:34)    Pertinent Medications:   ascorbic acid  atorvastatin  cyanocobalamin  dextrose 40% Gel  dextrose 5% + sodium chloride 0.9%.  dextrose 5%.  dextrose 5%.  dextrose 50% Injectable  dextrose 50% Injectable  dextrose 50% Injectable  diVALproex DR  enalapril  glucagon  Injectable  melatonin  multivitamin  potassium phosphate / sodium phosphate Powder (PHOS-NaK)  predniSONE   Tablet  tamsulosin  thiamine IVPB    Pertinent Labs:  12-17 Na136 mmol/L Glu 149 mg/dL<H> K+ 3.7 mmol/L Cr  0.56 mg/dL BUN 10 mg/dL 12-17 Phos 2.1 mg/dL<L> 12-15 Alb 4.0 g/dL    CAPILLARY BLOOD GLUCOSE  POCT Blood Glucose.: 185 mg/dL (17 Dec 2020 11:49)  POCT Blood Glucose.: 149 mg/dL (17 Dec 2020 06:37)  POCT Blood Glucose.: 160 mg/dL (16 Dec 2020 23:25)  POCT Blood Glucose.: 192 mg/dL (16 Dec 2020 18:04)      A1C with Estimated Average Glucose (12.13.20 @ 07:03)  A1C with Estimated Average Glucose Result: 6.6: High Risk (prediabetic)    5.7 - 6.4 %  Diabetic, diagnostic           > 6.5 %  ADA diabetic treatment goal    < 7.0 %  HbA1C values may not accurately reflect mean blood glucose in patients  with Hb variants.  Suggest clinical correlation. %  Estimated Average Glucose: 143 mg/dL    Skin: Stage 1 Pressure Injury to Sacrum    Estimated Needs:   [X ] no change since previous assessment; Est energy needs 1671-1949kcals (30-35kcal/kg); Est protein needs 78-89gm protein (1.4-1.6gm/kg)  [ ] recalculated:     Previous Nutrition Diagnosis: Moderate Malnutrition    New Nutrition Diagnosis: Severe Malnutrition related to severe physical findings of muscle loss as noted above; 8.2% weight loss x 1 month as previously noted.    Additional Recommendations:  1) Suggest Glucerna1.5 via at 20ml/hr and increase by 10ml every 6 hours as tolerated until goal of 50ml/hr x24 hours is reached. This will provide 1800kcal / 99gm protein / 911ml water via formula;   2) Monitor weights, tolerance to EN, pertinent labs, skin integrity/wound healing.

## 2020-12-17 NOTE — PROGRESS NOTE ADULT - PROBLEM SELECTOR PLAN 1
DDx inc.  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 no e/o acute intracranial pathology or infection   -CTH unremarkable  -UA, CXR negative  -on depakote while on prednisone  -neurology eval appreciated   (pt saw Dr. Nissenbaum last year, discussed case. he doesn't come to [facility])

## 2020-12-18 LAB
ANION GAP SERPL CALC-SCNC: 8 MMOL/L — SIGNIFICANT CHANGE UP (ref 7–14)
BUN SERPL-MCNC: 14 MG/DL — SIGNIFICANT CHANGE UP (ref 7–23)
CALCIUM SERPL-MCNC: 9.1 MG/DL — SIGNIFICANT CHANGE UP (ref 8.4–10.5)
CHLORIDE SERPL-SCNC: 103 MMOL/L — SIGNIFICANT CHANGE UP (ref 98–107)
CO2 SERPL-SCNC: 25 MMOL/L — SIGNIFICANT CHANGE UP (ref 22–31)
CREAT SERPL-MCNC: 0.6 MG/DL — SIGNIFICANT CHANGE UP (ref 0.5–1.3)
GLUCOSE SERPL-MCNC: 131 MG/DL — HIGH (ref 70–99)
HCT VFR BLD CALC: 36.9 % — LOW (ref 39–50)
HGB BLD-MCNC: 12.3 G/DL — LOW (ref 13–17)
MAGNESIUM SERPL-MCNC: 1.8 MG/DL — SIGNIFICANT CHANGE UP (ref 1.6–2.6)
MCHC RBC-ENTMCNC: 28.9 PG — SIGNIFICANT CHANGE UP (ref 27–34)
MCHC RBC-ENTMCNC: 33.3 GM/DL — SIGNIFICANT CHANGE UP (ref 32–36)
MCV RBC AUTO: 86.6 FL — SIGNIFICANT CHANGE UP (ref 80–100)
NRBC # BLD: 0 /100 WBCS — SIGNIFICANT CHANGE UP
NRBC # FLD: 0 K/UL — SIGNIFICANT CHANGE UP
PHOSPHATE SERPL-MCNC: 2.2 MG/DL — LOW (ref 2.5–4.5)
PLATELET # BLD AUTO: 111 K/UL — LOW (ref 150–400)
POTASSIUM SERPL-MCNC: 3.7 MMOL/L — SIGNIFICANT CHANGE UP (ref 3.5–5.3)
POTASSIUM SERPL-SCNC: 3.7 MMOL/L — SIGNIFICANT CHANGE UP (ref 3.5–5.3)
RBC # BLD: 4.26 M/UL — SIGNIFICANT CHANGE UP (ref 4.2–5.8)
RBC # FLD: 12.9 % — SIGNIFICANT CHANGE UP (ref 10.3–14.5)
SODIUM SERPL-SCNC: 136 MMOL/L — SIGNIFICANT CHANGE UP (ref 135–145)
WBC # BLD: 5.7 K/UL — SIGNIFICANT CHANGE UP (ref 3.8–10.5)
WBC # FLD AUTO: 5.7 K/UL — SIGNIFICANT CHANGE UP (ref 3.8–10.5)

## 2020-12-18 PROCEDURE — 99024 POSTOP FOLLOW-UP VISIT: CPT

## 2020-12-18 PROCEDURE — 70544 MR ANGIOGRAPHY HEAD W/O DYE: CPT | Mod: 26,59

## 2020-12-18 PROCEDURE — 70551 MRI BRAIN STEM W/O DYE: CPT | Mod: 26

## 2020-12-18 PROCEDURE — 70547 MR ANGIOGRAPHY NECK W/O DYE: CPT | Mod: 26

## 2020-12-18 RX ORDER — CLOPIDOGREL BISULFATE 75 MG/1
75 TABLET, FILM COATED ORAL DAILY
Refills: 0 | Status: DISCONTINUED | OUTPATIENT
Start: 2020-12-18 | End: 2020-12-18

## 2020-12-18 RX ADMIN — Medication 1000 MILLIGRAM(S): at 12:45

## 2020-12-18 RX ADMIN — Medication 2: at 17:36

## 2020-12-18 RX ADMIN — ATORVASTATIN CALCIUM 10 MILLIGRAM(S): 80 TABLET, FILM COATED ORAL at 21:56

## 2020-12-18 RX ADMIN — Medication 25 MILLIGRAM(S): at 05:47

## 2020-12-18 RX ADMIN — DIVALPROEX SODIUM 125 MILLIGRAM(S): 500 TABLET, DELAYED RELEASE ORAL at 05:48

## 2020-12-18 RX ADMIN — CLOPIDOGREL BISULFATE 75 MILLIGRAM(S): 75 TABLET, FILM COATED ORAL at 17:37

## 2020-12-18 RX ADMIN — Medication 1 PACKET(S): at 21:55

## 2020-12-18 RX ADMIN — DIVALPROEX SODIUM 125 MILLIGRAM(S): 500 TABLET, DELAYED RELEASE ORAL at 17:37

## 2020-12-18 RX ADMIN — Medication 1 DROP(S): at 12:46

## 2020-12-18 RX ADMIN — Medication 105 MILLIGRAM(S): at 12:44

## 2020-12-18 RX ADMIN — Medication 110 MILLIGRAM(S): at 21:56

## 2020-12-18 RX ADMIN — Medication 1 DROP(S): at 17:38

## 2020-12-18 RX ADMIN — Medication 3 MILLIGRAM(S): at 21:55

## 2020-12-18 RX ADMIN — ENOXAPARIN SODIUM 40 MILLIGRAM(S): 100 INJECTION SUBCUTANEOUS at 12:44

## 2020-12-18 RX ADMIN — Medication 10 MILLIGRAM(S): at 05:47

## 2020-12-18 RX ADMIN — PREGABALIN 1000 MICROGRAM(S): 225 CAPSULE ORAL at 12:46

## 2020-12-18 RX ADMIN — Medication 1 PACKET(S): at 05:47

## 2020-12-18 RX ADMIN — Medication 110 MILLIGRAM(S): at 11:14

## 2020-12-18 RX ADMIN — Medication 1 TABLET(S): at 12:45

## 2020-12-18 RX ADMIN — Medication 81 MILLIGRAM(S): at 12:47

## 2020-12-18 RX ADMIN — Medication 1 DROP(S): at 05:49

## 2020-12-18 RX ADMIN — Medication 1 PACKET(S): at 12:45

## 2020-12-18 RX ADMIN — Medication 1: at 11:47

## 2020-12-18 NOTE — CHART NOTE - NSCHARTNOTEFT_GEN_A_CORE
[facility] MEDICINE COVERAGE - Medicine Subsequent Hospital Care Note    CC: MRI Findings  HPI/Subjective: 80M with PMH of DM, HTN, HLD, chronic conjunctivitis s/p corneal surg 11/2020 at [facility] on steroid taper, presented to hospital with worsening agitation & failure to thrive.  Neurology consulted on 12/16.  Patient had MRI/MRA Brain/Neck today, findings as below.    ROS:  No new complaints.    Vital Signs Last 24 Hrs  T(C): 36.9 (12-18-20 @ 05:45), Max: 36.9 (12-18-20 @ 05:45)  T(F): 98.5 (12-18-20 @ 05:45), Max: 98.5 (12-18-20 @ 05:45)  HR: 61 (12-18-20 @ 05:45) (61 - 63)  BP: 110/68 (12-18-20 @ 05:45) (110/68 - 141/79)  RR: 16 (12-18-20 @ 05:45) (16 - 17)  SpO2: 100% (12-18-20 @ 05:45) (100% - 100%) on (O2)    PHYSICAL EXAM:  Constitutional: NAD, elderly, cachectic, appears comfortable  ENT, Mouth: normal external ears and nose, normal hearing, dry oral mucosa, NGT in place  Eyes: PEERL  Respiratory: Clear to auscultation bilaterally  Cardiovascular: regular rate and rhythm, S1 and S2  Skin: warm, dry, no rash  Neurologic: AOx2, speech slow, sensation grossly intact, non-focal exam  Musculoskeletal: generalized weakness, moving all extremities    RADIOLOGY:    < from: MR Head No Cont (12.18.20 @ 09:48) >    EXAM:  MR BRAIN        PROCEDURE DATE:  Dec 18 2020         INTERPRETATION:  CLINICAL STATEMENT: AMS    TECHNIQUE: MRI of the brain was performed without gadolinium.    COMPARISON: MRI brain 6/23/2020. CT head 12/13/2020.    FINDINGS:  There is mild diffuse parenchymal volume loss. There are T2 prolongation signal abnormalities in the brainstem, periventricular subcortical white matter likely related to severe chronic microvascular ischemic changes.    There is chronic bilateral frontoparietal subdural hematomas, new since prior MRI exam from 6/23/2020 but unchanged from prior CT exam.    Small high T1 high FLAIR signal component posteriorly in the bilateral parietal-occipital region regions measuring up to 3 mm in maximum width which may represent component of subacute subdural hematomas. There are also high FLAIR signals along the convexities which do not demonstrate high T1 signal suggestive of later subacute components.    Subcentimeter acute/subacute infarcts noted in the left posterior frontal lobe.    There is no midline shift.  There is no hydrocephalus.    Retention cyst/polyps in bilateral maxillary sinuses.    IMPRESSION:  Small acute/subacute infarcts in the left posterior frontal lobe.    Predominantly chronic bilateralsubdural hematomas unchanged from CT dated 12/13/2020, new from MRI dated 6/23/2020, with small subacute subdural components as described above.    < end of copied text >    < from: MR Angio Head No Cont (12.18.20 @ 09:48) >    EXAM:  MR ANGIO BRAIN      EXAM:  MR ANGIO NECK        PROCEDURE DATE:  Dec 18 2020         INTERPRETATION:  CLINICAL STATEMENT: Evaluate for stenosis.  CVA    TECHNIQUE: MRA of the head were performed without gadolinium. MRA of the neck was performed without gadolinium.    COMPARISON: MRA head 3/2/2011    FINDINGS:  MRA of the neck:  Study limited due to motion    The common carotid and internal carotid arteries are patent without hemodynamically significant stenosis.    The extracranial vertebral arteries are patent.    MRA of the head:  Study limited due to artifacts    The proximal anterior, middle and posterior cerebral arteries are patent. New Narrowing mid left and mid, distal right posterior cerebral arteries. Evaluation of anteriormiddle cerebral arteries limited due to artifacts    The intracranial vertebral and basilar arteries are patent. Lack of signal distal left vertebral artery could be artifactual.    There is no gross MR evidence of intracranial aneurysm.    IMPRESSION:  MRA neck limited due to motion. No gross hemodynamically significant stenosis in the neck.    MRA head exam limited due to artifacts. New narrowing mid left and mid and distal right posterior cerebral arteries. Consider CTA exam for better evaluation.      < end of copied text >    I reviewed the above labs, radiology, medications, tests, telemetry, and EKG interpretation.    ASSESSMENT/PLAN:    - Dr. Chicas made aware  - Called neurology (Spectra [id number]) who was made aware, provider will discuss with Dr. Alatorre + provide further recs.  - Will follow up      medium complexity/ risk (45 min)      Salina Molina NP-BC  Department of Medicine  In House Pager #[phone]

## 2020-12-18 NOTE — PROGRESS NOTE ADULT - SUBJECTIVE AND OBJECTIVE BOX
[facility] Neurological Care [facility]      Seen earlier today, and examined.  - Today, patient is without complaints.           *****MEDICATIONS: Current medication reviewed and documented.    MEDICATIONS  (STANDING):  ascorbic acid 1000 milliGRAM(s) Oral daily  aspirin  chewable 81 milliGRAM(s) Oral daily  atorvastatin 10 milliGRAM(s) Oral at bedtime  clopidogrel Tablet 75 milliGRAM(s) Oral daily  cyanocobalamin 1000 MICROGram(s) Oral daily  dextrose 40% Gel 15 Gram(s) Oral once  dextrose 5%. 1000 milliLiter(s) (50 mL/Hr) IV Continuous <Continuous>  dextrose 5%. 1000 milliLiter(s) (100 mL/Hr) IV Continuous <Continuous>  dextrose 50% Injectable 25 Gram(s) IV Push once  dextrose 50% Injectable 25 Gram(s) IV Push once  dextrose 50% Injectable 12.5 Gram(s) IV Push once  diVALproex  milliGRAM(s) Oral two times a day  doxycycline IVPB 100 milliGRAM(s) IV Intermittent every 12 hours  enalapril 10 milliGRAM(s) Oral daily  enoxaparin Injectable 40 milliGRAM(s) SubCutaneous daily  glucagon  Injectable 1 milliGRAM(s) IntraMuscular once  insulin lispro (ADMELOG) corrective regimen sliding scale   SubCutaneous every 6 hours  melatonin 3 milliGRAM(s) Oral at bedtime  moxifloxacin 0.5% Solution 1 Drop(s) Both EYES <User Schedule>  multivitamin 1 Tablet(s) Oral daily  potassium phosphate / sodium phosphate Powder (PHOS-NaK) 1 Packet(s) Oral three times a day  predniSONE   Tablet 25 milliGRAM(s) Oral daily  tamsulosin 0.4 milliGRAM(s) Oral at bedtime  thiamine IVPB 500 milliGRAM(s) IV Intermittent daily    MEDICATIONS  (PRN):  bisacodyl Suppository 10 milliGRAM(s) Rectal daily PRN Constipation  polyethylene glycol 3350 17 Gram(s) Oral daily PRN Constipation  senna 2 Tablet(s) Oral at bedtime PRN Constipation          ***** VITAL SIGNS:  T(F): 98.1 (20 @ 14:08), Max: 98.5 (20 @ 05:45)  HR: 70 (20 @ 14:08) (61 - 70)  BP: 127/83 (12-18-20 @ 14:08) (110/68 - 127/83)  RR: 18 (20 @ 14:08) (16 - 18)  SpO2: 100% (20 @ 14:08) (100% - 100%)  Wt(kg): --  ,   I&O's Summary           *****PHYSICAL EXAM: Alert oriented x1   Attention comprehension are poor . Able to name, repeat,  without any difficulty.   Able to follow 1-2   step commands easily       limited eye mvt of the [place]   able to count fingers with R eye  left eye closed and tearing excessively when opened   No facial asymmetry   Tongue is midline   Palate elevates symmetrically   Moving all 4 ext symmetrically antigravity   no double stimulation extinction   sensation is grossly symmetric  Gait : not assessed.  B/L down going toes          *****LAB AND IMAGIN.3   5.70  )-----------( 111      ( 18 Dec 2020 06:57 )             36.9               12-18    136  |  103  |  14  ----------------------------<  131<H>  3.7   |  25  |  0.60    Ca    9.1      18 Dec 2020 06:57  Phos  2.2     12-18  Mg     1.8     -18           < from: MR Angio Head No Cont (20 @ 09:48) >  FINDINGS:  MRA of the neck:  Study limited due to motion    The common carotid and internal carotid arteries are patent without hemodynamically significant stenosis.    The extracranial vertebral arteries are patent.    MRA of the head:  Study limited due to artifacts    The proximal anterior, middle and posterior cerebral arteries are patent. New Narrowing mid left and mid, distal right posterior cerebral arteries. Evaluation of anteriormiddle cerebral arteries limited due to artifacts    The intracranial vertebral and basilar arteries are patent. Lack of signal distal left vertebral artery could be artifactual.    There is no gross MR evidence of intracranial aneurysm.    IMPRESSION:  < from: MR Head No Cont (20 @ 09:48) >    FINDINGS:  There is mild diffuse parenchymal volume loss. There are T2 prolongation signal abnormalities in the brainstem, periventricular subcortical white matter likely related to severe chronic microvascular ischemic changes.    There is chronic bilateral frontoparietal subdural hematomas, new since prior MRI exam from 2020 but unchanged from prior CT exam.    Small high T1 high FLAIR signal component posteriorly in the bilateral parietal-occipital region regions measuring up to 3 mm in maximum width which may represent component of subacute subdural hematomas. There are also high FLAIR signals along the convexities which do not demonstrate high T1 signal suggestive of later subacute components.    Subcentimeter acute/subacute infarcts noted in the left posterior frontal lobe.    < end of copied text >  MRA neck limited due to motion. No gross hemodynamically significant stenosis in the neck.    MRA head exam limited due to artifacts. New narrowing mid left and mid and distal right posterior cerebral arteries. Consider CTA exam for better evaluation.    < end of copied text >              < from: MR Head No Cont (12.18.20 @ 09:48) >    Small acute/subacute infarcts in the left posterior frontal lobe.    Predominantly chronic bilateralsubdural hematomas unchanged from CT dated 2020, new from MRI dated 2020, with small subacute subdural components as described above.      < end of copied text >      [All pertinent recent Imaging/Reports reviewed]           *****A S S E S S M E N T   A N D   P L A N :  80 year old left handed  male with PMH of DM, HTN, HLD, chronic conjunctivitis s/p corneal surg 2020 at [facility] on steroid taper, p/w worsening agitation. As per son pt is AAOX2 and walks with a waker at baseline.  Since discharge from [facility] pt has been refusing to get out of the bed, eating less, not taking meds. He has also becomes more confused, not recognizing family and screaming at times. Son also reports unintentional weight loss of pt, ~60lb in the past year.  No fever, cough, sob, N/V/D or pain issues.     Problem/Recommendations 1:    ams  likely multifactorial metabolic encephalopathy  related to ? pain from recent surgery/vision changes, steroid psychosis, poor po intake, sleep wake  adjustment   disorder worsening underlying cognitive decline. ( as per wife pt has been repetitive calling out for help at home, agitated, she is having a difficult time taking care of him at home )   am cortisol   sleep wake cycle regulation agree with melatonin   thiamine 500 iv x daily 3 days then po 100 daily   mvi daily   depakote 125 bid for agitation while on steroids   ct head microvascular disease   mri with severe microvascular disease   pt with weight loss, refusing to eat, severe malnutrition.     Problem/Recommendations 2:  hyponatremia volume contraction vs. pain related siadh ?   consider urine lytes, urine osm serum osm       Problem/Recommendations 3: cva small subacute lacunar infarct  without any overt symptoms that is attributable to this stroke    pt with severe chronic microvascular disease   subacute infarct with intracranial stenosis but somewhat limited due to artifact   subacute subdural hematomas, therefore will hold off on any further antiplatelet r x  asa 81, atorvastatin for secondary prophyolaxis   will reimage head ct in 4 weeks to see if there is any progression of sdh   discussed with wife, kael in detail the findings of the mri, she is not interested in any aggressive measures, consider palliative care for [facility]       Thank you for allowing me to participate in the care of this patient. Please do not hesitate to call me if you have any  questions.        _______________  Yelena Alatorre MD  [facility] Neurological [facility] ([facility]  [phone]      33 minutes spent on total encounter; more than 50 % of the visit was  spent counseling about plan of care, compliance to diet/exercise and medication regimen and or  coordinating care by the attending physician.      It is advised that stroke patients follow up with BENJAMÍN Keller @ [phone] in 1- 2 weeks.   Others please follow up with Dr. Michael Nissenbaum [phone]

## 2020-12-18 NOTE — PROGRESS NOTE ADULT - PROBLEM SELECTOR PLAN 1
DDx inc.  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 no e/o acute intracranial pathology or infection   -CTH unremarkable  -UA, CXR negative  -on depakote while on prednisone  -neurology eval appreciated   (pt saw Dr. Nissenbaum last year, discussed case. he doesn't come to [facility])  -f/u MRI/MRA head reading

## 2020-12-18 NOTE — PROGRESS NOTE ADULT - SUBJECTIVE AND OBJECTIVE BOX
SUBJECTIVE / OVERNIGHT EVENTS:  Pt on his way to MRI/MRA  He appeared a bit more agitated to me    Vital Signs Last 24 Hrs  T(C): 36.9 (18 Dec 2020 05:45), Max: 36.9 (18 Dec 2020 05:45)  T(F): 98.5 (18 Dec 2020 05:45), Max: 98.5 (18 Dec 2020 05:45)  HR: 61 (18 Dec 2020 05:45) (61 - 63)  BP: 110/68 (18 Dec 2020 05:45) (110/68 - 141/79)  BP(mean): --  RR: 16 (18 Dec 2020 05:45) (16 - 17)  SpO2: 100% (18 Dec 2020 05:45) (100% - 100%)    I&O's Summary      PHYSICAL EXAM:  GENERAL: NAD, thin cachetic elderly  HEAD:  Atraumatic, Normocephalic  EYES: right eye patch in place  NECK: Supple, No JVD  CHEST/LUNG: Clear to auscultation bilaterally; No wheeze  HEART: Regular rate and rhythm; No murmurs, rubs, or gallops  ABDOMEN: Soft, Nontender, Nondistended; Bowel sounds present  Neuro: AAOx1-2, slow words, no focal deficit   EXTREMITIES:  2+ Peripheral Pulses, No clubbing, cyanosis, or edema  SKIN: No rashes or lesions     LABS:                        12.3   5.70  )-----------( 111      ( 18 Dec 2020 06:57 )             36.9     12-18    136  |  103  |  14  ----------------------------<  131<H>  3.7   |  25  |  0.60    Ca    9.1      18 Dec 2020 06:57  Phos  2.2     12-18  Mg     1.8     12-18        CAPILLARY BLOOD GLUCOSE      POCT Blood Glucose.: 128 mg/dL (18 Dec 2020 05:50)  POCT Blood Glucose.: 116 mg/dL (17 Dec 2020 23:42)  POCT Blood Glucose.: 223 mg/dL (17 Dec 2020 17:26)  POCT Blood Glucose.: 185 mg/dL (17 Dec 2020 11:49)            RADIOLOGY & ADDITIONAL TESTS:    Imaging Personally Reviewed:  [x] YES  [ ] NO    Consultant(s) Notes Reviewed:  [x] YES  [ ] NO

## 2020-12-18 NOTE — PROGRESS NOTE ADULT - SUBJECTIVE AND OBJECTIVE BOX
[facility] DEPARTMENT OF OPHTHALMOLOGY - PROGRESS NOTE  --------------------------------------------------------------------------------------------  Syeda Hurtado MD PGY-3  Pager: [phone]  ----------------------------------------------------------------------------------------------    80 year old male with PMH of DM, HTN, HLD admitted for worsening agitation and confusion at home. The patient follows at [facility] for progressive thinning of the bilateral corneas, for which he had a patch graft OD during prior admission and required gluing multiple times OS, uncertain cause of corneal melting (prior malignancy work-up was negative).    S: Patient seen and examined at bedside with Dr. Warren. Patient denies any changes in vision.     Alert and oriented x 0-1 ; appropriate mood and affect    Ophthalmology Exam:  Visual acuity (sc): HM OU  Pupils: PERRL OU, no APD  Ttono: Soft OU  Extraocular movements (EOMs): Full OU, no pain, no diplopia  Confrontational Visual Field (CVF): ED due to MS  Color Plates: ED due to poor vision    Pen Light Exam (PLE)  External: Clear shield in place OU  Lids/Lashes/Lacrimal Ducts: Flat OU Inferior lids noted to be slightly ectropic OU with deep fornices, eyelids crusted shut with significant discharge (which was cleaned gently with gauze and saline)  Sclera/Conjunctiva: 1+ injection OU  Cornea: OD with patch graft in place, well sutured, laura negative, no epithelial defect; OS with corneal glue off, central perforation (laura positive) and BCL in place  Anterior Chamber: D+F OU, No hypopyon appreciated OU, formed OU  Iris: poor view but appears Flat OU  Lens: poor view OU      Assessment and Recommendation:   80y male w/ pmhx/ochx of  DM, HTN, HLD, chronic conjunctivitis and 60 lb weight loss with progressive corneal thinning of both eyes, s/p corneal patch graft of the right eye. OD with patch graft in place, without epi defect. OS with deep chamber, but central perforation (laura positive) and no overlying glue. No infectious infiltrate or ulceration.    # Corneal thinning of both eyes, s/p corneal patch graft of the right eye and corneal gluing of the left eye  - At bedside, corneal gluing performed of the left eye. Patient tolerated procedure well, no complications. Corneal glue placed centrally over perforation, well sealed. BCL placed as well.  - c/w moxiflox QID to both eyes  - c/w PO prednisone now at 25 mg. Patient with acute mental status change possibly related to steroids; if need to be held for this reason, please let ophthalmology know. Prednisone has helped slow this patient's corneal melting and thinning, plan for slow taper. For now continue at 25 mg daily, unless medically contraindicated.  - given recent weight loss and b/l nature of corneal pathology, malignancy workup was done to evaluate for underlying disease process, unrevealing to date   - c/w vitamin C 1g daily and doxycycline 100mg BID daily as per primary team if no contraindications  - please re-check serum vitamin A levels  - recommend daily vitamin A supplementation given vitamin A deficiency is associated with corneal melting and patient with poor PO intake  - prior conjunctival biopsy with lymphoplasmocytic infiltrate, consistent with chronic inflammatory process  - ophthalmology will monitor closely  - findings discussed with patient and primary team    INCOMPLETE NOTE    Patient was seen and discussed with Dr. Warren (cornea specialist)    Outpatient follow-up: Patient should follow-up with his/her ophthalmologist or with [facility] Department of Ophthalmology within 2-3 days of discharge at:    [address]  [place] [address]  [phone]     [facility] DEPARTMENT OF OPHTHALMOLOGY - PROGRESS NOTE  --------------------------------------------------------------------------------------------  Syeda Hurtado MD PGY-3  Pager: [phone]  ----------------------------------------------------------------------------------------------    80 year old male with PMH of DM, HTN, HLD admitted for worsening agitation and confusion at home. The patient follows at [facility] for progressive thinning of the bilateral corneas, for which he had a patch graft OD during prior admission and required gluing multiple times OS, uncertain cause of corneal melting (prior malignancy work-up was negative).    S: Patient seen and examined at bedside with Dr. Warren. Patient denies any changes in vision.     Alert and oriented x 0-1 ; appropriate mood and affect    Ophthalmology Exam:  Visual acuity (sc): HM OU  Pupils: PERRL OU, no APD  Ttono: Soft OU  Extraocular movements (EOMs): Full OU, no pain, no diplopia  Confrontational Visual Field (CVF): ED due to MS  Color Plates: ED due to poor vision    Pen Light Exam (PLE)  External: Clear shield in place OU  Lids/Lashes/Lacrimal Ducts: Flat OU Inferior lids noted to be slightly ectropic OU with deep fornices, eyelids crusted shut with significant discharge (which was cleaned gently with gauze and saline)  Sclera/Conjunctiva: 1+ injection OU  Cornea: OD with patch graft in place, well sutured, laura negative, no epithelial defect; OS with corneal glue present centrally over prior perforation, BCL in place  Anterior Chamber: D+F OU, No hypopyon appreciated OU, formed OU  Iris: poor view but appears Flat OU  Lens: poor view OU      Assessment and Recommendation:   80y male w/ pmhx/ochx of  DM, HTN, HLD, chronic conjunctivitis and 60 lb weight loss with progressive corneal thinning of both eyes, s/p corneal patch graft of the right eye. OD with patch graft in place, without epi defect. OS with deep chamber, K glue and BCL in place. No infectious infiltrate or ulceration.    # Corneal thinning of both eyes, s/p corneal patch graft of the right eye and corneal gluing of the left eye  - c/w moxiflox QID to both eyes  - c/w PO prednisone now at 25 mg. Patient with acute mental status change possibly related to steroids; if need to be held for this reason, please let ophthalmology know. Prednisone has helped slow this patient's corneal melting and thinning, plan for slow taper. For now continue at 25 mg daily, unless medically contraindicated.  - given recent weight loss and b/l nature of corneal pathology, malignancy workup was done to evaluate for underlying disease process, unrevealing to date   - c/w vitamin C 1g daily and doxycycline 100mg BID daily as per primary team if no contraindications  - please re-check serum vitamin A levels  - c/w daily multivitamin to ensure adequate vit A   - prior conjunctival biopsy with lymphoplasmocytic infiltrate, consistent with chronic inflammatory process  - ophthalmology will monitor closely  - findings discussed with patient and primary team    INCOMPLETE NOTE    Patient was seen and discussed with Dr. Warren (cornea specialist)    Outpatient follow-up: Patient should follow-up with his/her ophthalmologist or with [facility] Department of Ophthalmology within 2-3 days of discharge at:    [address]  [place] [address]  [phone]     [facility] DEPARTMENT OF OPHTHALMOLOGY - PROGRESS NOTE  --------------------------------------------------------------------------------------------  Syeda Hurtado MD PGY-3  Pager: [phone]  ----------------------------------------------------------------------------------------------    80 year old male with PMH of DM, HTN, HLD admitted for worsening agitation and confusion at home. The patient follows at [facility] for progressive thinning of the bilateral corneas, for which he had a patch graft OD during prior admission and required gluing multiple times OS, uncertain cause of corneal melting (prior malignancy work-up was negative).    S: Patient seen and examined at bedside with Dr. Warren. Patient denies any changes in vision.     Alert and oriented x 0-1 ; appropriate mood and affect    Ophthalmology Exam:  Visual acuity (sc): HM OU  Pupils: PERRL OU, no APD  Ttono: Soft OU  Extraocular movements (EOMs): Full OU, no pain, no diplopia  Confrontational Visual Field (CVF): ED due to MS  Color Plates: ED due to poor vision    Pen Light Exam (PLE)  External: Clear shield in place OU  Lids/Lashes/Lacrimal Ducts: Flat OU Inferior lids noted to be slightly ectropic OU with deep fornices, eyelids crusted shut with significant discharge (which was cleaned gently with gauze and saline)  Sclera/Conjunctiva: 1+ injection OU  Cornea: OD with patch graft in place, well sutured, laura negative, no epithelial defect; OS with corneal glue present centrally over prior perforation, BCL in place  Anterior Chamber: D+F OU, No hypopyon appreciated OU, formed OU  Iris: poor view but appears Flat OU  Lens: poor view OU      Assessment and Recommendation:   80y male w/ pmhx/ochx of  DM, HTN, HLD, chronic conjunctivitis and 60 lb weight loss with progressive corneal thinning of both eyes, s/p corneal patch graft of the right eye. OD with patch graft in place, without epi defect. OS with deep chamber, K glue and BCL in place. No infectious infiltrate or ulceration.    # Corneal thinning of both eyes, s/p corneal patch graft of the right eye and corneal gluing of the left eye  - c/w moxiflox QID to both eyes  - c/w PO prednisone now at 25 mg. Patient with acute mental status change possibly related to steroids; if need to be held for this reason, please let ophthalmology know. Prednisone has helped slow this patient's corneal melting and thinning, plan for slow taper. For now continue at 25 mg daily, unless medically contraindicated.  - given recent weight loss and b/l nature of corneal pathology, malignancy workup was done to evaluate for underlying disease process, unrevealing to date   - c/w vitamin C 1g daily and doxycycline 100mg BID daily as per primary team if no contraindications  - serum vitamin A levels pending  - c/w daily multivitamin to ensure adequate vit A supplementation  - prior conjunctival biopsy with lymphoplasmocytic infiltrate, consistent with chronic inflammatory process  - ophthalmology will monitor closely  - findings discussed with patient and primary team    Patient was seen and discussed with Dr. Warren (cornea specialist)    Outpatient follow-up: Patient should follow-up with his/her ophthalmologist or with [facility] Department of Ophthalmology within 2-3 days of discharge at:    [address]  [place] [address]  [phone]

## 2020-12-19 LAB
AMMONIA BLD-MCNC: 40 UMOL/L — SIGNIFICANT CHANGE UP (ref 11–55)
ANION GAP SERPL CALC-SCNC: 9 MMOL/L — SIGNIFICANT CHANGE UP (ref 7–14)
BUN SERPL-MCNC: 16 MG/DL — SIGNIFICANT CHANGE UP (ref 7–23)
CALCIUM SERPL-MCNC: 9.2 MG/DL — SIGNIFICANT CHANGE UP (ref 8.4–10.5)
CHLORIDE SERPL-SCNC: 100 MMOL/L — SIGNIFICANT CHANGE UP (ref 98–107)
CO2 SERPL-SCNC: 28 MMOL/L — SIGNIFICANT CHANGE UP (ref 22–31)
CREAT SERPL-MCNC: 0.57 MG/DL — SIGNIFICANT CHANGE UP (ref 0.5–1.3)
GLUCOSE SERPL-MCNC: 152 MG/DL — HIGH (ref 70–99)
HCT VFR BLD CALC: 37 % — LOW (ref 39–50)
HCYS SERPL-MCNC: 9.2 UMOL/L — SIGNIFICANT CHANGE UP
HGB BLD-MCNC: 12.2 G/DL — LOW (ref 13–17)
LDLC SERPL DIRECT ASSAY-MCNC: 63 MG/DL — LOW (ref 73–160)
MAGNESIUM SERPL-MCNC: 1.8 MG/DL — SIGNIFICANT CHANGE UP (ref 1.6–2.6)
MCHC RBC-ENTMCNC: 28.6 PG — SIGNIFICANT CHANGE UP (ref 27–34)
MCHC RBC-ENTMCNC: 33 GM/DL — SIGNIFICANT CHANGE UP (ref 32–36)
MCV RBC AUTO: 86.7 FL — SIGNIFICANT CHANGE UP (ref 80–100)
NRBC # BLD: 0 /100 WBCS — SIGNIFICANT CHANGE UP
NRBC # FLD: 0 K/UL — SIGNIFICANT CHANGE UP
PHOSPHATE SERPL-MCNC: 2.4 MG/DL — LOW (ref 2.5–4.5)
PLATELET # BLD AUTO: 101 K/UL — LOW (ref 150–400)
POTASSIUM SERPL-MCNC: 4 MMOL/L — SIGNIFICANT CHANGE UP (ref 3.5–5.3)
POTASSIUM SERPL-SCNC: 4 MMOL/L — SIGNIFICANT CHANGE UP (ref 3.5–5.3)
RBC # BLD: 4.27 M/UL — SIGNIFICANT CHANGE UP (ref 4.2–5.8)
RBC # FLD: 13.1 % — SIGNIFICANT CHANGE UP (ref 10.3–14.5)
SODIUM SERPL-SCNC: 137 MMOL/L — SIGNIFICANT CHANGE UP (ref 135–145)
WBC # BLD: 5.53 K/UL — SIGNIFICANT CHANGE UP (ref 3.8–10.5)
WBC # FLD AUTO: 5.53 K/UL — SIGNIFICANT CHANGE UP (ref 3.8–10.5)

## 2020-12-19 RX ORDER — DOXAZOSIN MESYLATE 4 MG
1 TABLET ORAL AT BEDTIME
Refills: 0 | Status: DISCONTINUED | OUTPATIENT
Start: 2020-12-19 | End: 2021-01-07

## 2020-12-19 RX ORDER — SODIUM,POTASSIUM PHOSPHATES 278-250MG
1 POWDER IN PACKET (EA) ORAL THREE TIMES A DAY
Refills: 0 | Status: DISCONTINUED | OUTPATIENT
Start: 2020-12-19 | End: 2020-12-19

## 2020-12-19 RX ORDER — DIVALPROEX SODIUM 500 MG/1
125 TABLET, DELAYED RELEASE ORAL
Refills: 0 | Status: DISCONTINUED | OUTPATIENT
Start: 2020-12-19 | End: 2021-01-02

## 2020-12-19 RX ORDER — POLYETHYLENE GLYCOL 3350 17 G/17G
17 POWDER, FOR SOLUTION ORAL DAILY
Refills: 0 | Status: DISCONTINUED | OUTPATIENT
Start: 2020-12-19 | End: 2021-01-02

## 2020-12-19 RX ORDER — SENNA PLUS 8.6 MG/1
2 TABLET ORAL AT BEDTIME
Refills: 0 | Status: DISCONTINUED | OUTPATIENT
Start: 2020-12-19 | End: 2021-01-02

## 2020-12-19 RX ORDER — DIVALPROEX SODIUM 500 MG/1
125 TABLET, DELAYED RELEASE ORAL
Refills: 0 | Status: DISCONTINUED | OUTPATIENT
Start: 2020-12-19 | End: 2020-12-19

## 2020-12-19 RX ORDER — PREGABALIN 225 MG/1
1000 CAPSULE ORAL DAILY
Refills: 0 | Status: DISCONTINUED | OUTPATIENT
Start: 2020-12-19 | End: 2021-01-02

## 2020-12-19 RX ORDER — LANOLIN ALCOHOL/MO/W.PET/CERES
3 CREAM (GRAM) TOPICAL AT BEDTIME
Refills: 0 | Status: DISCONTINUED | OUTPATIENT
Start: 2020-12-19 | End: 2021-01-02

## 2020-12-19 RX ORDER — SODIUM,POTASSIUM PHOSPHATES 278-250MG
1 POWDER IN PACKET (EA) ORAL THREE TIMES A DAY
Refills: 0 | Status: COMPLETED | OUTPATIENT
Start: 2020-12-19 | End: 2020-12-21

## 2020-12-19 RX ORDER — TAMSULOSIN HYDROCHLORIDE 0.4 MG/1
0.4 CAPSULE ORAL AT BEDTIME
Refills: 0 | Status: DISCONTINUED | OUTPATIENT
Start: 2020-12-19 | End: 2020-12-19

## 2020-12-19 RX ORDER — ASCORBIC ACID 60 MG
1000 TABLET,CHEWABLE ORAL DAILY
Refills: 0 | Status: DISCONTINUED | OUTPATIENT
Start: 2020-12-19 | End: 2021-01-02

## 2020-12-19 RX ORDER — ATORVASTATIN CALCIUM 80 MG/1
10 TABLET, FILM COATED ORAL AT BEDTIME
Refills: 0 | Status: DISCONTINUED | OUTPATIENT
Start: 2020-12-19 | End: 2021-01-02

## 2020-12-19 RX ADMIN — Medication 3 MILLIGRAM(S): at 22:10

## 2020-12-19 RX ADMIN — Medication 10 MILLIGRAM(S): at 06:26

## 2020-12-19 RX ADMIN — Medication 1 DROP(S): at 18:19

## 2020-12-19 RX ADMIN — Medication 1 PACKET(S): at 10:46

## 2020-12-19 RX ADMIN — Medication 2: at 12:02

## 2020-12-19 RX ADMIN — Medication 110 MILLIGRAM(S): at 23:04

## 2020-12-19 RX ADMIN — Medication 1 PACKET(S): at 22:10

## 2020-12-19 RX ADMIN — Medication 1 DROP(S): at 12:03

## 2020-12-19 RX ADMIN — Medication 1000 MILLIGRAM(S): at 12:03

## 2020-12-19 RX ADMIN — PREGABALIN 1000 MICROGRAM(S): 225 CAPSULE ORAL at 12:03

## 2020-12-19 RX ADMIN — Medication 105 MILLIGRAM(S): at 15:41

## 2020-12-19 RX ADMIN — Medication 81 MILLIGRAM(S): at 12:03

## 2020-12-19 RX ADMIN — Medication 1 TABLET(S): at 12:03

## 2020-12-19 RX ADMIN — ATORVASTATIN CALCIUM 10 MILLIGRAM(S): 80 TABLET, FILM COATED ORAL at 22:10

## 2020-12-19 RX ADMIN — DIVALPROEX SODIUM 125 MILLIGRAM(S): 500 TABLET, DELAYED RELEASE ORAL at 18:19

## 2020-12-19 RX ADMIN — Medication 25 MILLIGRAM(S): at 06:26

## 2020-12-19 RX ADMIN — Medication 1: at 06:27

## 2020-12-19 RX ADMIN — Medication 1 DROP(S): at 23:05

## 2020-12-19 RX ADMIN — Medication 2: at 18:18

## 2020-12-19 RX ADMIN — Medication 1 DROP(S): at 00:25

## 2020-12-19 RX ADMIN — ENOXAPARIN SODIUM 40 MILLIGRAM(S): 100 INJECTION SUBCUTANEOUS at 12:02

## 2020-12-19 RX ADMIN — Medication 1 PACKET(S): at 06:26

## 2020-12-19 RX ADMIN — Medication 1 DROP(S): at 06:26

## 2020-12-19 RX ADMIN — DIVALPROEX SODIUM 125 MILLIGRAM(S): 500 TABLET, DELAYED RELEASE ORAL at 06:26

## 2020-12-19 RX ADMIN — Medication 110 MILLIGRAM(S): at 10:46

## 2020-12-19 NOTE — PROGRESS NOTE ADULT - SUBJECTIVE AND OBJECTIVE BOX
[facility]      Seen earlier today, and examined.  - Today, patient is without complaints.           *****MEDICATIONS: Current medication reviewed and documented.    MEDICATIONS  (STANDING):  ascorbic acid 1000 milliGRAM(s) Oral daily  aspirin  chewable 81 milliGRAM(s) Oral daily  atorvastatin 10 milliGRAM(s) Oral at bedtime  cyanocobalamin 1000 MICROGram(s) Oral daily  dextrose 40% Gel 15 Gram(s) Oral once  dextrose 5%. 1000 milliLiter(s) (100 mL/Hr) IV Continuous <Continuous>  dextrose 5%. 1000 milliLiter(s) (50 mL/Hr) IV Continuous <Continuous>  dextrose 50% Injectable 25 Gram(s) IV Push once  dextrose 50% Injectable 12.5 Gram(s) IV Push once  dextrose 50% Injectable 25 Gram(s) IV Push once  diVALproex  milliGRAM(s) Oral two times a day  doxycycline IVPB 100 milliGRAM(s) IV Intermittent every 12 hours  enalapril 10 milliGRAM(s) Oral daily  enoxaparin Injectable 40 milliGRAM(s) SubCutaneous daily  glucagon  Injectable 1 milliGRAM(s) IntraMuscular once  insulin lispro (ADMELOG) corrective regimen sliding scale   SubCutaneous every 6 hours  melatonin 3 milliGRAM(s) Oral at bedtime  moxifloxacin 0.5% Solution 1 Drop(s) Both EYES <User Schedule>  multivitamin 1 Tablet(s) Oral daily  potassium phosphate / sodium phosphate Powder (PHOS-NaK) 1 Packet(s) Oral three times a day  predniSONE   Tablet 25 milliGRAM(s) Oral daily  tamsulosin 0.4 milliGRAM(s) Oral at bedtime    MEDICATIONS  (PRN):  bisacodyl Suppository 10 milliGRAM(s) Rectal daily PRN Constipation  polyethylene glycol 3350 17 Gram(s) Oral daily PRN Constipation  senna 2 Tablet(s) Oral at bedtime PRN Constipation          ***** VITAL SIGNS:  T(F): 98.1 (20 @ 14:20), Max: 98.1 (20 @ 06:24)  HR: 64 (20 @ 14:20) (60 - 64)  BP: 141/78 (20 @ 14:20) (116/66 - 141/78)  RR: 17 (12-19-20 @ 14:20) (16 - 17)  SpO2: 100% (--20 @ 14:20) (100% - 100%)  Wt(kg): --  ,   I&O's Summary           *****PHYSICAL EXAM: eyes closed refusing to open   denied visual hallucinations    only cooperated with partial exam   then said leave me alone           *****LAB AND IMAGIN.2   5.53  )-----------( 101      ( 19 Dec 2020 08:03 )             37.0                   137  |  100  |  16  ----------------------------<  152<H>  4.0   |  28  |  0.57    Ca    9.2      19 Dec 2020 08:03  Phos  2.4       Mg     1.8                                [All pertinent recent Imaging/Reports reviewed]           *****A S S E S S M E N T   A N D   P L A N :    80 year old left handed  male with PMH of DM, HTN, HLD, chronic conjunctivitis s/p corneal surg 2020 at [facility] on steroid taper, p/w worsening agitation. As per son pt is AAOX2 and walks with a waker at baseline.  Since discharge from [facility] pt has been refusing to get out of the bed, eating less, not taking meds. He has also becomes more confused, not recognizing family and screaming at times. Son also reports unintentional weight loss of pt, ~60lb in the past year.  No fever, cough, sob, N/V/D or pain issues.     Problem/Recommendations 1:    ams  likely multifactorial metabolic encephalopathy  related to ? pain from recent surgery/vision changes, steroid psychosis, poor po intake, sleep wake  adjustment   disorder worsening underlying cognitive decline. ( as per wife pt has been repetitive calling out for help at home, agitated, she is having a difficult time taking care of him at home )   am cortisol   sleep wake cycle regulation agree with melatonin   thiamine 500 iv x daily 3 days then po 100 daily   mvi daily   depakote 125 bid for agitation while on steroids   ct head microvascular disease   mri with severe microvascular disease   pt with weight loss, refusing to eat, severe malnutrition.     Problem/Recommendations 2:  hyponatremia volume contraction vs. pain related siadh ?   consider urine lytes, urine osm serum osm       Problem/Recommendations 3: cva small subacute lacunar infarct  without any overt symptoms that is attributable to this stroke    pt with severe chronic microvascular disease   subacute infarct with intracranial stenosis but somewhat limited due to artifact   subacute componenets in subdural hematomas, therefore will hold off on adding 2nd antiplatelet r x  asa 81, atorvastatin for secondary prophyolaxis   will reimage head ct in 4 weeks to see if there is any progression of sdh   discussed with wife, kael in detail the findings of the mri, she is not interested in any aggressive measures, consider palliative care for [facility]       Thank you for allowing me to participate in the care of this patient. Please do not hesitate to call me if you have any  questions.        ________________  Yelena Alatorre MD  [facility]  [phone]      33 minutes spent on total encounter; more than 50 % of the visit was  spent counseling about plan of care, compliance to diet/exercise and medication regimen and or  coordinating care by the attending physician.      It is advised that stroke patients follow up with BENJAMÍN Keller @ [phone] in 1- 2 weeks.   Others please follow up with Dr. Michael Nissenbaum [phone]

## 2020-12-19 NOTE — PROGRESS NOTE ADULT - PROBLEM SELECTOR PLAN 1
DDx inc.  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 no e/o acute intracranial pathology or infection   -CTH unremarkable  -UA, CXR negative  -on depakote while on prednisone  -neurology eval appreciated   (pt saw Dr. Nissenbaum last year, discussed case. he doesn't come to [facility])  -MRI/MRA head 12/18/20 revealed small acute/subacute infarcts in the left posterior frontal lobe along w/predominantly chronic bilateral subdural hematomas unchanged from CT dated 12/13/2020, new from MRI dated 6/23/2020, with small subacute subdural components  -@ this juncture, it would be reasonable to consider palliatve consult on Monda to clarify GOC as neurology had spoke with wife who did not want overly aggressive measures

## 2020-12-19 NOTE — PROGRESS NOTE ADULT - SUBJECTIVE AND OBJECTIVE BOX
SUBJECTIVE / OVERNIGHT EVENTS:  MRI/MRA revealed small acute/subacute infarcts in the left posterior frontal lobe along w/predominantly chronic bilateral subdural hematomas unchanged from CT dated 12/13/2020, new from MRI dated 6/23/2020, with small subacute subdural components  Tolerating TF    Vital Signs Last 24 Hrs  T(C): 36.7 (19 Dec 2020 06:24), Max: 36.7 (18 Dec 2020 14:08)  T(F): 98.1 (19 Dec 2020 06:24), Max: 98.1 (18 Dec 2020 14:08)  HR: 60 (19 Dec 2020 06:24) (60 - 70)  BP: 132/75 (19 Dec 2020 06:24) (116/66 - 132/75)  BP(mean): --  RR: 16 (19 Dec 2020 06:24) (16 - 18)  SpO2: 100% (19 Dec 2020 06:24) (100% - 100%)    I&O's Summary      PHYSICAL EXAM:  GENERAL: NAD, thin cachetic elderly  HEAD:  Atraumatic, Normocephalic  EYES: right eye patch in place  NECK: Supple, No JVD  CHEST/LUNG: Clear to auscultation bilaterally; No wheeze  HEART: Regular rate and rhythm; No murmurs, rubs, or gallops  ABDOMEN: Soft, Nontender, Nondistended; Bowel sounds present  Neuro: AAOx1-2, slow words, no focal deficit   EXTREMITIES:  2+ Peripheral Pulses, No clubbing, cyanosis, or edema  SKIN: No rashes or lesions     LABS:                        12.2   5.53  )-----------( 101      ( 19 Dec 2020 08:03 )             37.0     12-19    137  |  100  |  16  ----------------------------<  152<H>  4.0   |  28  |  0.57    Ca    9.2      19 Dec 2020 08:03  Phos  2.4     12-19  Mg     1.8     12-19        CAPILLARY BLOOD GLUCOSE      POCT Blood Glucose.: 158 mg/dL (19 Dec 2020 05:54)  POCT Blood Glucose.: 126 mg/dL (18 Dec 2020 23:40)  POCT Blood Glucose.: 205 mg/dL (18 Dec 2020 17:09)  POCT Blood Glucose.: 164 mg/dL (18 Dec 2020 11:43)            RADIOLOGY & ADDITIONAL TESTS:    Imaging Personally Reviewed:  [x] YES  [ ] NO    Consultant(s) Notes Reviewed:  [x] YES  [ ] NO

## 2020-12-20 DIAGNOSIS — I63.9 CEREBRAL INFARCTION, UNSPECIFIED: ICD-10-CM

## 2020-12-20 LAB
AMMONIA BLD-MCNC: 43 UMOL/L — SIGNIFICANT CHANGE UP (ref 11–55)
ANION GAP SERPL CALC-SCNC: 13 MMOL/L — SIGNIFICANT CHANGE UP (ref 7–14)
BUN SERPL-MCNC: 17 MG/DL — SIGNIFICANT CHANGE UP (ref 7–23)
CALCIUM SERPL-MCNC: 9.6 MG/DL — SIGNIFICANT CHANGE UP (ref 8.4–10.5)
CHLORIDE SERPL-SCNC: 98 MMOL/L — SIGNIFICANT CHANGE UP (ref 98–107)
CO2 SERPL-SCNC: 26 MMOL/L — SIGNIFICANT CHANGE UP (ref 22–31)
CORTIS AM PEAK SERPL-MCNC: 23 UG/DL — HIGH (ref 6–18.4)
CREAT SERPL-MCNC: 0.59 MG/DL — SIGNIFICANT CHANGE UP (ref 0.5–1.3)
GLUCOSE SERPL-MCNC: 180 MG/DL — HIGH (ref 70–99)
HCT VFR BLD CALC: 40.6 % — SIGNIFICANT CHANGE UP (ref 39–50)
HGB BLD-MCNC: 13.4 G/DL — SIGNIFICANT CHANGE UP (ref 13–17)
MAGNESIUM SERPL-MCNC: 1.8 MG/DL — SIGNIFICANT CHANGE UP (ref 1.6–2.6)
MCHC RBC-ENTMCNC: 28.8 PG — SIGNIFICANT CHANGE UP (ref 27–34)
MCHC RBC-ENTMCNC: 33 GM/DL — SIGNIFICANT CHANGE UP (ref 32–36)
MCV RBC AUTO: 87.3 FL — SIGNIFICANT CHANGE UP (ref 80–100)
NRBC # BLD: 0 /100 WBCS — SIGNIFICANT CHANGE UP
NRBC # FLD: 0 K/UL — SIGNIFICANT CHANGE UP
PHOSPHATE SERPL-MCNC: 3 MG/DL — SIGNIFICANT CHANGE UP (ref 2.5–4.5)
PLATELET # BLD AUTO: 110 K/UL — LOW (ref 150–400)
POTASSIUM SERPL-MCNC: 4.7 MMOL/L — SIGNIFICANT CHANGE UP (ref 3.5–5.3)
POTASSIUM SERPL-SCNC: 4.7 MMOL/L — SIGNIFICANT CHANGE UP (ref 3.5–5.3)
RBC # BLD: 4.65 M/UL — SIGNIFICANT CHANGE UP (ref 4.2–5.8)
RBC # FLD: 13 % — SIGNIFICANT CHANGE UP (ref 10.3–14.5)
SODIUM SERPL-SCNC: 137 MMOL/L — SIGNIFICANT CHANGE UP (ref 135–145)
VIT B12 SERPL-MCNC: 2000 PG/ML — HIGH (ref 200–900)
WBC # BLD: 7.56 K/UL — SIGNIFICANT CHANGE UP (ref 3.8–10.5)
WBC # FLD AUTO: 7.56 K/UL — SIGNIFICANT CHANGE UP (ref 3.8–10.5)

## 2020-12-20 PROCEDURE — 93010 ELECTROCARDIOGRAM REPORT: CPT

## 2020-12-20 RX ORDER — HALOPERIDOL DECANOATE 100 MG/ML
1 INJECTION INTRAMUSCULAR ONCE
Refills: 0 | Status: COMPLETED | OUTPATIENT
Start: 2020-12-20 | End: 2020-12-20

## 2020-12-20 RX ORDER — HALOPERIDOL DECANOATE 100 MG/ML
2 INJECTION INTRAMUSCULAR ONCE
Refills: 0 | Status: COMPLETED | OUTPATIENT
Start: 2020-12-20 | End: 2020-12-20

## 2020-12-20 RX ADMIN — Medication 1 DROP(S): at 18:36

## 2020-12-20 RX ADMIN — DIVALPROEX SODIUM 125 MILLIGRAM(S): 500 TABLET, DELAYED RELEASE ORAL at 19:29

## 2020-12-20 RX ADMIN — Medication 1 PACKET(S): at 06:37

## 2020-12-20 RX ADMIN — ENOXAPARIN SODIUM 40 MILLIGRAM(S): 100 INJECTION SUBCUTANEOUS at 11:48

## 2020-12-20 RX ADMIN — Medication 1 DROP(S): at 23:44

## 2020-12-20 RX ADMIN — Medication 1 PACKET(S): at 21:20

## 2020-12-20 RX ADMIN — PREGABALIN 1000 MICROGRAM(S): 225 CAPSULE ORAL at 11:48

## 2020-12-20 RX ADMIN — DIVALPROEX SODIUM 125 MILLIGRAM(S): 500 TABLET, DELAYED RELEASE ORAL at 06:36

## 2020-12-20 RX ADMIN — Medication 1: at 23:44

## 2020-12-20 RX ADMIN — Medication 1 PACKET(S): at 15:36

## 2020-12-20 RX ADMIN — Medication 81 MILLIGRAM(S): at 11:48

## 2020-12-20 RX ADMIN — Medication 1 MILLIGRAM(S): at 23:45

## 2020-12-20 RX ADMIN — Medication 1: at 06:36

## 2020-12-20 RX ADMIN — Medication 110 MILLIGRAM(S): at 21:20

## 2020-12-20 RX ADMIN — Medication 3 MILLIGRAM(S): at 21:21

## 2020-12-20 RX ADMIN — Medication 1 TABLET(S): at 11:48

## 2020-12-20 RX ADMIN — HALOPERIDOL DECANOATE 1 MILLIGRAM(S): 100 INJECTION INTRAMUSCULAR at 11:46

## 2020-12-20 RX ADMIN — Medication 1 DROP(S): at 06:37

## 2020-12-20 RX ADMIN — Medication 110 MILLIGRAM(S): at 09:49

## 2020-12-20 RX ADMIN — Medication 1000 MILLIGRAM(S): at 11:48

## 2020-12-20 RX ADMIN — Medication 2: at 12:49

## 2020-12-20 RX ADMIN — Medication 1 DROP(S): at 11:48

## 2020-12-20 RX ADMIN — HALOPERIDOL DECANOATE 2 MILLIGRAM(S): 100 INJECTION INTRAMUSCULAR at 06:03

## 2020-12-20 RX ADMIN — Medication 10 MILLIGRAM(S): at 06:36

## 2020-12-20 RX ADMIN — Medication 25 MILLIGRAM(S): at 06:36

## 2020-12-20 RX ADMIN — ATORVASTATIN CALCIUM 10 MILLIGRAM(S): 80 TABLET, FILM COATED ORAL at 21:21

## 2020-12-20 NOTE — PROGRESS NOTE ADULT - PROBLEM SELECTOR PLAN 1
-MRI/MRA head 12/18/20 revealed small acute/subacute infarcts in the left posterior frontal lobe along w/predominantly chronic bilateral subdural hematomas unchanged from CT dated 12/13/2020, new from MRI dated 6/23/2020, with small subacute subdural components  -cont ASA/statin; holding off on adding plavix  -@ this juncture, it would be reasonable to consult palliative consult on Monday to clarify GOC as neurology had spoke with wife who did not want overly aggressive measures

## 2020-12-20 NOTE — PROGRESS NOTE ADULT - SUBJECTIVE AND OBJECTIVE BOX
No fevers overnight  Tolerating TF so far  O2 sats on RA remain stable    Vital Signs Last 24 Hrs  T(C): 36.7 (20 Dec 2020 06:34), Max: 36.7 (19 Dec 2020 14:20)  T(F): 98 (20 Dec 2020 06:34), Max: 98.1 (19 Dec 2020 14:20)  HR: 93 (20 Dec 2020 06:34) (52 - 93)  BP: 134/90 (20 Dec 2020 06:34) (134/90 - 141/78)  BP(mean): --  RR: 16 (20 Dec 2020 06:34) (16 - 17)  SpO2: 100% (20 Dec 2020 06:34) (100% - 100%)    I&O's Summary    12-19-20 @ 07:01  -  12-20-20 @ 07:00  --------------------------------------------------------  IN: 400 mL / OUT: 0 mL / NET: 400 mL        PHYSICAL EXAM:  GENERAL: NAD, thin cachetic elderly  HEAD:  Atraumatic, Normocephalic  EYES: right eye patch in place  NECK: Supple, No JVD  CHEST/LUNG: Clear to auscultation bilaterally; No wheeze  HEART: Regular rate and rhythm; No murmurs, rubs, or gallops  ABDOMEN: Soft, Nontender, Nondistended; Bowel sounds present  Neuro: AAOx1-2, slow words, no focal deficit   EXTREMITIES:  2+ Peripheral Pulses, No clubbing, cyanosis, or edema  SKIN: No rashes or lesions     LABS:                        13.4   7.56  )-----------( 110      ( 20 Dec 2020 06:40 )             40.6     12-20    137  |  98  |  17  ----------------------------<  180<H>  4.7   |  26  |  0.59    Ca    9.6      20 Dec 2020 06:40  Phos  3.0     12-20  Mg     1.8     12-20        CAPILLARY BLOOD GLUCOSE      POCT Blood Glucose.: 165 mg/dL (20 Dec 2020 06:18)  POCT Blood Glucose.: 111 mg/dL (19 Dec 2020 23:04)  POCT Blood Glucose.: 212 mg/dL (19 Dec 2020 18:01)  POCT Blood Glucose.: 249 mg/dL (19 Dec 2020 12:01)            RADIOLOGY & ADDITIONAL TESTS:    Imaging Personally Reviewed:  [x] YES  [ ] NO    Consultant(s) Notes Reviewed:  [x] YES  [ ] NO

## 2020-12-21 DIAGNOSIS — Z71.89 OTHER SPECIFIED COUNSELING: ICD-10-CM

## 2020-12-21 DIAGNOSIS — G93.49 OTHER ENCEPHALOPATHY: ICD-10-CM

## 2020-12-21 DIAGNOSIS — I63.9 CEREBRAL INFARCTION, UNSPECIFIED: ICD-10-CM

## 2020-12-21 DIAGNOSIS — R53.2 FUNCTIONAL QUADRIPLEGIA: ICD-10-CM

## 2020-12-21 DIAGNOSIS — Z51.5 ENCOUNTER FOR PALLIATIVE CARE: ICD-10-CM

## 2020-12-21 LAB
ANION GAP SERPL CALC-SCNC: 11 MMOL/L — SIGNIFICANT CHANGE UP (ref 7–14)
BUN SERPL-MCNC: 16 MG/DL — SIGNIFICANT CHANGE UP (ref 7–23)
CALCIUM SERPL-MCNC: 9.5 MG/DL — SIGNIFICANT CHANGE UP (ref 8.4–10.5)
CHLORIDE SERPL-SCNC: 98 MMOL/L — SIGNIFICANT CHANGE UP (ref 98–107)
CO2 SERPL-SCNC: 27 MMOL/L — SIGNIFICANT CHANGE UP (ref 22–31)
CREAT SERPL-MCNC: 0.55 MG/DL — SIGNIFICANT CHANGE UP (ref 0.5–1.3)
GLUCOSE SERPL-MCNC: 157 MG/DL — HIGH (ref 70–99)
HCT VFR BLD CALC: 37.8 % — LOW (ref 39–50)
HGB BLD-MCNC: 12.2 G/DL — LOW (ref 13–17)
MAGNESIUM SERPL-MCNC: 1.9 MG/DL — SIGNIFICANT CHANGE UP (ref 1.6–2.6)
MCHC RBC-ENTMCNC: 28 PG — SIGNIFICANT CHANGE UP (ref 27–34)
MCHC RBC-ENTMCNC: 32.3 GM/DL — SIGNIFICANT CHANGE UP (ref 32–36)
MCV RBC AUTO: 86.9 FL — SIGNIFICANT CHANGE UP (ref 80–100)
NRBC # BLD: 0 /100 WBCS — SIGNIFICANT CHANGE UP
NRBC # FLD: 0 K/UL — SIGNIFICANT CHANGE UP
PHOSPHATE SERPL-MCNC: 2.9 MG/DL — SIGNIFICANT CHANGE UP (ref 2.5–4.5)
PLATELET # BLD AUTO: 107 K/UL — LOW (ref 150–400)
POTASSIUM SERPL-MCNC: 4.3 MMOL/L — SIGNIFICANT CHANGE UP (ref 3.5–5.3)
POTASSIUM SERPL-SCNC: 4.3 MMOL/L — SIGNIFICANT CHANGE UP (ref 3.5–5.3)
RBC # BLD: 4.35 M/UL — SIGNIFICANT CHANGE UP (ref 4.2–5.8)
RBC # FLD: 13.3 % — SIGNIFICANT CHANGE UP (ref 10.3–14.5)
SODIUM SERPL-SCNC: 136 MMOL/L — SIGNIFICANT CHANGE UP (ref 135–145)
WBC # BLD: 6.4 K/UL — SIGNIFICANT CHANGE UP (ref 3.8–10.5)
WBC # FLD AUTO: 6.4 K/UL — SIGNIFICANT CHANGE UP (ref 3.8–10.5)

## 2020-12-21 PROCEDURE — 99497 ADVNCD CARE PLAN 30 MIN: CPT | Mod: 25

## 2020-12-21 PROCEDURE — 99498 ADVNCD CARE PLAN ADDL 30 MIN: CPT

## 2020-12-21 PROCEDURE — 99358 PROLONG SERVICE W/O CONTACT: CPT

## 2020-12-21 PROCEDURE — 99223 1ST HOSP IP/OBS HIGH 75: CPT

## 2020-12-21 RX ORDER — HALOPERIDOL DECANOATE 100 MG/ML
2 INJECTION INTRAMUSCULAR ONCE
Refills: 0 | Status: COMPLETED | OUTPATIENT
Start: 2020-12-21 | End: 2020-12-21

## 2020-12-21 RX ADMIN — Medication 1 DROP(S): at 23:35

## 2020-12-21 RX ADMIN — ATORVASTATIN CALCIUM 10 MILLIGRAM(S): 80 TABLET, FILM COATED ORAL at 21:06

## 2020-12-21 RX ADMIN — Medication 10 MILLIGRAM(S): at 05:03

## 2020-12-21 RX ADMIN — Medication 1 PACKET(S): at 13:03

## 2020-12-21 RX ADMIN — Medication 1: at 18:23

## 2020-12-21 RX ADMIN — Medication 1 TABLET(S): at 13:03

## 2020-12-21 RX ADMIN — Medication 1 DROP(S): at 18:24

## 2020-12-21 RX ADMIN — Medication 3: at 13:02

## 2020-12-21 RX ADMIN — DIVALPROEX SODIUM 125 MILLIGRAM(S): 500 TABLET, DELAYED RELEASE ORAL at 05:03

## 2020-12-21 RX ADMIN — Medication 2: at 05:03

## 2020-12-21 RX ADMIN — HALOPERIDOL DECANOATE 2 MILLIGRAM(S): 100 INJECTION INTRAMUSCULAR at 04:11

## 2020-12-21 RX ADMIN — Medication 110 MILLIGRAM(S): at 21:06

## 2020-12-21 RX ADMIN — Medication 3 MILLIGRAM(S): at 21:06

## 2020-12-21 RX ADMIN — Medication 1 DROP(S): at 13:03

## 2020-12-21 RX ADMIN — Medication 1000 MILLIGRAM(S): at 13:01

## 2020-12-21 RX ADMIN — ENOXAPARIN SODIUM 40 MILLIGRAM(S): 100 INJECTION SUBCUTANEOUS at 13:01

## 2020-12-21 RX ADMIN — Medication 1: at 23:35

## 2020-12-21 RX ADMIN — Medication 81 MILLIGRAM(S): at 13:07

## 2020-12-21 RX ADMIN — Medication 1 MILLIGRAM(S): at 21:06

## 2020-12-21 RX ADMIN — DIVALPROEX SODIUM 125 MILLIGRAM(S): 500 TABLET, DELAYED RELEASE ORAL at 18:24

## 2020-12-21 RX ADMIN — PREGABALIN 1000 MICROGRAM(S): 225 CAPSULE ORAL at 13:00

## 2020-12-21 RX ADMIN — Medication 1 PACKET(S): at 05:04

## 2020-12-21 RX ADMIN — Medication 110 MILLIGRAM(S): at 09:56

## 2020-12-21 RX ADMIN — Medication 25 MILLIGRAM(S): at 05:03

## 2020-12-21 RX ADMIN — Medication 1 DROP(S): at 05:03

## 2020-12-21 NOTE — CONSULT NOTE ADULT - PROBLEM SELECTOR RECOMMENDATION 6
.  # Code status: FULL  # HCP/ Surrogate: Maia @ [phone]  # Estimated prognosis: Weeks to months  # Hospice eligible: YES- family not interested    12/21  > Discussed nutritional GOC: family to get back to me about PEG vs pleasure feeds  > Family not interested in hospice  > Pls continue updating family re: pt's condition. They were very taken aback at my call (almost angry)    Due to visitation restrictions in the hospital in light of COVID pandemic, all discussions with the patient/ patient's healthcare proxy or surrogate have been done via telehealth. This is to prevent spread of infection within the hospital and out in the community. Total time discussing advance care planning, goals of care discussions, and discussions about code status and hospice = 16 mins (12:20 to 12:36)

## 2020-12-21 NOTE — PROGRESS NOTE ADULT - SUBJECTIVE AND OBJECTIVE BOX
Breathing feels "all right"  NO abd pain   No N/V  No diarrhea    Vital Signs Last 24 Hrs  T(C): 36.6 (21 Dec 2020 04:56), Max: 36.6 (21 Dec 2020 04:56)  T(F): 97.8 (21 Dec 2020 04:56), Max: 97.8 (21 Dec 2020 04:56)  HR: 74 (21 Dec 2020 04:56) (70 - 90)  BP: 132/67 (21 Dec 2020 04:56) (132/67 - 143/93)  BP(mean): --  RR: 16 (21 Dec 2020 04:56) (16 - 16)  SpO2: 99% (21 Dec 2020 04:56) (99% - 100%)    I&O's Summary    12-20-20 @ 07:01  -  12-21-20 @ 07:00  --------------------------------------------------------  IN: 400 mL / OUT: 0 mL / NET: 400 mL        PHYSICAL EXAM:  GENERAL: NAD, thin cachetic elderly  HEAD:  Atraumatic, Normocephalic  EYES: right eye patch in place  NECK: Supple, No JVD  CHEST/LUNG: Clear to auscultation bilaterally; No wheeze  HEART: Regular rate and rhythm; No murmurs, rubs, or gallops  ABDOMEN: Soft, Nontender, Nondistended; Bowel sounds present  Neuro: AAOx1-2, slow words, no focal deficit   EXTREMITIES:  2+ Peripheral Pulses, No clubbing, cyanosis, or edema  SKIN: No rashes or lesions     LABS:                        12.2   6.40  )-----------( 107      ( 21 Dec 2020 07:45 )             37.8     12-21    136  |  98  |  16  ----------------------------<  157<H>  4.3   |  27  |  0.55    Ca    9.5      21 Dec 2020 07:45  Phos  2.9     12-21  Mg     1.9     12-21        CAPILLARY BLOOD GLUCOSE      POCT Blood Glucose.: 204 mg/dL (21 Dec 2020 04:58)  POCT Blood Glucose.: 199 mg/dL (20 Dec 2020 22:58)  POCT Blood Glucose.: 142 mg/dL (20 Dec 2020 18:10)  POCT Blood Glucose.: 209 mg/dL (20 Dec 2020 12:17)            RADIOLOGY & ADDITIONAL TESTS:    Imaging Personally Reviewed:  [x] YES  [ ] NO    Consultant(s) Notes Reviewed:  [x] YES  [ ] NO

## 2020-12-21 NOTE — CONSULT NOTE ADULT - ASSESSMENT
80 year old male with PMH of DM, HTN, HLD, chronic conjunctivitis s/p corneal surg 11/2020 at [facility] on steroid taper admitted for a L posterior-frontal lobe CVA on top of chronic SDH. Palliative Medicine was consulted for complex medical decision making related to goals of care discussions

## 2020-12-21 NOTE — CONSULT NOTE ADULT - CONVERSATION DETAILS
1) Wife and DIL very taken aback at my call and service. Said they were not told patient was serious. They were also not aware of Sp/Sw findings    2) They made it clear they are not interested in hospice    3) Talked about nutritional goals of care: explained pros and cons of PEG + aspiration risk    4) Discussed pleasure feeds at length    5) They had many questions about his condition and MRI findings, which I answered    6) They said they will talk to the rest of the family and get back to me

## 2020-12-21 NOTE — CONSULT NOTE ADULT - PROBLEM SELECTOR RECOMMENDATION 3
.  NGT 12/17    > 12/21: Talked to wife about aspiration risk and PEG vs pleasure feeds  > They will get back to me after speaking with family

## 2020-12-21 NOTE — PROGRESS NOTE ADULT - SUBJECTIVE AND OBJECTIVE BOX
[facility] DEPARTMENT OF OPHTHALMOLOGY - PROGRESS NOTE  --------------------------------------------------------------------------------------------  Syeda Hurtado MD PGY-3  Pager: [phone]  ----------------------------------------------------------------------------------------------    80 year old male with PMH of DM, HTN, HLD admitted for worsening agitation and confusion at home. The patient follows at [facility] for progressive thinning of the bilateral corneas, for which he had a patch graft OD during prior admission and required gluing multiple times OS, uncertain cause of corneal melting (prior malignancy work-up was negative).    S: Patient seen and examined at bedside. Patient denies any changes in vision.     Alert and oriented x 0-1 ; appropriate mood and affect    Ophthalmology Exam:  Visual acuity (sc): HM OU  Pupils: PERRL OU, no APD  Ttono: Soft OU  Extraocular movements (EOMs): Full OU, no pain, no diplopia  Confrontational Visual Field (CVF): ED due to MS  Color Plates: ED due to poor vision    Pen Light Exam (PLE)  External: Clear shield in place OU  Lids/Lashes/Lacrimal Ducts: Flat OU Inferior lids noted to be slightly ectropic OU with deep fornices, eyelids crusted shut with significant discharge (which was cleaned gently with gauze and saline)  Sclera/Conjunctiva: 1+ injection OU  Cornea: OD with patch graft in place, well sutured, laura negative, no epithelial defect; OS with corneal glue no longer in place, central perforation present   Anterior Chamber: D+F OU, No hypopyon appreciated OU, formed OU  Iris: poor view but appears Flat OU  Lens: poor view OU      Assessment and Recommendation:   80y male w/ pmhx/ochx of  DM, HTN, HLD, chronic conjunctivitis and 60 lb weight loss with progressive corneal thinning of both eyes, s/p corneal patch graft of the right eye. OD with patch graft in place, without epi defect. OS with central perforation, K glue no longer in place.     # Corneal thinning of both eyes, s/p corneal patch graft of the right eye and corneal gluing of the left eye  - Cornea glued again today OS at bedside; patient tolerated the procedure well  - c/w moxiflox QID to both eyes  - can taper PO prednisone to 20 mg and can continue to taper down by 5 mg every week. Patient with acute mental status change possibly related to steroids; if need to be held for this reason, please let ophthalmology know. Prednisone has helped slow this patient's corneal melting and thinning, but may be contributing to worsening mental status  - given recent weight loss and b/l nature of corneal pathology, malignancy workup was done to evaluate for underlying disease process, unrevealing to date   - c/w vitamin C 1g daily and doxycycline 100mg BID daily as per primary team if no contraindications  - serum vitamin A levels pending  - c/w daily multivitamin to ensure adequate vit A supplementation  - prior conjunctival biopsy with lymphoplasmocytic infiltrate, consistent with chronic inflammatory process  - ophthalmology will monitor closely  - findings discussed with patient and primary team    Patient was seen and discussed with Dr. Warren (cornea specialist)    Outpatient follow-up: Patient should follow-up with his/her ophthalmologist or with [facility] Department of Ophthalmology within 2-3 days of discharge at:    [address]  [place] [address]  [phone]    SREEKANTH Warren (cornea)

## 2020-12-21 NOTE — PROGRESS NOTE ADULT - SUBJECTIVE AND OBJECTIVE BOX
[facility]      Seen earlier today, and examined.  - Today, patient is without complaints.  i want to eat          *****MEDICATIONS: Current medication reviewed and documented.    MEDICATIONS  (STANDING):  ascorbic acid 1000 milliGRAM(s) Oral daily  aspirin  chewable 81 milliGRAM(s) Oral daily  atorvastatin 10 milliGRAM(s) Oral at bedtime  cyanocobalamin 1000 MICROGram(s) Oral daily  dextrose 40% Gel 15 Gram(s) Oral once  dextrose 5%. 1000 milliLiter(s) (50 mL/Hr) IV Continuous <Continuous>  dextrose 5%. 1000 milliLiter(s) (100 mL/Hr) IV Continuous <Continuous>  dextrose 50% Injectable 25 Gram(s) IV Push once  dextrose 50% Injectable 12.5 Gram(s) IV Push once  dextrose 50% Injectable 25 Gram(s) IV Push once  diVALproex Sprinkle 125 milliGRAM(s) Oral two times a day  doxazosin 1 milliGRAM(s) Oral at bedtime  doxycycline IVPB 100 milliGRAM(s) IV Intermittent every 12 hours  enalapril 10 milliGRAM(s) Oral daily  enoxaparin Injectable 40 milliGRAM(s) SubCutaneous daily  glucagon  Injectable 1 milliGRAM(s) IntraMuscular once  insulin lispro (ADMELOG) corrective regimen sliding scale   SubCutaneous every 6 hours  melatonin 3 milliGRAM(s) Oral at bedtime  moxifloxacin 0.5% Solution 1 Drop(s) Both EYES <User Schedule>  multivitamin 1 Tablet(s) Oral daily  potassium phosphate / sodium phosphate Powder (PHOS-NaK) 1 Packet(s) Oral three times a day  predniSONE   Tablet 25 milliGRAM(s) Oral daily    MEDICATIONS  (PRN):  bisacodyl Suppository 10 milliGRAM(s) Rectal daily PRN Constipation  polyethylene glycol 3350 17 Gram(s) Oral daily PRN Constipation  senna 2 Tablet(s) Oral at bedtime PRN Constipation          ***** VITAL SIGNS:  T(F): 97.8 (20 @ 04:56), Max: 97.8 (20 @ 04:56)  HR: 74 (20 @ 04:56) (70 - 90)  BP: 132/67 (20 @ 04:56) (132/67 - 143/93)  RR: 16 (20 @ 04:56) (16 - 16)  SpO2: 99% (20 @ 04:56) (99% - 100%)  Wt(kg): --  ,   I&O's Summary    20 Dec 2020 07:01  -  21 Dec 2020 07:00  --------------------------------------------------------  IN: 400 mL / OUT: 0 mL / NET: 400 mL             *****PHYSICAL EXAM: eyes open    counted fingers with each eye accurately   cooperative with exam today   denied visual hallucinations  eomi   moving all 4 ext antigravity                 *****LAB AND IMAGIN.2   6.40  )-----------( 107      ( 21 Dec 2020 07:45 )             37.8               12-    136  |  98  |  16  ----------------------------<  157<H>  4.3   |  27  |  0.55    Ca    9.5      21 Dec 2020 07:45  Phos  2.9     12-  Mg     1.9     12-21             m< from: MR Head No Cont (20 @ 09:48) >  Small acute/subacute infarcts in the left posterior frontal lobe.    Predominantly chronic bilateralsubdural hematomas unchanged from CT dated 2020, new from MRI dated 2020, with small subacute subdural components as described above.    These findings were discussed with Kirstin BUTTS at 2020 10:07 AM by Dr. Lopez of Radiology with read back confirmation.      < end of copied text >                [All pertinent recent Imaging/Reports reviewed]           *****A S S E S S M E N T   A N D   P L A N :      80 year old left handed  male with PMH of DM, HTN, HLD, chronic conjunctivitis s/p corneal surg 2020 at [facility] on steroid taper, p/w worsening agitation. As per son pt is AAOX2 and walks with a waker at baseline.  Since discharge from [facility] pt has been refusing to get out of the bed, eating less, not taking meds. He has also becomes more confused, not recognizing family and screaming at times. Son also reports unintentional weight loss of pt, ~60lb in the past year.  No fever, cough, sob, N/V/D or pain issues.     Problem/Recommendations 1:    ams improved   likely multifactorial metabolic encephalopathy  related to ? pain from recent surgery/vision changes, steroid psychosis, poor po intake, sleep wake  adjustment   disorder       sleep wake cycle regulation agree with melatonin   thiamine 500 iv x daily 3 days then po 100 daily   mvi daily   depakote 125 bid for agitation while on steroids   ct head microvascular disease   mri with severe microvascular disease + subacute infarcts in the left post frontal area.   pt with weight loss, refusing to eat, severe malnutrition.     Problem/Recommendations 2:  hyponatremia volume contraction vs. pain related siadh ?   consider urine lytes, urine osm serum osm       Problem/Recommendations 3: cva small subacute lacunar infarct  without any overt symptoms that is attributable to this stroke    pt with severe chronic microvascular disease   subacute infarct with intracranial stenosis but somewhat limited due to artifact   subacute componenets in subdural hematomas, therefore will hold off on adding 2nd antiplatelet r x  asa 81, atorvastatin for secondary prophyolaxis   will reimage head ct in 4 weeks to see if there is any progression of sdh   discussed with wife, kael in detail the findings of the mri, she is not interested in any aggressive measures, consider palliative care for goc   repeat speach and swallow eval as pt may be more cooperative with testing.   d/w JEANNETTE Gustafson at bedside.       Thank you for allowing me to participate in the care of this patient. Please do not hesitate to call me if you have any  questions.        ________________  Yelena Alatorre MD  [facility] ([facility]  [phone]      33 minutes spent on total encounter; more than 50 % of the visit was  spent counseling about plan of care, compliance to diet/exercise and medication regimen and or  coordinating care by the attending physician.      It is advised that stroke patients follow up with NP Yael Keller @ [phone] in 1- 2 weeks.   Others please follow up with Dr. Michael Nissenbaum [phone]

## 2020-12-21 NOTE — CONSULT NOTE ADULT - SUBJECTIVE AND OBJECTIVE BOX
HPI:  80 year old male with PMH of DM, HTN, HLD, chronic conjunctivitis s/p corneal surg 11/2020 at [facility] on steroid taper admitted for a L posterior-frontal lobe CVA on top of chronic SDH. Palliative Medicine was consulted for complex medical decision making related to goals of care discussions    =======================================================  12/21    - Chart reviewed. Patient seen and examined. Discussed w/ primary team.  - The patient was able to speak to me a little. He asked for milk and cereal. I explained it's not safe for him to have food by mouth at the moment. He denied any other symptoms.   - I spoke to the patient's wife, Maia @ [phone] (she had her DIL Hazel with her). Pls see [facility] for more info:    1) Talked about failed Sp/Sw which they were not aware of  2) Considering PEG vs pleasure feeds  3) Interested in rehab after  4) Not interested in hospice    =======================================================  ----- SYMPTOM ASSESSMENT:   [ ]Unable to obtain due to poor mentation: information obtained from team/ contact    PAIN ASSESSMENT: DENIED  Site-   Onset-   Character-   Radiation-   Associated symptoms-   Timing and duration-   Exacerbating factors  Severity-     Effect on QOL-   PAIN AD Score: 0    Dyspnea:  Yes [ ] No [ ] - [ ]Mild [ ]Moderate [ ]Severe  Anxiety:    Yes [ ] No [ ] - [ ]Mild [ ]Moderate [ ]Severe  Fatigue:    Yes [ ] No [ ] - [ ]Mild [ ]Moderate [ ]Severe  Nausea:    Yes [ ] No [ ] - [ ]Mild [ ]Moderate [ ]Severe                         Loss of appetite: Yes [ ] No [ ] - [ ]Mild [ ]Moderate [ ]Severe             Constipation:  Yes [ ] No [ ] - [ ]Mild [ ]Moderate [ ]Severe  Grief: Yes [ ] No [ ]     [X ]All other review of systems negative, including: weakness, cough, colds, blurry vision, headaches, dysuria, pruritus, palpitations, muscle cramps, easy bruising, epistaxis, rashes    =======================================================  ----- PERTINENT PMH/ SXH/ FHX  BPH (benign prostatic hyperplasia)    Hyperlipidemia    HTN (hypertension)    DM (diabetes mellitus)    No pertinent past medical history      No significant past surgical history      FAMILY HISTORY:  No pertinent family history in first degree relatives    ----- SOCIAL HISTORY:   [ ] Unable to elicit  Significant other/partner: YES  Children: YES (2 SONS)  Nondenominational/Spirituality:  Substance hx: Yes[ ]  No [ ]   Tobacco hx:  Yes [ ] No [ ]   Alcohol hx: Yes [ ] No [ ]   Home Opioid hx:  [ ] I-Stop Reference No:  Living Situation: [ X]Home  [ ]Long term care  [ ]Rehab [ ]Other  PATIENT IS  WITH 2 SONS  WIFE JUST RETIRED RECENTLY    ----- ADVANCE DIRECTIVES:    DNR  MOLST  [ ]  Living Will  [ ]   DECISION MAKER(s):  [ ] Health Care Proxy(s)  [ ] Surrogate(s)  [ ] Guardian           Name(s): Phone Number(s):  Maia @ [phone]       ----- BASELINE (I)ADL(s) (prior to admission):  [place]: [ ]Total  [ ] Moderate [ ]Dependent  Palliative Performance Status Version 2: 30%    =======================================================  -----MEDICATIONS AND ALLERGIES:  Allergies    No Known Allergies    Intolerances    MEDICATIONS  (STANDING):  ascorbic acid 1000 milliGRAM(s) Oral daily  aspirin  chewable 81 milliGRAM(s) Oral daily  atorvastatin 10 milliGRAM(s) Oral at bedtime  cyanocobalamin 1000 MICROGram(s) Oral daily  dextrose 40% Gel 15 Gram(s) Oral once  dextrose 5%. 1000 milliLiter(s) (50 mL/Hr) IV Continuous <Continuous>  dextrose 5%. 1000 milliLiter(s) (100 mL/Hr) IV Continuous <Continuous>  dextrose 50% Injectable 25 Gram(s) IV Push once  dextrose 50% Injectable 12.5 Gram(s) IV Push once  dextrose 50% Injectable 25 Gram(s) IV Push once  diVALproex Sprinkle 125 milliGRAM(s) Oral two times a day  doxazosin 1 milliGRAM(s) Oral at bedtime  doxycycline IVPB 100 milliGRAM(s) IV Intermittent every 12 hours  enalapril 10 milliGRAM(s) Oral daily  enoxaparin Injectable 40 milliGRAM(s) SubCutaneous daily  glucagon  Injectable 1 milliGRAM(s) IntraMuscular once  insulin lispro (ADMELOG) corrective regimen sliding scale   SubCutaneous every 6 hours  melatonin 3 milliGRAM(s) Oral at bedtime  moxifloxacin 0.5% Solution 1 Drop(s) Both EYES <User Schedule>  multivitamin 1 Tablet(s) Oral daily  potassium phosphate / sodium phosphate Powder (PHOS-NaK) 1 Packet(s) Oral three times a day  predniSONE   Tablet 25 milliGRAM(s) Oral daily    MEDICATIONS  (PRN):  bisacodyl Suppository 10 milliGRAM(s) Rectal daily PRN Constipation  polyethylene glycol 3350 17 Gram(s) Oral daily PRN Constipation  senna 2 Tablet(s) Oral at bedtime PRN Constipation      =======================================================  ----- PHYSICAL EXAM:  Vital Signs Last 24 Hrs  T(C): 36.6 (21 Dec 2020 04:56), Max: 36.6 (21 Dec 2020 04:56)  T(F): 97.8 (21 Dec 2020 04:56), Max: 97.8 (21 Dec 2020 04:56)  HR: 74 (21 Dec 2020 04:56) (70 - 74)  BP: 132/67 (21 Dec 2020 04:56) (132/67 - 135/70)  BP(mean): --  RR: 16 (21 Dec 2020 04:56) (16 - 16)  SpO2: 99% (21 Dec 2020 04:56) (99% - 100%) I&O's Summary    20 Dec 2020 07:01  -  21 Dec 2020 07:00  --------------------------------------------------------  IN: 400 mL / OUT: 0 mL / NET: 400 mL    GEN: Awake, LETHARGIC, NAD  HEAD: Normocephalic and atraumatic, NGT  EYES: Anicteric sclerae, EOM's grossly intact  NECK: No JVD, no thyromegaly  PULM: Comfortable work of breathing, clear BS  CV: Pulses 2+ symmetric bilaterally, regular rate and rhythm  ABD: Not distended, soft, non-tender,   EXT: No edema, No deformities  PSYCH: Appropriate mood and affect, no suicidal ideations elicited  NEURO: No facial asymmetry, no tremors, no observed movement disorders  SKIN: No rashes or lesions on exposed skin, No jaundice    =======================================================  ----- LABS:                        12.2   6.40  )-----------( 107      ( 21 Dec 2020 07:45 )             37.8   12-21    136  |  98  |  16  ----------------------------<  157<H>  4.3   |  27  |  0.55    Ca    9.5      21 Dec 2020 07:45  Phos  2.9     12-21  Mg     1.9     12-21          -----RADIOLOGY & ADDITIONAL STUDIES:    PROTEIN CALORIE MALNUTRITION PRESENT: [ ] Yes [ ] No  [ ] PPSV2 < or = to 30% [ ] significant weight loss  [ ] poor nutritional intake [ ] catabolic state [ ] anasarca     Albumin, Serum: 4.0 g/dL (12-15-20 @ 07:38)      REFERRALS:   [ ]Chaplaincy  [ ] Hospice  [ ]Child Life  [ ]Social Work  [ ]Case management [ ]Holistic Therapy   Goals of Care Discussion Document:     ************************************************************************  PALLIATIVE MEDICINE COORDINATION OF CARE DOCUMENTATION  [x] Inpatient Consult  [ ] Other:  ************************************************************************  MEDICATION REVIEW:  --- Pls refer to current medicatons in the body of this note  ISTOP REFERENCE: [id number]  --- PRN usage: NO PRN  ------------------------------------------------------------------------  COORDINATION OF CARE:  --- Palliative Care consulted for: [facility]  --- Patient assessed: 12/21  --- Patient previously seen by Palliative Care service: NO  ADVANCE CARE PLANNING  --- Code status: FULL  --- MOLST reviewed in chart: NONE  --- HCP/ Surrogate: MAIA MENDES  --- GOC document found in Alpha: NONE  --- HCP/ Living will/ Other advanced directives in Alpha: NONE  ------------------------------------------------------------------------  CARE PROVIDER DOCUMENTATION:  --- RENETTA/CALIN notes: Faith Maia mayra re: ELIANE  --- NEURO: Multifactorial encephalopathy/ Depakote for agitation  --- OPHTO: Moxiflox to eyes/ continue prednisone  --- NGT inserted 12/17  --- Sp/Sw recs: NPO  PLAN OF CARE  --- Known admissions in past year: 2  --- Current admit date: 12/13  --- LOS: 8  --- LACE score: 14  --- Current dispo plan: TO BE DETERMINED  ------------------------------------------------------------------------  --- Chart reviewed: 30 Minutes [including time used to gather, review and transfer data to this note]  --- Start: 12:40  --- End: 1:10  Prolonged services rendered, as part of this patient's care provided by Palliative Medicine, include: i.chart review for provider and ancillary service documentation, ii.pertinent diagnostics including laboratory and imaging studies,iii. medication review including PRN use, iv. admission history including previous palliative care encounters and GOC notes, v.advance care planning documents including HCP and MOLST forms in Alpha. Part of Palliative Medicine extended evaluation and management also involves coordination of care with our IDT, the primary and consulting anabela, and unit CM/SW and Hospice if eligible. Recommendations based on the information gathered and discussed are outline in the AP of our notes.    ************************************************************************

## 2020-12-22 LAB
ALBUMIN SERPL ELPH-MCNC: 3.3 G/DL — SIGNIFICANT CHANGE UP (ref 3.3–5)
ALP SERPL-CCNC: 81 U/L — SIGNIFICANT CHANGE UP (ref 40–120)
ALT FLD-CCNC: 15 U/L — SIGNIFICANT CHANGE UP (ref 4–41)
ANION GAP SERPL CALC-SCNC: 9 MMOL/L — SIGNIFICANT CHANGE UP (ref 7–14)
AST SERPL-CCNC: 15 U/L — SIGNIFICANT CHANGE UP (ref 4–40)
BILIRUB SERPL-MCNC: 0.3 MG/DL — SIGNIFICANT CHANGE UP (ref 0.2–1.2)
BUN SERPL-MCNC: 17 MG/DL — SIGNIFICANT CHANGE UP (ref 7–23)
CALCIUM SERPL-MCNC: 9.2 MG/DL — SIGNIFICANT CHANGE UP (ref 8.4–10.5)
CHLORIDE SERPL-SCNC: 95 MMOL/L — LOW (ref 98–107)
CO2 SERPL-SCNC: 28 MMOL/L — SIGNIFICANT CHANGE UP (ref 22–31)
CREAT SERPL-MCNC: 0.58 MG/DL — SIGNIFICANT CHANGE UP (ref 0.5–1.3)
GLUCOSE SERPL-MCNC: 208 MG/DL — HIGH (ref 70–99)
HCT VFR BLD CALC: 35.3 % — LOW (ref 39–50)
HGB BLD-MCNC: 11.6 G/DL — LOW (ref 13–17)
MAGNESIUM SERPL-MCNC: 1.8 MG/DL — SIGNIFICANT CHANGE UP (ref 1.6–2.6)
MCHC RBC-ENTMCNC: 29 PG — SIGNIFICANT CHANGE UP (ref 27–34)
MCHC RBC-ENTMCNC: 32.9 GM/DL — SIGNIFICANT CHANGE UP (ref 32–36)
MCV RBC AUTO: 88.3 FL — SIGNIFICANT CHANGE UP (ref 80–100)
NRBC # BLD: 0 /100 WBCS — SIGNIFICANT CHANGE UP
NRBC # FLD: 0 K/UL — SIGNIFICANT CHANGE UP
PHOSPHATE SERPL-MCNC: 3 MG/DL — SIGNIFICANT CHANGE UP (ref 2.5–4.5)
PLATELET # BLD AUTO: 121 K/UL — LOW (ref 150–400)
POTASSIUM SERPL-MCNC: 4.3 MMOL/L — SIGNIFICANT CHANGE UP (ref 3.5–5.3)
POTASSIUM SERPL-SCNC: 4.3 MMOL/L — SIGNIFICANT CHANGE UP (ref 3.5–5.3)
PROT SERPL-MCNC: 5.5 G/DL — LOW (ref 6–8.3)
RBC # BLD: 4 M/UL — LOW (ref 4.2–5.8)
RBC # FLD: 13.1 % — SIGNIFICANT CHANGE UP (ref 10.3–14.5)
SODIUM SERPL-SCNC: 132 MMOL/L — LOW (ref 135–145)
WBC # BLD: 5.19 K/UL — SIGNIFICANT CHANGE UP (ref 3.8–10.5)
WBC # FLD AUTO: 5.19 K/UL — SIGNIFICANT CHANGE UP (ref 3.8–10.5)

## 2020-12-22 PROCEDURE — 99498 ADVNCD CARE PLAN ADDL 30 MIN: CPT | Mod: 25

## 2020-12-22 PROCEDURE — 99497 ADVNCD CARE PLAN 30 MIN: CPT | Mod: 25

## 2020-12-22 PROCEDURE — 99233 SBSQ HOSP IP/OBS HIGH 50: CPT

## 2020-12-22 RX ADMIN — ATORVASTATIN CALCIUM 10 MILLIGRAM(S): 80 TABLET, FILM COATED ORAL at 22:16

## 2020-12-22 RX ADMIN — Medication 2: at 05:20

## 2020-12-22 RX ADMIN — Medication 1 TABLET(S): at 13:06

## 2020-12-22 RX ADMIN — DIVALPROEX SODIUM 125 MILLIGRAM(S): 500 TABLET, DELAYED RELEASE ORAL at 18:12

## 2020-12-22 RX ADMIN — Medication 110 MILLIGRAM(S): at 22:16

## 2020-12-22 RX ADMIN — Medication 1: at 23:49

## 2020-12-22 RX ADMIN — Medication 110 MILLIGRAM(S): at 09:15

## 2020-12-22 RX ADMIN — Medication 81 MILLIGRAM(S): at 13:06

## 2020-12-22 RX ADMIN — Medication 3: at 13:05

## 2020-12-22 RX ADMIN — PREGABALIN 1000 MICROGRAM(S): 225 CAPSULE ORAL at 13:06

## 2020-12-22 RX ADMIN — Medication 1: at 18:12

## 2020-12-22 RX ADMIN — Medication 20 MILLIGRAM(S): at 05:20

## 2020-12-22 RX ADMIN — Medication 1 DROP(S): at 05:20

## 2020-12-22 RX ADMIN — ENOXAPARIN SODIUM 40 MILLIGRAM(S): 100 INJECTION SUBCUTANEOUS at 13:06

## 2020-12-22 RX ADMIN — Medication 1 DROP(S): at 23:50

## 2020-12-22 RX ADMIN — Medication 1 MILLIGRAM(S): at 22:16

## 2020-12-22 RX ADMIN — Medication 3 MILLIGRAM(S): at 22:16

## 2020-12-22 RX ADMIN — Medication 1 DROP(S): at 13:07

## 2020-12-22 RX ADMIN — Medication 1000 MILLIGRAM(S): at 13:06

## 2020-12-22 RX ADMIN — DIVALPROEX SODIUM 125 MILLIGRAM(S): 500 TABLET, DELAYED RELEASE ORAL at 05:20

## 2020-12-22 RX ADMIN — Medication 10 MILLIGRAM(S): at 05:20

## 2020-12-22 NOTE — SWALLOW BEDSIDE ASSESSMENT ADULT - ADDITIONAL RECOMMENDATIONS
Oral care to prevent the colonization of oral cavity bacteria
Oral care to reduce colonization of bacteria in oral cavity
Oral care to prevent the colonization of oral cavity bacteria  Please reconsult this service as patient is medically optimized if indicated, otherwise this service will attempt to follow up as schedule permits

## 2020-12-22 NOTE — PROGRESS NOTE ADULT - SUBJECTIVE AND OBJECTIVE BOX
No fevers overnight  NO significant TF residuals so far  Saturating well on RA    Vital Signs Last 24 Hrs  T(C): 36.4 (22 Dec 2020 05:09), Max: 36.6 (21 Dec 2020 21:05)  T(F): 97.6 (22 Dec 2020 05:09), Max: 97.8 (21 Dec 2020 21:05)  HR: 75 (22 Dec 2020 05:09) (75 - 85)  BP: 122/77 (22 Dec 2020 05:09) (122/77 - 160/96)  BP(mean): --  RR: 16 (22 Dec 2020 05:09) (16 - 16)  SpO2: 100% (22 Dec 2020 05:09) (100% - 100%)    I&O's Summary    12-21-20 @ 07:01  -  12-22-20 @ 07:00  --------------------------------------------------------  IN: 400 mL / OUT: 0 mL / NET: 400 mL        PHYSICAL EXAM:  GENERAL: NAD, thin cachetic elderly  HEAD:  Atraumatic, Normocephalic  EYES: right eye patch in place  NECK: Supple, No JVD  CHEST/LUNG: Clear to auscultation bilaterally; No wheeze  HEART: Regular rate and rhythm; No murmurs, rubs, or gallops  ABDOMEN: Soft, Nontender, Nondistended; Bowel sounds present  Neuro: AAOx1-2, slow words, no focal deficit   EXTREMITIES:  2+ Peripheral Pulses, No clubbing, cyanosis, or edema  SKIN: No rashes or lesions     LABS:                        11.6   5.19  )-----------( 121      ( 22 Dec 2020 08:29 )             35.3     12-22    132<L>  |  95<L>  |  17  ----------------------------<  208<H>  4.3   |  28  |  0.58    Ca    9.2      22 Dec 2020 08:29  Phos  3.0     12-22  Mg     1.8     12-22    TPro  5.5<L>  /  Alb  3.3  /  TBili  0.3  /  DBili  x   /  AST  15  /  ALT  15  /  AlkPhos  81  12-22      CAPILLARY BLOOD GLUCOSE      POCT Blood Glucose.: 217 mg/dL (22 Dec 2020 05:18)  POCT Blood Glucose.: 156 mg/dL (21 Dec 2020 23:33)  POCT Blood Glucose.: 154 mg/dL (21 Dec 2020 17:55)  POCT Blood Glucose.: 267 mg/dL (21 Dec 2020 12:22)            RADIOLOGY & ADDITIONAL TESTS:    Imaging Personally Reviewed:  [x] YES  [ ] NO    Consultant(s) Notes Reviewed:  [x] YES  [ ] NO

## 2020-12-22 NOTE — PROGRESS NOTE ADULT - ASSESSMENT
80 year old male with PMH of DM, HTN, HLD, chronic conjunctivitis s/p corneal surg 11/2020 at [facility] on steroid taper admitted for a L posterior-frontal lobe CVA on top of chronic SDH. Palliative Medicine was consulted for complex medical decision making related to goals of care discussions

## 2020-12-22 NOTE — PROGRESS NOTE ADULT - SUBJECTIVE AND OBJECTIVE BOX
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 Palliative Medicine was consulted for complex medical decision making related to goals of care discussions    =======================================================  12/22    - Chart reviewed. Patient seen and examined.   - I spoke to the patient's wife, Maia @ [phone] (she had her sons, Winston Mtz and Hudson on the line).     1) We talked about the patients failed Sp/Sw and the worry that the NGT cannot stay in for long  2) Discussed PEG tube vs pleasure feeds IF patient does not regain swallow function  3) Discussed code status  4) As per yesterday, family not interested in hospice  5) I answered all their questions and concerns      =======================================================  ----- SYMPTOM ASSESSMENT:   [ ]Unable to obtain due to poor mentation: information obtained from team/ contact    PAIN ASSESSMENT: DENIED  Site-   Onset-   Character-   Radiation-   Associated symptoms-   Timing and duration-   Exacerbating factors  Severity-     Effect on QOL-   PAIN AD Score: 0    Dyspnea:  Yes [ ] No [ ] - [ ]Mild [ ]Moderate [ ]Severe  Anxiety:    Yes [ ] No [ ] - [ ]Mild [ ]Moderate [ ]Severe  Fatigue:    Yes [ ] No [ ] - [ ]Mild [ ]Moderate [ ]Severe  Nausea:    Yes [ ] No [ ] - [ ]Mild [ ]Moderate [ ]Severe                         Loss of appetite: Yes [ ] No [ ] - [ ]Mild [ ]Moderate [ ]Severe             Constipation:  Yes [ ] No [ ] - [ ]Mild [ ]Moderate [ ]Severe  Grief: Yes [ ] No [ ]     [X ]All other review of systems negative, including: weakness, cough, colds, blurry vision, headaches, dysuria, pruritus, palpitations, muscle cramps, easy bruising, epistaxis, rashes    =======================================================  ----- PERTINENT PMH/ SXH/ FHX  BPH (benign prostatic hyperplasia)    Hyperlipidemia    HTN (hypertension)    DM (diabetes mellitus)    No pertinent past medical history      No significant past surgical history      FAMILY HISTORY:  No pertinent family history in first degree relatives    ----- SOCIAL HISTORY:   [ ] Unable to elicit  Significant other/partner: YES  Children: YES (2 SONS)  Alevism/Spirituality:  Substance hx: Yes[ ]  No [ ]   Tobacco hx:  Yes [ ] No [ ]   Alcohol hx: Yes [ ] No [ ]   Home Opioid hx:  [ ] I-Stop Reference No:  Living Situation: [ X]Home  [ ]Long term care  [ ]Rehab [ ]Other  PATIENT IS  WITH 2 SONS  WIFE JUST RETIRED RECENTLY    ----- ADVANCE DIRECTIVES:    DNR  MOLST  [ ]  Living Will  [ ]   DECISION MAKER(s):  [ ] Health Care Proxy(s)  [ ] Surrogate(s)  [ ] Guardian           Name(s): Phone Number(s):  Maia @ [phone]       ----- BASELINE (I)ADL(s) (prior to admission):  [place]: [ ]Total  [ ] Moderate [ ]Dependent  Palliative Performance Status Version 2: 30%    =======================================================  -----MEDICATIONS AND ALLERGIES:  Allergies    No Known Allergies    Intolerances    MEDICATIONS  (STANDING):  ascorbic acid 1000 milliGRAM(s) Oral daily  aspirin  chewable 81 milliGRAM(s) Oral daily  atorvastatin 10 milliGRAM(s) Oral at bedtime  cyanocobalamin 1000 MICROGram(s) Oral daily  dextrose 40% Gel 15 Gram(s) Oral once  dextrose 5%. 1000 milliLiter(s) (100 mL/Hr) IV Continuous <Continuous>  dextrose 5%. 1000 milliLiter(s) (50 mL/Hr) IV Continuous <Continuous>  dextrose 50% Injectable 25 Gram(s) IV Push once  dextrose 50% Injectable 12.5 Gram(s) IV Push once  dextrose 50% Injectable 25 Gram(s) IV Push once  diVALproex Sprinkle 125 milliGRAM(s) Oral two times a day  doxazosin 1 milliGRAM(s) Oral at bedtime  doxycycline IVPB 100 milliGRAM(s) IV Intermittent every 12 hours  enalapril 10 milliGRAM(s) Oral daily  enoxaparin Injectable 40 milliGRAM(s) SubCutaneous daily  glucagon  Injectable 1 milliGRAM(s) IntraMuscular once  insulin lispro (ADMELOG) corrective regimen sliding scale   SubCutaneous every 6 hours  melatonin 3 milliGRAM(s) Oral at bedtime  moxifloxacin 0.5% Solution 1 Drop(s) Both EYES <User Schedule>  multivitamin 1 Tablet(s) Oral daily  predniSONE   Tablet 20 milliGRAM(s) Oral daily    MEDICATIONS  (PRN):  bisacodyl Suppository 10 milliGRAM(s) Rectal daily PRN Constipation  polyethylene glycol 3350 17 Gram(s) Oral daily PRN Constipation  senna 2 Tablet(s) Oral at bedtime PRN Constipation      =======================================================  ----- PHYSICAL EXAM:  Vital Signs Last 24 Hrs  T(C): 36.7 (22 Dec 2020 13:10), Max: 36.7 (22 Dec 2020 13:10)  T(F): 98 (22 Dec 2020 13:10), Max: 98 (22 Dec 2020 13:10)  HR: 98 (22 Dec 2020 13:10) (75 - 98)  BP: 130/81 (22 Dec 2020 13:10) (122/77 - 131/84)  BP(mean): --  RR: 16 (22 Dec 2020 13:10) (16 - 16)  SpO2: 100% (22 Dec 2020 13:10) (100% - 100%)    GEN: Awake, LETHARGIC, NAD  HEAD: Normocephalic and atraumatic, NGT  EYES: Anicteric sclerae, EOM's grossly intact  NECK: No JVD, no thyromegaly  PULM: Comfortable work of breathing, clear BS  CV: Pulses 2+ symmetric bilaterally, regular rate and rhythm  ABD: Not distended, soft, non-tender,   EXT: No edema, No deformities  PSYCH: Appropriate mood and affect, no suicidal ideations elicited  NEURO: No facial asymmetry, no tremors, no observed movement disorders  SKIN: No rashes or lesions on exposed skin, No jaundice    =======================================================  ----- LABS:    12-22    132<L>  |  95<L>  |  17  ----------------------------<  208<H>  4.3   |  28  |  0.58    Ca    9.2      22 Dec 2020 08:29  Phos  3.0     12-22  Mg     1.8     12-22    TPro  5.5<L>  /  Alb  3.3  /  TBili  0.3  /  DBili  x   /  AST  15  /  ALT  15  /  AlkPhos  81  12-22

## 2020-12-22 NOTE — PROGRESS NOTE ADULT - PROBLEM SELECTOR PLAN 1
.  Multifactorial etiology: CVA, failure to thrive, metabolic    > Not agitated during my visit  > Neuro on board; recommending Depakote  > Pls continue coordinating care with wife, Maia as she is the surrogate decision-maker

## 2020-12-22 NOTE — PROGRESS NOTE ADULT - SUBJECTIVE AND OBJECTIVE BOX
[facility]      Seen earlier today, and examined.  - Today, patient is without complaints.           *****MEDICATIONS: Current medication reviewed and documented.    MEDICATIONS  (STANDING):  ascorbic acid 1000 milliGRAM(s) Oral daily  aspirin  chewable 81 milliGRAM(s) Oral daily  atorvastatin 10 milliGRAM(s) Oral at bedtime  cyanocobalamin 1000 MICROGram(s) Oral daily  dextrose 40% Gel 15 Gram(s) Oral once  dextrose 5%. 1000 milliLiter(s) (100 mL/Hr) IV Continuous <Continuous>  dextrose 5%. 1000 milliLiter(s) (50 mL/Hr) IV Continuous <Continuous>  dextrose 50% Injectable 25 Gram(s) IV Push once  dextrose 50% Injectable 12.5 Gram(s) IV Push once  dextrose 50% Injectable 25 Gram(s) IV Push once  diVALproex Sprinkle 125 milliGRAM(s) Oral two times a day  doxazosin 1 milliGRAM(s) Oral at bedtime  doxycycline IVPB 100 milliGRAM(s) IV Intermittent every 12 hours  enalapril 10 milliGRAM(s) Oral daily  enoxaparin Injectable 40 milliGRAM(s) SubCutaneous daily  glucagon  Injectable 1 milliGRAM(s) IntraMuscular once  insulin lispro (ADMELOG) corrective regimen sliding scale   SubCutaneous every 6 hours  melatonin 3 milliGRAM(s) Oral at bedtime  moxifloxacin 0.5% Solution 1 Drop(s) Both EYES <User Schedule>  multivitamin 1 Tablet(s) Oral daily  predniSONE   Tablet 20 milliGRAM(s) Oral daily    MEDICATIONS  (PRN):  bisacodyl Suppository 10 milliGRAM(s) Rectal daily PRN Constipation  polyethylene glycol 3350 17 Gram(s) Oral daily PRN Constipation  senna 2 Tablet(s) Oral at bedtime PRN Constipation          ***** VITAL SIGNS:  T(F): 98 (20 @ 13:10), Max: 98 (20 @ 13:10)  HR: 98 (20 @ 13:10) (75 - 98)  BP: 130/81 (20 @ 13:10) (122/77 - 130/81)  RR: 16 (20 @ 13:10) (16 - 16)  SpO2: 100% (20 @ 13:10) (100% - 100%)  Wt(kg): --  ,   I&O's Summary    21 Dec 2020 07:01  -  22 Dec 2020 07:00  --------------------------------------------------------  IN: 400 mL / OUT: 0 mL / NET: 400 mL             *****PHYSICAL EXAM:  ALERTS BRIEFLY THEN FALLS ASEEP   Gait not assessed.            *****LAB AND IMAGIN.6   5.19  )-----------( 121      ( 22 Dec 2020 08:29 )             35.3                   132<L>  |  95<L>  |  17  ----------------------------<  208<H>  4.3   |  28  |  0.58    Ca    9.2      22 Dec 2020 08:29  Phos  3.0       Mg     1.8         TPro  5.5<L>  /  Alb  3.3  /  TBili  0.3  /  DBili  x   /  AST  15  /  ALT  15  /  AlkPhos  81                           [All pertinent recent Imaging/Reports reviewed]           *****A S S E S S M E N T   A N D   P L A N :             m< from: MR Head No Cont (20 @ 09:48) >  Small acute/subacute infarcts in the left posterior frontal lobe.    Predominantly chronic bilateralsubdural hematomas unchanged from CT dated 2020, new from MRI dated 2020, with small subacute subdural components as described above.    These findings were discussed with Kirstin BUTTS at 2020 10:07 AM by Dr. Lopez of Radiology with read back confirmation.      < end of copied text >                [All pertinent recent Imaging/Reports reviewed]           *****A S S E S S M E N T   A N D   P L A N :      80 year old left handed  male with PMH of DM, HTN, HLD, chronic conjunctivitis s/p corneal surg 2020 at [facility] on steroid taper, p/w worsening agitation. As per son pt is AAOX2 and walks with a waker at baseline.  Since discharge from [facility] pt has been refusing to get out of the bed, eating less, not taking meds. He has also becomes more confused, not recognizing family and screaming at times. Son also reports unintentional weight loss of pt, ~60lb in the past year.  No fever, cough, sob, N/V/D or pain issues.     Problem/Recommendations 1:    ams improved   likely multifactorial metabolic encephalopathy  related to ? pain from recent surgery/vision changes, steroid psychosis, poor po intake, sleep wake  adjustment   disorder       sleep wake cycle regulation agree with melatonin   thiamine 500 iv x daily 3 days then po 100 daily   mvi daily   depakote 125 bid for agitation while on steroids   ct head microvascular disease   mri with severe microvascular disease + subacute infarcts in the left post frontal area.   pt with weight loss, refusing to eat, severe malnutrition.     Problem/Recommendations 2:  hyponatremia volume contraction vs. pain related siadh ?   consider urine lytes, urine osm serum osm       Problem/Recommendations 3: cva small subacute lacunar infarct  without any overt symptoms that is attributable to this stroke    pt with severe chronic microvascular disease   subacute infarct with intracranial stenosis but somewhat limited due to artifact   subacute componenets in subdural hematomas, therefore will hold off on adding 2nd antiplatelet r x  asa 81, atorvastatin for secondary prophyolaxis   will reimage head ct in 4 weeks to see if there is any progression of sdh   discussed with wife, kael in detail the findings of the mri, she is not interested in any aggressive measures, consider palliative care for goc   repeat speach and swallow eval as pt may be more cooperative with testing.   d/w JEANNETTE Gustafson at bedside.    Thank you for allowing me to participate in the care of this patient. Please do not hesitate to call me if you have any  questions.        ________________  Yelnea Alatorre MD  [facility] ([facility]  [phone]      33 minutes spent on total encounter; more than 50 % of the visit was  spent counseling about plan of care, compliance to diet/exercise and medication regimen and or  coordinating care by the attending physician.      It is advised that stroke patients follow up with NP Yael Keller @ [phone] in 1- 2 weeks.   Others please follow up with Dr. Michael Nissenbaum [phone]

## 2020-12-22 NOTE — PROGRESS NOTE ADULT - PROBLEM SELECTOR PLAN 1
-MRI/MRA head 12/18/20 revealed small acute/subacute infarcts in the left posterior frontal lobe along w/predominantly chronic bilateral subdural hematomas unchanged from CT dated 12/13/2020, new from MRI dated 6/23/2020, with small subacute subdural components  -cont ASA/statin; holding off on adding plavix  -appreciate palliative input; family deciding on PEG vs pleasure feeds; not interested in hospice  -will have speech/swallow reevaluate

## 2020-12-22 NOTE — SWALLOW BEDSIDE ASSESSMENT ADULT - PHARYNGEAL PHASE
Delayed pharyngeal swallow/Delayed cough post oral intake/Multiple swallows Delayed pharyngeal swallow/Cough post oral intake/Multiple swallows

## 2020-12-22 NOTE — SWALLOW BEDSIDE ASSESSMENT ADULT - SWALLOW EVAL: DIAGNOSIS
Patient consumed trials of puree and honey thick liquids presenting with severe oropharyngeal dysphagia. Oral phase characterized by adequate acceptance, adequate anterior bolus containment, slow bolus manipulation, repetitive lingual movement, anterior to posterior transport and no oral cavity residue post swallow. Pharyngeal phase characterized by suspect delayed initiation of the pharyngeal swallow and hyolaryngeal elevation and excursion noted upon palpation. Coughing following trials of honey thick liquids indicative of laryngeal penetration/aspiration. No clinically overt signs or symptoms of aspiration across trials of puree.
Patient presents with Oropharyngeal Dysphagia marked by adequate stripping of bolus from utensil, adequate oral containment, reduced bolus manipulation, reduced anterior to posterior transport and suspected posterior loss. There was laryngeal elevation upon digital palpation with suspected delay in initiation of pharyngeal swallow. There was evidence of multiple swallows (x4) per trial suggestive of reduced pharyngeal clearance. There was evidence of throat clear and cough response subsequent puree consistency and honey thick liquids indicative of laryngeal penetration/aspiration.
SLP provided trials of puree and honey thick liquids presenting with severe oropharyngeal dysphagia. Oral phase characterized by adequate acceptance, adequate anterior bolus containment, slow bolus manipulation, suspect premature spillage, adequate oral cavity clearance. Pharyngeal phase characterized by suspect delayed initiation of the pharyngeal swallow and hyolaryngeal elevation and excursion noted upon palpation.  Approximately 4-5 swallows in response to each trial, suggestive of pharyngeal residue versus respiration/swallow incoordination.  Immediate cough response with trials of puree and delayed cough response following trials of honey thick liquids indicative of laryngeal penetration/aspiration.

## 2020-12-23 DIAGNOSIS — E87.1 HYPO-OSMOLALITY AND HYPONATREMIA: ICD-10-CM

## 2020-12-23 LAB
ANION GAP SERPL CALC-SCNC: 8 MMOL/L — SIGNIFICANT CHANGE UP (ref 7–14)
BUN SERPL-MCNC: 17 MG/DL — SIGNIFICANT CHANGE UP (ref 7–23)
CALCIUM SERPL-MCNC: 9 MG/DL — SIGNIFICANT CHANGE UP (ref 8.4–10.5)
CHLORIDE SERPL-SCNC: 95 MMOL/L — LOW (ref 98–107)
CO2 SERPL-SCNC: 27 MMOL/L — SIGNIFICANT CHANGE UP (ref 22–31)
CREAT SERPL-MCNC: 0.53 MG/DL — SIGNIFICANT CHANGE UP (ref 0.5–1.3)
GLUCOSE SERPL-MCNC: 197 MG/DL — HIGH (ref 70–99)
HCT VFR BLD CALC: 34.8 % — LOW (ref 39–50)
HGB BLD-MCNC: 11.3 G/DL — LOW (ref 13–17)
MAGNESIUM SERPL-MCNC: 1.9 MG/DL — SIGNIFICANT CHANGE UP (ref 1.6–2.6)
MCHC RBC-ENTMCNC: 28.6 PG — SIGNIFICANT CHANGE UP (ref 27–34)
MCHC RBC-ENTMCNC: 32.5 GM/DL — SIGNIFICANT CHANGE UP (ref 32–36)
MCV RBC AUTO: 88.1 FL — SIGNIFICANT CHANGE UP (ref 80–100)
NRBC # BLD: 0 /100 WBCS — SIGNIFICANT CHANGE UP
NRBC # FLD: 0 K/UL — SIGNIFICANT CHANGE UP
PHOSPHATE SERPL-MCNC: 3 MG/DL — SIGNIFICANT CHANGE UP (ref 2.5–4.5)
PLATELET # BLD AUTO: 116 K/UL — LOW (ref 150–400)
POTASSIUM SERPL-MCNC: 4.5 MMOL/L — SIGNIFICANT CHANGE UP (ref 3.5–5.3)
POTASSIUM SERPL-SCNC: 4.5 MMOL/L — SIGNIFICANT CHANGE UP (ref 3.5–5.3)
RBC # BLD: 3.95 M/UL — LOW (ref 4.2–5.8)
RBC # FLD: 13.1 % — SIGNIFICANT CHANGE UP (ref 10.3–14.5)
SODIUM SERPL-SCNC: 130 MMOL/L — LOW (ref 135–145)
WBC # BLD: 4.34 K/UL — SIGNIFICANT CHANGE UP (ref 3.8–10.5)
WBC # FLD AUTO: 4.34 K/UL — SIGNIFICANT CHANGE UP (ref 3.8–10.5)

## 2020-12-23 PROCEDURE — 99233 SBSQ HOSP IP/OBS HIGH 50: CPT

## 2020-12-23 PROCEDURE — 93010 ELECTROCARDIOGRAM REPORT: CPT

## 2020-12-23 PROCEDURE — 99497 ADVNCD CARE PLAN 30 MIN: CPT | Mod: 25

## 2020-12-23 RX ORDER — THIAMINE MONONITRATE (VIT B1) 100 MG
100 TABLET ORAL DAILY
Refills: 0 | Status: DISCONTINUED | OUTPATIENT
Start: 2020-12-23 | End: 2021-01-02

## 2020-12-23 RX ORDER — HALOPERIDOL DECANOATE 100 MG/ML
1 INJECTION INTRAMUSCULAR ONCE
Refills: 0 | Status: COMPLETED | OUTPATIENT
Start: 2020-12-23 | End: 2020-12-23

## 2020-12-23 RX ORDER — SODIUM CHLORIDE 9 MG/ML
1000 INJECTION INTRAMUSCULAR; INTRAVENOUS; SUBCUTANEOUS
Refills: 0 | Status: DISCONTINUED | OUTPATIENT
Start: 2020-12-23 | End: 2020-12-28

## 2020-12-23 RX ADMIN — Medication 1 MILLIGRAM(S): at 22:10

## 2020-12-23 RX ADMIN — Medication 1 DROP(S): at 23:08

## 2020-12-23 RX ADMIN — DIVALPROEX SODIUM 125 MILLIGRAM(S): 500 TABLET, DELAYED RELEASE ORAL at 18:29

## 2020-12-23 RX ADMIN — DIVALPROEX SODIUM 125 MILLIGRAM(S): 500 TABLET, DELAYED RELEASE ORAL at 05:05

## 2020-12-23 RX ADMIN — PREGABALIN 1000 MICROGRAM(S): 225 CAPSULE ORAL at 13:12

## 2020-12-23 RX ADMIN — Medication 1 DROP(S): at 12:01

## 2020-12-23 RX ADMIN — Medication 81 MILLIGRAM(S): at 13:12

## 2020-12-23 RX ADMIN — Medication 1000 MILLIGRAM(S): at 13:12

## 2020-12-23 RX ADMIN — Medication 1: at 05:05

## 2020-12-23 RX ADMIN — ATORVASTATIN CALCIUM 10 MILLIGRAM(S): 80 TABLET, FILM COATED ORAL at 22:09

## 2020-12-23 RX ADMIN — ENOXAPARIN SODIUM 40 MILLIGRAM(S): 100 INJECTION SUBCUTANEOUS at 13:12

## 2020-12-23 RX ADMIN — Medication 10 MILLIGRAM(S): at 05:05

## 2020-12-23 RX ADMIN — Medication 100 MILLIGRAM(S): at 13:18

## 2020-12-23 RX ADMIN — Medication 3 MILLIGRAM(S): at 22:09

## 2020-12-23 RX ADMIN — Medication 1 DROP(S): at 18:29

## 2020-12-23 RX ADMIN — HALOPERIDOL DECANOATE 1 MILLIGRAM(S): 100 INJECTION INTRAMUSCULAR at 19:50

## 2020-12-23 RX ADMIN — Medication 1 DROP(S): at 05:06

## 2020-12-23 RX ADMIN — Medication 2: at 23:07

## 2020-12-23 RX ADMIN — Medication 1 TABLET(S): at 13:12

## 2020-12-23 RX ADMIN — Medication 2: at 13:09

## 2020-12-23 RX ADMIN — Medication 20 MILLIGRAM(S): at 05:05

## 2020-12-23 RX ADMIN — Medication 110 MILLIGRAM(S): at 22:09

## 2020-12-23 RX ADMIN — Medication 110 MILLIGRAM(S): at 10:16

## 2020-12-23 NOTE — PROGRESS NOTE ADULT - PROBLEM SELECTOR PLAN 1
-MRI/MRA head 12/18/20 revealed small acute/subacute infarcts in the left posterior frontal lobe along w/predominantly chronic bilateral subdural hematomas unchanged from CT dated 12/13/2020, new from MRI dated 6/23/2020, with small subacute subdural components  -cont ASA/statin; holding off on adding plavix  -appreciate palliative input; family deciding on PEG vs pleasure feeds; not interested in hospice  -will have speech/swallow reevaluate  - will follow up tomorrow in regards to PEG

## 2020-12-23 NOTE — PROGRESS NOTE ADULT - SUBJECTIVE AND OBJECTIVE BOX
HPI:  80 year old male with PMH of DM, HTN, HLD, chronic conjunctivitis s/p corneal surg 11/2020 at [facility] on steroid taper admitted for a L posterior-frontal lobe CVA on top of chronic SDH. Palliative Medicine was consulted for complex medical decision making related to goals of care discussions    =======================================================  12/23    - Chart reviewed. Patient seen and examined.   - The patient was able to answer simple questions. No pain or distress.   - I spoke to the patient's wife, Maia @ [phone] (she had Winston Mtz on the line).     1) Followed-up on our conversation the previous days  2) They told me the patient already had cognitive, motor and behavioral problems weeks prior to admission  3) No formal dx of dementia although I read the MRI findings of volume loss and MV ischemic changes to them  4) They asked questions about prognosis and "next steps"  5) I broached hospice again with them w/ pleasure feeds which would be an option for the patient anytime moving forward  6) I shared repeat Sp/Sw findings  7) I answered the rest of their q's and c's  8) They need more time to deliberate    =======================================================  ----- SYMPTOM ASSESSMENT:   [ ]Unable to obtain due to poor mentation: information obtained from team/ contact    PAIN ASSESSMENT: DENIED  Site-   Onset-   Character-   Radiation-   Associated symptoms-   Timing and duration-   Exacerbating factors  Severity-     Effect on QOL-   PAIN AD Score: 0    Dyspnea:  Yes [ ] No [ ] - [ ]Mild [ ]Moderate [ ]Severe  Anxiety:    Yes [ ] No [ ] - [ ]Mild [ ]Moderate [ ]Severe  Fatigue:    Yes [ ] No [ ] - [ ]Mild [ ]Moderate [ ]Severe  Nausea:    Yes [ ] No [ ] - [ ]Mild [ ]Moderate [ ]Severe                         Loss of appetite: Yes [ ] No [ ] - [ ]Mild [ ]Moderate [ ]Severe             Constipation:  Yes [ ] No [ ] - [ ]Mild [ ]Moderate [ ]Severe  Grief: Yes [ ] No [ ]     [X ]All other review of systems negative, including: weakness, cough, colds, blurry vision, headaches, dysuria, pruritus, palpitations, muscle cramps, easy bruising, epistaxis, rashes    =======================================================  ----- PERTINENT PMH/ SXH/ FHX  BPH (benign prostatic hyperplasia)    Hyperlipidemia    HTN (hypertension)    DM (diabetes mellitus)    No pertinent past medical history      No significant past surgical history      FAMILY HISTORY:  No pertinent family history in first degree relatives    ----- SOCIAL HISTORY:   [ ] Unable to elicit  Significant other/partner: YES  Children: YES (2 SONS)  Jewish/Spirituality:  Substance hx: Yes[ ]  No [ ]   Tobacco hx:  Yes [ ] No [ ]   Alcohol hx: Yes [ ] No [ ]   Home Opioid hx:  [ ] I-Stop Reference No:  Living Situation: [ X]Home  [ ]Long term care  [ ]Rehab [ ]Other  PATIENT IS  WITH 2 SONS  WIFE JUST RETIRED RECENTLY    ----- ADVANCE DIRECTIVES:    DNR  MOLST  [ ]  Living Will  [ ]   DECISION MAKER(s):  [ ] Health Care Proxy(s)  [ ] Surrogate(s)  [ ] Guardian           Name(s): Phone Number(s):  Maia @ [phone]       ----- BASELINE (I)ADL(s) (prior to admission):  [place]: [ ]Total  [ ] Moderate [ ]Dependent  Palliative Performance Status Version 2: 30%    =======================================================  -----MEDICATIONS AND ALLERGIES:  Allergies    No Known Allergies    Intolerances    MEDICATIONS  (STANDING):  ascorbic acid 1000 milliGRAM(s) Oral daily  aspirin  chewable 81 milliGRAM(s) Oral daily  atorvastatin 10 milliGRAM(s) Oral at bedtime  cyanocobalamin 1000 MICROGram(s) Oral daily  dextrose 40% Gel 15 Gram(s) Oral once  dextrose 5%. 1000 milliLiter(s) (50 mL/Hr) IV Continuous <Continuous>  dextrose 5%. 1000 milliLiter(s) (100 mL/Hr) IV Continuous <Continuous>  dextrose 50% Injectable 25 Gram(s) IV Push once  dextrose 50% Injectable 12.5 Gram(s) IV Push once  dextrose 50% Injectable 25 Gram(s) IV Push once  diVALproex Sprinkle 125 milliGRAM(s) Oral two times a day  doxazosin 1 milliGRAM(s) Oral at bedtime  doxycycline IVPB 100 milliGRAM(s) IV Intermittent every 12 hours  enalapril 10 milliGRAM(s) Oral daily  enoxaparin Injectable 40 milliGRAM(s) SubCutaneous daily  glucagon  Injectable 1 milliGRAM(s) IntraMuscular once  haloperidol    Injectable 1 milliGRAM(s) IntraMuscular once  insulin lispro (ADMELOG) corrective regimen sliding scale   SubCutaneous every 6 hours  melatonin 3 milliGRAM(s) Oral at bedtime  moxifloxacin 0.5% Solution 1 Drop(s) Both EYES <User Schedule>  multivitamin 1 Tablet(s) Oral daily  predniSONE   Tablet 20 milliGRAM(s) Oral daily  sodium chloride 0.9%. 1000 milliLiter(s) (75 mL/Hr) IV Continuous <Continuous>  thiamine 100 milliGRAM(s) Oral daily    MEDICATIONS  (PRN):  bisacodyl Suppository 10 milliGRAM(s) Rectal daily PRN Constipation  polyethylene glycol 3350 17 Gram(s) Oral daily PRN Constipation  senna 2 Tablet(s) Oral at bedtime PRN Constipation      =======================================================  ----- PHYSICAL EXAM:  Vital Signs Last 24 Hrs  T(C): 36.5 (23 Dec 2020 05:00), Max: 36.6 (22 Dec 2020 22:30)  T(F): 97.7 (23 Dec 2020 05:00), Max: 97.8 (22 Dec 2020 22:30)  HR: 78 (23 Dec 2020 05:00) (72 - 78)  BP: 116/72 (23 Dec 2020 05:00) (116/72 - 128/80)  BP(mean): --  RR: 16 (23 Dec 2020 05:00) (16 - 16)  SpO2: 100% (23 Dec 2020 05:00) (100% - 100%)    GEN: Awake, LETHARGIC, NAD  HEAD: Normocephalic and atraumatic, NGT  EYES: Anicteric sclerae, EOM's grossly intact  NECK: No JVD, no thyromegaly  PULM: Comfortable work of breathing, clear BS  CV: Pulses 2+ symmetric bilaterally, regular rate and rhythm  ABD: Not distended, soft, non-tender,   EXT: No edema, No deformities  PSYCH: UNABLE TO ASSESS  NEURO: No facial asymmetry, no tremors, no observed movement disorders  SKIN: No rashes or lesions on exposed skin, No jaundice    =======================================================  ----- LABS:    12-23    130<L>  |  95<L>  |  17  ----------------------------<  197<H>  4.5   |  27  |  0.53    Ca    9.0      23 Dec 2020 08:04  Phos  3.0     12-23  Mg     1.9     12-23    TPro  5.5<L>  /  Alb  3.3  /  TBili  0.3  /  DBili  x   /  AST  15  /  ALT  15  /  AlkPhos  81  12-22

## 2020-12-23 NOTE — PROGRESS NOTE ADULT - SUBJECTIVE AND OBJECTIVE BOX
SUBJECTIVE / OVERNIGHT EVENTS:  awake alert  forgetful  "I am in the restaurant"  eyes close  answer simple questions  denied pain         --------------------------------------------------------------------------------------------  LABS:                        11.3   4.34  )-----------( 116      ( 23 Dec 2020 08:04 )             34.8     12-23    130<L>  |  95<L>  |  17  ----------------------------<  197<H>  4.5   |  27  |  0.53    Ca    9.0      23 Dec 2020 08:04  Phos  3.0     12-23  Mg     1.9     12-23    TPro  5.5<L>  /  Alb  3.3  /  TBili  0.3  /  DBili  x   /  AST  15  /  ALT  15  /  AlkPhos  81  12-22      CAPILLARY BLOOD GLUCOSE      POCT Blood Glucose.: 103 mg/dL (23 Dec 2020 18:25)  POCT Blood Glucose.: 250 mg/dL (23 Dec 2020 12:13)  POCT Blood Glucose.: 157 mg/dL (23 Dec 2020 04:57)  POCT Blood Glucose.: 155 mg/dL (22 Dec 2020 23:33)  POCT Blood Glucose.: 168 mg/dL (22 Dec 2020 18:50)            RADIOLOGY & ADDITIONAL TESTS:    Imaging Personally Reviewed:  [x] YES  [ ] NO    Consultant(s) Notes Reviewed:  [x] YES  [ ] NO    MEDICATIONS  (STANDING):  ascorbic acid 1000 milliGRAM(s) Oral daily  aspirin  chewable 81 milliGRAM(s) Oral daily  atorvastatin 10 milliGRAM(s) Oral at bedtime  cyanocobalamin 1000 MICROGram(s) Oral daily  dextrose 40% Gel 15 Gram(s) Oral once  dextrose 5%. 1000 milliLiter(s) (50 mL/Hr) IV Continuous <Continuous>  dextrose 5%. 1000 milliLiter(s) (100 mL/Hr) IV Continuous <Continuous>  dextrose 50% Injectable 25 Gram(s) IV Push once  dextrose 50% Injectable 12.5 Gram(s) IV Push once  dextrose 50% Injectable 25 Gram(s) IV Push once  diVALproex Sprinkle 125 milliGRAM(s) Oral two times a day  doxazosin 1 milliGRAM(s) Oral at bedtime  doxycycline IVPB 100 milliGRAM(s) IV Intermittent every 12 hours  enalapril 10 milliGRAM(s) Oral daily  enoxaparin Injectable 40 milliGRAM(s) SubCutaneous daily  glucagon  Injectable 1 milliGRAM(s) IntraMuscular once  haloperidol    Injectable 1 milliGRAM(s) IntraMuscular once  insulin lispro (ADMELOG) corrective regimen sliding scale   SubCutaneous every 6 hours  melatonin 3 milliGRAM(s) Oral at bedtime  moxifloxacin 0.5% Solution 1 Drop(s) Both EYES <User Schedule>  multivitamin 1 Tablet(s) Oral daily  predniSONE   Tablet 20 milliGRAM(s) Oral daily  sodium chloride 0.9%. 1000 milliLiter(s) (75 mL/Hr) IV Continuous <Continuous>  thiamine 100 milliGRAM(s) Oral daily    MEDICATIONS  (PRN):  bisacodyl Suppository 10 milliGRAM(s) Rectal daily PRN Constipation  polyethylene glycol 3350 17 Gram(s) Oral daily PRN Constipation  senna 2 Tablet(s) Oral at bedtime PRN Constipation      Care Discussed with Consultants/Other Providers [x] YES  [ ] NO    Vital Signs Last 24 Hrs  T(C): 36.6 (23 Dec 2020 12:22), Max: 36.6 (22 Dec 2020 22:30)  T(F): 97.9 (23 Dec 2020 12:22), Max: 97.9 (23 Dec 2020 12:22)  HR: 88 (23 Dec 2020 12:22) (72 - 88)  BP: 123/73 (23 Dec 2020 12:22) (116/72 - 128/80)  BP(mean): --  RR: 17 (23 Dec 2020 12:22) (16 - 17)  SpO2: 100% (23 Dec 2020 12:22) (100% - 100%)  I&O's Summary    22 Dec 2020 07:01  -  23 Dec 2020 07:00  --------------------------------------------------------  IN: 400 mL / OUT: 0 mL / NET: 400 mL      PHYSICAL EXAM:  GENERAL: NAD, thin cachetic elderly, NG tube feeding in progress  HEAD:  Atraumatic, Normocephalic  EYES: eyes close shut   NECK: Supple, No JVD  CHEST/LUNG: mild decrease breath sounds bilaterally; No wheeze   HEART: Regular rate and rhythm; No murmurs, rubs, or gallops  ABDOMEN: Soft, Nontender, Nondistended; Bowel sounds present  Neuro: AAOx1-2, slow words, no focal deficit   EXTREMITIES:  2+ Peripheral Pulses, No clubbing, cyanosis, or edema  SKIN: No rashes or lesions

## 2020-12-23 NOTE — PROGRESS NOTE ADULT - SUBJECTIVE AND OBJECTIVE BOX
[facility] DEPARTMENT OF OPHTHALMOLOGY - PROGRESS NOTE  --------------------------------------------------------------------------------------------  Syeda Hurtado MD PGY-3  Pager: [phone]  ----------------------------------------------------------------------------------------------    80 year old male with PMH of DM, HTN, HLD admitted for worsening agitation and confusion at home. The patient follows at [facility] Ophthalmology for progressive thinning of the bilateral corneas, for which he had a patch graft OD during prior admission and required gluing multiple times OS, uncertain cause of corneal melting (prior malignancy work-up was negative).    S: Patient seen and examined at bedside. Patient denies any changes in vision.     Alert and oriented x 0-1 ; appropriate mood and affect    Ophthalmology Exam:  Visual acuity (sc): HM OU  Pupils: PERRL OU, no APD  Ttono: Soft OU  Extraocular movements (EOMs): Full OU, no pain, no diplopia  Confrontational Visual Field (CVF): ED due to MS  Color Plates: ED due to poor vision    Pen Light Exam (PLE)  External: Clear shield in place OU  Lids/Lashes/Lacrimal Ducts: Flat OU Inferior lids noted to be slightly ectropic OU with deep fornices, eyelids crusted shut with significant discharge (which was cleaned gently with gauze and saline)  Sclera/Conjunctiva: 1+ injection OU  Cornea: OD with patch graft in place, well sutured, laura negative, no epithelial defect; OS with corneal glue no longer in place, central perforation present   Anterior Chamber: D+F OU, No hypopyon appreciated OU, formed OU  Iris: poor view but appears Flat OU  Lens: poor view OU      Assessment and Recommendation:   80y male w/ pmhx/ochx of  DM, HTN, HLD, chronic conjunctivitis and 60 lb weight loss with progressive corneal thinning of both eyes, s/p corneal patch graft of the right eye. OD with patch graft in place, without epi defect. OS with central perforation, K glue no longer in place.     # Corneal thinning of both eyes, s/p corneal patch graft of the right eye and corneal gluing of the left eye (most recently 12/21/20)  - c/w moxiflox QID to both eyes  - c/w PO prednisone 20 mg and can continue to taper down by 5 mg every week. Patient with acute mental status change possibly related to steroids; if need to be held for this reason, please let ophthalmology know. Prednisone has helped slow this patient's corneal melting and thinning, but may be contributing to worsening mental status  - given recent weight loss and b/l nature of corneal pathology, malignancy workup was done to evaluate for underlying disease process, unrevealing to date   - c/w vitamin C 1g daily and doxycycline 100mg BID daily as per primary team if no contraindications  - repeat serum vitamin A levels pending  - c/w daily multivitamin to ensure adequate vit A supplementation  - prior conjunctival biopsy with lymphoplasmocytic infiltrate, consistent with chronic inflammatory process  - ophthalmology will monitor closely  - findings discussed with patient and primary team    Patient was discussed with Dr. Warren (cornea specialist)    Outpatient follow-up: Patient should follow-up with his/her ophthalmologist or with [facility] Department of Ophthalmology within 2-3 days of discharge at:    [address]  [place] [address]  [phone]    INCOMPLETE   [facility] DEPARTMENT OF OPHTHALMOLOGY - PROGRESS NOTE  --------------------------------------------------------------------------------------------  Syeda Hurtado MD PGY-3  Pager: [phone]  ----------------------------------------------------------------------------------------------    80 year old male with PMH of DM, HTN, HLD admitted for worsening agitation and confusion at home. The patient follows at [facility] for progressive thinning of the bilateral corneas, for which he had a patch graft OD during prior admission and required gluing multiple times OS, uncertain cause of corneal melting (prior malignancy work-up was negative).    S: Patient seen and examined at bedside. Patient denies any changes in vision.     Alert and oriented x 1 ; appropriate mood and affect    Ophthalmology Exam:  Visual acuity (sc): HM OU  Pupils: PERRL OU, no APD  Ttono: Soft OU  Extraocular movements (EOMs): Full OU, no pain, no diplopia  Confrontational Visual Field (CVF): ED due to MS  Color Plates: ED due to poor vision    Pen Light Exam (PLE)  External: Clear shield in place OU  Lids/Lashes/Lacrimal Ducts: Flat OU Inferior lids noted to be slightly ectropic OU with deep fornices, eyelids crusted shut with significant discharge (which was cleaned gently with gauze and saline)  Sclera/Conjunctiva: 1+ injection OU  Cornea: OD with patch graft in place, well sutured, laura negative, no epithelial defect, trace SPK; OS with corneal glue in place, BCL present   Anterior Chamber: D+F OU, No hypopyon appreciated OU, formed OU  Iris: poor view but appears Flat OU  Lens: poor view OU      Assessment and Recommendation:   80y male w/ pmhx/ochx of  DM, HTN, HLD, chronic conjunctivitis and 60 lb weight loss with progressive corneal thinning of both eyes, s/p corneal patch graft of the right eye. OD with patch graft in place, without epi defect. OS with central perforation, K glue no longer in place.     # Corneal thinning of both eyes, s/p corneal patch graft of the right eye and corneal gluing of the left eye (most recently 12/21/20)  - c/w moxiflox QID to both eyes  - c/w PO prednisone 20 mg and can continue to taper down by 5 mg every week. Patient with acute mental status change possibly related to steroids; if need to be held for this reason, please let ophthalmology know. Prednisone has helped slow this patient's corneal melting and thinning, but may be contributing to worsening mental status  - given recent weight loss and b/l nature of corneal pathology, malignancy workup was done to evaluate for underlying disease process, unrevealing to date   - c/w vitamin C 1g daily and doxycycline 100mg BID daily as per primary team if no contraindications  - repeat serum vitamin A levels pending  - c/w daily multivitamin to ensure adequate vit A supplementation  - prior conjunctival biopsy with lymphoplasmocytic infiltrate, consistent with chronic inflammatory process  - ophthalmology will monitor closely  - findings discussed with patient and primary team    Patient was discussed with Dr. Warren (cornea specialist)    Outpatient follow-up: Patient should follow-up with his/her ophthalmologist or with [facility] Department of Ophthalmology within 2-3 days of discharge at:    [address]  [place] [address]  [phone]

## 2020-12-23 NOTE — PROGRESS NOTE ADULT - ASSESSMENT
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 Palliative Medicine was consulted for complex medical decision making related to goals of care discussions

## 2020-12-23 NOTE — PROGRESS NOTE ADULT - SUBJECTIVE AND OBJECTIVE BOX
[facility]      Seen earlier today, and examined.  - Today, patient is without complaints.           *****MEDICATIONS: Current medication reviewed and documented.    MEDICATIONS  (STANDING):  ascorbic acid 1000 milliGRAM(s) Oral daily  aspirin  chewable 81 milliGRAM(s) Oral daily  atorvastatin 10 milliGRAM(s) Oral at bedtime  cyanocobalamin 1000 MICROGram(s) Oral daily  dextrose 40% Gel 15 Gram(s) Oral once  dextrose 5%. 1000 milliLiter(s) (50 mL/Hr) IV Continuous <Continuous>  dextrose 5%. 1000 milliLiter(s) (100 mL/Hr) IV Continuous <Continuous>  dextrose 50% Injectable 25 Gram(s) IV Push once  dextrose 50% Injectable 12.5 Gram(s) IV Push once  dextrose 50% Injectable 25 Gram(s) IV Push once  diVALproex Sprinkle 125 milliGRAM(s) Oral two times a day  doxazosin 1 milliGRAM(s) Oral at bedtime  doxycycline IVPB 100 milliGRAM(s) IV Intermittent every 12 hours  enalapril 10 milliGRAM(s) Oral daily  enoxaparin Injectable 40 milliGRAM(s) SubCutaneous daily  glucagon  Injectable 1 milliGRAM(s) IntraMuscular once  insulin lispro (ADMELOG) corrective regimen sliding scale   SubCutaneous every 6 hours  melatonin 3 milliGRAM(s) Oral at bedtime  moxifloxacin 0.5% Solution 1 Drop(s) Both EYES <User Schedule>  multivitamin 1 Tablet(s) Oral daily  predniSONE   Tablet 20 milliGRAM(s) Oral daily  sodium chloride 0.9%. 1000 milliLiter(s) (75 mL/Hr) IV Continuous <Continuous>  thiamine 100 milliGRAM(s) Oral daily    MEDICATIONS  (PRN):  bisacodyl Suppository 10 milliGRAM(s) Rectal daily PRN Constipation  polyethylene glycol 3350 17 Gram(s) Oral daily PRN Constipation  senna 2 Tablet(s) Oral at bedtime PRN Constipation          ***** VITAL SIGNS:  T(F): 98.1 (20 @ 21:39), Max: 98.1 (20 @ 21:39)  HR: 86 (20 @ 21:39) (78 - 88)  BP: 145/86 (20 @ 21:39) (116/72 - 145/86)  RR: 18 (1223-20 @ 21:39) (16 - 18)  SpO2: 100% (20 @ 21:39) (100% - 100%)  Wt(kg): --  ,   I&O's Summary    22 Dec 2020 07:01  -  23 Dec 2020 07:00  --------------------------------------------------------  IN: 400 mL / OUT: 0 mL / NET: 400 mL             *****PHYSICAL EXAM:   eyes closed    alert oriented x 2 attention comprehension are limited   Able to name, repeat.    does not open eyes   Tongue is midline     Moving all 4 ext spontaneously no drift appreciated    Gait not assessed.            *****LAB AND IMAGIN.3   4.34  )-----------( 116      ( 23 Dec 2020 08:04 )             34.8                   130<L>  |  95<L>  |  17  ----------------------------<  197<H>  4.5   |  27  |  0.53    Ca    9.0      23 Dec 2020 08:04  Phos  3.0       Mg     1.9         TPro  5.5<L>  /  Alb  3.3  /  TBili  0.3  /  DBili  x   /  AST  15  /  ALT  15  /  AlkPhos  81  -                         [All pertinent recent Imaging/Reports reviewed]           *****A S S E S S M E N T   A N D   P L A N :          80 year old left handed  male with PMH of DM, HTN, HLD, chronic conjunctivitis s/p corneal surg 2020 at [facility] on steroid taper, p/w worsening agitation. As per son pt is AAOX2 and walks with a waker at baseline.  Since discharge from [facility] pt has been refusing to get out of the bed, eating less, not taking meds. He has also becomes more confused, not recognizing family and screaming at times. Son also reports unintentional weight loss of pt, ~60lb in the past year.  No fever, cough, sob, N/V/D or pain issues.     Problem/Recommendations 1:    ams improved   likely multifactorial metabolic encephalopathy  related to ? pain from recent surgery/vision changes, steroid psychosis, poor po intake, sleep wake  adjustment   disorder       sleep wake cycle regulation agree with melatonin   thiamine 500 iv x daily 3 days then po 100 daily   mvi daily   depakote 125 bid for agitation while on steroids   ct head microvascular disease   mri with severe microvascular disease + subacute infarcts in the left post frontal area.   pt with weight loss, refusing to eat, severe malnutrition.     Problem/Recommendations 2:  hyponatremia volume contraction vs. pain related siadh ?   consider urine lytes, urine osm serum osm       Problem/Recommendations 3: cva small subacute lacunar infarct  without any overt symptoms that is attributable to this stroke    pt with severe chronic microvascular disease   subacute infarct with intracranial stenosis but somewhat limited due to artifact   subacute componenets in subdural hematomas, therefore will hold off on adding 2nd antiplatelet r x  asa 81, atorvastatin for secondary prophyolaxis   will reimage head ct in 4 weeks to see if there is any progression of sdh   discussed with wife, kael in detail the findings of the mri, she is not interested in any aggressive measures, consider palliative care for goc   repeat speach and swallow eval  pt failed   palliative care input appreciated     Thank you for allowing me to participate in the care of this patient. Please do not hesitate to call me if you have any  questions.        ________________  Yelena Alatorre MD  [facility] Neurological [facility] ([facility]  [phone]      33 minutes spent on total encounter; more than 50 % of the visit was  spent counseling about plan of care, compliance to diet/exercise and medication regimen and or  coordinating care by the attending physician.      It is advised that stroke patients follow up with BENJAMÍN Keller @ [phone] in 1- 2 weeks.   Others please follow up with Dr. Michael Nissenbaum [phone]

## 2020-12-23 NOTE — PROGRESS NOTE ADULT - PROBLEM SELECTOR PLAN 1
.  Multifactorial etiology: CVA, failure to thrive, metabolic    > Patient likely has underlying baseline dementia based on family's given history of the patient + imaging findings  > Not agitated during my visit  > Neuro on board; recommending Depakote  > Pls continue coordinating care with wife, Maia as she is the surrogate decision-maker

## 2020-12-24 LAB
ANION GAP SERPL CALC-SCNC: 10 MMOL/L — SIGNIFICANT CHANGE UP (ref 7–14)
BUN SERPL-MCNC: 15 MG/DL — SIGNIFICANT CHANGE UP (ref 7–23)
CALCIUM SERPL-MCNC: 9.2 MG/DL — SIGNIFICANT CHANGE UP (ref 8.4–10.5)
CHLORIDE SERPL-SCNC: 94 MMOL/L — LOW (ref 98–107)
CO2 SERPL-SCNC: 28 MMOL/L — SIGNIFICANT CHANGE UP (ref 22–31)
CREAT SERPL-MCNC: 0.52 MG/DL — SIGNIFICANT CHANGE UP (ref 0.5–1.3)
GLUCOSE SERPL-MCNC: 202 MG/DL — HIGH (ref 70–99)
HCT VFR BLD CALC: 36.3 % — LOW (ref 39–50)
HGB BLD-MCNC: 11.7 G/DL — LOW (ref 13–17)
MAGNESIUM SERPL-MCNC: 1.8 MG/DL — SIGNIFICANT CHANGE UP (ref 1.6–2.6)
MCHC RBC-ENTMCNC: 28.7 PG — SIGNIFICANT CHANGE UP (ref 27–34)
MCHC RBC-ENTMCNC: 32.2 GM/DL — SIGNIFICANT CHANGE UP (ref 32–36)
MCV RBC AUTO: 89 FL — SIGNIFICANT CHANGE UP (ref 80–100)
NRBC # BLD: 0 /100 WBCS — SIGNIFICANT CHANGE UP
NRBC # FLD: 0 K/UL — SIGNIFICANT CHANGE UP
PHOSPHATE SERPL-MCNC: 2.8 MG/DL — SIGNIFICANT CHANGE UP (ref 2.5–4.5)
PLATELET # BLD AUTO: 143 K/UL — LOW (ref 150–400)
POTASSIUM SERPL-MCNC: 4.2 MMOL/L — SIGNIFICANT CHANGE UP (ref 3.5–5.3)
POTASSIUM SERPL-SCNC: 4.2 MMOL/L — SIGNIFICANT CHANGE UP (ref 3.5–5.3)
RBC # BLD: 4.08 M/UL — LOW (ref 4.2–5.8)
RBC # FLD: 13 % — SIGNIFICANT CHANGE UP (ref 10.3–14.5)
SODIUM SERPL-SCNC: 132 MMOL/L — LOW (ref 135–145)
VIT A SERPL-MCNC: 21 UG/DL — LOW (ref 22–69.5)
WBC # BLD: 4.76 K/UL — SIGNIFICANT CHANGE UP (ref 3.8–10.5)
WBC # FLD AUTO: 4.76 K/UL — SIGNIFICANT CHANGE UP (ref 3.8–10.5)

## 2020-12-24 PROCEDURE — 99024 POSTOP FOLLOW-UP VISIT: CPT

## 2020-12-24 RX ADMIN — Medication 110 MILLIGRAM(S): at 12:00

## 2020-12-24 RX ADMIN — ATORVASTATIN CALCIUM 10 MILLIGRAM(S): 80 TABLET, FILM COATED ORAL at 21:15

## 2020-12-24 RX ADMIN — PREGABALIN 1000 MICROGRAM(S): 225 CAPSULE ORAL at 12:04

## 2020-12-24 RX ADMIN — Medication 3 MILLIGRAM(S): at 21:15

## 2020-12-24 RX ADMIN — Medication 1 MILLIGRAM(S): at 21:15

## 2020-12-24 RX ADMIN — DIVALPROEX SODIUM 125 MILLIGRAM(S): 500 TABLET, DELAYED RELEASE ORAL at 18:08

## 2020-12-24 RX ADMIN — Medication 1: at 18:08

## 2020-12-24 RX ADMIN — ENOXAPARIN SODIUM 40 MILLIGRAM(S): 100 INJECTION SUBCUTANEOUS at 12:03

## 2020-12-24 RX ADMIN — Medication 10 MILLIGRAM(S): at 05:35

## 2020-12-24 RX ADMIN — Medication 1 DROP(S): at 12:00

## 2020-12-24 RX ADMIN — Medication 20 MILLIGRAM(S): at 05:35

## 2020-12-24 RX ADMIN — Medication 1 TABLET(S): at 12:04

## 2020-12-24 RX ADMIN — Medication 1000 MILLIGRAM(S): at 12:05

## 2020-12-24 RX ADMIN — Medication 81 MILLIGRAM(S): at 12:04

## 2020-12-24 RX ADMIN — DIVALPROEX SODIUM 125 MILLIGRAM(S): 500 TABLET, DELAYED RELEASE ORAL at 05:35

## 2020-12-24 RX ADMIN — Medication 100 MILLIGRAM(S): at 12:05

## 2020-12-24 RX ADMIN — Medication 1 DROP(S): at 05:35

## 2020-12-24 RX ADMIN — Medication 2: at 06:30

## 2020-12-24 RX ADMIN — Medication 2: at 12:06

## 2020-12-24 RX ADMIN — Medication 110 MILLIGRAM(S): at 21:15

## 2020-12-24 RX ADMIN — Medication 1 DROP(S): at 18:09

## 2020-12-24 NOTE — PROGRESS NOTE ADULT - SUBJECTIVE AND OBJECTIVE BOX
SUBJECTIVE / OVERNIGHT EVENTS:  awake alert  but confused  NG tube feeding in progress  comfortable  denied pain.   unable to recall the names of his children/grandchildren         --------------------------------------------------------------------------------------------  LABS:                        11.7   4.76  )-----------( 143      ( 24 Dec 2020 07:23 )             36.3     12-24    132<L>  |  94<L>  |  15  ----------------------------<  202<H>  4.2   |  28  |  0.52    Ca    9.2      24 Dec 2020 07:23  Phos  2.8     12-24  Mg     1.8     12-24        CAPILLARY BLOOD GLUCOSE      POCT Blood Glucose.: 230 mg/dL (24 Dec 2020 11:58)  POCT Blood Glucose.: 209 mg/dL (24 Dec 2020 06:18)  POCT Blood Glucose.: 202 mg/dL (23 Dec 2020 22:42)  POCT Blood Glucose.: 103 mg/dL (23 Dec 2020 18:25)            RADIOLOGY & ADDITIONAL TESTS:    Imaging Personally Reviewed:  [x] YES  [ ] NO    Consultant(s) Notes Reviewed:  [x] YES  [ ] NO    MEDICATIONS  (STANDING):  ascorbic acid 1000 milliGRAM(s) Oral daily  aspirin  chewable 81 milliGRAM(s) Oral daily  atorvastatin 10 milliGRAM(s) Oral at bedtime  cyanocobalamin 1000 MICROGram(s) Oral daily  dextrose 40% Gel 15 Gram(s) Oral once  dextrose 5%. 1000 milliLiter(s) (50 mL/Hr) IV Continuous <Continuous>  dextrose 5%. 1000 milliLiter(s) (100 mL/Hr) IV Continuous <Continuous>  dextrose 50% Injectable 25 Gram(s) IV Push once  dextrose 50% Injectable 12.5 Gram(s) IV Push once  dextrose 50% Injectable 25 Gram(s) IV Push once  diVALproex Sprinkle 125 milliGRAM(s) Oral two times a day  doxazosin 1 milliGRAM(s) Oral at bedtime  doxycycline IVPB 100 milliGRAM(s) IV Intermittent every 12 hours  enalapril 10 milliGRAM(s) Oral daily  enoxaparin Injectable 40 milliGRAM(s) SubCutaneous daily  glucagon  Injectable 1 milliGRAM(s) IntraMuscular once  insulin lispro (ADMELOG) corrective regimen sliding scale   SubCutaneous every 6 hours  melatonin 3 milliGRAM(s) Oral at bedtime  moxifloxacin 0.5% Solution 1 Drop(s) Both EYES <User Schedule>  multivitamin 1 Tablet(s) Oral daily  predniSONE   Tablet 20 milliGRAM(s) Oral daily  sodium chloride 0.9%. 1000 milliLiter(s) (75 mL/Hr) IV Continuous <Continuous>  thiamine 100 milliGRAM(s) Oral daily    MEDICATIONS  (PRN):  bisacodyl Suppository 10 milliGRAM(s) Rectal daily PRN Constipation  polyethylene glycol 3350 17 Gram(s) Oral daily PRN Constipation  senna 2 Tablet(s) Oral at bedtime PRN Constipation      Care Discussed with Consultants/Other Providers [x] YES  [ ] NO    Vital Signs Last 24 Hrs  T(C): 36.8 (24 Dec 2020 05:26), Max: 36.8 (24 Dec 2020 05:26)  T(F): 98.3 (24 Dec 2020 05:26), Max: 98.3 (24 Dec 2020 05:26)  HR: 73 (24 Dec 2020 05:26) (73 - 86)  BP: 128/75 (24 Dec 2020 05:26) (128/75 - 145/86)  BP(mean): --  RR: 17 (24 Dec 2020 05:26) (17 - 18)  SpO2: 100% (24 Dec 2020 05:26) (100% - 100%)  I&O's Summary    23 Dec 2020 07:01  -  24 Dec 2020 07:00  --------------------------------------------------------  IN: 400 mL / OUT: 0 mL / NET: 400 mL      PHYSICAL EXAM:  GENERAL: NAD, thin cachetic elderly, NG tube feeding in progress  HEAD:  Atraumatic, Normocephalic  EYES: eyes close shut   NECK: Supple, No JVD  CHEST/LUNG: mild decrease breath sounds bilaterally; No wheeze   HEART: Regular rate and rhythm; No murmurs, rubs, or gallops  ABDOMEN: Soft, Nontender, Nondistended; Bowel sounds present  Neuro: AAOx1, slow words, poor insight   EXTREMITIES:  2+ Peripheral Pulses, No clubbing, cyanosis, or edema  SKIN: No rashes or lesions

## 2020-12-24 NOTE — PROGRESS NOTE ADULT - ATTENDING COMMENTS
- Dr. LUCHO Brothers ([facility])  - [phone] Discussed with the wife Maia in regards to conservative vs. aggressive options: pleasure feeds with aspiration risk/comfort care vs. PEG and NH scenario. Explained to her that chances of his recovery is poor and he will likely spent the rest of his life in NH.  The wife verbalize her understanding. Will discuss with the son Winston.  No definitive decision yet. c/w NG tube feeding for now.  I will follow up tomorrow. All questions answered.     - Dr. LUCHO Brothers ([facility])  - [phone]

## 2020-12-24 NOTE — PROGRESS NOTE ADULT - PROBLEM SELECTOR PLAN 1
-MRI/MRA head 12/18/20 revealed small acute/subacute infarcts in the left posterior frontal lobe along w/predominantly chronic bilateral subdural hematomas unchanged from CT dated 12/13/2020, new from MRI dated 6/23/2020, with small subacute subdural components  -cont ASA/statin; holding off on adding plavix  -appreciate palliative input; family deciding on PEG vs pleasure feeds; not interested in hospice  -will have speech/swallow reevaluate  - will follow up in regards to PEG vs. pleasure feeds

## 2020-12-24 NOTE — PROGRESS NOTE ADULT - SUBJECTIVE AND OBJECTIVE BOX
[facility] DEPARTMENT OF OPHTHALMOLOGY - PROGRESS NOTE  --------------------------------------------------------------------------------------------  Syeda Hurtado MD PGY-3  Pager: [phone]  ----------------------------------------------------------------------------------------------    80 year old male with PMH of DM, HTN, HLD admitted for worsening agitation and confusion at home. The patient follows at [facility] for progressive thinning of the bilateral corneas, for which he had a patch graft OD during prior admission and required gluing multiple times OS, uncertain cause of corneal melting (prior malignancy work-up was negative).    S: Patient seen and examined at bedside. Patient denies any changes in vision. No pain.     Alert and oriented x 1-2 ; appropriate mood and affect    Ophthalmology Exam:  Visual acuity (sc): HM OU  Pupils: PERRL OU, no APD  Ttono: Soft OU  Extraocular movements (EOMs): Full OU, no pain, no diplopia  Confrontational Visual Field (CVF): ED due to MS  Color Plates: ED due to poor vision    Pen Light Exam (PLE)  External: Clear shield in place OU  Lids/Lashes/Lacrimal Ducts: Flat OU Inferior lids noted to be slightly ectropic OU with deep fornices, eyelids crusted shut with significant discharge (which was cleaned gently with gauze and saline)  Sclera/Conjunctiva: tr injection OD, 1+ injection OS  Cornea: OD with patch graft in place, well sutured, laura negative, no epithelial defect, trace SPK; OS with corneal glue displaced nasally, central area of perforation not covered by glue, BCL in place. When BCL removed, Laura of central area appears laura negative but cannot exclude microleak  Anterior Chamber: D+F OU, No hypopyon appreciated OU, formed OU  Iris: poor view but appears Flat OU  Lens: poor view OU      Assessment and Recommendation:   80y male w/ pmhx/ochx of  DM, HTN, HLD, chronic conjunctivitis and 60 lb weight loss with progressive corneal thinning of both eyes, s/p corneal patch graft of the right eye. OD with patch graft in place, without epi defect. OS with central perforation, K glue no longer in place. BCL removed and Laura test performed on central area, appears to be negative but cannot exclude micro-leak.      # Corneal thinning of both eyes, s/p corneal patch graft of the right eye and corneal gluing of the left eye (most recently 12/21/20)  - K glue placed over central area of perforation OS today, BCL replaced. Patient tolerated procedure well.   - c/w moxiflox QID to both eyes  - Can taper PO prednisone to 15 mg and continue to taper down by 5 mg weekly. Patient with acute mental status change possibly related to steroids; if need to be held for this reason, please let ophthalmology know. Prednisone has helped slow this patient's corneal melting and thinning, but may be contributing to worsening mental status  - given recent weight loss and b/l nature of corneal pathology, malignancy workup was done to evaluate for underlying disease process, unrevealing to date   - c/w vitamin C 1g daily and doxycycline 100mg BID daily as per primary team if no contraindications  - repeat serum vitamin A levels pending  - c/w daily multivitamin to ensure adequate vit A supplementation  - prior conjunctival biopsy with lymphoplasmocytic infiltrate, consistent with chronic inflammatory process  - ophthalmology will monitor closely  - findings discussed with patient and primary team    Patient was seen and discussed with Dr. Warren (cornea specialist)    Outpatient follow-up: Patient should follow-up with his/her ophthalmologist or with [facility] Department of Ophthalmology within 2-3 days of discharge at:    [address]  [place] [address]  [phone]

## 2020-12-25 RX ORDER — FOLIC ACID/VIT B COMPLEX AND C 400 MCG
10000 TABLET ORAL DAILY
Refills: 0 | Status: DISCONTINUED | OUTPATIENT
Start: 2020-12-25 | End: 2020-12-25

## 2020-12-25 RX ORDER — MULTIVIT-MIN/FERROUS GLUCONATE 9 MG/15 ML
15 LIQUID (ML) ORAL DAILY
Refills: 0 | Status: DISCONTINUED | OUTPATIENT
Start: 2020-12-25 | End: 2021-01-07

## 2020-12-25 RX ADMIN — Medication 2: at 12:31

## 2020-12-25 RX ADMIN — PREGABALIN 1000 MICROGRAM(S): 225 CAPSULE ORAL at 12:33

## 2020-12-25 RX ADMIN — Medication 110 MILLIGRAM(S): at 21:47

## 2020-12-25 RX ADMIN — Medication 1: at 07:02

## 2020-12-25 RX ADMIN — DIVALPROEX SODIUM 125 MILLIGRAM(S): 500 TABLET, DELAYED RELEASE ORAL at 18:38

## 2020-12-25 RX ADMIN — ATORVASTATIN CALCIUM 10 MILLIGRAM(S): 80 TABLET, FILM COATED ORAL at 21:47

## 2020-12-25 RX ADMIN — Medication 1 TABLET(S): at 12:32

## 2020-12-25 RX ADMIN — Medication 15 MILLIGRAM(S): at 18:38

## 2020-12-25 RX ADMIN — Medication 1 DROP(S): at 01:13

## 2020-12-25 RX ADMIN — Medication 1: at 18:38

## 2020-12-25 RX ADMIN — Medication 20 MILLIGRAM(S): at 05:21

## 2020-12-25 RX ADMIN — DIVALPROEX SODIUM 125 MILLIGRAM(S): 500 TABLET, DELAYED RELEASE ORAL at 05:21

## 2020-12-25 RX ADMIN — Medication 1: at 23:54

## 2020-12-25 RX ADMIN — Medication 1 DROP(S): at 18:38

## 2020-12-25 RX ADMIN — Medication 1 DROP(S): at 23:54

## 2020-12-25 RX ADMIN — ENOXAPARIN SODIUM 40 MILLIGRAM(S): 100 INJECTION SUBCUTANEOUS at 12:32

## 2020-12-25 RX ADMIN — Medication 81 MILLIGRAM(S): at 12:32

## 2020-12-25 RX ADMIN — Medication 1 DROP(S): at 12:32

## 2020-12-25 RX ADMIN — Medication 110 MILLIGRAM(S): at 12:32

## 2020-12-25 RX ADMIN — Medication 1 MILLIGRAM(S): at 21:47

## 2020-12-25 RX ADMIN — Medication 3 MILLIGRAM(S): at 21:47

## 2020-12-25 RX ADMIN — Medication 1000 MILLIGRAM(S): at 12:32

## 2020-12-25 RX ADMIN — Medication 100 MILLIGRAM(S): at 12:32

## 2020-12-25 RX ADMIN — Medication 15 MILLILITER(S): at 18:38

## 2020-12-25 RX ADMIN — Medication 1 DROP(S): at 05:21

## 2020-12-25 NOTE — PROVIDER CONTACT NOTE (FALL NOTIFICATION) - SITUATION
Patient is calm and sleeping. Bed alarm is on. Primary nurse was across the germain giving medications. Bed alarm went off-within seconds EVS saw patient sliding off bed onto floor.

## 2020-12-25 NOTE — PROGRESS NOTE ADULT - ATTENDING COMMENTS
Discussed with the wife Maia in regards to conservative vs. aggressive options: pleasure feeds with aspiration risk/comfort care vs. PEG and NH scenario. Explained to her that chances of his recovery is poor and he will likely spent the rest of his life in NH.  The wife verbalize her understanding. Will discuss with the son Winston.  No definitive decision yet. c/w NG tube feeding for now.  I will follow up tomorrow. All questions answered.     - Dr. LUCHO Brothers ([facility])  - [phone]

## 2020-12-25 NOTE — CHART NOTE - NSCHARTNOTEFT_GEN_A_CORE
Notified by RN that pt s/p witnessed fall from his bed. Per RN,  was in the room, pt slided from bed with assistance. Pt was seen and examined at bedside, in no distress. No bleeding, no bruising, no hematoma noted. Pt observed to be comfortable, denies pain. Pt on You were seen for a fall. Maintain a safe environment. Do not change positions quickly. Particpate in program recommended by physical therapy precautions with side rails up and close to nursing station. Case discussed with Attending. Will continue to monitor pts clinical status.

## 2020-12-25 NOTE — PROVIDER CONTACT NOTE (FALL NOTIFICATION) - ASSESSMENT
Staff assisted patient safely back to bed. Patient denies injuries/hitting head. When asked why patient was getting out of bed patient stated "to get my money". Patient is confused at baseline. Patient's vitals done and patient is stable. Bed alarm put back on 2nd setting. 3 bed rails (bilateral lower and upper left rail) in place. Before fall patient had bilateral upper rails and lower right rail (table on lower left side). ANM and provider made aware.

## 2020-12-25 NOTE — PROGRESS NOTE ADULT - SUBJECTIVE AND OBJECTIVE BOX
SUBJECTIVE / OVERNIGHT EVENTS:  mental status unchanged  eyes close  answer simple questions.  events reviewed. pt slide off the bed. no injury.   denied pain         --------------------------------------------------------------------------------------------  LABS:                        11.7   4.76  )-----------( 143      ( 24 Dec 2020 07:23 )             36.3     12-24    132<L>  |  94<L>  |  15  ----------------------------<  202<H>  4.2   |  28  |  0.52    Ca    9.2      24 Dec 2020 07:23  Phos  2.8     12-24  Mg     1.8     12-24        CAPILLARY BLOOD GLUCOSE      POCT Blood Glucose.: 231 mg/dL (25 Dec 2020 11:59)  POCT Blood Glucose.: 197 mg/dL (25 Dec 2020 06:31)  POCT Blood Glucose.: 103 mg/dL (24 Dec 2020 23:31)  POCT Blood Glucose.: 159 mg/dL (24 Dec 2020 18:03)            RADIOLOGY & ADDITIONAL TESTS:    Imaging Personally Reviewed:  [x] YES  [ ] NO    Consultant(s) Notes Reviewed:  [x] YES  [ ] NO    MEDICATIONS  (STANDING):  ascorbic acid 1000 milliGRAM(s) Oral daily  aspirin  chewable 81 milliGRAM(s) Oral daily  atorvastatin 10 milliGRAM(s) Oral at bedtime  cyanocobalamin 1000 MICROGram(s) Oral daily  dextrose 40% Gel 15 Gram(s) Oral once  dextrose 5%. 1000 milliLiter(s) (50 mL/Hr) IV Continuous <Continuous>  dextrose 5%. 1000 milliLiter(s) (100 mL/Hr) IV Continuous <Continuous>  dextrose 50% Injectable 25 Gram(s) IV Push once  dextrose 50% Injectable 12.5 Gram(s) IV Push once  dextrose 50% Injectable 25 Gram(s) IV Push once  diVALproex Sprinkle 125 milliGRAM(s) Oral two times a day  doxazosin 1 milliGRAM(s) Oral at bedtime  doxycycline IVPB 100 milliGRAM(s) IV Intermittent every 12 hours  enalapril 10 milliGRAM(s) Oral daily  enoxaparin Injectable 40 milliGRAM(s) SubCutaneous daily  glucagon  Injectable 1 milliGRAM(s) IntraMuscular once  insulin lispro (ADMELOG) corrective regimen sliding scale   SubCutaneous every 6 hours  melatonin 3 milliGRAM(s) Oral at bedtime  moxifloxacin 0.5% Solution 1 Drop(s) Both EYES <User Schedule>  multivitamin/minerals/iron Oral Solution (CENTRUM) 15 milliLiter(s) Oral daily  predniSONE   Tablet 15 milliGRAM(s) Oral daily  sodium chloride 0.9%. 1000 milliLiter(s) (75 mL/Hr) IV Continuous <Continuous>  thiamine 100 milliGRAM(s) Oral daily    MEDICATIONS  (PRN):  bisacodyl Suppository 10 milliGRAM(s) Rectal daily PRN Constipation  polyethylene glycol 3350 17 Gram(s) Oral daily PRN Constipation  senna 2 Tablet(s) Oral at bedtime PRN Constipation      Care Discussed with Consultants/Other Providers [x] YES  [ ] NO    Vital Signs Last 24 Hrs  T(C): 36.6 (25 Dec 2020 10:24), Max: 36.6 (25 Dec 2020 10:24)  T(F): 97.8 (25 Dec 2020 10:24), Max: 97.8 (25 Dec 2020 10:24)  HR: 77 (25 Dec 2020 10:24) (66 - 77)  BP: 129/80 (25 Dec 2020 10:24) (118/66 - 129/80)  BP(mean): --  RR: 18 (25 Dec 2020 10:24) (17 - 18)  SpO2: 100% (25 Dec 2020 10:24) (100% - 100%)  I&O's Summary      PHYSICAL EXAM:  GENERAL: NAD, thin cachetic elderly, tolerating NG tube feeding   HEAD:  Atraumatic, Normocephalic  EYES: eyes close shut   NECK: Supple, No JVD  CHEST/LUNG: mild decrease breath sounds bilaterally; No wheeze   HEART: Regular rate and rhythm; No murmurs, rubs, or gallops  ABDOMEN: Soft, Nontender, Nondistended; Bowel sounds present  Neuro: AAOx1, slow words, poor insight, eyes close shut  EXTREMITIES:  2+ Peripheral Pulses, No clubbing, cyanosis, or edema  SKIN: No rashes or lesions

## 2020-12-26 LAB
ANION GAP SERPL CALC-SCNC: 8 MMOL/L — SIGNIFICANT CHANGE UP (ref 7–14)
BUN SERPL-MCNC: 15 MG/DL — SIGNIFICANT CHANGE UP (ref 7–23)
CALCIUM SERPL-MCNC: 9.3 MG/DL — SIGNIFICANT CHANGE UP (ref 8.4–10.5)
CHLORIDE SERPL-SCNC: 99 MMOL/L — SIGNIFICANT CHANGE UP (ref 98–107)
CO2 SERPL-SCNC: 27 MMOL/L — SIGNIFICANT CHANGE UP (ref 22–31)
CREAT SERPL-MCNC: 0.52 MG/DL — SIGNIFICANT CHANGE UP (ref 0.5–1.3)
GLUCOSE SERPL-MCNC: 206 MG/DL — HIGH (ref 70–99)
HCT VFR BLD CALC: 35.4 % — LOW (ref 39–50)
HGB BLD-MCNC: 11.4 G/DL — LOW (ref 13–17)
MAGNESIUM SERPL-MCNC: 2 MG/DL — SIGNIFICANT CHANGE UP (ref 1.6–2.6)
MCHC RBC-ENTMCNC: 28.8 PG — SIGNIFICANT CHANGE UP (ref 27–34)
MCHC RBC-ENTMCNC: 32.2 GM/DL — SIGNIFICANT CHANGE UP (ref 32–36)
MCV RBC AUTO: 89.4 FL — SIGNIFICANT CHANGE UP (ref 80–100)
NRBC # BLD: 0 /100 WBCS — SIGNIFICANT CHANGE UP
NRBC # FLD: 0 K/UL — SIGNIFICANT CHANGE UP
PHOSPHATE SERPL-MCNC: 2.7 MG/DL — SIGNIFICANT CHANGE UP (ref 2.5–4.5)
PLATELET # BLD AUTO: 150 K/UL — SIGNIFICANT CHANGE UP (ref 150–400)
POTASSIUM SERPL-MCNC: 4.5 MMOL/L — SIGNIFICANT CHANGE UP (ref 3.5–5.3)
POTASSIUM SERPL-SCNC: 4.5 MMOL/L — SIGNIFICANT CHANGE UP (ref 3.5–5.3)
RBC # BLD: 3.96 M/UL — LOW (ref 4.2–5.8)
RBC # FLD: 13.1 % — SIGNIFICANT CHANGE UP (ref 10.3–14.5)
SODIUM SERPL-SCNC: 134 MMOL/L — LOW (ref 135–145)
WBC # BLD: 5.72 K/UL — SIGNIFICANT CHANGE UP (ref 3.8–10.5)
WBC # FLD AUTO: 5.72 K/UL — SIGNIFICANT CHANGE UP (ref 3.8–10.5)

## 2020-12-26 RX ADMIN — Medication 1 DROP(S): at 17:49

## 2020-12-26 RX ADMIN — PREGABALIN 1000 MICROGRAM(S): 225 CAPSULE ORAL at 12:17

## 2020-12-26 RX ADMIN — Medication 2: at 12:57

## 2020-12-26 RX ADMIN — Medication 100 MILLIGRAM(S): at 12:16

## 2020-12-26 RX ADMIN — Medication 1000 MILLIGRAM(S): at 12:18

## 2020-12-26 RX ADMIN — Medication 110 MILLIGRAM(S): at 11:02

## 2020-12-26 RX ADMIN — DIVALPROEX SODIUM 125 MILLIGRAM(S): 500 TABLET, DELAYED RELEASE ORAL at 06:17

## 2020-12-26 RX ADMIN — Medication 1 MILLIGRAM(S): at 23:32

## 2020-12-26 RX ADMIN — Medication 1 DROP(S): at 12:20

## 2020-12-26 RX ADMIN — Medication 1: at 17:49

## 2020-12-26 RX ADMIN — Medication 1 DROP(S): at 23:33

## 2020-12-26 RX ADMIN — Medication 1 DROP(S): at 06:17

## 2020-12-26 RX ADMIN — Medication 3 MILLIGRAM(S): at 23:31

## 2020-12-26 RX ADMIN — Medication 15 MILLIGRAM(S): at 06:17

## 2020-12-26 RX ADMIN — ATORVASTATIN CALCIUM 10 MILLIGRAM(S): 80 TABLET, FILM COATED ORAL at 23:31

## 2020-12-26 RX ADMIN — ENOXAPARIN SODIUM 40 MILLIGRAM(S): 100 INJECTION SUBCUTANEOUS at 12:17

## 2020-12-26 RX ADMIN — Medication 110 MILLIGRAM(S): at 23:31

## 2020-12-26 RX ADMIN — Medication 10 MILLIGRAM(S): at 06:17

## 2020-12-26 RX ADMIN — Medication 81 MILLIGRAM(S): at 12:17

## 2020-12-26 RX ADMIN — DIVALPROEX SODIUM 125 MILLIGRAM(S): 500 TABLET, DELAYED RELEASE ORAL at 17:49

## 2020-12-26 RX ADMIN — Medication 2: at 06:59

## 2020-12-26 RX ADMIN — Medication 15 MILLILITER(S): at 12:17

## 2020-12-26 NOTE — PROGRESS NOTE ADULT - SUBJECTIVE AND OBJECTIVE BOX
SUBJECTIVE / OVERNIGHT EVENTS:  awake alert  more interactive  able to open eye but vision limited  the wife Maia at bedside.  explained pleasure feeds vs. PEG  the wife would like to proceed with PEG         --------------------------------------------------------------------------------------------  LABS:                        11.4   5.72  )-----------( 150      ( 26 Dec 2020 07:11 )             35.4     12-26    134<L>  |  99  |  15  ----------------------------<  206<H>  4.5   |  27  |  0.52    Ca    9.3      26 Dec 2020 07:11  Phos  2.7     12-26  Mg     2.0     12-26        CAPILLARY BLOOD GLUCOSE      POCT Blood Glucose.: 224 mg/dL (26 Dec 2020 12:34)  POCT Blood Glucose.: 235 mg/dL (26 Dec 2020 06:55)  POCT Blood Glucose.: 165 mg/dL (25 Dec 2020 23:32)  POCT Blood Glucose.: 173 mg/dL (25 Dec 2020 18:13)            RADIOLOGY & ADDITIONAL TESTS:    Imaging Personally Reviewed:  [x] YES  [ ] NO    Consultant(s) Notes Reviewed:  [x] YES  [ ] NO    MEDICATIONS  (STANDING):  ascorbic acid 1000 milliGRAM(s) Oral daily  aspirin  chewable 81 milliGRAM(s) Oral daily  atorvastatin 10 milliGRAM(s) Oral at bedtime  cyanocobalamin 1000 MICROGram(s) Oral daily  dextrose 40% Gel 15 Gram(s) Oral once  dextrose 5%. 1000 milliLiter(s) (100 mL/Hr) IV Continuous <Continuous>  dextrose 5%. 1000 milliLiter(s) (50 mL/Hr) IV Continuous <Continuous>  dextrose 50% Injectable 25 Gram(s) IV Push once  dextrose 50% Injectable 12.5 Gram(s) IV Push once  dextrose 50% Injectable 25 Gram(s) IV Push once  diVALproex Sprinkle 125 milliGRAM(s) Oral two times a day  doxazosin 1 milliGRAM(s) Oral at bedtime  doxycycline IVPB 100 milliGRAM(s) IV Intermittent every 12 hours  enalapril 10 milliGRAM(s) Oral daily  enoxaparin Injectable 40 milliGRAM(s) SubCutaneous daily  glucagon  Injectable 1 milliGRAM(s) IntraMuscular once  insulin lispro (ADMELOG) corrective regimen sliding scale   SubCutaneous every 6 hours  melatonin 3 milliGRAM(s) Oral at bedtime  moxifloxacin 0.5% Solution 1 Drop(s) Both EYES <User Schedule>  multivitamin/minerals/iron Oral Solution (CENTRUM) 15 milliLiter(s) Oral daily  predniSONE   Tablet 15 milliGRAM(s) Oral daily  sodium chloride 0.9%. 1000 milliLiter(s) (75 mL/Hr) IV Continuous <Continuous>  thiamine 100 milliGRAM(s) Oral daily    MEDICATIONS  (PRN):  bisacodyl Suppository 10 milliGRAM(s) Rectal daily PRN Constipation  polyethylene glycol 3350 17 Gram(s) Oral daily PRN Constipation  senna 2 Tablet(s) Oral at bedtime PRN Constipation      Care Discussed with Consultants/Other Providers [x] YES  [ ] NO    Vital Signs Last 24 Hrs  T(C): 36.6 (26 Dec 2020 15:34), Max: 36.6 (25 Dec 2020 21:58)  T(F): 97.8 (26 Dec 2020 15:34), Max: 97.8 (25 Dec 2020 21:58)  HR: 66 (26 Dec 2020 15:34) (56 - 69)  BP: 122/80 (26 Dec 2020 15:34) (122/80 - 148/84)  BP(mean): --  RR: 18 (26 Dec 2020 15:34) (18 - 18)  SpO2: 100% (26 Dec 2020 15:34) (100% - 100%)  I&O's Summary      PHYSICAL EXAM:  GENERAL: NAD, thin cachetic elderly, tolerating NG tube feeding   HEAD:  Atraumatic, Normocephalic  EYES: eyes close shut   NECK: Supple, No JVD  CHEST/LUNG: mild decrease breath sounds bilaterally; No wheeze   HEART: Regular rate and rhythm; No murmurs, rubs, or gallops  ABDOMEN: Soft, Nontender, Nondistended; Bowel sounds present  Neuro: AAOx1, slow words, poor insight, eyes close shut  EXTREMITIES:  2+ Peripheral Pulses, No clubbing, cyanosis, or edema  SKIN: No rashes or lesions

## 2020-12-26 NOTE — PROGRESS NOTE ADULT - ATTENDING COMMENTS
Discussed with the wife Maia in regards to conservative vs. aggressive options: pleasure feeds with aspiration risk/comfort care vs. PEG and NH scenario. Explained to her that chances of his recovery is poor and he will likely spent the rest of his life in NH.  The wife verbalize her understanding. discussed with the son Winston.   confirmed on 12/26/20 that they would like to proceed with PEG and give him a chance of recovery.     - Dr. LUCHO Brothers ([facility])  - [phone] Discussed with the wife Maia in regards to conservative vs. aggressive options: pleasure feeds with aspiration risk/comfort care vs. PEG and NH scenario. Explained to her that chances of his recovery is poor and he will likely spent the rest of his life in NH.  The wife verbalize her understanding. discussed with the son Winston.   confirmed on 12/26/20 that they would like to proceed with PEG and give him a chance of recovery.   Remain full code. Advance care planning time: approximately 30 mins spent discussing goals of care.    - [facility]  - [phone]

## 2020-12-26 NOTE — PROGRESS NOTE ADULT - ASSESSMENT
80y male w/ pmhx/ochx of  DM, HTN, HLD, chronic conjunctivitis and 60 lb weight loss with progressive corneal thinning of both eyes, s/p corneal patch graft of the right eye. OD with patch graft in place, without epi defect. OS with central perforation, K glue no longer in place. BCL removed and Charly test performed on central area, appears to be negative but cannot exclude micro-leak.      # Corneal thinning of both eyes, s/p corneal patch graft of the right eye and corneal gluing of the left eye (most recently 12/21/20)  - K glue placed over central area of perforation OS today, BCL replaced. Patient tolerated procedure well.   - c/w moxiflox QID to both eyes  - Can taper PO prednisone to 15 mg and continue to taper down by 5 mg weekly. Patient with acute mental status change possibly related to steroids; if need to be held for this reason, please let ophthalmology know. Prednisone has helped slow this patient's corneal melting and thinning, but may be contributing to worsening mental status  - given recent weight loss and b/l nature of corneal pathology, malignancy workup was done to evaluate for underlying disease process, unrevealing to date   - c/w vitamin C 1g daily and doxycycline 100mg BID daily as per primary team if no contraindications  - repeat serum vitamin A levels pending  - c/w daily multivitamin to ensure adequate vit A supplementation  - prior conjunctival biopsy with lymphoplasmocytic infiltrate, consistent with chronic inflammatory process  - ophthalmology will monitor closely  - findings discussed with patient and primary team    Patient was seen and discussed with Dr. Warren (cornea specialist)    Outpatient follow-up: Patient should follow-up with his/her ophthalmologist or with [facility] Department of Ophthalmology within 2-3 days of discharge at:    [address]  [place] [address]  [phone]   80y male w/ pmhx/ochx of  DM, HTN, HLD, chronic conjunctivitis and 60 lb weight loss with progressive corneal thinning of both eyes, s/p corneal patch graft of the right eye. OD with patch graft in place, without epi defect. OS with central perforation, K glue no longer in place. BCL removed and Charly test performed on central area, appears to be negative but cannot exclude micro-leak.      # Corneal thinning of both eyes, s/p corneal patch graft of the right eye and corneal gluing of the left eye (most recently 12/24/20)  - K glue placed over central area of perforation OS and BCL replaced 12/24/2020. Patient tolerated procedure well.   - c/w moxiflox QID to both eyes  - c/w taper PO prednisone to 15 mg and continue to taper down by 5 mg weekly. Patient with acute mental status change possibly related to steroids; if need to be held for this reason, please let ophthalmology know. Prednisone has helped slow this patient's corneal melting and thinning, but may be contributing to worsening mental status  - given recent weight loss and b/l nature of corneal pathology, malignancy workup was done to evaluate for underlying disease process, unrevealing to date   - c/w vitamin C 1g daily and doxycycline 100mg BID daily as per primary team if no contraindications  - serum vitamin A levels at 21. Pt on daily multi-vitamin   - c/w daily multivitamin to ensure adequate vit A supplementation  - prior conjunctival biopsy with lymphoplasmocytic infiltrate, consistent with chronic inflammatory process  - ophthalmology will monitor closely  - findings discussed with patient and primary team    d/w Dr. Warren (cornea specialist)    Outpatient follow-up: Patient should follow-up with his/her ophthalmologist or with [facility] Department of Ophthalmology within 2-3 days of discharge at:    [address]  [place], [place] [address] [phone]   80y male w/ pmhx/ochx of  DM, HTN, HLD, chronic conjunctivitis and 60 lb weight loss with progressive corneal thinning of both eyes, s/p corneal patch graft of the right eye. OD with patch graft in place, without epi defect. OS with central perforation, K glue no longer in place. BCL removed and Charly test performed on central area, appears to be negative but cannot exclude micro-leak on 12/24/20. Now OS contact lens and glue in place. Chamber formed.     # Corneal thinning of both eyes, s/p corneal patch graft of the right eye and corneal gluing of the left eye (most recently 12/24/20)  - K glue placed over central area of perforation OS and BCL replaced 12/24/2020. Patient tolerated procedure well.   - c/w moxiflox QID to both eyes  - c/w taper PO prednisone to 15 mg and continue to taper down by 5 mg weekly. Patient with acute mental status change possibly related to steroids; if need to be held for this reason, please let ophthalmology know. Prednisone has helped slow this patient's corneal melting and thinning, but may be contributing to worsening mental status  - given recent weight loss and b/l nature of corneal pathology, malignancy workup was done to evaluate for underlying disease process, unrevealing to date   - c/w vitamin C 1g daily and doxycycline 100mg BID daily as per primary team if no contraindications  - serum vitamin A levels at 21. Pt on daily multi-vitamin   - c/w daily multivitamin to ensure adequate vit A supplementation  - prior conjunctival biopsy with lymphoplasmocytic infiltrate, consistent with chronic inflammatory process  - ophthalmology will monitor closely  - findings discussed with patient and primary team    d/w Dr. Warren (cornea specialist)    Outpatient follow-up: Patient should follow-up with his/her ophthalmologist or with [facility] Department of Ophthalmology within 2-3 days of discharge at:    [address]  [place] [address]  [phone]   80y male w/ pmhx/ochx of  DM, HTN, HLD, chronic conjunctivitis and 60 lb weight loss with progressive corneal thinning of both eyes, s/p corneal patch graft of the right eye. OD with patch graft in place, without epi defect. OS with central perforation, K glue no longer in place. BCL removed and Charly test performed on central area, appears to be negative but cannot exclude micro-leak on 12/24/20. Now OS contact lens and glue in place. Chamber formed.     # Corneal thinning of both eyes, s/p corneal patch graft of the right eye and corneal gluing of the left eye (most recently 12/24/20)  - K glue placed over central area of perforation OS and BCL replaced 12/24/2020. Patient tolerated procedure well.   - c/w moxiflox QID to both eyes  - c/w taper PO prednisone to 15 mg and continue to taper down by 5 mg weekly. Patient with acute mental status change possibly related to steroids; if need to be held for this reason, please let ophthalmology know. Prednisone has helped slow this patient's corneal melting and thinning, but may be contributing to worsening mental status  - given recent weight loss and b/l nature of corneal pathology, malignancy workup was done to evaluate for underlying disease process, unrevealing to date   - c/w vitamin C 1g daily and doxycycline 100mg BID daily as per primary team if no contraindications  - serum vitamin A levels at 21. Pt on daily multi-vitamin and d/w primary team NP the need for additional vitamin A supplementation.   - c/w daily multivitamin to ensure adequate vit A supplementation  - prior conjunctival biopsy with lymphoplasmocytic infiltrate, consistent with chronic inflammatory process  - ophthalmology will monitor closely  - findings discussed with patient and primary team    d/w Dr. Warren (cornea specialist)    Outpatient follow-up: Patient should follow-up with his/her ophthalmologist or with [facility] Department of Ophthalmology within 2-3 days of discharge at:    [address]  [place] [address]  [phone]

## 2020-12-26 NOTE — PROGRESS NOTE ADULT - SUBJECTIVE AND OBJECTIVE BOX
80 year old male with PMH of DM, HTN, HLD admitted for worsening agitation and confusion at home. The patient follows at [facility] for progressive thinning of the bilateral corneas, for which he had a patch graft OD during prior admission and required gluing multiple times OS, uncertain cause of corneal melting (prior malignancy work-up was negative).    S: Patient seen and examined at bedside. Patient denies any changes in vision. No pain.     Alert and oriented x 1-2 ; appropriate mood and affect    Ophthalmology Exam:  Visual acuity (sc): HM OU  Pupils: PERRL OU, no APD  Ttono: Soft OU  Extraocular movements (EOMs): Full OU, no pain, no diplopia  Confrontational Visual Field (CVF): ED due to MS  Color Plates: ED due to poor vision    Pen Light Exam (PLE)  External: Clear shield in place OU  Lids/Lashes/Lacrimal Ducts: Flat OU Inferior lids noted to be slightly ectropic OU with deep fornices, eyelids crusted shut with significant discharge (which was cleaned gently with gauze and saline)  Sclera/Conjunctiva: tr injection OD, 1+ injection OS  Cornea: OD with patch graft in place, well sutured, laura negative, no epithelial defect, trace SPK; OS with corneal glue displaced nasally, central area of perforation not covered by glue, BCL in place. When BCL removed, Laura of central area appears laura negative but cannot exclude microleak  Anterior Chamber: D+F OU, No hypopyon appreciated OU, formed OU  Iris: poor view but appears Flat OU  Lens: poor view OU   80 year old male with PMH of DM, HTN, HLD admitted for worsening agitation and confusion at home. The patient follows at [facility] for progressive thinning of the bilateral corneas, for which he had a patch graft OD during prior admission and required gluing multiple times OS, uncertain cause of corneal melting (prior malignancy work-up was negative).    S: Patient seen and examined at bedside. Patient denies any changes in vision. No pain.     Alert and oriented x 1-2 ; appropriate mood and affect    Ophthalmology Exam:  Visual acuity (sc): HM OU  Pupils: PERRL OU, no APD  Ttono: Soft OU  Extraocular movements (EOMs): Full OU, no pain, no diplopia  Confrontational Visual Field (CVF): ED due to MS  Color Plates: ED due to poor vision    Pen Light Exam (PLE)  External: Clear shield in place OU  Lids/Lashes/Lacrimal Ducts: Flat OU Inferior lids noted to be slightly ectropic OU with deep fornices, eyelids crusted shut with significant discharge (which was cleaned gently with gauze and saline)  Sclera/Conjunctiva: tr injection OD, 1+ injection OS  Cornea: OD with patch graft in place, well sutured, laura negative, no epithelial defect, trace SPK; OS with corneal glue intact overlying central area of defect with overlying BCL.   Anterior Chamber: D+F OU, No hypopyon appreciated OU, formed OU  Iris: poor view but appears Flat OU  Lens: poor view OU

## 2020-12-26 NOTE — PROGRESS NOTE ADULT - PROBLEM SELECTOR PLAN 1
-MRI/MRA head 12/18/20 revealed small acute/subacute infarcts in the left posterior frontal lobe along w/predominantly chronic bilateral subdural hematomas unchanged from CT dated 12/13/2020, new from MRI dated 6/23/2020, with small subacute subdural components  -cont ASA/statin; holding off on adding plavix  -appreciate palliative input; family deciding on PEG vs pleasure feeds; not interested in hospice  -speech/swallow reevaluate: NPO  - after numerous discussion family would like to proceed with PEG feeding.   - GI consulted.

## 2020-12-27 DIAGNOSIS — I10 ESSENTIAL (PRIMARY) HYPERTENSION: ICD-10-CM

## 2020-12-27 DIAGNOSIS — T17.208A UNSPECIFIED FOREIGN BODY IN PHARYNX CAUSING OTHER INJURY, INITIAL ENCOUNTER: ICD-10-CM

## 2020-12-27 DIAGNOSIS — E11.9 TYPE 2 DIABETES MELLITUS WITHOUT COMPLICATIONS: ICD-10-CM

## 2020-12-27 LAB
ANION GAP SERPL CALC-SCNC: 9 MMOL/L — SIGNIFICANT CHANGE UP (ref 7–14)
BUN SERPL-MCNC: 17 MG/DL — SIGNIFICANT CHANGE UP (ref 7–23)
CALCIUM SERPL-MCNC: 9.5 MG/DL — SIGNIFICANT CHANGE UP (ref 8.4–10.5)
CHLORIDE SERPL-SCNC: 100 MMOL/L — SIGNIFICANT CHANGE UP (ref 98–107)
CO2 SERPL-SCNC: 27 MMOL/L — SIGNIFICANT CHANGE UP (ref 22–31)
CREAT SERPL-MCNC: 0.56 MG/DL — SIGNIFICANT CHANGE UP (ref 0.5–1.3)
GLUCOSE SERPL-MCNC: 189 MG/DL — HIGH (ref 70–99)
HCT VFR BLD CALC: 35.5 % — LOW (ref 39–50)
HGB BLD-MCNC: 11.4 G/DL — LOW (ref 13–17)
MAGNESIUM SERPL-MCNC: 1.9 MG/DL — SIGNIFICANT CHANGE UP (ref 1.6–2.6)
MCHC RBC-ENTMCNC: 28.7 PG — SIGNIFICANT CHANGE UP (ref 27–34)
MCHC RBC-ENTMCNC: 32.1 GM/DL — SIGNIFICANT CHANGE UP (ref 32–36)
MCV RBC AUTO: 89.4 FL — SIGNIFICANT CHANGE UP (ref 80–100)
NRBC # BLD: 0 /100 WBCS — SIGNIFICANT CHANGE UP
NRBC # FLD: 0 K/UL — SIGNIFICANT CHANGE UP
PHOSPHATE SERPL-MCNC: 2.9 MG/DL — SIGNIFICANT CHANGE UP (ref 2.5–4.5)
PLATELET # BLD AUTO: 163 K/UL — SIGNIFICANT CHANGE UP (ref 150–400)
POTASSIUM SERPL-MCNC: 4.2 MMOL/L — SIGNIFICANT CHANGE UP (ref 3.5–5.3)
POTASSIUM SERPL-SCNC: 4.2 MMOL/L — SIGNIFICANT CHANGE UP (ref 3.5–5.3)
RBC # BLD: 3.97 M/UL — LOW (ref 4.2–5.8)
RBC # FLD: 13.3 % — SIGNIFICANT CHANGE UP (ref 10.3–14.5)
SODIUM SERPL-SCNC: 136 MMOL/L — SIGNIFICANT CHANGE UP (ref 135–145)
WBC # BLD: 5.12 K/UL — SIGNIFICANT CHANGE UP (ref 3.8–10.5)
WBC # FLD AUTO: 5.12 K/UL — SIGNIFICANT CHANGE UP (ref 3.8–10.5)

## 2020-12-27 RX ADMIN — Medication 10 MILLIGRAM(S): at 06:05

## 2020-12-27 RX ADMIN — Medication 1000 MILLIGRAM(S): at 12:11

## 2020-12-27 RX ADMIN — ATORVASTATIN CALCIUM 10 MILLIGRAM(S): 80 TABLET, FILM COATED ORAL at 21:35

## 2020-12-27 RX ADMIN — ENOXAPARIN SODIUM 40 MILLIGRAM(S): 100 INJECTION SUBCUTANEOUS at 12:10

## 2020-12-27 RX ADMIN — Medication 15 MILLILITER(S): at 12:10

## 2020-12-27 RX ADMIN — Medication 110 MILLIGRAM(S): at 09:46

## 2020-12-27 RX ADMIN — Medication 1 DROP(S): at 12:12

## 2020-12-27 RX ADMIN — Medication 81 MILLIGRAM(S): at 12:10

## 2020-12-27 RX ADMIN — Medication 15 MILLIGRAM(S): at 06:05

## 2020-12-27 RX ADMIN — Medication 1: at 06:06

## 2020-12-27 RX ADMIN — Medication 1 DROP(S): at 06:05

## 2020-12-27 RX ADMIN — Medication 3: at 13:10

## 2020-12-27 RX ADMIN — DIVALPROEX SODIUM 125 MILLIGRAM(S): 500 TABLET, DELAYED RELEASE ORAL at 06:04

## 2020-12-27 RX ADMIN — Medication 1 DROP(S): at 18:17

## 2020-12-27 RX ADMIN — Medication 110 MILLIGRAM(S): at 23:23

## 2020-12-27 RX ADMIN — PREGABALIN 1000 MICROGRAM(S): 225 CAPSULE ORAL at 12:11

## 2020-12-27 RX ADMIN — Medication 1 MILLIGRAM(S): at 21:35

## 2020-12-27 RX ADMIN — Medication 1 DROP(S): at 23:24

## 2020-12-27 RX ADMIN — Medication 100 MILLIGRAM(S): at 12:11

## 2020-12-27 RX ADMIN — Medication 3 MILLIGRAM(S): at 21:35

## 2020-12-27 RX ADMIN — DIVALPROEX SODIUM 125 MILLIGRAM(S): 500 TABLET, DELAYED RELEASE ORAL at 18:16

## 2020-12-27 RX ADMIN — Medication 1: at 18:16

## 2020-12-27 NOTE — CONSULT NOTE ADULT - ATTENDING COMMENTS
I have interviewed and examined the patient and reviewed the residents note including the history, exam, assessment, and plan.  I agree with the residents assessment and plan.    Agree with above  Private patient of Dr. Warren, case and findings discussed with and plan per Dr. Kelvin Jimenez MD
Differential diagnosis and plan of care discussed with patient after the evaluation  75 Minutes spent on total encounter of which more than fifty percent of the encounter was spent counseling and/or coordinating care by the attending physician.  Advanced care planning was discussed with the patient and/or surrogate decision makers. Advanced care planning forms were discussed. The risks benefits and alternatives to pertinent gastrointestinal procedures and interventions were discussed in detail and all questions were answered. Duration: 30 Minutes.      Davidson Roca M.D.   Gastroenterology and Hepatology  Cell: [phone]

## 2020-12-27 NOTE — CONSULT NOTE ADULT - SUBJECTIVE AND OBJECTIVE BOX
Chief Complaint:  Patient is a 80y old  Male who presents with a chief complaint of AMS (27 Dec 2020 13:34)      HPI:    The patient is a 80 year old man with chronic conjunctivitis HTN and HLD who presented with agitation. He was found to have metabolic encephalopathy as well as subacute lacunar infarcts and subdural hematomas.   Palliative care team was consulted, but patient's family continues to prefer PEG placement.    The patient is a limited historian but denies dysphagia, abdominal pain, diarrhea, unintentional weight loss, change in bowel habits or NSAID use.      Allergies:  No Known Allergies      Home Medications:    Hospital Medications:  ascorbic acid 1000 milliGRAM(s) Oral daily  aspirin  chewable 81 milliGRAM(s) Oral daily  atorvastatin 10 milliGRAM(s) Oral at bedtime  bisacodyl Suppository 10 milliGRAM(s) Rectal daily PRN  cyanocobalamin 1000 MICROGram(s) Oral daily  dextrose 40% Gel 15 Gram(s) Oral once  dextrose 5%. 1000 milliLiter(s) IV Continuous <Continuous>  dextrose 5%. 1000 milliLiter(s) IV Continuous <Continuous>  dextrose 50% Injectable 25 Gram(s) IV Push once  dextrose 50% Injectable 12.5 Gram(s) IV Push once  dextrose 50% Injectable 25 Gram(s) IV Push once  diVALproex Sprinkle 125 milliGRAM(s) Oral two times a day  doxazosin 1 milliGRAM(s) Oral at bedtime  doxycycline IVPB 100 milliGRAM(s) IV Intermittent every 12 hours  enalapril 10 milliGRAM(s) Oral daily  enoxaparin Injectable 40 milliGRAM(s) SubCutaneous daily  glucagon  Injectable 1 milliGRAM(s) IntraMuscular once  insulin lispro (ADMELOG) corrective regimen sliding scale   SubCutaneous every 6 hours  melatonin 3 milliGRAM(s) Oral at bedtime  moxifloxacin 0.5% Solution 1 Drop(s) Both EYES <User Schedule>  multivitamin/minerals/iron Oral Solution (CENTRUM) 15 milliLiter(s) Oral daily  polyethylene glycol 3350 17 Gram(s) Oral daily PRN  predniSONE   Tablet 15 milliGRAM(s) Oral daily  senna 2 Tablet(s) Oral at bedtime PRN  sodium chloride 0.9%. 1000 milliLiter(s) IV Continuous <Continuous>  thiamine 100 milliGRAM(s) Oral daily      PMHX/PSHX:  BPH (benign prostatic hyperplasia)    Hyperlipidemia    HTN (hypertension)    DM (diabetes mellitus)          Family history:  No pertinent family history in first degree relatives        Social History:   Denies ethanol use.  Denies illicit drug use.    ROS:     cannot fully participate      PHYSICAL EXAM:     GENERAL:  Appears stated age, well-groomed, frail, no distress  HEENT:  NC/AT,  conjunctivae anicteric, clear and pink,   NECK: supple, trachea midline  CHEST:  Full & symmetric excursion, no increased effort, breath sounds clear  HEART:  Regular rhythm, no JVD  ABDOMEN:  Soft, non-tender, non-distended, normoactive bowel sounds,  no masses , no hepatosplenomegaly  EXTREMITIES:  no cyanosis,clubbing or edema  SKIN:  No rash, erythema, or, ecchymoses, no jaundice  NEURO:  Alert, non-focal, no asterixis  PSYCH: Appropriate affect, oriented to place and time  RECTAL: Deferred      Vital Signs:  Vital Signs Last 24 Hrs  T(C): 36.3 (27 Dec 2020 13:44), Max: 36.6 (27 Dec 2020 06:02)  T(F): 97.4 (27 Dec 2020 13:44), Max: 97.8 (27 Dec 2020 06:02)  HR: 66 (27 Dec 2020 13:44) (59 - 66)  BP: 120/83 (27 Dec 2020 13:44) (120/83 - 138/81)  BP(mean): --  RR: 18 (27 Dec 2020 13:44) (18 - 18)  SpO2: 100% (27 Dec 2020 13:44) (100% - 100%)  Daily     Daily     LABS:                        11.4   5.12  )-----------( 163      ( 27 Dec 2020 06:27 )             35.5     12-27    136  |  100  |  17  ----------------------------<  189<H>  4.2   |  27  |  0.56    Ca    9.5      27 Dec 2020 06:27  Phos  2.9     12-27  Mg     1.9     12-27

## 2020-12-27 NOTE — PROGRESS NOTE ADULT - SUBJECTIVE AND OBJECTIVE BOX
SUBJECTIVE / OVERNIGHT EVENTS:  No overnight events.  awake alert, though eyes close. can't see well s/p recent eye surgery.  Thinks he is in the living room but re-oriented. explained to him that he is in the hospital and he had a stroke.   answer all questions.  he agree with PEG placement with the hope of improvement in the future.   Answered his questions.  This was already discussed with the wife Maia, see previous notes.         --------------------------------------------------------------------------------------------  LABS:                        11.4   5.12  )-----------( 163      ( 27 Dec 2020 06:27 )             35.5     12-27    136  |  100  |  17  ----------------------------<  189<H>  4.2   |  27  |  0.56    Ca    9.5      27 Dec 2020 06:27  Phos  2.9     12-27  Mg     1.9     12-27        CAPILLARY BLOOD GLUCOSE      POCT Blood Glucose.: 188 mg/dL (27 Dec 2020 17:33)  POCT Blood Glucose.: 257 mg/dL (27 Dec 2020 12:29)  POCT Blood Glucose.: 186 mg/dL (27 Dec 2020 06:04)  POCT Blood Glucose.: 120 mg/dL (26 Dec 2020 23:29)            RADIOLOGY & ADDITIONAL TESTS:    Imaging Personally Reviewed:  [x] YES  [ ] NO    Consultant(s) Notes Reviewed:  [x] YES  [ ] NO    MEDICATIONS  (STANDING):  ascorbic acid 1000 milliGRAM(s) Oral daily  aspirin  chewable 81 milliGRAM(s) Oral daily  atorvastatin 10 milliGRAM(s) Oral at bedtime  cyanocobalamin 1000 MICROGram(s) Oral daily  dextrose 40% Gel 15 Gram(s) Oral once  dextrose 5%. 1000 milliLiter(s) (50 mL/Hr) IV Continuous <Continuous>  dextrose 5%. 1000 milliLiter(s) (100 mL/Hr) IV Continuous <Continuous>  dextrose 50% Injectable 25 Gram(s) IV Push once  dextrose 50% Injectable 12.5 Gram(s) IV Push once  dextrose 50% Injectable 25 Gram(s) IV Push once  diVALproex Sprinkle 125 milliGRAM(s) Oral two times a day  doxazosin 1 milliGRAM(s) Oral at bedtime  doxycycline IVPB 100 milliGRAM(s) IV Intermittent every 12 hours  enalapril 10 milliGRAM(s) Oral daily  enoxaparin Injectable 40 milliGRAM(s) SubCutaneous daily  glucagon  Injectable 1 milliGRAM(s) IntraMuscular once  insulin lispro (ADMELOG) corrective regimen sliding scale   SubCutaneous every 6 hours  melatonin 3 milliGRAM(s) Oral at bedtime  moxifloxacin 0.5% Solution 1 Drop(s) Both EYES <User Schedule>  multivitamin/minerals/iron Oral Solution (CENTRUM) 15 milliLiter(s) Oral daily  predniSONE   Tablet 15 milliGRAM(s) Oral daily  sodium chloride 0.9%. 1000 milliLiter(s) (75 mL/Hr) IV Continuous <Continuous>  thiamine 100 milliGRAM(s) Oral daily    MEDICATIONS  (PRN):  bisacodyl Suppository 10 milliGRAM(s) Rectal daily PRN Constipation  polyethylene glycol 3350 17 Gram(s) Oral daily PRN Constipation  senna 2 Tablet(s) Oral at bedtime PRN Constipation      Care Discussed with Consultants/Other Providers [x] YES  [ ] NO    Vital Signs Last 24 Hrs  T(C): 36.3 (27 Dec 2020 21:33), Max: 36.6 (27 Dec 2020 06:02)  T(F): 97.4 (27 Dec 2020 21:33), Max: 97.8 (27 Dec 2020 06:02)  HR: 60 (27 Dec 2020 21:33) (60 - 66)  BP: 125/76 (27 Dec 2020 21:33) (120/83 - 125/76)  BP(mean): --  RR: 17 (27 Dec 2020 21:33) (17 - 18)  SpO2: 100% (27 Dec 2020 21:33) (100% - 100%)  I&O's Summary      PHYSICAL EXAM:  GENERAL: NAD, thin cachetic elderly, tolerating NG tube feeding   HEAD:  Atraumatic, Normocephalic  EYES: eyes close shut   NECK: Supple, No JVD  CHEST/LUNG: mild decrease breath sounds bilaterally; No wheeze   HEART: Regular rate and rhythm; No murmurs, rubs, or gallops  ABDOMEN: Soft, Nontender, Nondistended; Bowel sounds present  Neuro: AAOx1, slow words, poor insight, eyes close shut  EXTREMITIES:  2+ Peripheral Pulses, No clubbing, cyanosis, or edema  SKIN: No rashes or lesions

## 2020-12-27 NOTE — SWALLOW BEDSIDE ASSESSMENT ADULT - COMMENTS
Consult received and chart reviewed. RN reported patient not appropriate for PO trials at this time secondary to administration of medication resulting in increased lethargy. Reconsult when patient alert and appropriate for PO trials. Paged ACP.
As per Internal Medicine Note: 80 year old male with PMH of DM, HTN, HLD, chronic conjunctivitis s/p corneal surg on steroids p/w worsening agitation and FTT.    Patient seen by this service on 12/15/20 and 12/16/20 for swallow evaluation with recommendation of NPO, please refer to reports for full details.      SLP received patient sitting upright in bed, awake, alert, able to answer some simple questions but overall confused.  Patient with NGT in place.  Patient denies pain.
Progress Note 12/15/20: 80 year old male with PMH of DM, HTN, HLD, chronic conjunctivitis s/p corneal surg on steroids p/w worsening agitation and FTT    CTH 12/13/20: No acute intracranial hemorrhage or mass effect.  CXR 12/13/20: Clear lungs.    Initial clinical bedside swallow evaluation completed on 12/15/20 (see note for details).     Patient was seen upright at bedside with RN present. Patient in alert/awake state and verbally responsive to simple questions. Patient orally receptive to PO trials.
As per Internal Medicine Attendin year old male with PMH of DM, HTN, HLD, chronic conjunctivitis s/p corneal surg on steroids p/w worsening agitation and FTT.    SLP received patient in bed, sitting upright, awake, alert, bandages over eyes, verbalizing but does not make sense, not able to follow directions.  No physical indicators of pain present.  RN in room for evaluation.

## 2020-12-27 NOTE — CONSULT NOTE ADULT - CONSULT REASON
corneal thinning
ams
Oropharyngeal dysphagia
Palliative Medicine was consulted for complex medical decision making related to goals of care discussions

## 2020-12-27 NOTE — PROGRESS NOTE ADULT - PROBLEM SELECTOR PLAN 1
-MRI/MRA head 12/18/20 revealed small acute/subacute infarcts in the left posterior frontal lobe along w/predominantly chronic bilateral subdural hematomas unchanged from CT dated 12/13/2020, new from MRI dated 6/23/2020, with small subacute subdural components  -cont ASA/statin; holding off on adding plavix  -appreciate palliative input; family deciding on PEG vs pleasure feeds; not interested in hospice  -speech/swallow reevaluate: NPO  - after numerous discussion family would like to proceed with PEG feeding.   - GI consult appreciated.

## 2020-12-27 NOTE — PROGRESS NOTE ADULT - ATTENDING COMMENTS
Discussed with the wife Maia in regards to conservative vs. aggressive options: pleasure feeds with aspiration risk/comfort care vs. PEG and NH scenario. Explained to her that chances of his recovery is poor and he will likely spent the rest of his life in NH.  The wife verbalize her understanding. discussed with the son Winston.   confirmed on 12/26/20 that they would like to proceed with PEG and give him a chance of recovery.   Remain full code.     - Dr. LUCHO Brothers ([facility])  - [phone]

## 2020-12-28 ENCOUNTER — TRANSCRIPTION ENCOUNTER (OUTPATIENT)
Age: 80
End: 2020-12-28

## 2020-12-28 ENCOUNTER — RESULT REVIEW (OUTPATIENT)
Age: 80
End: 2020-12-28

## 2020-12-28 LAB
ANION GAP SERPL CALC-SCNC: 9 MMOL/L — SIGNIFICANT CHANGE UP (ref 7–14)
APTT BLD: 24.4 SEC — LOW (ref 27–36.3)
BUN SERPL-MCNC: 17 MG/DL — SIGNIFICANT CHANGE UP (ref 7–23)
CALCIUM SERPL-MCNC: 9.5 MG/DL — SIGNIFICANT CHANGE UP (ref 8.4–10.5)
CHLORIDE SERPL-SCNC: 97 MMOL/L — LOW (ref 98–107)
CO2 SERPL-SCNC: 29 MMOL/L — SIGNIFICANT CHANGE UP (ref 22–31)
CREAT SERPL-MCNC: 0.54 MG/DL — SIGNIFICANT CHANGE UP (ref 0.5–1.3)
GLUCOSE SERPL-MCNC: 173 MG/DL — HIGH (ref 70–99)
HCT VFR BLD CALC: 36.8 % — LOW (ref 39–50)
HGB BLD-MCNC: 12 G/DL — LOW (ref 13–17)
INR BLD: 1.04 RATIO — SIGNIFICANT CHANGE UP (ref 0.88–1.16)
MAGNESIUM SERPL-MCNC: 2 MG/DL — SIGNIFICANT CHANGE UP (ref 1.6–2.6)
MCHC RBC-ENTMCNC: 28.7 PG — SIGNIFICANT CHANGE UP (ref 27–34)
MCHC RBC-ENTMCNC: 32.6 GM/DL — SIGNIFICANT CHANGE UP (ref 32–36)
MCV RBC AUTO: 88 FL — SIGNIFICANT CHANGE UP (ref 80–100)
NRBC # BLD: 0 /100 WBCS — SIGNIFICANT CHANGE UP
NRBC # FLD: 0 K/UL — SIGNIFICANT CHANGE UP
PHOSPHATE SERPL-MCNC: 2.9 MG/DL — SIGNIFICANT CHANGE UP (ref 2.5–4.5)
PLATELET # BLD AUTO: 175 K/UL — SIGNIFICANT CHANGE UP (ref 150–400)
POTASSIUM SERPL-MCNC: 4.1 MMOL/L — SIGNIFICANT CHANGE UP (ref 3.5–5.3)
POTASSIUM SERPL-SCNC: 4.1 MMOL/L — SIGNIFICANT CHANGE UP (ref 3.5–5.3)
PROTHROM AB SERPL-ACNC: 11.9 SEC — SIGNIFICANT CHANGE UP (ref 10.6–13.6)
RBC # BLD: 4.18 M/UL — LOW (ref 4.2–5.8)
RBC # FLD: 13.3 % — SIGNIFICANT CHANGE UP (ref 10.3–14.5)
SODIUM SERPL-SCNC: 135 MMOL/L — SIGNIFICANT CHANGE UP (ref 135–145)
WBC # BLD: 5.1 K/UL — SIGNIFICANT CHANGE UP (ref 3.8–10.5)
WBC # FLD AUTO: 5.1 K/UL — SIGNIFICANT CHANGE UP (ref 3.8–10.5)

## 2020-12-28 PROCEDURE — 88305 TISSUE EXAM BY PATHOLOGIST: CPT | Mod: 26

## 2020-12-28 PROCEDURE — 88312 SPECIAL STAINS GROUP 1: CPT | Mod: 26

## 2020-12-28 RX ADMIN — ATORVASTATIN CALCIUM 10 MILLIGRAM(S): 80 TABLET, FILM COATED ORAL at 23:30

## 2020-12-28 RX ADMIN — Medication 1: at 06:30

## 2020-12-28 RX ADMIN — Medication 1 DROP(S): at 23:31

## 2020-12-28 RX ADMIN — Medication 1 MILLIGRAM(S): at 23:31

## 2020-12-28 RX ADMIN — Medication 1 DROP(S): at 06:32

## 2020-12-28 RX ADMIN — Medication 2: at 12:44

## 2020-12-28 RX ADMIN — Medication 3 MILLIGRAM(S): at 23:31

## 2020-12-28 RX ADMIN — DIVALPROEX SODIUM 125 MILLIGRAM(S): 500 TABLET, DELAYED RELEASE ORAL at 06:29

## 2020-12-28 RX ADMIN — ENOXAPARIN SODIUM 40 MILLIGRAM(S): 100 INJECTION SUBCUTANEOUS at 18:56

## 2020-12-28 RX ADMIN — DIVALPROEX SODIUM 125 MILLIGRAM(S): 500 TABLET, DELAYED RELEASE ORAL at 20:05

## 2020-12-28 RX ADMIN — Medication 110 MILLIGRAM(S): at 11:13

## 2020-12-28 RX ADMIN — Medication 15 MILLIGRAM(S): at 06:29

## 2020-12-28 RX ADMIN — Medication 1 DROP(S): at 12:53

## 2020-12-28 RX ADMIN — Medication 110 MILLIGRAM(S): at 22:28

## 2020-12-28 RX ADMIN — Medication 10 MILLIGRAM(S): at 06:29

## 2020-12-28 NOTE — DISCHARGE NOTE PROVIDER - NSDCMRMEDTOKEN_GEN_ALL_CORE_FT
acetaminophen 325 mg oral tablet: 2 tab(s) orally every 6 hours, As needed, Mild Pain (1 - 3), Moderate Pain (4 - 6)  ascorbic acid 1000 mg oral tablet: 1 tab(s) orally once a day  ascorbic acid 1000 mg oral tablet: 1 tab(s) orally once a day  Aspir 81 oral delayed release tablet: 1 tab(s) orally once a day  Aspir 81 oral delayed release tablet: 1 tab(s) orally once a day  atorvastatin 10 mg oral tablet: 1 tab(s) orally once a day  Cosopt 2.23%-0.68% ophthalmic solution: 1 drop(s) to each affected eye 2 times a day  doxycycline hyclate 100 mg oral capsule: 100 milligram(s) orally 2 times a day  doxycycline monohydrate 100 mg oral capsule: 1 cap(s) orally every 12 hours    You must follow up with your ophthalmologist tomorrow 11/19/2020, to discuss how long you will need to stay on this medication  enalapril 5 mg oral tablet: 1 tab(s) orally once a day  enalapril 5 mg oral tablet: 1 tab(s) orally once a day  famotidine 20 mg oral tablet: 1 tab(s) orally 2 times a day  famotidine 20 mg oral tablet: 1 tab(s) orally once a day  Flomax 0.4 mg oral capsule: 1 cap(s) orally once a day  Lipitor 10 mg oral tablet: 1 tab(s) orally once a day  metFORMIN 1000 mg oral tablet: 1 tab(s) orally 2 times a day  metFORMIN 1000 mg oral tablet: 1 tab(s) orally 2 times a day  MiraLax oral powder for reconstitution: 17 gram(s) orally once a day  MiraLax oral powder for reconstitution: 1 dose(s) orally once a day  moxifloxacin 0.5% ophthalmic solution: 1 drop(s) in both eyes 4 times a day (6am, 10am, 2pm and 8pm)    Dispense 30 day supply  moxifloxacin 0.5% ophthalmic solution: 1 drop(s) to each affected eye 4 times a day  predniSONE 10 mg oral tablet: 30 milligram(s) orally once a day  predniSONE 20 mg oral tablet: 2 tab(s) orally every 24 hours    You must follow up with ophthalmology tomorrow (11/19 /2020) to discuss tapering this medication  Senna 8.6 mg oral tablet: 2 tab(s) orally once a day (at bedtime)  Senna 8.6 mg oral tablet: 1 tab(s) orally once a day (at bedtime)  tamsulosin 0.4 mg oral capsule: 1 cap(s) orally once a day  Vitamin B12 1000 mcg oral tablet: 1 tab(s) orally once a day  Vitamin B12 1000 mcg oral tablet: 1 tab(s) orally once a day   ascorbic acid 1000 mg oral tablet: 1 tab(s) orally once a day  aspirin 81 mg oral tablet, chewable: 1 tab(s) orally once a day  atorvastatin 10 mg oral tablet: 1 tab(s) orally once a day (at bedtime)  atorvastatin 10 mg oral tablet: 1 tab(s) orally once a day  bisacodyl 10 mg rectal suppository: 1 suppository(ies) rectal once a day, As needed, Constipation  Cosopt 2.23%-0.68% ophthalmic solution: 1 drop(s) to each affected eye 2 times a day  cyanocobalamin 1000 mcg oral tablet: 1 tab(s) orally once a day  divalproex sodium 125 mg oral delayed release capsule: 1 cap(s) orally 2 times a day  doxazosin 1 mg oral tablet: 1 tab(s) orally once a day (at bedtime)  doxycycline 100 mg injection: 100  injectable 2 times a day  enoxaparin: 40 milligram(s) subcutaneous once a day  melatonin 3 mg oral tablet: 1 tab(s) orally once a day (at bedtime)  metFORMIN 1000 mg oral tablet: 1 tab(s) orally 2 times a day  moxifloxacin 0.5% ophthalmic solution: 1 drop(s) to each affected eye   Multiple Vitamins with Minerals oral liquid: 15 milliliter(s) orally once a day through PEG   pantoprazole 40 mg intravenous injection: 40 milligram(s) intravenous once a day  polyethylene glycol 3350 oral powder for reconstitution: 17 gram(s) orally once a day, As needed, Constipation  senna oral tablet: 2 tab(s) orally once a day (at bedtime), As needed, Constipation   ascorbic acid 1000 mg oral tablet: 1 tab(s) orally once a day  aspirin 81 mg oral tablet, chewable: 1 tab(s) orally once a day  atorvastatin 10 mg oral tablet: 1 tab(s) orally once a day (at bedtime)  bisacodyl 10 mg rectal suppository: 1 suppository(ies) rectal once a day, As needed, Constipation  Cosopt 2.23%-0.68% ophthalmic solution: 1 drop(s) to each affected eye 2 times a day  cyanocobalamin 1000 mcg oral tablet: 1 tab(s) orally once a day  divalproex sodium 125 mg oral delayed release capsule: 1 cap(s) orally 2 times a day  doxazosin 1 mg oral tablet: 1 tab(s) orally once a day (at bedtime)  doxycycline 100 mg injection: 100  injectable 2 times a day  enoxaparin: 40 milligram(s) subcutaneous once a day  melatonin 3 mg oral tablet: 1 tab(s) orally once a day (at bedtime)  metFORMIN 1000 mg oral tablet: 1 tab(s) orally 2 times a day  moxifloxacin 0.5% ophthalmic solution: 1 drop(s) to each affected eye   Multiple Vitamins with Minerals oral liquid: 15 milliliter(s) orally once a day through PEG   pantoprazole 40 mg intravenous injection: 40 milligram(s) intravenous once a day  polyethylene glycol 3350 oral powder for reconstitution: 17 gram(s) orally once a day, As needed, Constipation  predniSONE: 10 milligram(s) by gastrostomy tube as follows for taper from 1/2/21 - 1/8/21  5 milligrams by gastrostomy tube as follows for taper from 1/9/21 - 1/15/21  senna oral tablet: 2 tab(s) orally once a day (at bedtime), As needed, Constipation  thiamine 100 mg oral tablet: 1 tab(s) orally once a day

## 2020-12-28 NOTE — DISCHARGE NOTE PROVIDER - PROVIDER TOKENS
FREE:[LAST:[Ophthalmology],PHONE:[(   )    -],FAX:[(   )    -],ADDRESS:[[facility] Department of Ophthalmology within 2-3 days of discharge at:    [address] [place] [address]  [phone]]],PROVIDER:[TOKEN:[[id number]]] PROVIDER:[TOKEN:[[id number]],FOLLOWUP:[1-3 days]],FREE:[LAST:[Ophthalmology],PHONE:[(   )    -],FAX:[(   )    -],ADDRESS:[[facility] Department of Ophthalmology within 2-3 days of discharge at:    [address] [place] [address]  [phone]],FOLLOWUP:[1-3 days]]

## 2020-12-28 NOTE — DISCHARGE NOTE PROVIDER - DETAILS OF MALNUTRITION DIAGNOSIS/DIAGNOSES
This patient has been assessed with a concern for Malnutrition and was treated during this hospitalization for the following Nutrition diagnosis/diagnoses:     -  12/17/2020: Severe protein-calorie malnutrition   -  12/14/2020: Moderate protein-calorie malnutrition   This patient has been assessed with a concern for Malnutrition and was treated during this hospitalization for the following Nutrition diagnosis/diagnoses:     -  12/17/2020: Severe protein-calorie malnutrition   -  12/14/2020: Moderate protein-calorie malnutrition    This patient has been assessed with a concern for Malnutrition and was treated during this hospitalization for the following Nutrition diagnosis/diagnoses:     -  12/17/2020: Severe protein-calorie malnutrition   -  12/14/2020: Moderate protein-calorie malnutrition   This patient has been assessed with a concern for Malnutrition and was treated during this hospitalization for the following Nutrition diagnosis/diagnoses:     -  12/17/2020: Severe protein-calorie malnutrition   -  12/14/2020: Moderate protein-calorie malnutrition    This patient has been assessed with a concern for Malnutrition and was treated during this hospitalization for the following Nutrition diagnosis/diagnoses:     -  12/17/2020: Severe protein-calorie malnutrition   -  12/14/2020: Moderate protein-calorie malnutrition    This patient has been assessed with a concern for Malnutrition and was treated during this hospitalization for the following Nutrition diagnosis/diagnoses:     -  12/17/2020: Severe protein-calorie malnutrition   -  12/14/2020: Moderate protein-calorie malnutrition   This patient has been assessed with a concern for Malnutrition and was treated during this hospitalization for the following Nutrition diagnosis/diagnoses:     -  12/17/2020: Severe protein-calorie malnutrition   -  12/14/2020: Moderate protein-calorie malnutrition    This patient has been assessed with a concern for Malnutrition and was treated during this hospitalization for the following Nutrition diagnosis/diagnoses:     -  12/17/2020: Severe protein-calorie malnutrition   -  12/14/2020: Moderate protein-calorie malnutrition    This patient has been assessed with a concern for Malnutrition and was treated during this hospitalization for the following Nutrition diagnosis/diagnoses:     -  12/17/2020: Severe protein-calorie malnutrition   -  12/14/2020: Moderate protein-calorie malnutrition    This patient has been assessed with a concern for Malnutrition and was treated during this hospitalization for the following Nutrition diagnosis/diagnoses:     -  12/17/2020: Severe protein-calorie malnutrition   -  12/14/2020: Moderate protein-calorie malnutrition   This patient has been assessed with a concern for Malnutrition and was treated during this hospitalization for the following Nutrition diagnosis/diagnoses:     -  12/17/2020: Severe protein-calorie malnutrition   -  12/14/2020: Moderate protein-calorie malnutrition    This patient has been assessed with a concern for Malnutrition and was treated during this hospitalization for the following Nutrition diagnosis/diagnoses:     -  12/17/2020: Severe protein-calorie malnutrition   -  12/14/2020: Moderate protein-calorie malnutrition    This patient has been assessed with a concern for Malnutrition and was treated during this hospitalization for the following Nutrition diagnosis/diagnoses:     -  12/17/2020: Severe protein-calorie malnutrition   -  12/14/2020: Moderate protein-calorie malnutrition    This patient has been assessed with a concern for Malnutrition and was treated during this hospitalization for the following Nutrition diagnosis/diagnoses:     -  12/17/2020: Severe protein-calorie malnutrition   -  12/14/2020: Moderate protein-calorie malnutrition    This patient has been assessed with a concern for Malnutrition and was treated during this hospitalization for the following Nutrition diagnosis/diagnoses:     -  12/17/2020: Severe protein-calorie malnutrition   -  12/14/2020: Moderate protein-calorie malnutrition   This patient has been assessed with a concern for Malnutrition and was treated during this hospitalization for the following Nutrition diagnosis/diagnoses:     -  12/17/2020: Severe protein-calorie malnutrition   -  12/14/2020: Moderate protein-calorie malnutrition    This patient has been assessed with a concern for Malnutrition and was treated during this hospitalization for the following Nutrition diagnosis/diagnoses:     -  12/17/2020: Severe protein-calorie malnutrition   -  12/14/2020: Moderate protein-calorie malnutrition    This patient has been assessed with a concern for Malnutrition and was treated during this hospitalization for the following Nutrition diagnosis/diagnoses:     -  12/17/2020: Severe protein-calorie malnutrition   -  12/14/2020: Moderate protein-calorie malnutrition    This patient has been assessed with a concern for Malnutrition and was treated during this hospitalization for the following Nutrition diagnosis/diagnoses:     -  12/17/2020: Severe protein-calorie malnutrition   -  12/14/2020: Moderate protein-calorie malnutrition    This patient has been assessed with a concern for Malnutrition and was treated during this hospitalization for the following Nutrition diagnosis/diagnoses:     -  12/17/2020: Severe protein-calorie malnutrition   -  12/14/2020: Moderate protein-calorie malnutrition    This patient has been assessed with a concern for Malnutrition and was treated during this hospitalization for the following Nutrition diagnosis/diagnoses:     -  12/17/2020: Severe protein-calorie malnutrition   -  12/14/2020: Moderate protein-calorie malnutrition   This patient has been assessed with a concern for Malnutrition and was treated during this hospitalization for the following Nutrition diagnosis/diagnoses:     -  12/17/2020: Severe protein-calorie malnutrition   -  12/14/2020: Moderate protein-calorie malnutrition    This patient has been assessed with a concern for Malnutrition and was treated during this hospitalization for the following Nutrition diagnosis/diagnoses:     -  12/17/2020: Severe protein-calorie malnutrition   -  12/14/2020: Moderate protein-calorie malnutrition    This patient has been assessed with a concern for Malnutrition and was treated during this hospitalization for the following Nutrition diagnosis/diagnoses:     -  12/17/2020: Severe protein-calorie malnutrition   -  12/14/2020: Moderate protein-calorie malnutrition    This patient has been assessed with a concern for Malnutrition and was treated during this hospitalization for the following Nutrition diagnosis/diagnoses:     -  12/17/2020: Severe protein-calorie malnutrition   -  12/14/2020: Moderate protein-calorie malnutrition    This patient has been assessed with a concern for Malnutrition and was treated during this hospitalization for the following Nutrition diagnosis/diagnoses:     -  12/17/2020: Severe protein-calorie malnutrition   -  12/14/2020: Moderate protein-calorie malnutrition    This patient has been assessed with a concern for Malnutrition and was treated during this hospitalization for the following Nutrition diagnosis/diagnoses:     -  12/17/2020: Severe protein-calorie malnutrition   -  12/14/2020: Moderate protein-calorie malnutrition    This patient has been assessed with a concern for Malnutrition and was treated during this hospitalization for the following Nutrition diagnosis/diagnoses:     -  12/17/2020: Severe protein-calorie malnutrition   -  12/14/2020: Moderate protein-calorie malnutrition   This patient has been assessed with a concern for Malnutrition and was treated during this hospitalization for the following Nutrition diagnosis/diagnoses:     -  12/17/2020: Severe protein-calorie malnutrition   -  12/14/2020: Moderate protein-calorie malnutrition    This patient has been assessed with a concern for Malnutrition and was treated during this hospitalization for the following Nutrition diagnosis/diagnoses:     -  12/17/2020: Severe protein-calorie malnutrition   -  12/14/2020: Moderate protein-calorie malnutrition    This patient has been assessed with a concern for Malnutrition and was treated during this hospitalization for the following Nutrition diagnosis/diagnoses:     -  12/17/2020: Severe protein-calorie malnutrition   -  12/14/2020: Moderate protein-calorie malnutrition    This patient has been assessed with a concern for Malnutrition and was treated during this hospitalization for the following Nutrition diagnosis/diagnoses:     -  12/17/2020: Severe protein-calorie malnutrition   -  12/14/2020: Moderate protein-calorie malnutrition    This patient has been assessed with a concern for Malnutrition and was treated during this hospitalization for the following Nutrition diagnosis/diagnoses:     -  12/17/2020: Severe protein-calorie malnutrition   -  12/14/2020: Moderate protein-calorie malnutrition    This patient has been assessed with a concern for Malnutrition and was treated during this hospitalization for the following Nutrition diagnosis/diagnoses:     -  12/17/2020: Severe protein-calorie malnutrition   -  12/14/2020: Moderate protein-calorie malnutrition    This patient has been assessed with a concern for Malnutrition and was treated during this hospitalization for the following Nutrition diagnosis/diagnoses:     -  12/17/2020: Severe protein-calorie malnutrition   -  12/14/2020: Moderate protein-calorie malnutrition    This patient has been assessed with a concern for Malnutrition and was treated during this hospitalization for the following Nutrition diagnosis/diagnoses:     -  12/17/2020: Severe protein-calorie malnutrition   -  12/14/2020: Moderate protein-calorie malnutrition   This patient has been assessed with a concern for Malnutrition and was treated during this hospitalization for the following Nutrition diagnosis/diagnoses:     -  12/17/2020: Severe protein-calorie malnutrition   -  12/14/2020: Moderate protein-calorie malnutrition    This patient has been assessed with a concern for Malnutrition and was treated during this hospitalization for the following Nutrition diagnosis/diagnoses:     -  12/17/2020: Severe protein-calorie malnutrition   -  12/14/2020: Moderate protein-calorie malnutrition    This patient has been assessed with a concern for Malnutrition and was treated during this hospitalization for the following Nutrition diagnosis/diagnoses:     -  12/17/2020: Severe protein-calorie malnutrition   -  12/14/2020: Moderate protein-calorie malnutrition    This patient has been assessed with a concern for Malnutrition and was treated during this hospitalization for the following Nutrition diagnosis/diagnoses:     -  12/17/2020: Severe protein-calorie malnutrition   -  12/14/2020: Moderate protein-calorie malnutrition    This patient has been assessed with a concern for Malnutrition and was treated during this hospitalization for the following Nutrition diagnosis/diagnoses:     -  12/17/2020: Severe protein-calorie malnutrition   -  12/14/2020: Moderate protein-calorie malnutrition    This patient has been assessed with a concern for Malnutrition and was treated during this hospitalization for the following Nutrition diagnosis/diagnoses:     -  12/17/2020: Severe protein-calorie malnutrition   -  12/14/2020: Moderate protein-calorie malnutrition    This patient has been assessed with a concern for Malnutrition and was treated during this hospitalization for the following Nutrition diagnosis/diagnoses:     -  12/17/2020: Severe protein-calorie malnutrition   -  12/14/2020: Moderate protein-calorie malnutrition    This patient has been assessed with a concern for Malnutrition and was treated during this hospitalization for the following Nutrition diagnosis/diagnoses:     -  12/17/2020: Severe protein-calorie malnutrition   -  12/14/2020: Moderate protein-calorie malnutrition    This patient has been assessed with a concern for Malnutrition and was treated during this hospitalization for the following Nutrition diagnosis/diagnoses:     -  12/17/2020: Severe protein-calorie malnutrition   -  12/14/2020: Moderate protein-calorie malnutrition

## 2020-12-28 NOTE — PROGRESS NOTE ADULT - SUBJECTIVE AND OBJECTIVE BOX
Chief Complaint:  Patient is a 80y old  Male who presents with a chief complaint of AMS (28 Dec 2020 07:29)      Interval Events: no acute events  Allergies:  No Known Allergies      Hospital Medications:  ascorbic acid 1000 milliGRAM(s) Oral daily  aspirin  chewable 81 milliGRAM(s) Oral daily  atorvastatin 10 milliGRAM(s) Oral at bedtime  bisacodyl Suppository 10 milliGRAM(s) Rectal daily PRN  cyanocobalamin 1000 MICROGram(s) Oral daily  dextrose 40% Gel 15 Gram(s) Oral once  dextrose 5%. 1000 milliLiter(s) IV Continuous <Continuous>  dextrose 5%. 1000 milliLiter(s) IV Continuous <Continuous>  dextrose 50% Injectable 25 Gram(s) IV Push once  dextrose 50% Injectable 12.5 Gram(s) IV Push once  dextrose 50% Injectable 25 Gram(s) IV Push once  diVALproex Sprinkle 125 milliGRAM(s) Oral two times a day  doxazosin 1 milliGRAM(s) Oral at bedtime  doxycycline IVPB 100 milliGRAM(s) IV Intermittent every 12 hours  enalapril 10 milliGRAM(s) Oral daily  enoxaparin Injectable 40 milliGRAM(s) SubCutaneous daily  glucagon  Injectable 1 milliGRAM(s) IntraMuscular once  insulin lispro (ADMELOG) corrective regimen sliding scale   SubCutaneous every 6 hours  melatonin 3 milliGRAM(s) Oral at bedtime  moxifloxacin 0.5% Solution 1 Drop(s) Both EYES <User Schedule>  multivitamin/minerals/iron Oral Solution (CENTRUM) 15 milliLiter(s) Oral daily  polyethylene glycol 3350 17 Gram(s) Oral daily PRN  predniSONE   Tablet 15 milliGRAM(s) Oral daily  senna 2 Tablet(s) Oral at bedtime PRN  sodium chloride 0.9%. 1000 milliLiter(s) IV Continuous <Continuous>  thiamine 100 milliGRAM(s) Oral daily      PMHX/PSHX:  BPH (benign prostatic hyperplasia)    Hyperlipidemia    HTN (hypertension)    DM (diabetes mellitus)    No pertinent past medical history    No significant past surgical history        Family history:  No pertinent family history in first degree relatives        ROS:     General:  No wt loss, fevers, chills, night sweats, fatigue,   Eyes:  Good vision, no reported pain  ENT:  No sore throat, pain, runny nose, dysphagia  CV:  No pain, palpitations, hypo/hypertension  Resp:  No dyspnea, cough, tachypnea, wheezing  GI:  See HPI  :  No pain, bleeding, incontinence, nocturia  Muscle:  No pain, weakness  Neuro:  No weakness, tingling, memory problems  Psych:  No fatigue, insomnia, mood problems, depression  Endocrine:  No polyuria, polydipsia, cold/heat intolerance  Heme:  No petechiae, ecchymosis, easy bruisability  Skin:  No rash, edema      PHYSICAL EXAM:     GENERAL:  Appears stated age, well-groomed, well-nourished, no distress  HEENT:  NC/AT,  conjunctivae clear, sclera-anicteric  NECK: Trachea midline, supple  CHEST:  Full & symmetric excursion, no increased effort, breath sounds clear  HEART:  Regular rhythm, no daily/heave  ABDOMEN:  Soft, non-tender, non-distended, normoactive bowel sounds,  no masses ,no hepato-splenomegaly,   EXTREMITIES:  no cyanosis,clubbing or edema  SKIN:  No rash/erythema/petechiae, no jaundice  NEURO:  Alert, oriented, no asterixis  RECTAL: Deferred    Vital Signs:  Vital Signs Last 24 Hrs  T(C): 36.4 (28 Dec 2020 10:07), Max: 36.6 (28 Dec 2020 10:07)  T(F): 97.6 (28 Dec 2020 10:07), Max: 97.8 (28 Dec 2020 10:07)  HR: 81 (28 Dec 2020 10:07) (60 - 81)  BP: 134/91 (28 Dec 2020 10:07) (120/83 - 134/91)  BP(mean): --  RR: 17 (28 Dec 2020 10:07) (17 - 18)  SpO2: 100% (28 Dec 2020 10:07) (98% - 100%)  Daily     Daily     LABS:                        12.0   5.10  )-----------( 175      ( 28 Dec 2020 06:20 )             36.8     12-28    135  |  97<L>  |  17  ----------------------------<  173<H>  4.1   |  29  |  0.54    Ca    9.5      28 Dec 2020 06:20  Phos  2.9     12-28  Mg     2.0     12-28        PT/INR - ( 28 Dec 2020 07:16 )   PT: 11.9 sec;   INR: 1.04 ratio         PTT - ( 28 Dec 2020 07:16 )  PTT:24.4 sec        Imaging:

## 2020-12-28 NOTE — PROGRESS NOTE ADULT - SUBJECTIVE AND OBJECTIVE BOX
80 year old male with PMH of DM, HTN, HLD admitted for worsening agitation and confusion at home. The patient follows at [facility] for progressive thinning of the bilateral corneas, for which he had a patch graft OD during prior admission and required gluing multiple times OS, uncertain cause of corneal melting (prior malignancy work-up was negative).    S: Patient seen and examined at bedside. Reports vision feels more clear.     Alert and oriented x 1-2 ; appropriate mood and affect    Ophthalmology Exam:  Visual acuity (sc): HM OU  Pupils: PERRL OU, no APD  Ttono: Soft OU  Extraocular movements (EOMs): Full OU, no pain, no diplopia  Confrontational Visual Field (CVF): ED due to MS  Color Plates: ED due to poor vision    Pen Light Exam (PLE)  External: Clear shield in place OU  Lids/Lashes/Lacrimal Ducts: Flat OU Inferior lids noted to be slightly ectropic OU with deep fornices, eyelids crusted shut with significant discharge (which was cleaned gently with gauze and saline)  Sclera/Conjunctiva: tr injection OD, 1+ injection OS  Cornea: OD with patch graft in place, well sutured, laura negative, no epithelial defect, trace SPK; OS with corneal glue intact overlying central area of defect with overlying BCL.   Anterior Chamber: D+F OU, No hypopyon appreciated OU, formed OU  Iris: poor view but appears Flat OU  Lens: poor view OU

## 2020-12-28 NOTE — PROGRESS NOTE ADULT - ASSESSMENT
80 year old man with subacute CVA and subdural hematomas now with oropharyngeal dysphagia        Problem/Recommendation - 1:  Problem: Oropharyngeal aspiration. Recommendation: plan for PEG placement today     Problem/Recommendation - 2:  ·  Problem: CVA (cerebral vascular accident).  Recommendation: per neurology.      Problem/Recommendation - 3:  ·  Problem: HTN (hypertension).      Problem/Recommendation - 4:  ·  Problem: DM (diabetes mellitus).     Attending Attestation:   Differential diagnosis and plan of care discussed with patient after the evaluation  35 Minutes spent on total encounter of which more than fifty percent of the encounter was spent counseling and/or coordinating care by the attending physician.    Davidson Roca M.D.   Gastroenterology and Hepatology  Cell: [phone] .      Electronic Signatures:  Davidson Roca)  (Signed 27-Dec-2020 17:17)  	Authored: Consult Note, Referral/Consultation, Subjective and Objective, Assessment and Recommendation, Attending Attestation

## 2020-12-28 NOTE — PROGRESS NOTE ADULT - SUBJECTIVE AND OBJECTIVE BOX
SUBJECTIVE / OVERNIGHT EVENTS:  s/p PEG  mild discomfort but no overt pain  awake alert  comfortable         --------------------------------------------------------------------------------------------  LABS:                        12.0   5.10  )-----------( 175      ( 28 Dec 2020 06:20 )             36.8     12-28    135  |  97<L>  |  17  ----------------------------<  173<H>  4.1   |  29  |  0.54    Ca    9.5      28 Dec 2020 06:20  Phos  2.9     12-28  Mg     2.0     12-28      PT/INR - ( 28 Dec 2020 07:16 )   PT: 11.9 sec;   INR: 1.04 ratio         PTT - ( 28 Dec 2020 07:16 )  PTT:24.4 sec  CAPILLARY BLOOD GLUCOSE      POCT Blood Glucose.: 143 mg/dL (28 Dec 2020 17:22)  POCT Blood Glucose.: 183 mg/dL (28 Dec 2020 15:26)  POCT Blood Glucose.: 233 mg/dL (28 Dec 2020 12:00)  POCT Blood Glucose.: 157 mg/dL (28 Dec 2020 05:44)  POCT Blood Glucose.: 130 mg/dL (27 Dec 2020 23:20)            RADIOLOGY & ADDITIONAL TESTS:    Imaging Personally Reviewed:  [x] YES  [ ] NO    Consultant(s) Notes Reviewed:  [x] YES  [ ] NO    MEDICATIONS  (STANDING):  ascorbic acid 1000 milliGRAM(s) Oral daily  aspirin  chewable 81 milliGRAM(s) Oral daily  atorvastatin 10 milliGRAM(s) Oral at bedtime  cyanocobalamin 1000 MICROGram(s) Oral daily  dextrose 40% Gel 15 Gram(s) Oral once  dextrose 5%. 1000 milliLiter(s) (50 mL/Hr) IV Continuous <Continuous>  dextrose 5%. 1000 milliLiter(s) (100 mL/Hr) IV Continuous <Continuous>  dextrose 50% Injectable 25 Gram(s) IV Push once  dextrose 50% Injectable 12.5 Gram(s) IV Push once  dextrose 50% Injectable 25 Gram(s) IV Push once  diVALproex Sprinkle 125 milliGRAM(s) Oral two times a day  doxazosin 1 milliGRAM(s) Oral at bedtime  doxycycline IVPB 100 milliGRAM(s) IV Intermittent every 12 hours  enalapril 10 milliGRAM(s) Oral daily  enoxaparin Injectable 40 milliGRAM(s) SubCutaneous daily  glucagon  Injectable 1 milliGRAM(s) IntraMuscular once  insulin lispro (ADMELOG) corrective regimen sliding scale   SubCutaneous every 6 hours  melatonin 3 milliGRAM(s) Oral at bedtime  moxifloxacin 0.5% Solution 1 Drop(s) Both EYES <User Schedule>  multivitamin/minerals/iron Oral Solution (CENTRUM) 15 milliLiter(s) Oral daily  predniSONE   Tablet 15 milliGRAM(s) Oral daily  thiamine 100 milliGRAM(s) Oral daily    MEDICATIONS  (PRN):  bisacodyl Suppository 10 milliGRAM(s) Rectal daily PRN Constipation  polyethylene glycol 3350 17 Gram(s) Oral daily PRN Constipation  senna 2 Tablet(s) Oral at bedtime PRN Constipation      Care Discussed with Consultants/Other Providers [x] YES  [ ] NO    Vital Signs Last 24 Hrs  T(C): 36.3 (28 Dec 2020 14:47), Max: 36.6 (28 Dec 2020 10:07)  T(F): 97.3 (28 Dec 2020 14:47), Max: 97.8 (28 Dec 2020 10:07)  HR: 61 (28 Dec 2020 15:30) (60 - 75)  BP: 103/72 (28 Dec 2020 15:30) (101/60 - 134/91)  BP(mean): --  RR: 17 (28 Dec 2020 15:30) (12 - 18)  SpO2: 100% (28 Dec 2020 15:30) (97% - 100%)  I&O's Summary      PHYSICAL EXAM:  GENERAL: NAD, thin cachetic elderly, NG tube removed.  HEAD:  Atraumatic, Normocephalic  EYES: eyes close shut   NECK: Supple, No JVD  CHEST/LUNG: mild decrease breath sounds bilaterally; No wheeze   HEART: Regular rate and rhythm; No murmurs, rubs, or gallops  ABDOMEN: Soft, Nontender, Nondistended; Bowel sounds present, PEG in place  Neuro: AAOx1, slow words, poor insight, eyes close shut  EXTREMITIES:  2+ Peripheral Pulses, No clubbing, cyanosis, or edema  SKIN: No rashes or lesions

## 2020-12-28 NOTE — PROGRESS NOTE ADULT - PROBLEM SELECTOR PLAN 1
-MRI/MRA head 12/18/20 revealed small acute/subacute infarcts in the left posterior frontal lobe along w/predominantly chronic bilateral subdural hematomas unchanged from CT dated 12/13/2020, new from MRI dated 6/23/2020, with small subacute subdural components  -cont ASA/statin; holding off on adding plavix  -appreciate palliative input; family not interested in hospice  -speech/swallow reevaluate: NPO  - after numerous discussion family would like to proceed with PEG feeding.   - GI consult appreciated. PEG placed 12/28/20

## 2020-12-28 NOTE — DISCHARGE NOTE PROVIDER - NSDCFUADDAPPT_GEN_ALL_CORE_FT
Patient should follow-up with his/her ophthalmologist or with [facility] Department of Ophthalmology within 2-3 days of discharge at:    [address]  [place] [address]  [phone]   If you are in need of a general medicine physician and post-discharge medical follow-up for further care/recommendations you may contact the [facility] for an appointment [phone]     If you are in need of a general medicine physician and post-discharge medical follow-up for further care/recommendations you may contact the [facility] for an appointment [phone]    Repeat CT Head in 4 weeks to see if there is any progression of subdural hematoma     Follow up with ophthalmologist within 2-3 days of discharge

## 2020-12-28 NOTE — DISCHARGE NOTE PROVIDER - NSDCCPCAREPLAN_GEN_ALL_CORE_FT
PRINCIPAL DISCHARGE DIAGNOSIS  Diagnosis: Agitation  Assessment and Plan of Treatment:       SECONDARY DISCHARGE DIAGNOSES  Diagnosis: Conjunctiva disorder  Assessment and Plan of Treatment: Prednisone decreased to 15mg daily on 12/25-cont to taper down by 5mg every week     PRINCIPAL DISCHARGE DIAGNOSIS  Diagnosis: Metabolic encephalopathy  Assessment and Plan of Treatment: Metabolic encephalopathy  - CTH unremarkable, CXR negative; UA weakly (+); UCx neg  - Neuro consulted 12/15- started on thiamine 500mg IV daily x 3 followed by PO  - started depakote 125mg PO BID for agitation per neuro while on steroids  - failed S+S, NGT placed 12/16, CXR confirmed placement, failed S+S again 12/22  - MRI + MRA brain 12/18-- Small acute/subacute infarcts in the left posterior frontal lobe  - ASA + Statin; Plavix held per Neuro  - per Neuro subacute componenets in subdural hematomas, therefore will hold off on adding 2nd antiplatelet   - repeat CT Head in 4 weeks to see if there is any progression of sdh   12/25 s/p fall slided down (witnessed), no Fx, bruising, hematoma      SECONDARY DISCHARGE DIAGNOSES  Diagnosis: COVID-19  Assessment and Plan of Treatment:     Diagnosis: Conjunctiva disorder  Assessment and Plan of Treatment: You were sen by Optho team during hospital stay. Prednisone decreased to 15mg daily on 12/25-cont to taper down by 5mg every week.    Diagnosis: Severe protein-calorie malnutrition  Assessment and Plan of Treatment: A PEG tube was placed 12/28/2020 and you were started on PEG tube feeds. Please follow upw ith the GI team as outpatient within 2-3 weeks of discharge from the hospital. Please follow up with your primary care provider within 1 week of discharge from the hospital. If you do not have a primary care provider please follow up with the [facility], call [phone]    Diagnosis: Type 2 diabetes mellitus with other specified complication, without long-term current use of insulin  Assessment and Plan of Treatment: Continue your medication regimen and a consistent carbohydrate diet (Meaning eating the same amount of carbohydrates at the same time each day). Monitor blood glucose levels throughout the day before meals and at bedtime. Record blood sugars and bring to outpatient providers appointment in order to be reviewed by your doctor for management modifications. Monitor for signs/symptoms of low blood glucose, such as, dizziness, altered mental status, or cool/clammy skin. In addition, monitor for signs/symptoms of high blood glucose, such as, feeling hot, dry, fatigued, or with increased thirst/urination. Make regular podiatry appointments in order to have feet checked for wounds and uncontrolled toe nail growth to prevent infections, as well as, appointments with an ophthalmologist to monitor your vision.    Diagnosis: Essential hypertension  Assessment and Plan of Treatment: Continue blood pressure medication regimen as directed. Monitor for any visual changes, headaches or dizziness.  Monitor blood pressure regularly.  Follow up with your PCP for further management for high blood pressure.     PRINCIPAL DISCHARGE DIAGNOSIS  Diagnosis: Metabolic encephalopathy  Assessment and Plan of Treatment: You were found to have a stroke and treated accordingly. Please continue with prescribed blood thinning agents and participate in any recommended physical or occupational therapy to optimize your recovery. Continue with a low salt/fat diet and follow up w/ your Neurologist johanna 2 weeks for repeat labs and continued care. Ask your provider about available support groups for stroke survivors for emotional support.  You should have a repeat Head CT done within 4 weeks of discharge- call Dr. Alatorre for an appointment.         SECONDARY DISCHARGE DIAGNOSES  Diagnosis: Severe protein-calorie malnutrition  Assessment and Plan of Treatment: A PEG tube was placed 12/28/2020 and you were started on PEG tube feeds. Please follow upw ith the GI team as outpatient within 2-3 weeks of discharge from the hospital. Please follow up with your primary care provider within 1 week of discharge from the hospital. If you do not have a primary care provider please follow up with the [facility], call [phone]    Diagnosis: COVID-19  Assessment and Plan of Treatment: You have been diagnosed with the COVID-19 virus during your hospital stay. You must self quarantine to complete a 14 day time period.  Monitor for fevers, shortness of breath and cough primarily.  Monitor your temperature daily to not any changes and increases.    It has been determined that you no longer need hospitalization and can recover while remaining in self-quarantine at home. You should follow the prevention steps below until a healthcare provider or local or state health department says you can return to your normal activities.  1. You should restrict activities outside your home, except for getting medical care.  2. Do not go to work, school, or public areas.  3. Avoid using public transportation, ride-sharing, or taxis.  4. Separate yourself from other people and animals in your home.  People: As much as possible, you should stay in a specific room and away from other people in your home. Also, you should use a separate bathroom, if available.  Animals: You should restrict contact with pets and other animals while you are sick with COVID-19, just like you would around other people. Although there have not been reports of pets or other animals becoming sick with COVID-19, it is still recommended that people sick with COVID-19 limit contact with animals until more information is known about the virus.  When possible, have another member of your household care for your animals while you are sick. If you must care for your pet or be around animals while you are sick, wash your hands before and after you interact with pets and wear a facemask.  5. Call ahead before visiting your doctor.  If you have a medical appointment, call the healthcare provider and tell them that you have or may have COVID-19. This will help the healthcare provider’s office take steps to keep other people from getting infected or exposed.  6. Wear a facemask.  You should wear a facemask when you are around other people (e.g., sharing a room or vehicle) or pets and before you enter a healthcare pro    Diagnosis: Type 2 diabetes mellitus with other specified complication, without long-term current use of insulin  Assessment and Plan of Treatment: Continue your medication regimen and a consistent carbohydrate diet (Meaning eating the same amount of carbohydrates at the same time each day). Monitor blood glucose levels throughout the day before meals and at bedtime. Record blood sugars and bring to outpatient providers appointment in order to be reviewed by your doctor for management modifications. Monitor for signs/symptoms of low blood glucose, such as, dizziness, altered mental status, or cool/clammy skin. In addition, monitor for signs/symptoms of high blood glucose, such as, feeling hot, dry, fatigued, or with increased thirst/urination. Make regular podiatry appointments in order to have feet checked for wounds and uncontrolled toe nail growth to prevent infections, as well as, appointments with an ophthalmologist to monitor your vision.    Diagnosis: Essential hypertension  Assessment and Plan of Treatment: Continue blood pressure medication regimen as directed. Monitor for any visual changes, headaches or dizziness.  Monitor blood pressure regularly.  Follow up with your PCP for further management for high blood pressure.    Diagnosis: Conjunctiva disorder  Assessment and Plan of Treatment: You were sen by Optho team during hospital stay. Prednisone decreased to 15mg daily on 12/25-contnue to taper down by 5mg every   week.  Outpatient follow-up: Patient should follow-up with his/her ophthalmologist or with [facility] Department of Ophthalmology within 2-3 days of discharge at:  [address]  [place] [address]

## 2020-12-28 NOTE — DISCHARGE NOTE PROVIDER - CARE PROVIDER_API CALL
Ophthalmology,   [facility] Department of Ophthalmology within 2-3 days of discharge at:    [address]  [place] [address]  [phone]  Phone: (   )    -  Fax: (   )    -  Follow Up Time:     Yelena Alatorre  NEUROLOGY  [address] [place] [id number]  Phone: [phone]  Fax: [phone]  Follow Up Time:    Yelena Alatorre  NEUROLOGY  [address] [place] [address]  Phone: [phone]  Fax: [phone]  Follow Up Time: 1-3 days    Ophthalmology,   [facility] Department of Ophthalmology within 2-3 days of discharge at:    [address]  [place] [address]  [phone]  Phone: (   )    -  Fax: (   )    -  Follow Up Time: 1-3 days

## 2020-12-28 NOTE — DISCHARGE NOTE PROVIDER - HOSPITAL COURSE
80 year old male with PMH of DM, HTN, HLD, chronic conjunctivitis s/p corneal surg on steroids p/w worsening agitation and FTT 80 year old male with PMH of DM, HTN, HLD, chronic conjunctivitis s/p corneal surg on steroids p/w worsening agitation and FTT    Metabolic encephalopathy  - CTH unremarkable, CXR negative; UA weakly (+); UCx neg  - Neuro consulted 12/15- started on thiamine 500mg IV daily x 3 followed by PO  - started depakote 125mg PO BID for agitation per neuro while on steroids  - failed S+S, NGT placed 12/16, CXR confirmed placement, failed S+S again 12/22  - MRI + MRA brain 12/18-- Small acute/subacute infarcts in the left posterior frontal lobe  - ASA + Statin; Plavix held per Neuro  - per Neuro subacute componenets in subdural hematomas, therefore will hold off on adding 2nd antiplatelet   - repeat CT Head in 4 weeks to see if there is any progression of sdh   12/25 s/p fall slided down (witnessed), no Fx, bruising, hematoma    FTT  - 12/28 s/p Peg, 12/29 PEG tube feeds started    COVID +  - Symptomatic care    Conjuctival disorder  - s/p corneal patch 11/2020 @ [facility]  - Currently no evidence of corneal perforation. No infectious infiltrate or ulceration  - Ophtalmology - c/w Prednisone taper, Moxifloxacin gtt  - c/w vitamin C 1g daily and doxycycline 100mg BID daily, Vitamin A level -21 low    - tapered down to pred 15mg daily 12/25 - cont taper by 5mg every week per optho   - prior conjunctival biopsy with lymphoplasmocytic infiltrate, consistent with chronic inflammatory process    DM type II  - HgbA1c 6.6%, c/w ISS & FS QID    HTN  - Increase Enalapril 5mg --> 10mg    Hx of Glaucoma  - Hold off on Cosopt for now    C- Family- Broached hospice w/ family today- not interested    Discussed case with  _____________________ on ________________________, patient medically stable for discharge to ___________________.   80 year old male with PMH of DM, HTN, HLD, chronic conjunctivitis s/p corneal surg on steroids p/w worsening agitation and FTT    Hospital Course  Metabolic encephalopathy  -in s/o dementia   - CTH unremarkable, CXR negative; UA (+); UCx neg  - Neuro followed- subacute components in subdural hematomas, therefore will hold off on adding 2nd antiplatelet   - C/w thiamine   - started depakote 125mg PO BID for agitation  - failed S+S evaluation and PEG tube placed  - MRI/MRA brain 12/18 w/ Small acute/subacute infarcts in the left posterior frontal lobe  - ASA + Statin; Plavix on hold per Neuro  -Patient to follow up repeat CT Head in 4 weeks to see if there is any progression of sdh     FTT  - s/p PEG placement PEG tube feeds started    COVID +  - Remained stable on RA   - Symptomatic care  - D dimer elevated, LE duplex neg. Received lovenox ppx   -Repeat COVID neg 1/4, 1/5     Conjuctival disorder  - s/p corneal patch 11/2020 @ [facility]  - Opthalmology followed- c/w Prednisone taper (taper by 5 mg every week), Moxifloxacin gtt  - c/w vitamin C 1g daily and doxycycline 100mg BID daily, Vitamin A level -21 low    - prior conjunctival biopsy with lymphoplasmocytic infiltrate, consistent with chronic inflammatory process    DM type II  -metformin held on admission   - HgbA1c 6.6%. Received ISS & FS QID    HTN  - c/w Enalapril      Dispo- On ___ this case was reviewed with  ____, the patient is medically stable and optimized for discharge. All medications were reviewed and prescriptions were sent to mutually agreed upon pharmacy..   80 year old male with PMH of DM, HTN, HLD, chronic conjunctivitis s/p corneal surg on steroids p/w worsening agitation and FTT    Hospital Course  Metabolic encephalopathy  -in s/o dementia   - CTH unremarkable, CXR negative; UA (+); UCx neg  - Neuro followed- subacute components in subdural hematomas, therefore will hold off on adding 2nd antiplatelet   - C/w thiamine   - started depakote 125mg PO BID for agitation  - failed S+S evaluation and PEG tube placed  - MRI/MRA brain 12/18 w/ Small acute/subacute infarcts in the left posterior frontal lobe  - ASA + Statin; Plavix on hold per Neuro  -Patient to follow up repeat CT Head in 4 weeks to see if there is any progression of sdh     FTT  - s/p PEG placement PEG tube feeds started    COVID +  - Remained stable on RA   - Symptomatic care  - D dimer elevated, LE duplex neg. Received lovenox ppx   -Repeat COVID neg 1/4, 1/5     Conjuctival disorder  - s/p corneal patch 11/2020 @ [facility]  - Opthalmology followed- c/w Prednisone taper (taper by 5 mg every week), Moxifloxacin gtt  - c/w vitamin C 1g daily and doxycycline 100mg BID daily, Vitamin A level -21 low    - prior conjunctival biopsy with lymphoplasmocytic infiltrate, consistent with chronic inflammatory process    DM type II  -metformin held on admission   - HgbA1c 6.6%. Received ISS & FS QID    HTN  - c/w Enalapril      Dispo- On 1/7/2021 this case was reviewed with Dr. Ana Brothers, the patient is medically stable and optimized for discharge. All medications were reviewed and prescriptions were sent to mutually agreed upon pharmacy..   80 year old male with PMH of DM, HTN, HLD, chronic conjunctivitis s/p corneal surg on steroids p/w worsening agitation and FTT    Hospital Course  Metabolic encephalopathy  -in s/o dementia   - CTH unremarkable, CXR negative; UA (+); UCx neg  - Neuro followed- subacute components in subdural hematomas, therefore will hold off on adding 2nd antiplatelet   - C/w thiamine   - started depakote 125mg PO BID for agitation  - failed S+S evaluation and PEG tube placed  - MRI/MRA brain 12/18 w/ Small acute/subacute infarcts in the left posterior frontal lobe  - ASA + Statin; Plavix on hold per Neuro  -Patient to follow up repeat CT Head in 4 weeks to see if there is any progression of sdh     FTT  - s/p PEG placement PEG tube feeds started    COVID +  - Remained stable on RA   - Symptomatic care  - D dimer elevated, LE duplex neg. Received lovenox ppx   -Repeat COVID neg 1/4, 1/5     Conjuctival disorder  - s/p corneal patch 11/2020 @ [facility]  - Opthalmology followed- c/w Prednisone taper (taper by 5 mg every week), Moxifloxacin gtt  - c/w vitamin C 1g daily and doxycycline 100mg BID daily, Vitamin A level -21 low    - prior conjunctival biopsy with lymphoplasmocytic infiltrate, consistent with chronic inflammatory process    DM type II  -metformin held on admission   - HgbA1c 6.6%. Received ISS & FS QID    HTN  - c/w Enalapril      Dispo- On 1/7/2021 this case was reviewed with Dr. Ana Brothers, the patient is medically stable and optimized for discharge. All medications were reviewed and prescriptions were sent to mutually agreed upon pharmacy.    Attending Addendum:  Patient seen and examined by me on the discharge day. Medications reviewed.  All questions answered in details. Follow up plan explained.  More than 30 mins were spent evaluating patient and coordinating care for discharge.  Discharge summary sent to pt's primary care physician at [facility].

## 2020-12-28 NOTE — PROGRESS NOTE ADULT - ASSESSMENT
80y male w/ pmhx/ochx of DM, HTN, HLD, chronic conjunctivitis and 60 lb weight loss with progressive corneal thinning of both eyes, s/p corneal patch graft of the right eye. OD with patch graft in place, without epi defect. OS with central perforation. On 12/24/20 glue was no longer in place, pt reglued and BCL placed. Now OS contact lens and glue in place. Chamber formed.     # Corneal thinning of both eyes, s/p corneal patch graft of the right eye and corneal gluing of the left eye (most recently 12/24/20)  - K glue placed over central area of perforation OS and BCL replaced 12/24/2020. Patient tolerated procedure well.   - c/w moxiflox QID to both eyes  - c/w taper PO prednisone to 15 mg and continue to taper down by 5 mg weekly. Patient with acute mental status change possibly related to steroids; if need to be held for this reason, please let ophthalmology know. Prednisone has helped slow this patient's corneal melting and thinning, but may be contributing to worsening mental status  - given recent weight loss and b/l nature of corneal pathology, malignancy workup was done to evaluate for underlying disease process, unrevealing to date   - c/w vitamin C 1g daily and doxycycline 100mg BID daily as per primary team if no contraindications  - serum vitamin A levels at 21. Pt on daily multi-vitamin and d/w primary team NP the need for additional vitamin A supplementation.   - c/w daily multivitamin to ensure adequate vit A supplementation  - prior conjunctival biopsy with lymphoplasmocytic infiltrate, consistent with chronic inflammatory process  - ophthalmology will monitor closely  - findings discussed with patient and primary team    D/W Dr. Warren (cornea) and Dr. De La Cruz (cornea)    Outpatient follow-up: Patient should follow-up with his/her ophthalmologist or with [facility] Department of Ophthalmology within 2-3 days of discharge at:    [address]  [place] [address]  [phone]

## 2020-12-28 NOTE — DISCHARGE NOTE PROVIDER - DISCHARGE DATE
DATE OF PROCEDURE:  01/27/2020



PREOPERATIVE DIAGNOSIS:  Left mid ureteral stone.



POSTOPERATIVE DIAGNOSIS:  Left mid ureteral stone.



PROCEDURES PERFORMED:  Cysto removal of left stent, left rigid ureteroscopy, laser

lithotripsy, left retrograde, and left stent replacement. 



ANESTHETIC:  General.



ESTIMATED BLOOD LOSS:  Minimal.



FINDINGS:  She had a 9 mm left mid ureteral stone.  It was a soft stone.  It

fragmented well with the laser into multiple small pieces, so we did not waste any

time in trying to remove any of these.  We just fragmented into pieces that washed

down with the flow of the irrigant. 



DESCRIPTION OF PROCEDURE:  After obtaining written and verbal consent from the

patient, she was taken to the operating suite.  She was placed in the supine

position on the treatment table.  PlexiPulses were placed on her lower extremities

and turned on.  She had received Rocephin IV piggyback.  She was given a general

anesthetic oral intubation, placed in the dorsal lithotomy position, sterilely

prepped and draped.  Fluoroscopy was brought in and used to help in the procedure.

Cystoscopy was performed with a 22-Macanese sheath.  This was well lubricated and

passed under direct vision through the female urethra into the urinary bladder with

aid of a 30-degree lens of video camera and monitor.  The bladder was filled and

emptied number of times, and the distal end of the indwelling double-J stent was

grasped and brought out through the urethral meatus.  A guidewire was fed up through

this into the region of the renal pelvis.  The stent was removed over the guidewire

intact and discarded.  We then brought in a small caliber, short graduated rigid

ureteroscope and passed it through the female urethra into the bladder under direct

vision with aid of a video camera and monitor.  We manipulated it adjacent to the

guidewire up to the level in the mid ureter, where a stone was identified.  We then

brought in a small caliber holmium laser fiber and used it to break the stone up

into multiple tiny tiny fragments.  Most of these washed down into her bladder by

the end of the procedure.  We then looked all the way up into the renal pelvis and

saw no other stones.  We looked at the ureter on its entirety of the way out.  There

were some slightly larger fragments down in the distal ureter, and these were broken

up into smaller pieces until they also had cleared.  At this point, we went back up

to the site of the original stone and slightly proximal to it.  There were no other

remaining stone fragments of any size.  The instruments were removed.  The guidewire

was backloaded through the 22-Macanese cystoscope, which was placed back into the

bladder.  The Pollack catheter was placed up into the region of the renal pelvis,

and the wire was removed.  About 15 mL of contrast were injected, which showed good

flow of contrast down the ureter into the bladder without any evidence of

extravasation or significant filling defect nor obstruction.  The guidewire was

placed through the [place].  The Pollack was removed.  A 6 x 24 Polaris double-J

stent was pushed up into place with aid of a pusher over the guidewire, so its

proximal end coiled in the renal pelvis and its distal end coiled in the bladder

when the wire was removed.  A string was left exiting the urethra.  The bladder was

drained.  The instruments were removed.  She was awakened from her anesthetic and

taken by stretcher to the recovery room. 







Job ID:  [id number]
07-Jan-2021

## 2020-12-28 NOTE — PROGRESS NOTE ADULT - SUBJECTIVE AND OBJECTIVE BOX
[facility]      Seen earlier today, and examined.  - Today, patient is without complaints.  asking for food          *****MEDICATIONS: Current medication reviewed and documented.    MEDICATIONS  (STANDING):  ascorbic acid 1000 milliGRAM(s) Oral daily  aspirin  chewable 81 milliGRAM(s) Oral daily  atorvastatin 10 milliGRAM(s) Oral at bedtime  cyanocobalamin 1000 MICROGram(s) Oral daily  dextrose 40% Gel 15 Gram(s) Oral once  dextrose 5%. 1000 milliLiter(s) (50 mL/Hr) IV Continuous <Continuous>  dextrose 5%. 1000 milliLiter(s) (100 mL/Hr) IV Continuous <Continuous>  dextrose 50% Injectable 25 Gram(s) IV Push once  dextrose 50% Injectable 12.5 Gram(s) IV Push once  dextrose 50% Injectable 25 Gram(s) IV Push once  diVALproex Sprinkle 125 milliGRAM(s) Oral two times a day  doxazosin 1 milliGRAM(s) Oral at bedtime  doxycycline IVPB 100 milliGRAM(s) IV Intermittent every 12 hours  enalapril 10 milliGRAM(s) Oral daily  enoxaparin Injectable 40 milliGRAM(s) SubCutaneous daily  glucagon  Injectable 1 milliGRAM(s) IntraMuscular once  insulin lispro (ADMELOG) corrective regimen sliding scale   SubCutaneous every 6 hours  melatonin 3 milliGRAM(s) Oral at bedtime  moxifloxacin 0.5% Solution 1 Drop(s) Both EYES <User Schedule>  multivitamin/minerals/iron Oral Solution (CENTRUM) 15 milliLiter(s) Oral daily  predniSONE   Tablet 15 milliGRAM(s) Oral daily  thiamine 100 milliGRAM(s) Oral daily    MEDICATIONS  (PRN):  bisacodyl Suppository 10 milliGRAM(s) Rectal daily PRN Constipation  polyethylene glycol 3350 17 Gram(s) Oral daily PRN Constipation  senna 2 Tablet(s) Oral at bedtime PRN Constipation          ***** VITAL SIGNS:  T(F): 97.3 (20 @ 14:47), Max: 97.8 (20 @ 10:07)  HR: 61 (20 @ 15:30) (61 - 75)  BP: 103/72 (20 @ 15:30) (101/60 - 134/91)  RR: 17 (20 @ 15:30) (12 - 18)  SpO2: 100% (-20 @ 15:30) (97% - 100%)  Wt(kg): --  ,   I&O's Summary           *****PHYSICAL EXAM:   alert oriented x 2 attention comprehension are fair.  Able to name, repeat.   EOmi fundi not visualized   no nystagmus VFF to confrontation  Tongue is midline  Palate elevates symmetrically   Moving all 4 ext spontaneously no drift appreciated    Gait not assessed.            *****LAB AND IMAGIN.0   5.10  )-----------( 175      ( 28 Dec 2020 06:20 )             36.8                   135  |  97<L>  |  17  ----------------------------<  173<H>  4.1   |  29  |  0.54    Ca    9.5      28 Dec 2020 06:20  Phos  2.9       Mg     2.0           PT/INR - ( 28 Dec 2020 07:16 )   PT: 11.9 sec;   INR: 1.04 ratio         PTT - ( 28 Dec 2020 07:16 )  PTT:24.4 sec                     [All pertinent recent Imaging/Reports reviewed]           *****A S S E S S M E N T   A N D   P L A N :      80 year old left handed  male with PMH of DM, HTN, HLD, chronic conjunctivitis s/p corneal surg 2020 at [facility] on steroid taper, p/w worsening agitation. As per son pt is AAOX2 and walks with a waker at baseline.  Since discharge from [facility] pt has been refusing to get out of the bed, eating less, not taking meds. He has also becomes more confused, not recognizing family and screaming at times. Son also reports unintentional weight loss of pt, ~60lb in the past year.  No fever, cough, sob, N/V/D or pain issues.     Problem/Recommendations 1:    ams improved   likely multifactorial metabolic encephalopathy  related to ? pain from recent surgery/vision changes, steroid psychosis, poor po intake, sleep wake  adjustment   disorder       sleep wake cycle regulation agree with melatonin   thiamine 500 iv x daily 3 days then po 100 daily   mvi daily   depakote 125 bid for agitation while on steroids   ct head microvascular disease   mri with severe microvascular disease + subacute infarcts in the left post frontal area.   pt with weight loss, refusing to eat, severe malnutrition.     Problem/Recommendations 2:  hyponatremia volume contraction vs. pain related siadh ?   consider urine lytes, urine osm serum osm       Problem/Recommendations 3: cva small subacute lacunar infarct  without any overt symptoms that is attributable to this stroke    pt with severe chronic microvascular disease   subacute infarct with intracranial stenosis but somewhat limited due to artifact   subacute componenets in subdural hematomas, therefore will hold off on adding 2nd antiplatelet r x  asa 81, atorvastatin for secondary prophyolaxis   will reimage head ct in 4 weeks to see if there is any progression of sdh   discussed with wife, kael in detail the findings of the mri, she is not interested in any aggressive measures, consider palliative care for goc   repeat speach and swallow eval  pt failed repeatedly   palliative care input appreciated consider starting pleasure fees.    rehab pending     Thank you for allowing me to participate in the care of this patient. Please do not hesitate to call me if you have any  questions.        ________________  Yelena Alatorre MD  [facility]  [phone]      33 minutes spent on total encounter; more than 50 % of the visit was  spent counseling about plan of care, compliance to diet/exercise and medication regimen and or  coordinating care by the attending physician.      It is advised that stroke patients follow up with NP Yael Keller @ [phone] in 1- 2 weeks.   Others please follow up with Dr. Michael Nissenbaum [phone] [facility]      Seen earlier today, and examined.  - Today, patient is without complaints.  asking for food          *****MEDICATIONS: Current medication reviewed and documented.    MEDICATIONS  (STANDING):  ascorbic acid 1000 milliGRAM(s) Oral daily  aspirin  chewable 81 milliGRAM(s) Oral daily  atorvastatin 10 milliGRAM(s) Oral at bedtime  cyanocobalamin 1000 MICROGram(s) Oral daily  dextrose 40% Gel 15 Gram(s) Oral once  dextrose 5%. 1000 milliLiter(s) (50 mL/Hr) IV Continuous <Continuous>  dextrose 5%. 1000 milliLiter(s) (100 mL/Hr) IV Continuous <Continuous>  dextrose 50% Injectable 25 Gram(s) IV Push once  dextrose 50% Injectable 12.5 Gram(s) IV Push once  dextrose 50% Injectable 25 Gram(s) IV Push once  diVALproex Sprinkle 125 milliGRAM(s) Oral two times a day  doxazosin 1 milliGRAM(s) Oral at bedtime  doxycycline IVPB 100 milliGRAM(s) IV Intermittent every 12 hours  enalapril 10 milliGRAM(s) Oral daily  enoxaparin Injectable 40 milliGRAM(s) SubCutaneous daily  glucagon  Injectable 1 milliGRAM(s) IntraMuscular once  insulin lispro (ADMELOG) corrective regimen sliding scale   SubCutaneous every 6 hours  melatonin 3 milliGRAM(s) Oral at bedtime  moxifloxacin 0.5% Solution 1 Drop(s) Both EYES <User Schedule>  multivitamin/minerals/iron Oral Solution (CENTRUM) 15 milliLiter(s) Oral daily  predniSONE   Tablet 15 milliGRAM(s) Oral daily  thiamine 100 milliGRAM(s) Oral daily    MEDICATIONS  (PRN):  bisacodyl Suppository 10 milliGRAM(s) Rectal daily PRN Constipation  polyethylene glycol 3350 17 Gram(s) Oral daily PRN Constipation  senna 2 Tablet(s) Oral at bedtime PRN Constipation          ***** VITAL SIGNS:  T(F): 97.3 (20 @ 14:47), Max: 97.8 (20 @ 10:07)  HR: 61 (20 @ 15:30) (61 - 75)  BP: 103/72 (20 @ 15:30) (101/60 - 134/91)  RR: 17 (20 @ 15:30) (12 - 18)  SpO2: 100% (-20 @ 15:30) (97% - 100%)  Wt(kg): --  ,   I&O's Summary           *****PHYSICAL EXAM:eyes are open today    alert oriented x 2 attention comprehension are fair.  Able to name, repeat.   EOmi fundi not visualized   no nystagmus VFF to confrontation  Tongue is midline  Palate elevates symmetrically   Moving all 4 ext spontaneously no drift appreciated    Gait not assessed.            *****LAB AND IMAGIN.0   5.10  )-----------( 175      ( 28 Dec 2020 06:20 )             36.8                   135  |  97<L>  |  17  ----------------------------<  173<H>  4.1   |  29  |  0.54    Ca    9.5      28 Dec 2020 06:20  Phos  2.9       Mg     2.0           PT/INR - ( 28 Dec 2020 07:16 )   PT: 11.9 sec;   INR: 1.04 ratio         PTT - ( 28 Dec 2020 07:16 )  PTT:24.4 sec                     [All pertinent recent Imaging/Reports reviewed]           *****A S S E S S M E N T   A N D   P L A N :      80 year old left handed  male with PMH of DM, HTN, HLD, chronic conjunctivitis s/p corneal surg 2020 at [facility] on steroid taper, p/w worsening agitation. As per son pt is AAOX2 and walks with a waker at baseline.  Since discharge from [facility] pt has been refusing to get out of the bed, eating less, not taking meds. He has also becomes more confused, not recognizing family and screaming at times. Son also reports unintentional weight loss of pt, ~60lb in the past year.  No fever, cough, sob, N/V/D or pain issues.     Problem/Recommendations 1:    ams improved   likely multifactorial metabolic encephalopathy  related to ? pain from recent surgery/vision changes, steroid psychosis, poor po intake, sleep wake  adjustment   disorder       sleep wake cycle regulation agree with melatonin   thiamine 500 iv x daily 3 days then po 100 daily   mvi daily   depakote 125 bid for agitation while on steroids   ct head microvascular disease   mri with severe microvascular disease + subacute infarcts in the left post frontal area.   pt with weight loss, refusing to eat, severe malnutrition.     Problem/Recommendations 2:  hyponatremia volume contraction vs. pain related siadh ?   consider urine lytes, urine osm serum osm       Problem/Recommendations 3: cva small subacute lacunar infarct  without any overt symptoms that is attributable to this stroke    pt with severe chronic microvascular disease   subacute infarct with intracranial stenosis but somewhat limited due to artifact   subacute componenets in subdural hematomas, therefore will hold off on adding 2nd antiplatelet r x  asa 81, atorvastatin for secondary prophyolaxis   will reimage head ct in 4 weeks to see if there is any progression of sdh    repeat speach and swallow eval  pt failed repeatedly   palliative care input appreciated consider starting pleasure feeds.    rehab pending     Thank you for allowing me to participate in the care of this patient. Please do not hesitate to call me if you have any  questions.        ________________  Yelena Alatorre MD  [facility]  [phone]      33 minutes spent on total encounter; more than 50 % of the visit was  spent counseling about plan of care, compliance to diet/exercise and medication regimen and or  coordinating care by the attending physician.      It is advised that stroke patients follow up with NP Yael Keller @ [phone] in 1- 2 weeks.   Others please follow up with Dr. Michael Nissenbaum [phone]

## 2020-12-29 LAB — SARS-COV-2 RNA SPEC QL NAA+PROBE: DETECTED

## 2020-12-29 PROCEDURE — 99358 PROLONG SERVICE W/O CONTACT: CPT

## 2020-12-29 RX ADMIN — Medication 1: at 12:58

## 2020-12-29 RX ADMIN — ATORVASTATIN CALCIUM 10 MILLIGRAM(S): 80 TABLET, FILM COATED ORAL at 21:25

## 2020-12-29 RX ADMIN — Medication 1: at 17:37

## 2020-12-29 RX ADMIN — Medication 15 MILLIGRAM(S): at 05:56

## 2020-12-29 RX ADMIN — Medication 1 DROP(S): at 05:58

## 2020-12-29 RX ADMIN — DIVALPROEX SODIUM 125 MILLIGRAM(S): 500 TABLET, DELAYED RELEASE ORAL at 05:56

## 2020-12-29 RX ADMIN — Medication 15 MILLILITER(S): at 12:58

## 2020-12-29 RX ADMIN — PREGABALIN 1000 MICROGRAM(S): 225 CAPSULE ORAL at 12:57

## 2020-12-29 RX ADMIN — ENOXAPARIN SODIUM 40 MILLIGRAM(S): 100 INJECTION SUBCUTANEOUS at 12:58

## 2020-12-29 RX ADMIN — DIVALPROEX SODIUM 125 MILLIGRAM(S): 500 TABLET, DELAYED RELEASE ORAL at 17:38

## 2020-12-29 RX ADMIN — Medication 1000 MILLIGRAM(S): at 12:58

## 2020-12-29 RX ADMIN — Medication 100 MILLIGRAM(S): at 12:57

## 2020-12-29 RX ADMIN — Medication 110 MILLIGRAM(S): at 11:17

## 2020-12-29 RX ADMIN — Medication 1 DROP(S): at 17:38

## 2020-12-29 RX ADMIN — Medication 110 MILLIGRAM(S): at 21:25

## 2020-12-29 RX ADMIN — Medication 81 MILLIGRAM(S): at 12:57

## 2020-12-29 RX ADMIN — Medication 1 DROP(S): at 23:58

## 2020-12-29 RX ADMIN — Medication 1 MILLIGRAM(S): at 21:25

## 2020-12-29 RX ADMIN — Medication 10 MILLIGRAM(S): at 12:57

## 2020-12-29 RX ADMIN — Medication 1 DROP(S): at 12:58

## 2020-12-29 RX ADMIN — Medication 3 MILLIGRAM(S): at 21:25

## 2020-12-29 NOTE — PROGRESS NOTE ADULT - ASSESSMENT
80 year old man with subacute CVA and subdural hematomas now with oropharyngeal dysphagia        Problem/Recommendation - 1:  Problem: Oropharyngeal aspiration. Recommendation: s/p PEG, doing well  -gastrostomy feeding underway     Problem/Recommendation - 2:  ·  Problem: CVA (cerebral vascular accident).  Recommendation: per neurology.      Problem/Recommendation - 3:  ·  Problem: HTN (hypertension).      Problem/Recommendation - 4:  ·  Problem: DM (diabetes mellitus).     Attending Attestation:   Differential diagnosis and plan of care discussed with patient after the evaluation  35 Minutes spent on total encounter of which more than fifty percent of the encounter was spent counseling and/or coordinating care by the attending physician.    Davidson Roca M.D.   Gastroenterology and Hepatology  Cell: [phone] .

## 2020-12-29 NOTE — PROGRESS NOTE ADULT - PROBLEM SELECTOR PLAN 1
-MRI/MRA head 12/18/20 revealed small acute/subacute infarcts in the left posterior frontal lobe along w/predominantly chronic bilateral subdural hematomas unchanged from CT dated 12/13/2020, new from MRI dated 6/23/2020, with small subacute subdural components  -cont ASA/statin; holding off on adding plavix  -appreciate palliative input; family not interested in hospice  -speech/swallow reevaluate: NPO  - after numerous discussion family would like to proceed with PEG feeding.   - GI consult appreciated. PEG placed 12/28/20, started tube feeding and tolerating

## 2020-12-29 NOTE — CHART NOTE - NSCHARTNOTEFT_GEN_A_CORE
************************************************************************  PALLIATIVE MEDICINE COORDINATION OF CARE DOCUMENTATION  [x] Inpatient Consult  [ ] Other:  ************************************************************************  SUMMARY OF RECOMMENDATIONS:    - Chart reviewed. Patient discussed in IDT.   - Events noted: s/p PEG 12/28    SYMPTOM MANAGEMENT: No uncontrolled pain during multiple visits with the patient  GOALS OF CARE: Family opting to place patient in [facility]. I have had multiple discussions with the family: Winston Ricci Jr, and [place]. Hospice is not within the scope of their goals for the patient.   CODE: Full code    As the family's goals are clear and they want to place the patient in [facility] to focus all efforts on his recovery, I will sign off! The family has my direct number and will call if they have any questions related to a hospice plan of care.    ------------------------------------------------------------------------  HPI  80 year old male with PMH of DM, HTN, HLD, chronic conjunctivitis s/p corneal surg 11/2020 at [facility] on steroid taper admitted for a L posterior-frontal lobe CVA on top of chronic SDH. Palliative Medicine was consulted for complex medical decision making related to goals of care discussions    MEDICATIONS  (STANDING):  ascorbic acid 1000 milliGRAM(s) Oral daily  aspirin  chewable 81 milliGRAM(s) Oral daily  atorvastatin 10 milliGRAM(s) Oral at bedtime  cyanocobalamin 1000 MICROGram(s) Oral daily  dextrose 40% Gel 15 Gram(s) Oral once  dextrose 5%. 1000 milliLiter(s) (50 mL/Hr) IV Continuous <Continuous>  dextrose 5%. 1000 milliLiter(s) (100 mL/Hr) IV Continuous <Continuous>  dextrose 50% Injectable 25 Gram(s) IV Push once  dextrose 50% Injectable 12.5 Gram(s) IV Push once  dextrose 50% Injectable 25 Gram(s) IV Push once  diVALproex Sprinkle 125 milliGRAM(s) Oral two times a day  doxazosin 1 milliGRAM(s) Oral at bedtime  doxycycline IVPB 100 milliGRAM(s) IV Intermittent every 12 hours  enalapril 10 milliGRAM(s) Oral daily  enoxaparin Injectable 40 milliGRAM(s) SubCutaneous daily  glucagon  Injectable 1 milliGRAM(s) IntraMuscular once  insulin lispro (ADMELOG) corrective regimen sliding scale   SubCutaneous every 6 hours  melatonin 3 milliGRAM(s) Oral at bedtime  moxifloxacin 0.5% Solution 1 Drop(s) Both EYES <User Schedule>  multivitamin/minerals/iron Oral Solution (CENTRUM) 15 milliLiter(s) Oral daily  predniSONE   Tablet 15 milliGRAM(s) Oral daily  thiamine 100 milliGRAM(s) Oral daily    MEDICATIONS  (PRN):  bisacodyl Suppository 10 milliGRAM(s) Rectal daily PRN Constipation  polyethylene glycol 3350 17 Gram(s) Oral daily PRN Constipation  senna 2 Tablet(s) Oral at bedtime PRN Constipation    MEDICATION REVIEW:  --- Pls refer to current medicatons in the body of this note  ISTOP REFERENCE: [id number]  --- PRN usage: NO PRN  ------------------------------------------------------------------------  COORDINATION OF CARE:  --- Palliative Care consulted for: [facility]  --- Patient assessed: 12/23  --- Patient previously seen by Palliative Care service: NO  ADVANCE CARE PLANNING  --- Code status: FULL  --- MOLST reviewed in chart: NONE  --- HCP/ Surrogate: WIFE, JUAN LUIS  --- [facility] document found in Alpha: NONE  --- HCP/ Living will/ Other advanced directives in Alpha: NONE  ------------------------------------------------------------------------  CARE PROVIDER DOCUMENTATION:  --- s/p PEG 12/28  --- SW: Coordinating [facility] placement with wife  --- NEURO: AMS improving/ Depakote for agitation/ Thiamine  --- OPHTHA: K glue placed over perforation OS and BCL  --- Sp/Sw: Not appropriate for trial 2/2 AMS 12/27  --- NGT inserted 12/17  PLAN OF CARE  --- Known admissions in past year: 2  --- Current admit date: 12/13  --- LOS: 16  --- LACE score: 16  --- Current dispo plan: [facility]  ------------------------------------------------------------------------  --- Chart reviewed: 30 Minutes [including time used to gather, review and transfer data to this note]  --- Start: 8:30  --- End: 9:00  Prolonged services rendered, as part of this patient's care provided by Palliative Medicine, include: i.chart review for provider and ancillary service documentation, ii.pertinent diagnostics including laboratory and imaging studies,iii. medication review including PRN use, iv. admission history including previous palliative care encounters and GOC notes, v.advance care planning documents including HCP and MOLST forms in Alpha. Part of Palliative Medicine extended evaluation and management also involves coordination of care with our IDT, the primary and consulting anabela, and unit CM/SW and Hospice if eligible. Recommendations based on the information gathered and discussed are outline in the AP of our notes.    ************************************************************************

## 2020-12-29 NOTE — PROGRESS NOTE ADULT - SUBJECTIVE AND OBJECTIVE BOX
Chief Complaint:  Patient is a 80y old  Male who presents with a chief complaint of AMS (29 Dec 2020 16:08)      Interval Events:   no acute events  Allergies:  No Known Allergies      Hospital Medications:  ascorbic acid 1000 milliGRAM(s) Oral daily  aspirin  chewable 81 milliGRAM(s) Oral daily  atorvastatin 10 milliGRAM(s) Oral at bedtime  bisacodyl Suppository 10 milliGRAM(s) Rectal daily PRN  cyanocobalamin 1000 MICROGram(s) Oral daily  dextrose 40% Gel 15 Gram(s) Oral once  dextrose 5%. 1000 milliLiter(s) IV Continuous <Continuous>  dextrose 5%. 1000 milliLiter(s) IV Continuous <Continuous>  dextrose 50% Injectable 25 Gram(s) IV Push once  dextrose 50% Injectable 12.5 Gram(s) IV Push once  dextrose 50% Injectable 25 Gram(s) IV Push once  diVALproex Sprinkle 125 milliGRAM(s) Oral two times a day  doxazosin 1 milliGRAM(s) Oral at bedtime  doxycycline IVPB 100 milliGRAM(s) IV Intermittent every 12 hours  enalapril 10 milliGRAM(s) Oral daily  enoxaparin Injectable 40 milliGRAM(s) SubCutaneous daily  glucagon  Injectable 1 milliGRAM(s) IntraMuscular once  insulin lispro (ADMELOG) corrective regimen sliding scale   SubCutaneous every 6 hours  melatonin 3 milliGRAM(s) Oral at bedtime  moxifloxacin 0.5% Solution 1 Drop(s) Both EYES <User Schedule>  multivitamin/minerals/iron Oral Solution (CENTRUM) 15 milliLiter(s) Oral daily  polyethylene glycol 3350 17 Gram(s) Oral daily PRN  predniSONE   Tablet 15 milliGRAM(s) Oral daily  senna 2 Tablet(s) Oral at bedtime PRN  thiamine 100 milliGRAM(s) Oral daily      PMHX/PSHX:  BPH (benign prostatic hyperplasia)    Hyperlipidemia    HTN (hypertension)    DM (diabetes mellitus)    No pertinent past medical history    No significant past surgical history        Family history:  No pertinent family history in first degree relatives        ROS:     General:  No wt loss, fevers, chills, night sweats, fatigue,   Eyes:  Good vision, no reported pain  ENT:  No sore throat, pain, runny nose, dysphagia  CV:  No pain, palpitations, hypo/hypertension  Resp:  No dyspnea, cough, tachypnea, wheezing  GI:  See HPI  :  No pain, bleeding, incontinence, nocturia  Muscle:  No pain, weakness  Neuro:  No weakness, tingling, memory problems  Psych:  No fatigue, insomnia, mood problems, depression  Endocrine:  No polyuria, polydipsia, cold/heat intolerance  Heme:  No petechiae, ecchymosis, easy bruisability  Skin:  No rash, edema      PHYSICAL EXAM:     GENERAL:  Appears stated age, well-groomed, well-nourished, no distress  HEENT:  NC/AT,  conjunctivae clear, sclera-anicteric  NECK: Trachea midline, supple  CHEST:  Full & symmetric excursion, no increased effort, breath sounds clear  HEART:  Regular rhythm, no daily/heave  ABDOMEN:  Soft, non-tender, non-distended, normoactive bowel sounds,  no masses ,no hepato-splenomegaly, gastrostomy  EXTREMITIES:  no cyanosis,clubbing or edema  SKIN:  No rash/erythema/petechiae, no jaundice  NEURO:  Alert, oriented, no asterixis  RECTAL: Deferred    Vital Signs:  Vital Signs Last 24 Hrs  T(C): 36.4 (29 Dec 2020 12:19), Max: 36.4 (29 Dec 2020 12:19)  T(F): 97.5 (29 Dec 2020 12:19), Max: 97.5 (29 Dec 2020 12:19)  HR: 66 (29 Dec 2020 12:19) (62 - 66)  BP: 140/73 (29 Dec 2020 12:19) (113/72 - 140/73)  BP(mean): --  RR: 16 (29 Dec 2020 12:19) (16 - 18)  SpO2: 100% (29 Dec 2020 12:19) (100% - 100%)  Daily     Daily     LABS:                        12.0   5.10  )-----------( 175      ( 28 Dec 2020 06:20 )             36.8     12-28    135  |  97<L>  |  17  ----------------------------<  173<H>  4.1   |  29  |  0.54    Ca    9.5      28 Dec 2020 06:20  Phos  2.9     12-28  Mg     2.0     12-28        PT/INR - ( 28 Dec 2020 07:16 )   PT: 11.9 sec;   INR: 1.04 ratio         PTT - ( 28 Dec 2020 07:16 )  PTT:24.4 sec        Imaging:

## 2020-12-29 NOTE — PROGRESS NOTE ADULT - ATTENDING COMMENTS
Noel Cohen will be covering for me starting 12/30/20. He can be reached at  if needed.     - Dr. LUCHO Brothers ([facility])  - [phone]

## 2020-12-29 NOTE — PROGRESS NOTE ADULT - SUBJECTIVE AND OBJECTIVE BOX
SUBJECTIVE / OVERNIGHT EVENTS:  tolerating tube feeding  denied pain  rehab planning  COVID tanisha        --------------------------------------------------------------------------------------------  LABS:                        12.0   5.10  )-----------( 175      ( 28 Dec 2020 06:20 )             36.8     12-28    135  |  97<L>  |  17  ----------------------------<  173<H>  4.1   |  29  |  0.54    Ca    9.5      28 Dec 2020 06:20  Phos  2.9     12-28  Mg     2.0     12-28      PT/INR - ( 28 Dec 2020 07:16 )   PT: 11.9 sec;   INR: 1.04 ratio         PTT - ( 28 Dec 2020 07:16 )  PTT:24.4 sec  CAPILLARY BLOOD GLUCOSE      POCT Blood Glucose.: 165 mg/dL (29 Dec 2020 17:12)  POCT Blood Glucose.: 167 mg/dL (29 Dec 2020 12:06)  POCT Blood Glucose.: 163 mg/dL (29 Dec 2020 08:20)  POCT Blood Glucose.: 132 mg/dL (29 Dec 2020 05:55)  POCT Blood Glucose.: 149 mg/dL (28 Dec 2020 23:29)            RADIOLOGY & ADDITIONAL TESTS:    Imaging Personally Reviewed:  [x] YES  [ ] NO    Consultant(s) Notes Reviewed:  [x] YES  [ ] NO    MEDICATIONS  (STANDING):  ascorbic acid 1000 milliGRAM(s) Oral daily  aspirin  chewable 81 milliGRAM(s) Oral daily  atorvastatin 10 milliGRAM(s) Oral at bedtime  cyanocobalamin 1000 MICROGram(s) Oral daily  dextrose 40% Gel 15 Gram(s) Oral once  dextrose 5%. 1000 milliLiter(s) (50 mL/Hr) IV Continuous <Continuous>  dextrose 5%. 1000 milliLiter(s) (100 mL/Hr) IV Continuous <Continuous>  dextrose 50% Injectable 25 Gram(s) IV Push once  dextrose 50% Injectable 12.5 Gram(s) IV Push once  dextrose 50% Injectable 25 Gram(s) IV Push once  diVALproex Sprinkle 125 milliGRAM(s) Oral two times a day  doxazosin 1 milliGRAM(s) Oral at bedtime  doxycycline IVPB 100 milliGRAM(s) IV Intermittent every 12 hours  enalapril 10 milliGRAM(s) Oral daily  enoxaparin Injectable 40 milliGRAM(s) SubCutaneous daily  glucagon  Injectable 1 milliGRAM(s) IntraMuscular once  insulin lispro (ADMELOG) corrective regimen sliding scale   SubCutaneous every 6 hours  melatonin 3 milliGRAM(s) Oral at bedtime  moxifloxacin 0.5% Solution 1 Drop(s) Both EYES <User Schedule>  multivitamin/minerals/iron Oral Solution (CENTRUM) 15 milliLiter(s) Oral daily  predniSONE   Tablet 15 milliGRAM(s) Oral daily  thiamine 100 milliGRAM(s) Oral daily    MEDICATIONS  (PRN):  bisacodyl Suppository 10 milliGRAM(s) Rectal daily PRN Constipation  polyethylene glycol 3350 17 Gram(s) Oral daily PRN Constipation  senna 2 Tablet(s) Oral at bedtime PRN Constipation      Care Discussed with Consultants/Other Providers [x] YES  [ ] NO    Vital Signs Last 24 Hrs  T(C): 36.4 (29 Dec 2020 12:19), Max: 36.4 (29 Dec 2020 12:19)  T(F): 97.5 (29 Dec 2020 12:19), Max: 97.5 (29 Dec 2020 12:19)  HR: 66 (29 Dec 2020 12:19) (62 - 66)  BP: 140/73 (29 Dec 2020 12:19) (113/72 - 140/73)  BP(mean): --  RR: 16 (29 Dec 2020 12:19) (16 - 18)  SpO2: 100% (29 Dec 2020 12:19) (100% - 100%)  I&O's Summary    29 Dec 2020 07:01  -  29 Dec 2020 19:09  --------------------------------------------------------  IN: 570 mL / OUT: 0 mL / NET: 570 mL      PHYSICAL EXAM:  GENERAL: NAD, thin cachetic elderly, NG tube removed.  HEAD:  Atraumatic, Normocephalic  EYES: eyes close shut   NECK: Supple, No JVD  CHEST/LUNG: mild decrease breath sounds bilaterally; No wheeze   HEART: Regular rate and rhythm; No murmurs, rubs, or gallops  ABDOMEN: Soft, Nontender, Nondistended; Bowel sounds present, PEG in place  Neuro: AAOx1, slow words, poor insight, eyes close shut  EXTREMITIES:  2+ Peripheral Pulses, No clubbing, cyanosis, or edema  SKIN: No rashes or lesions

## 2020-12-29 NOTE — CHART NOTE - NSCHARTNOTEFT_GEN_A_CORE
Source: Patient [ ]    Family [ ]     other [x ] chart review, RN     Pt seen for severe protein calorie malnutrition follow-up. 81 y/o M admitted with agitation and FTT A&Ox1. Failed second swallow assessment 12/27. s/p PEG 12/28. Plan for d/c to [facility]. Pt ordered for Glucerna 1.5 50 mL/hr x 24 hrs (which he was tolerating via NGT.)     Diet, NPO with Tube Feed:   Tube Feeding Modality: Nasogastric  Glucerna 1.5 Bryce (GLUCERNA1.5RTH)  Total Volume for 24 Hours (mL): 1200  Continuous  Starting Tube Feed Rate {mL per Hour}: 20  Increase Tube Feed Rate by (mL): 10     Every 4 hours  Until Goal Tube Feed Rate (mL per Hour): 50  Tube Feed Duration (in Hours): 24  Tube Feed Start Time: 13:10  No Carb Prosource (1pkg = 15gms Protein)     Qty per Day:  1 (12-29-20 @ 13:06)    Per flow sheets, no GI issues (nausea/vomiting/diarrhea/constipation.)   Enteral /Parenteral Nutrition: Goal rate provides 1800 bryce, 99 gm pro, 911 mL water daily.   Current Weight: Weight (kg): 55.7 (12-13)  Edema: none  Pressure Injuries: sacrum stage 1     __________________ Pertinent Medications__________________   MEDICATIONS  (STANDING):  ascorbic acid 1000 milliGRAM(s) Oral daily  aspirin  chewable 81 milliGRAM(s) Oral daily  atorvastatin 10 milliGRAM(s) Oral at bedtime  cyanocobalamin 1000 MICROGram(s) Oral daily  dextrose 40% Gel 15 Gram(s) Oral once  dextrose 5%. 1000 milliLiter(s) (50 mL/Hr) IV Continuous <Continuous>  dextrose 5%. 1000 milliLiter(s) (100 mL/Hr) IV Continuous <Continuous>  dextrose 50% Injectable 25 Gram(s) IV Push once  dextrose 50% Injectable 12.5 Gram(s) IV Push once  dextrose 50% Injectable 25 Gram(s) IV Push once  diVALproex Sprinkle 125 milliGRAM(s) Oral two times a day  doxazosin 1 milliGRAM(s) Oral at bedtime  doxycycline IVPB 100 milliGRAM(s) IV Intermittent every 12 hours  enalapril 10 milliGRAM(s) Oral daily  enoxaparin Injectable 40 milliGRAM(s) SubCutaneous daily  glucagon  Injectable 1 milliGRAM(s) IntraMuscular once  insulin lispro (ADMELOG) corrective regimen sliding scale   SubCutaneous every 6 hours  melatonin 3 milliGRAM(s) Oral at bedtime  moxifloxacin 0.5% Solution 1 Drop(s) Both EYES <User Schedule>  multivitamin/minerals/iron Oral Solution (CENTRUM) 15 milliLiter(s) Oral daily  predniSONE   Tablet 15 milliGRAM(s) Oral daily  thiamine 100 milliGRAM(s) Oral daily    MEDICATIONS  (PRN):  bisacodyl Suppository 10 milliGRAM(s) Rectal daily PRN Constipation  polyethylene glycol 3350 17 Gram(s) Oral daily PRN Constipation  senna 2 Tablet(s) Oral at bedtime PRN Constipation      __________________ Pertinent Labs__________________   12-28 Na135 mmol/L Glu 173 mg/dL<H> K+ 4.1 mmol/L Cr  0.54 mg/dL BUN 17 mg/dL 12-28 Phos 2.9 mg/dL    12-29 @ 12:06 POCT 167 mg/dL  12-29 @ 08:20 POCT 163 mg/dL  12-29 @ 05:55 POCT 132 mg/dL  12-28 @ 23:29 POCT 149 mg/dL  12-28 @ 17:22 POCT 143 mg/dL  12-28 @ 15:26 POCT 183 mg/dL        Estimated Needs:   [ x] no change since previous assessment  [ ] recalculated:       Previous Nutrition Diagnosis: severe protein calorie malnutrition     Nutrition Diagnosis is [ x] ongoing  [ ] resolved [ ] not applicable     Recommendations:  1. Continue Glucerna 1.5 @ 50 mL/hr x 24 hrs.       Monitoring and Evaluation:      [ x] Tolerance to enteral prescription [x ] weights [x ] follow up per protocol  [ ] other:

## 2020-12-30 DIAGNOSIS — U07.1 COVID-19: ICD-10-CM

## 2020-12-30 LAB
ANION GAP SERPL CALC-SCNC: 9 MMOL/L — SIGNIFICANT CHANGE UP (ref 7–14)
BLD GP AB SCN SERPL QL: NEGATIVE — SIGNIFICANT CHANGE UP
BUN SERPL-MCNC: 31 MG/DL — HIGH (ref 7–23)
CALCIUM SERPL-MCNC: 8.9 MG/DL — SIGNIFICANT CHANGE UP (ref 8.4–10.5)
CHLORIDE SERPL-SCNC: 98 MMOL/L — SIGNIFICANT CHANGE UP (ref 98–107)
CO2 SERPL-SCNC: 28 MMOL/L — SIGNIFICANT CHANGE UP (ref 22–31)
CREAT SERPL-MCNC: 0.59 MG/DL — SIGNIFICANT CHANGE UP (ref 0.5–1.3)
GLUCOSE SERPL-MCNC: 164 MG/DL — HIGH (ref 70–99)
HCT VFR BLD CALC: 30.4 % — LOW (ref 39–50)
HCT VFR BLD CALC: 30.5 % — LOW (ref 39–50)
HGB BLD-MCNC: 9.7 G/DL — LOW (ref 13–17)
HGB BLD-MCNC: 9.8 G/DL — LOW (ref 13–17)
MAGNESIUM SERPL-MCNC: 2 MG/DL — SIGNIFICANT CHANGE UP (ref 1.6–2.6)
MCHC RBC-ENTMCNC: 28.4 PG — SIGNIFICANT CHANGE UP (ref 27–34)
MCHC RBC-ENTMCNC: 28.5 PG — SIGNIFICANT CHANGE UP (ref 27–34)
MCHC RBC-ENTMCNC: 31.8 GM/DL — LOW (ref 32–36)
MCHC RBC-ENTMCNC: 32.2 GM/DL — SIGNIFICANT CHANGE UP (ref 32–36)
MCV RBC AUTO: 88.4 FL — SIGNIFICANT CHANGE UP (ref 80–100)
MCV RBC AUTO: 89.2 FL — SIGNIFICANT CHANGE UP (ref 80–100)
NRBC # BLD: 0 /100 WBCS — SIGNIFICANT CHANGE UP
NRBC # BLD: 0 /100 WBCS — SIGNIFICANT CHANGE UP
NRBC # FLD: 0 K/UL — SIGNIFICANT CHANGE UP
NRBC # FLD: 0 K/UL — SIGNIFICANT CHANGE UP
PHOSPHATE SERPL-MCNC: 2.7 MG/DL — SIGNIFICANT CHANGE UP (ref 2.5–4.5)
PLATELET # BLD AUTO: 173 K/UL — SIGNIFICANT CHANGE UP (ref 150–400)
PLATELET # BLD AUTO: 181 K/UL — SIGNIFICANT CHANGE UP (ref 150–400)
POTASSIUM SERPL-MCNC: 4.5 MMOL/L — SIGNIFICANT CHANGE UP (ref 3.5–5.3)
POTASSIUM SERPL-SCNC: 4.5 MMOL/L — SIGNIFICANT CHANGE UP (ref 3.5–5.3)
RBC # BLD: 3.42 M/UL — LOW (ref 4.2–5.8)
RBC # BLD: 3.44 M/UL — LOW (ref 4.2–5.8)
RBC # FLD: 13.2 % — SIGNIFICANT CHANGE UP (ref 10.3–14.5)
RBC # FLD: 13.2 % — SIGNIFICANT CHANGE UP (ref 10.3–14.5)
RH IG SCN BLD-IMP: POSITIVE — SIGNIFICANT CHANGE UP
SARS-COV-2 IGG SERPL QL IA: NEGATIVE — SIGNIFICANT CHANGE UP
SARS-COV-2 IGM SERPL IA-ACNC: 0.01 INDEX — SIGNIFICANT CHANGE UP
SODIUM SERPL-SCNC: 135 MMOL/L — SIGNIFICANT CHANGE UP (ref 135–145)
SURGICAL PATHOLOGY STUDY: SIGNIFICANT CHANGE UP
WBC # BLD: 5.08 K/UL — SIGNIFICANT CHANGE UP (ref 3.8–10.5)
WBC # BLD: 5.58 K/UL — SIGNIFICANT CHANGE UP (ref 3.8–10.5)
WBC # FLD AUTO: 5.08 K/UL — SIGNIFICANT CHANGE UP (ref 3.8–10.5)
WBC # FLD AUTO: 5.58 K/UL — SIGNIFICANT CHANGE UP (ref 3.8–10.5)

## 2020-12-30 PROCEDURE — 99024 POSTOP FOLLOW-UP VISIT: CPT

## 2020-12-30 RX ORDER — PANTOPRAZOLE SODIUM 20 MG/1
40 TABLET, DELAYED RELEASE ORAL DAILY
Refills: 0 | Status: DISCONTINUED | OUTPATIENT
Start: 2020-12-30 | End: 2021-01-07

## 2020-12-30 RX ADMIN — Medication 15 MILLIGRAM(S): at 05:40

## 2020-12-30 RX ADMIN — ATORVASTATIN CALCIUM 10 MILLIGRAM(S): 80 TABLET, FILM COATED ORAL at 21:11

## 2020-12-30 RX ADMIN — Medication 110 MILLIGRAM(S): at 21:10

## 2020-12-30 RX ADMIN — DIVALPROEX SODIUM 125 MILLIGRAM(S): 500 TABLET, DELAYED RELEASE ORAL at 05:40

## 2020-12-30 RX ADMIN — Medication 3 MILLIGRAM(S): at 21:11

## 2020-12-30 RX ADMIN — Medication 1 DROP(S): at 17:43

## 2020-12-30 RX ADMIN — Medication 1: at 05:40

## 2020-12-30 RX ADMIN — Medication 110 MILLIGRAM(S): at 09:16

## 2020-12-30 RX ADMIN — Medication 1 DROP(S): at 23:42

## 2020-12-30 RX ADMIN — Medication 100 MILLIGRAM(S): at 12:54

## 2020-12-30 RX ADMIN — PANTOPRAZOLE SODIUM 40 MILLIGRAM(S): 20 TABLET, DELAYED RELEASE ORAL at 17:42

## 2020-12-30 RX ADMIN — Medication 81 MILLIGRAM(S): at 12:54

## 2020-12-30 RX ADMIN — Medication 15 MILLILITER(S): at 12:55

## 2020-12-30 RX ADMIN — Medication 1: at 13:22

## 2020-12-30 RX ADMIN — DIVALPROEX SODIUM 125 MILLIGRAM(S): 500 TABLET, DELAYED RELEASE ORAL at 21:11

## 2020-12-30 RX ADMIN — Medication 1 DROP(S): at 05:40

## 2020-12-30 RX ADMIN — Medication 1000 MILLIGRAM(S): at 12:54

## 2020-12-30 RX ADMIN — Medication 10 MILLIGRAM(S): at 12:55

## 2020-12-30 RX ADMIN — Medication 1: at 17:42

## 2020-12-30 RX ADMIN — Medication 1 MILLIGRAM(S): at 21:11

## 2020-12-30 RX ADMIN — Medication 1 DROP(S): at 12:55

## 2020-12-30 RX ADMIN — PREGABALIN 1000 MICROGRAM(S): 225 CAPSULE ORAL at 12:54

## 2020-12-30 RX ADMIN — ENOXAPARIN SODIUM 40 MILLIGRAM(S): 100 INJECTION SUBCUTANEOUS at 12:54

## 2020-12-30 NOTE — PROGRESS NOTE ADULT - ASSESSMENT
80y male w/ pmhx/ochx of DM, HTN, HLD, chronic conjunctivitis and 60 lb weight loss with progressive corneal thinning of both eyes, s/p corneal patch graft of the right eye. OD with patch graft in place, without epi defect. OS with central perforation. On 12/24/20 glue was no longer in place, pt reglued and BCL placed. Now OS contact lens and glue in place. Chamber formed. On 12/29/20 patient found to be COVID positive.     # Corneal thinning of both eyes, s/p corneal patch graft of the right eye and corneal gluing of the left eye (most recently 12/24/20)  - K glue placed over central area of perforation OS and BCL replaced 12/24/2020. Patient tolerated procedure well.   - c/w moxiflox QID to both eyes  - c/w taper PO prednisone to 15 mg and continue to taper down by 5 mg weekly. Patient with acute mental status change possibly related to steroids; if need to be held for this reason, please let ophthalmology know. Prednisone has helped slow this patient's corneal melting and thinning, but may be contributing to worsening mental status  - given recent weight loss and b/l nature of corneal pathology, malignancy workup was done to evaluate for underlying disease process, unrevealing to date   - c/w vitamin C 1g daily and doxycycline 100mg BID daily as per primary team if no contraindications  - serum vitamin A levels at 21. Pt on daily multi-vitamin and d/w primary team NP the need for additional vitamin A supplementation.   - c/w daily multivitamin to ensure adequate vit A supplementation  - prior conjunctival biopsy with lymphoplasmocytic infiltrate, consistent with chronic inflammatory process  - ophthalmology will monitor closely  - findings discussed with patient and primary team    SDW Dr. Warren (cornea    Outpatient follow-up: Patient should follow-up with his/her ophthalmologist or with [facility] Department of Ophthalmology within 2-3 days of discharge at:    [address]  [place] [address]  [phone]

## 2020-12-30 NOTE — PROGRESS NOTE ADULT - ASSESSMENT
80 year old man with subacute CVA and subdural hematomas now with oropharyngeal dysphagia        Problem/Recommendation - 1:  Problem: Oropharyngeal aspiration. Recommendation: s/p PEG, doing well  -gastrostomy feeding underway     Problem/Recommendation - 2:  ·  Problem: CVA (cerebral vascular accident).  Recommendation: per neurology.      Problem/Recommendation - 3:  ·  Problem: drop in hemoglobin: no overt GI bleeding noted. Repeat hgb stable. Monitor for GI bleeding. Repeat hgb.   PPI QD.      Problem/Recommendation - 4:  ·  Problem: DM (diabetes mellitus).     Attending Attestation:   Differential diagnosis and plan of care discussed with patient after the evaluation  35 Minutes spent on total encounter of which more than fifty percent of the encounter was spent counseling and/or coordinating care by the attending physician.    Davidson Roca M.D.   Gastroenterology and Hepatology  Cell: [phone] .

## 2020-12-30 NOTE — PROGRESS NOTE ADULT - ATTENDING COMMENTS
I have physically examined patient with appropriate PPE. Patient now COVID positive, BCL intact. Anterior chamber formed. Extensive work up performed on patient on previous admission. Eye stable, however, patient will eventually need patch graft. Vitamin A levels borderline low, as per case reports in countries such [place], underlying etiology for bilateral spontaneous corneal perforations have been vitamin A deficiency. Continue to supplement with vitamin A in form of multivitamin and recheck levels.

## 2020-12-30 NOTE — PROGRESS NOTE ADULT - SUBJECTIVE AND OBJECTIVE BOX
Tolerating TF so far  (+)COVID-19 PCR 12/29 moved to [facility]  SAturating well on RA    Vital Signs Last 24 Hrs  T(C): 36.7 (30 Dec 2020 11:00), Max: 36.7 (30 Dec 2020 11:00)  T(F): 98 (30 Dec 2020 11:00), Max: 98 (30 Dec 2020 11:00)  HR: 68 (30 Dec 2020 11:00) (60 - 77)  BP: 118/68 (30 Dec 2020 11:00) (92/60 - 119/79)  BP(mean): --  RR: 17 (30 Dec 2020 11:00) (16 - 17)  SpO2: 100% (30 Dec 2020 11:00) (99% - 100%)    I&O's Summary    12-29-20 @ 07:01  -  12-30-20 @ 07:00  --------------------------------------------------------  IN: 570 mL / OUT: 0 mL / NET: 570 mL        PHYSICAL EXAM:  GENERAL: NAD, thin cachetic elderly, NG tube removed.  HEAD:  Atraumatic, Normocephalic  EYES: eyes close shut   NECK: Supple, No JVD  CHEST/LUNG: mild decrease breath sounds bilaterally; No wheeze   HEART: Regular rate and rhythm; No murmurs, rubs, or gallops  ABDOMEN: Soft, Nontender, Nondistended; Bowel sounds present, PEG in place  Neuro: AAOx1, slow words, poor insight, eyes close shut  EXTREMITIES:  2+ Peripheral Pulses, No clubbing, cyanosis, or edema  SKIN: No rashes or lesions     LABS:                        9.7    5.58  )-----------( 181      ( 30 Dec 2020 11:52 )             30.5     12-30    135  |  98  |  31<H>  ----------------------------<  164<H>  4.5   |  28  |  0.59    Ca    8.9      30 Dec 2020 07:33  Phos  2.7     12-30  Mg     2.0     12-30        CAPILLARY BLOOD GLUCOSE      POCT Blood Glucose.: 188 mg/dL (30 Dec 2020 05:36)  POCT Blood Glucose.: 133 mg/dL (29 Dec 2020 23:24)  POCT Blood Glucose.: 111 mg/dL (29 Dec 2020 21:13)  POCT Blood Glucose.: 165 mg/dL (29 Dec 2020 17:12)            RADIOLOGY & ADDITIONAL TESTS:    Imaging Personally Reviewed:  [x] YES  [ ] NO    Consultant(s) Notes Reviewed:  [x] YES  [ ] NO

## 2020-12-30 NOTE — PROGRESS NOTE ADULT - SUBJECTIVE AND OBJECTIVE BOX
[facility] DEPARTMENT OF OPHTHALMOLOGY  ------------------------------------------------------------------------------  Jad Frank MD PGY-3  Pager: [phone]/[facility]: [id number]  ------------------------------------------------------------------------------    80 year old male with PMH of DM, HTN, HLD admitted for worsening agitation and confusion at home. The patient follows at [facility] for progressive thinning of the bilateral corneas, for which he had a patch graft OD during prior admission and required gluing multiple times OS, uncertain cause of corneal melting (prior malignancy work-up was negative).    Interval History: Pt COVID positive. Minimally reactive, barely follows commands.     MEDICATIONS  (STANDING):  ascorbic acid 1000 milliGRAM(s) Oral daily  aspirin  chewable 81 milliGRAM(s) Oral daily  atorvastatin 10 milliGRAM(s) Oral at bedtime  cyanocobalamin 1000 MICROGram(s) Oral daily  dextrose 40% Gel 15 Gram(s) Oral once  dextrose 5%. 1000 milliLiter(s) (50 mL/Hr) IV Continuous <Continuous>  dextrose 5%. 1000 milliLiter(s) (100 mL/Hr) IV Continuous <Continuous>  dextrose 50% Injectable 25 Gram(s) IV Push once  dextrose 50% Injectable 12.5 Gram(s) IV Push once  dextrose 50% Injectable 25 Gram(s) IV Push once  diVALproex Sprinkle 125 milliGRAM(s) Oral two times a day  doxazosin 1 milliGRAM(s) Oral at bedtime  doxycycline IVPB 100 milliGRAM(s) IV Intermittent every 12 hours  enalapril 10 milliGRAM(s) Oral daily  enoxaparin Injectable 40 milliGRAM(s) SubCutaneous daily  glucagon  Injectable 1 milliGRAM(s) IntraMuscular once  insulin lispro (ADMELOG) corrective regimen sliding scale   SubCutaneous every 6 hours  melatonin 3 milliGRAM(s) Oral at bedtime  moxifloxacin 0.5% Solution 1 Drop(s) Both EYES <User Schedule>  multivitamin/minerals/iron Oral Solution (CENTRUM) 15 milliLiter(s) Oral daily  pantoprazole  Injectable 40 milliGRAM(s) IV Push daily  thiamine 100 milliGRAM(s) Oral daily    MEDICATIONS  (PRN):  bisacodyl Suppository 10 milliGRAM(s) Rectal daily PRN Constipation  polyethylene glycol 3350 17 Gram(s) Oral daily PRN Constipation  senna 2 Tablet(s) Oral at bedtime PRN Constipation      VITALS: T(C): 36.7 (12-30-20 @ 11:00)  T(F): 98 (12-30-20 @ 11:00), Max: 98 (12-30-20 @ 11:00)  HR: 68 (12-30-20 @ 11:00) (60 - 77)  BP: 118/68 (12-30-20 @ 11:00) (92/60 - 119/79)  RR:  (16 - 17)  SpO2:  (99% - 100%)  Wt(kg): --  Alert and oriented x 1-2 ; appropriate mood and affect    Ophthalmology Exam:  Visual acuity (sc): HM OU  Pupils: PERRL OU, no APD  Ttono: Soft OU  Extraocular movements (EOMs): Full OU, no pain, no diplopia  Confrontational Visual Field (CVF): ED due to MS  Color Plates: ED due to poor vision    Pen Light Exam (PLE)  External: Clear shield in place OU  Lids/Lashes/Lacrimal Ducts: Flat OU Inferior lids noted to be slightly ectropic OU with deep fornices, eyelids crusted shut with significant discharge (which was cleaned gently with gauze and saline)  Sclera/Conjunctiva: tr injection OD, 1+ injection OS  Cornea: OD with patch graft in place, well sutured, laura negative, no epithelial defect, trace SPK; OS with corneal glue intact overlying central area of defect with overlying BCL.   Anterior Chamber: D+F OU, No hypopyon appreciated OU, formed OU  Iris: poor view but appears Flat OU  Lens: poor view OU

## 2020-12-30 NOTE — PROGRESS NOTE ADULT - SUBJECTIVE AND OBJECTIVE BOX
Chief Complaint:  Patient is a 80y old  Male who presents with a chief complaint of AMS (29 Dec 2020 19:48)      Interval Events:   brown stool recorded overnight    Allergies:  No Known Allergies      Hospital Medications:  ascorbic acid 1000 milliGRAM(s) Oral daily  aspirin  chewable 81 milliGRAM(s) Oral daily  atorvastatin 10 milliGRAM(s) Oral at bedtime  bisacodyl Suppository 10 milliGRAM(s) Rectal daily PRN  cyanocobalamin 1000 MICROGram(s) Oral daily  dextrose 40% Gel 15 Gram(s) Oral once  dextrose 5%. 1000 milliLiter(s) IV Continuous <Continuous>  dextrose 5%. 1000 milliLiter(s) IV Continuous <Continuous>  dextrose 50% Injectable 25 Gram(s) IV Push once  dextrose 50% Injectable 12.5 Gram(s) IV Push once  dextrose 50% Injectable 25 Gram(s) IV Push once  diVALproex Sprinkle 125 milliGRAM(s) Oral two times a day  doxazosin 1 milliGRAM(s) Oral at bedtime  doxycycline IVPB 100 milliGRAM(s) IV Intermittent every 12 hours  enalapril 10 milliGRAM(s) Oral daily  enoxaparin Injectable 40 milliGRAM(s) SubCutaneous daily  glucagon  Injectable 1 milliGRAM(s) IntraMuscular once  insulin lispro (ADMELOG) corrective regimen sliding scale   SubCutaneous every 6 hours  melatonin 3 milliGRAM(s) Oral at bedtime  moxifloxacin 0.5% Solution 1 Drop(s) Both EYES <User Schedule>  multivitamin/minerals/iron Oral Solution (CENTRUM) 15 milliLiter(s) Oral daily  polyethylene glycol 3350 17 Gram(s) Oral daily PRN  senna 2 Tablet(s) Oral at bedtime PRN  thiamine 100 milliGRAM(s) Oral daily      PMHX/PSHX:  BPH (benign prostatic hyperplasia)    Hyperlipidemia    HTN (hypertension)    DM (diabetes mellitus)    No pertinent past medical history    No significant past surgical history        Family history:  No pertinent family history in first degree relatives        ROS:   does not respond      PHYSICAL EXAM:     GENERAL:  Appears stated age, well-groomed, well-nourished, no distress  HEENT:  NC/AT,  conjunctivae clear, sclera-anicteric  NECK: Trachea midline, supple  CHEST:  Full & symmetric excursion, no increased effort, breath sounds clear  HEART:  Regular rhythm, no daily/heave  ABDOMEN:  Soft, non-tender, non-distended, normoactive bowel sounds,  no masses ,no hepato-splenomegaly, gastrostomy tube site unremarkable  EXTREMITIES:  no cyanosis,clubbing or edema  SKIN:  No rash/erythema/petechiae, no jaundice  NEURO:  Alert, oriented, no asterixis  RECTAL: Deferred    Vital Signs:  Vital Signs Last 24 Hrs  T(C): 36.7 (30 Dec 2020 11:00), Max: 36.7 (30 Dec 2020 11:00)  T(F): 98 (30 Dec 2020 11:00), Max: 98 (30 Dec 2020 11:00)  HR: 68 (30 Dec 2020 11:00) (60 - 77)  BP: 118/68 (30 Dec 2020 11:00) (92/60 - 119/79)  BP(mean): --  RR: 17 (30 Dec 2020 11:00) (16 - 17)  SpO2: 100% (30 Dec 2020 11:00) (99% - 100%)  Daily     Daily     LABS:                        9.7    5.58  )-----------( 181      ( 30 Dec 2020 11:52 )             30.5     12-30    135  |  98  |  31<H>  ----------------------------<  164<H>  4.5   |  28  |  0.59    Ca    8.9      30 Dec 2020 07:33  Phos  2.7     12-30  Mg     2.0     12-30                Imaging:

## 2020-12-30 NOTE — PROGRESS NOTE ADULT - PROBLEM SELECTOR PLAN 9
BMI<19, 60lb weight loss in 1 year  -had malignancy w/u last month, pan CT/MR, EGD unremarkable  -c/w dietary supplements  -s/p NGT Insertion 12/17/20, now with PEG placed. see above

## 2020-12-31 LAB
ANION GAP SERPL CALC-SCNC: 9 MMOL/L — SIGNIFICANT CHANGE UP (ref 7–14)
BUN SERPL-MCNC: 22 MG/DL — SIGNIFICANT CHANGE UP (ref 7–23)
CALCIUM SERPL-MCNC: 9.2 MG/DL — SIGNIFICANT CHANGE UP (ref 8.4–10.5)
CHLORIDE SERPL-SCNC: 99 MMOL/L — SIGNIFICANT CHANGE UP (ref 98–107)
CO2 SERPL-SCNC: 26 MMOL/L — SIGNIFICANT CHANGE UP (ref 22–31)
CREAT SERPL-MCNC: 0.46 MG/DL — LOW (ref 0.5–1.3)
CRP SERPL-MCNC: 18.5 MG/L — HIGH
D DIMER BLD IA.RAPID-MCNC: 420 NG/ML DDU — HIGH
FERRITIN SERPL-MCNC: 192 NG/ML — SIGNIFICANT CHANGE UP (ref 30–400)
GLUCOSE SERPL-MCNC: 192 MG/DL — HIGH (ref 70–99)
HCT VFR BLD CALC: 31.6 % — LOW (ref 39–50)
HGB BLD-MCNC: 10.2 G/DL — LOW (ref 13–17)
MAGNESIUM SERPL-MCNC: 1.9 MG/DL — SIGNIFICANT CHANGE UP (ref 1.6–2.6)
MCHC RBC-ENTMCNC: 28.9 PG — SIGNIFICANT CHANGE UP (ref 27–34)
MCHC RBC-ENTMCNC: 32.3 GM/DL — SIGNIFICANT CHANGE UP (ref 32–36)
MCV RBC AUTO: 89.5 FL — SIGNIFICANT CHANGE UP (ref 80–100)
NRBC # BLD: 0 /100 WBCS — SIGNIFICANT CHANGE UP
NRBC # FLD: 0 K/UL — SIGNIFICANT CHANGE UP
PHOSPHATE SERPL-MCNC: 2.1 MG/DL — LOW (ref 2.5–4.5)
PLATELET # BLD AUTO: 183 K/UL — SIGNIFICANT CHANGE UP (ref 150–400)
POTASSIUM SERPL-MCNC: 4.2 MMOL/L — SIGNIFICANT CHANGE UP (ref 3.5–5.3)
POTASSIUM SERPL-SCNC: 4.2 MMOL/L — SIGNIFICANT CHANGE UP (ref 3.5–5.3)
RBC # BLD: 3.53 M/UL — LOW (ref 4.2–5.8)
RBC # FLD: 13.3 % — SIGNIFICANT CHANGE UP (ref 10.3–14.5)
SODIUM SERPL-SCNC: 134 MMOL/L — LOW (ref 135–145)
WBC # BLD: 4.76 K/UL — SIGNIFICANT CHANGE UP (ref 3.8–10.5)
WBC # FLD AUTO: 4.76 K/UL — SIGNIFICANT CHANGE UP (ref 3.8–10.5)

## 2020-12-31 RX ADMIN — Medication 1: at 13:18

## 2020-12-31 RX ADMIN — DIVALPROEX SODIUM 125 MILLIGRAM(S): 500 TABLET, DELAYED RELEASE ORAL at 18:06

## 2020-12-31 RX ADMIN — Medication 1 DROP(S): at 23:20

## 2020-12-31 RX ADMIN — Medication 1: at 07:05

## 2020-12-31 RX ADMIN — DIVALPROEX SODIUM 125 MILLIGRAM(S): 500 TABLET, DELAYED RELEASE ORAL at 05:23

## 2020-12-31 RX ADMIN — Medication 1: at 18:07

## 2020-12-31 RX ADMIN — Medication 1 DROP(S): at 18:06

## 2020-12-31 RX ADMIN — Medication 1 MILLIGRAM(S): at 23:19

## 2020-12-31 RX ADMIN — Medication 3 MILLIGRAM(S): at 23:19

## 2020-12-31 RX ADMIN — Medication 15 MILLIGRAM(S): at 05:22

## 2020-12-31 RX ADMIN — Medication 81 MILLIGRAM(S): at 13:14

## 2020-12-31 RX ADMIN — ENOXAPARIN SODIUM 40 MILLIGRAM(S): 100 INJECTION SUBCUTANEOUS at 13:14

## 2020-12-31 RX ADMIN — Medication 110 MILLIGRAM(S): at 14:36

## 2020-12-31 RX ADMIN — Medication 15 MILLILITER(S): at 13:13

## 2020-12-31 RX ADMIN — ATORVASTATIN CALCIUM 10 MILLIGRAM(S): 80 TABLET, FILM COATED ORAL at 23:19

## 2020-12-31 RX ADMIN — PANTOPRAZOLE SODIUM 40 MILLIGRAM(S): 20 TABLET, DELAYED RELEASE ORAL at 13:14

## 2020-12-31 RX ADMIN — Medication 100 MILLIGRAM(S): at 13:14

## 2020-12-31 RX ADMIN — Medication 1 DROP(S): at 05:22

## 2020-12-31 RX ADMIN — Medication 1000 MILLIGRAM(S): at 13:14

## 2020-12-31 RX ADMIN — Medication 1 DROP(S): at 13:18

## 2020-12-31 RX ADMIN — Medication 110 MILLIGRAM(S): at 23:19

## 2020-12-31 RX ADMIN — PREGABALIN 1000 MICROGRAM(S): 225 CAPSULE ORAL at 13:14

## 2020-12-31 NOTE — PROGRESS NOTE ADULT - SUBJECTIVE AND OBJECTIVE BOX
NO acute SOB  No fevers last night  Saturating well on RA  No overt bleeding last night    Vital Signs Last 24 Hrs  T(C): 36.6 (31 Dec 2020 05:20), Max: 36.6 (30 Dec 2020 17:38)  T(F): 97.9 (31 Dec 2020 05:20), Max: 97.9 (31 Dec 2020 05:20)  HR: 66 (31 Dec 2020 05:20) (66 - 68)  BP: 116/66 (31 Dec 2020 05:20) (110/68 - 128/64)  BP(mean): --  RR: 18 (31 Dec 2020 05:20) (18 - 18)  SpO2: 100% (31 Dec 2020 05:20) (100% - 100%)    I&O's Summary        PHYSICAL EXAM:  GENERAL: NAD, thin cachetic elderly, NG tube removed.  HEAD:  Atraumatic, Normocephalic  EYES: eyes close shut   NECK: Supple, No JVD  CHEST/LUNG: mild decrease breath sounds bilaterally; No wheeze   HEART: Regular rate and rhythm; No murmurs, rubs, or gallops  ABDOMEN: Soft, Nontender, Nondistended; Bowel sounds present, PEG in place  Neuro: AAOx1, slow words, poor insight, eyes close shut  EXTREMITIES:  2+ Peripheral Pulses, No clubbing, cyanosis, or edema  SKIN: No rashes or lesions     LABS:                        10.2   4.76  )-----------( 183      ( 31 Dec 2020 07:41 )             31.6     12-31    134<L>  |  99  |  22  ----------------------------<  192<H>  4.2   |  26  |  0.46<L>    Ca    9.2      31 Dec 2020 07:41  Phos  2.1     12-31  Mg     1.9     12-31        CAPILLARY BLOOD GLUCOSE      POCT Blood Glucose.: 187 mg/dL (31 Dec 2020 07:01)  POCT Blood Glucose.: 147 mg/dL (30 Dec 2020 23:39)  POCT Blood Glucose.: 170 mg/dL (30 Dec 2020 16:56)  POCT Blood Glucose.: 187 mg/dL (30 Dec 2020 13:20)            RADIOLOGY & ADDITIONAL TESTS:    Imaging Personally Reviewed:  [x] YES  [ ] NO    Consultant(s) Notes Reviewed:  [x] YES  [ ] NO

## 2020-12-31 NOTE — PROGRESS NOTE ADULT - ASSESSMENT
80 year old man with subacute CVA and subdural hematomas now with oropharyngeal dysphagia        Problem/Recommendation - 1:  Problem: Oropharyngeal aspiration. Recommendation: s/p PEG, doing well  -gastrostomy feeding underway     Problem/Recommendation - 2:  ·  Problem: CVA (cerebral vascular accident).  Recommendation: per neurology.      Problem/Recommendation - 3:  ·  Problem: drop in hemoglobin: no overt GI bleeding noted. Repeat hgb stable. Monitor for GI bleeding. Repeat hgb.   PPI QD.      Problem/Recommendation - 4:  ·  Problem: COVID 19    Attending Attestation:   Differential diagnosis and plan of care discussed with patient after the evaluation  35 Minutes spent on total encounter of which more than fifty percent of the encounter was spent counseling and/or coordinating care by the attending physician.    Davidson Roca M.D.   Gastroenterology and Hepatology  Cell: [phone] .

## 2020-12-31 NOTE — PROGRESS NOTE ADULT - SUBJECTIVE AND OBJECTIVE BOX
Chief Complaint:  Patient is a 80y old  Male who presents with a chief complaint of AMS (31 Dec 2020 11:26)      Interval Events:   transferred to airborne isolation  no abd pain    Allergies:  No Known Allergies      Hospital Medications:  ascorbic acid 1000 milliGRAM(s) Oral daily  aspirin  chewable 81 milliGRAM(s) Oral daily  atorvastatin 10 milliGRAM(s) Oral at bedtime  bisacodyl Suppository 10 milliGRAM(s) Rectal daily PRN  cyanocobalamin 1000 MICROGram(s) Oral daily  dextrose 40% Gel 15 Gram(s) Oral once  dextrose 5%. 1000 milliLiter(s) IV Continuous <Continuous>  dextrose 5%. 1000 milliLiter(s) IV Continuous <Continuous>  dextrose 50% Injectable 25 Gram(s) IV Push once  dextrose 50% Injectable 12.5 Gram(s) IV Push once  dextrose 50% Injectable 25 Gram(s) IV Push once  diVALproex Sprinkle 125 milliGRAM(s) Oral two times a day  doxazosin 1 milliGRAM(s) Oral at bedtime  doxycycline IVPB 100 milliGRAM(s) IV Intermittent every 12 hours  enalapril 10 milliGRAM(s) Oral daily  enoxaparin Injectable 40 milliGRAM(s) SubCutaneous daily  glucagon  Injectable 1 milliGRAM(s) IntraMuscular once  insulin lispro (ADMELOG) corrective regimen sliding scale   SubCutaneous every 6 hours  melatonin 3 milliGRAM(s) Oral at bedtime  moxifloxacin 0.5% Solution 1 Drop(s) Both EYES <User Schedule>  multivitamin/minerals/iron Oral Solution (CENTRUM) 15 milliLiter(s) Oral daily  pantoprazole  Injectable 40 milliGRAM(s) IV Push daily  polyethylene glycol 3350 17 Gram(s) Oral daily PRN  predniSONE   Tablet 15 milliGRAM(s) Oral daily  senna 2 Tablet(s) Oral at bedtime PRN  thiamine 100 milliGRAM(s) Oral daily      PMHX/PSHX:  BPH (benign prostatic hyperplasia)    Hyperlipidemia    HTN (hypertension)    DM (diabetes mellitus)    No pertinent past medical history    No significant past surgical history        Family history:  No pertinent family history in first degree relatives        ROS:     General:  No wt loss, fevers, chills, night sweats, fatigue,   Eyes:  Good vision, no reported pain  ENT:  No sore throat, pain, runny nose, dysphagia  CV:  No pain, palpitations, hypo/hypertension  Resp:  No dyspnea, cough, tachypnea, wheezing  GI:  See HPI  :  No pain, bleeding, incontinence, nocturia  Muscle:  No pain, weakness  Neuro:  No weakness, tingling, memory problems  Psych:  No fatigue, insomnia, mood problems, depression  Endocrine:  No polyuria, polydipsia, cold/heat intolerance  Heme:  No petechiae, ecchymosis, easy bruisability  Skin:  No rash, edema      PHYSICAL EXAM:     GENERAL:  Appears stated age, well-groomed, thin no distress  HEENT:  NC/AT,  conjunctivae clear, sclera-anicteric  NECK: Trachea midline, supple  CHEST:  Full & symmetric excursion, no increased effort, breath sounds clear  HEART:  Regular rhythm, no daily/heave  ABDOMEN:  Soft, non-tender, non-distended, normoactive bowel sounds,  no masses ,no hepato-splenomegaly, gastrostomy  EXTREMITIES:  no cyanosis,clubbing or edema  SKIN:  No rash/erythema/petechiae, no jaundice  NEURO:  Alert, oriented, no asterixis, blindness  RECTAL: Deferred    Vital Signs:  Vital Signs Last 24 Hrs  T(C): 36.5 (31 Dec 2020 14:56), Max: 36.6 (30 Dec 2020 17:38)  T(F): 97.7 (31 Dec 2020 14:56), Max: 97.9 (31 Dec 2020 05:20)  HR: 70 (31 Dec 2020 14:56) (66 - 70)  BP: 138/76 (31 Dec 2020 14:56) (110/68 - 138/76)  BP(mean): --  RR: 18 (31 Dec 2020 14:56) (18 - 18)  SpO2: 100% (31 Dec 2020 14:56) (100% - 100%)  Daily     Daily     LABS:                        10.2   4.76  )-----------( 183      ( 31 Dec 2020 07:41 )             31.6     12-31    134<L>  |  99  |  22  ----------------------------<  192<H>  4.2   |  26  |  0.46<L>    Ca    9.2      31 Dec 2020 07:41  Phos  2.1     12-31  Mg     1.9     12-31                Imaging:

## 2021-01-01 LAB
ALBUMIN SERPL ELPH-MCNC: 3.4 G/DL — SIGNIFICANT CHANGE UP (ref 3.3–5)
ALP SERPL-CCNC: 104 U/L — SIGNIFICANT CHANGE UP (ref 40–120)
ALT FLD-CCNC: 24 U/L — SIGNIFICANT CHANGE UP (ref 4–41)
ANION GAP SERPL CALC-SCNC: 8 MMOL/L — SIGNIFICANT CHANGE UP (ref 7–14)
AST SERPL-CCNC: 23 U/L — SIGNIFICANT CHANGE UP (ref 4–40)
BILIRUB SERPL-MCNC: 0.4 MG/DL — SIGNIFICANT CHANGE UP (ref 0.2–1.2)
BUN SERPL-MCNC: 15 MG/DL — SIGNIFICANT CHANGE UP (ref 7–23)
CALCIUM SERPL-MCNC: 9.1 MG/DL — SIGNIFICANT CHANGE UP (ref 8.4–10.5)
CHLORIDE SERPL-SCNC: 98 MMOL/L — SIGNIFICANT CHANGE UP (ref 98–107)
CO2 SERPL-SCNC: 28 MMOL/L — SIGNIFICANT CHANGE UP (ref 22–31)
CREAT SERPL-MCNC: 0.45 MG/DL — LOW (ref 0.5–1.3)
GLUCOSE SERPL-MCNC: 188 MG/DL — HIGH (ref 70–99)
HCT VFR BLD CALC: 30.5 % — LOW (ref 39–50)
HGB BLD-MCNC: 10.1 G/DL — LOW (ref 13–17)
MAGNESIUM SERPL-MCNC: 1.8 MG/DL — SIGNIFICANT CHANGE UP (ref 1.6–2.6)
MCHC RBC-ENTMCNC: 28.6 PG — SIGNIFICANT CHANGE UP (ref 27–34)
MCHC RBC-ENTMCNC: 33.1 GM/DL — SIGNIFICANT CHANGE UP (ref 32–36)
MCV RBC AUTO: 86.4 FL — SIGNIFICANT CHANGE UP (ref 80–100)
NRBC # BLD: 0 /100 WBCS — SIGNIFICANT CHANGE UP
NRBC # FLD: 0 K/UL — SIGNIFICANT CHANGE UP
PHOSPHATE SERPL-MCNC: 1.9 MG/DL — LOW (ref 2.5–4.5)
PLATELET # BLD AUTO: 186 K/UL — SIGNIFICANT CHANGE UP (ref 150–400)
POTASSIUM SERPL-MCNC: 4.1 MMOL/L — SIGNIFICANT CHANGE UP (ref 3.5–5.3)
POTASSIUM SERPL-SCNC: 4.1 MMOL/L — SIGNIFICANT CHANGE UP (ref 3.5–5.3)
PROT SERPL-MCNC: 5.8 G/DL — LOW (ref 6–8.3)
RBC # BLD: 3.53 M/UL — LOW (ref 4.2–5.8)
RBC # FLD: 13.2 % — SIGNIFICANT CHANGE UP (ref 10.3–14.5)
SODIUM SERPL-SCNC: 134 MMOL/L — LOW (ref 135–145)
WBC # BLD: 5.34 K/UL — SIGNIFICANT CHANGE UP (ref 3.8–10.5)
WBC # FLD AUTO: 5.34 K/UL — SIGNIFICANT CHANGE UP (ref 3.8–10.5)

## 2021-01-01 RX ORDER — SODIUM,POTASSIUM PHOSPHATES 278-250MG
1 POWDER IN PACKET (EA) ORAL EVERY 4 HOURS
Refills: 0 | Status: COMPLETED | OUTPATIENT
Start: 2021-01-01 | End: 2021-01-01

## 2021-01-01 RX ORDER — HALOPERIDOL DECANOATE 100 MG/ML
1 INJECTION INTRAMUSCULAR ONCE
Refills: 0 | Status: COMPLETED | OUTPATIENT
Start: 2021-01-01 | End: 2021-01-01

## 2021-01-01 RX ADMIN — PREGABALIN 1000 MICROGRAM(S): 225 CAPSULE ORAL at 13:01

## 2021-01-01 RX ADMIN — Medication 1 PACKET(S): at 13:00

## 2021-01-01 RX ADMIN — Medication 1000 MILLIGRAM(S): at 13:00

## 2021-01-01 RX ADMIN — Medication 3 MILLIGRAM(S): at 23:02

## 2021-01-01 RX ADMIN — Medication 110 MILLIGRAM(S): at 10:23

## 2021-01-01 RX ADMIN — HALOPERIDOL DECANOATE 1 MILLIGRAM(S): 100 INJECTION INTRAMUSCULAR at 13:36

## 2021-01-01 RX ADMIN — Medication 81 MILLIGRAM(S): at 13:00

## 2021-01-01 RX ADMIN — Medication 1 DROP(S): at 17:55

## 2021-01-01 RX ADMIN — Medication 1 DROP(S): at 23:03

## 2021-01-01 RX ADMIN — Medication 100 MILLIGRAM(S): at 12:59

## 2021-01-01 RX ADMIN — ATORVASTATIN CALCIUM 10 MILLIGRAM(S): 80 TABLET, FILM COATED ORAL at 22:25

## 2021-01-01 RX ADMIN — ENOXAPARIN SODIUM 40 MILLIGRAM(S): 100 INJECTION SUBCUTANEOUS at 13:01

## 2021-01-01 RX ADMIN — Medication 2: at 17:55

## 2021-01-01 RX ADMIN — Medication 1 DROP(S): at 13:00

## 2021-01-01 RX ADMIN — Medication 15 MILLIGRAM(S): at 13:02

## 2021-01-01 RX ADMIN — Medication 1 MILLIGRAM(S): at 23:02

## 2021-01-01 RX ADMIN — Medication 1 PACKET(S): at 17:56

## 2021-01-01 RX ADMIN — Medication 1: at 22:25

## 2021-01-01 RX ADMIN — DIVALPROEX SODIUM 125 MILLIGRAM(S): 500 TABLET, DELAYED RELEASE ORAL at 13:02

## 2021-01-01 RX ADMIN — PANTOPRAZOLE SODIUM 40 MILLIGRAM(S): 20 TABLET, DELAYED RELEASE ORAL at 13:00

## 2021-01-01 RX ADMIN — Medication 110 MILLIGRAM(S): at 22:24

## 2021-01-01 RX ADMIN — DIVALPROEX SODIUM 125 MILLIGRAM(S): 500 TABLET, DELAYED RELEASE ORAL at 17:55

## 2021-01-01 NOTE — PROGRESS NOTE ADULT - SUBJECTIVE AND OBJECTIVE BOX
Chief Complaint:  Patient is a 80y old  Male who presents with a chief complaint of AMS (31 Dec 2020 11:26)      Interval Events:     no abd pain    Allergies:  No Known Allergies      Hospital Medications:  ascorbic acid 1000 milliGRAM(s) Oral daily  aspirin  chewable 81 milliGRAM(s) Oral daily  atorvastatin 10 milliGRAM(s) Oral at bedtime  bisacodyl Suppository 10 milliGRAM(s) Rectal daily PRN  cyanocobalamin 1000 MICROGram(s) Oral daily  dextrose 40% Gel 15 Gram(s) Oral once  dextrose 5%. 1000 milliLiter(s) IV Continuous <Continuous>  dextrose 5%. 1000 milliLiter(s) IV Continuous <Continuous>  dextrose 50% Injectable 25 Gram(s) IV Push once  dextrose 50% Injectable 12.5 Gram(s) IV Push once  dextrose 50% Injectable 25 Gram(s) IV Push once  diVALproex Sprinkle 125 milliGRAM(s) Oral two times a day  doxazosin 1 milliGRAM(s) Oral at bedtime  doxycycline IVPB 100 milliGRAM(s) IV Intermittent every 12 hours  enalapril 10 milliGRAM(s) Oral daily  enoxaparin Injectable 40 milliGRAM(s) SubCutaneous daily  glucagon  Injectable 1 milliGRAM(s) IntraMuscular once  insulin lispro (ADMELOG) corrective regimen sliding scale   SubCutaneous every 6 hours  melatonin 3 milliGRAM(s) Oral at bedtime  moxifloxacin 0.5% Solution 1 Drop(s) Both EYES <User Schedule>  multivitamin/minerals/iron Oral Solution (CENTRUM) 15 milliLiter(s) Oral daily  pantoprazole  Injectable 40 milliGRAM(s) IV Push daily  polyethylene glycol 3350 17 Gram(s) Oral daily PRN  predniSONE   Tablet 15 milliGRAM(s) Oral daily  senna 2 Tablet(s) Oral at bedtime PRN  thiamine 100 milliGRAM(s) Oral daily      PMHX/PSHX:  BPH (benign prostatic hyperplasia)    Hyperlipidemia    HTN (hypertension)    DM (diabetes mellitus)    No pertinent past medical history    No significant past surgical history        Family history:  No pertinent family history in first degree relatives        ROS:     General:  No wt loss, fevers, chills, night sweats, fatigue,   Eyes:  Good vision, no reported pain  ENT:  No sore throat, pain, runny nose, dysphagia  CV:  No pain, palpitations, hypo/hypertension  Resp:  No dyspnea, cough, tachypnea, wheezing  GI:  See HPI  :  No pain, bleeding, incontinence, nocturia  Muscle:  No pain, weakness  Neuro:  No weakness, tingling, memory problems  Psych:  No fatigue, insomnia, mood problems, depression  Endocrine:  No polyuria, polydipsia, cold/heat intolerance  Heme:  No petechiae, ecchymosis, easy bruisability  Skin:  No rash, edema      PHYSICAL EXAM:     GENERAL:  Appears stated age, well-groomed, thin no distress  HEENT:  NC/AT,  conjunctivae clear, sclera-anicteric  NECK: Trachea midline, supple  CHEST:  Full & symmetric excursion, no increased effort, breath sounds clear  HEART:  Regular rhythm, no daily/heave  ABDOMEN:  Soft, non-tender, non-distended, normoactive bowel sounds,  no masses ,no hepato-splenomegaly, gastrostomy  EXTREMITIES:  no cyanosis,clubbing or edema  SKIN:  No rash/erythema/petechiae, no jaundice  NEURO:  Alert, oriented, no asterixis, blindness  RECTAL: Deferred    Vital Signs:  Vital Signs Last 24 Hrs  T(C): 36.5 (31 Dec 2020 14:56), Max: 36.6 (30 Dec 2020 17:38)  T(F): 97.7 (31 Dec 2020 14:56), Max: 97.9 (31 Dec 2020 05:20)  HR: 70 (31 Dec 2020 14:56) (66 - 70)  BP: 138/76 (31 Dec 2020 14:56) (110/68 - 138/76)  BP(mean): --  RR: 18 (31 Dec 2020 14:56) (18 - 18)  SpO2: 100% (31 Dec 2020 14:56) (100% - 100%)  Daily     Daily     LABS:                        10.2   4.76  )-----------( 183      ( 31 Dec 2020 07:41 )             31.6     12-31    134<L>  |  99  |  22  ----------------------------<  192<H>  4.2   |  26  |  0.46<L>    Ca    9.2      31 Dec 2020 07:41  Phos  2.1     12-31  Mg     1.9     12-31                Imaging:

## 2021-01-01 NOTE — PROGRESS NOTE ADULT - PROBLEM SELECTOR PLAN 1
-MRI/MRA head 12/18/20 revealed small acute/subacute infarcts in the left posterior frontal lobe along w/predominantly chronic bilateral subdural hematomas unchanged from CT dated 12/13/2020, new from MRI dated 6/23/2020, with small subacute subdural components  -cont ASA/statin; holding off on adding plavix  -appreciate palliative input; family not interested in hospice  -speech/swallow reevaluate -> recommended NPO  -after numerous discussion family agreed to PEG tube placement  -GI consult appreciated. PEG placed 12/28/20, started tube feeding and tolerating

## 2021-01-01 NOTE — PROGRESS NOTE ADULT - SUBJECTIVE AND OBJECTIVE BOX
Tolerating TF  No Nausea or abd pain    Vital Signs Last 24 Hrs  T(C): 36.5 (31 Dec 2020 21:35), Max: 36.5 (31 Dec 2020 14:56)  T(F): 97.7 (31 Dec 2020 21:35), Max: 97.7 (31 Dec 2020 14:56)  HR: 87 (31 Dec 2020 21:35) (70 - 87)  BP: 144/87 (31 Dec 2020 21:35) (138/76 - 144/87)  BP(mean): --  RR: 18 (31 Dec 2020 21:35) (18 - 18)  SpO2: 100% (31 Dec 2020 21:35) (100% - 100%)    I&O's Summary    12-31-20 @ 07:01  -  01-01-21 @ 07:00  --------------------------------------------------------  IN: 0 mL / OUT: 800 mL / NET: -800 mL            PHYSICAL EXAM:  GENERAL: NAD, thin cachetic elderly, NG tube removed.  HEAD:  Atraumatic, Normocephalic  EYES: eyes close shut   NECK: Supple, No JVD  CHEST/LUNG: mild decrease breath sounds bilaterally; No wheeze   HEART: Regular rate and rhythm; No murmurs, rubs, or gallops  ABDOMEN: Soft, Nontender, Nondistended; Bowel sounds present, PEG in place  Neuro: AAOx1, slow words, poor insight, eyes close shut  EXTREMITIES:  2+ Peripheral Pulses, No clubbing, cyanosis, or edema  SKIN: No rashes or lesions     LABS:                        10.1   5.34  )-----------( 186      ( 01 Jan 2021 10:42 )             30.5     01-01    134<L>  |  98  |  15  ----------------------------<  188<H>  4.1   |  28  |  0.45<L>    Ca    9.1      01 Jan 2021 10:42  Phos  1.9     01-01  Mg     1.8     01-01    TPro  5.8<L>  /  Alb  3.4  /  TBili  0.4  /  DBili  x   /  AST  23  /  ALT  24  /  AlkPhos  104  01-01      CAPILLARY BLOOD GLUCOSE      POCT Blood Glucose.: 176 mg/dL (01 Jan 2021 08:05)  POCT Blood Glucose.: 163 mg/dL (31 Dec 2020 23:38)  POCT Blood Glucose.: 194 mg/dL (31 Dec 2020 17:34)  POCT Blood Glucose.: 184 mg/dL (31 Dec 2020 12:26)            RADIOLOGY & ADDITIONAL TESTS:    Imaging Personally Reviewed:  [x] YES  [ ] NO    Consultant(s) Notes Reviewed:  [x] YES  [ ] NO

## 2021-01-02 LAB
ALBUMIN SERPL ELPH-MCNC: 3.6 G/DL — SIGNIFICANT CHANGE UP (ref 3.3–5)
ALP SERPL-CCNC: 94 U/L — SIGNIFICANT CHANGE UP (ref 40–120)
ALT FLD-CCNC: 23 U/L — SIGNIFICANT CHANGE UP (ref 4–41)
ANION GAP SERPL CALC-SCNC: 10 MMOL/L — SIGNIFICANT CHANGE UP (ref 7–14)
AST SERPL-CCNC: 24 U/L — SIGNIFICANT CHANGE UP (ref 4–40)
BILIRUB SERPL-MCNC: 0.5 MG/DL — SIGNIFICANT CHANGE UP (ref 0.2–1.2)
BUN SERPL-MCNC: 15 MG/DL — SIGNIFICANT CHANGE UP (ref 7–23)
CALCIUM SERPL-MCNC: 9.6 MG/DL — SIGNIFICANT CHANGE UP (ref 8.4–10.5)
CHLORIDE SERPL-SCNC: 98 MMOL/L — SIGNIFICANT CHANGE UP (ref 98–107)
CO2 SERPL-SCNC: 28 MMOL/L — SIGNIFICANT CHANGE UP (ref 22–31)
CREAT SERPL-MCNC: 0.51 MG/DL — SIGNIFICANT CHANGE UP (ref 0.5–1.3)
CRP SERPL-MCNC: 15.2 MG/L — HIGH
CULTURE RESULTS: SIGNIFICANT CHANGE UP
D DIMER BLD IA.RAPID-MCNC: 1065 NG/ML DDU — HIGH
FERRITIN SERPL-MCNC: 203 NG/ML — SIGNIFICANT CHANGE UP (ref 30–400)
GLUCOSE SERPL-MCNC: 176 MG/DL — HIGH (ref 70–99)
HCT VFR BLD CALC: 32.2 % — LOW (ref 39–50)
HGB BLD-MCNC: 10.5 G/DL — LOW (ref 13–17)
LDH SERPL L TO P-CCNC: 330 U/L — HIGH (ref 135–225)
MAGNESIUM SERPL-MCNC: 2 MG/DL — SIGNIFICANT CHANGE UP (ref 1.6–2.6)
MCHC RBC-ENTMCNC: 28.4 PG — SIGNIFICANT CHANGE UP (ref 27–34)
MCHC RBC-ENTMCNC: 32.6 GM/DL — SIGNIFICANT CHANGE UP (ref 32–36)
MCV RBC AUTO: 87 FL — SIGNIFICANT CHANGE UP (ref 80–100)
NRBC # BLD: 0 /100 WBCS — SIGNIFICANT CHANGE UP
NRBC # FLD: 0 K/UL — SIGNIFICANT CHANGE UP
PHOSPHATE SERPL-MCNC: 2.3 MG/DL — LOW (ref 2.5–4.5)
PLATELET # BLD AUTO: 217 K/UL — SIGNIFICANT CHANGE UP (ref 150–400)
POTASSIUM SERPL-MCNC: 4.1 MMOL/L — SIGNIFICANT CHANGE UP (ref 3.5–5.3)
POTASSIUM SERPL-SCNC: 4.1 MMOL/L — SIGNIFICANT CHANGE UP (ref 3.5–5.3)
PROT SERPL-MCNC: 6 G/DL — SIGNIFICANT CHANGE UP (ref 6–8.3)
RBC # BLD: 3.7 M/UL — LOW (ref 4.2–5.8)
RBC # FLD: 13.2 % — SIGNIFICANT CHANGE UP (ref 10.3–14.5)
SODIUM SERPL-SCNC: 136 MMOL/L — SIGNIFICANT CHANGE UP (ref 135–145)
SPECIMEN SOURCE: SIGNIFICANT CHANGE UP
WBC # BLD: 5.41 K/UL — SIGNIFICANT CHANGE UP (ref 3.8–10.5)
WBC # FLD AUTO: 5.41 K/UL — SIGNIFICANT CHANGE UP (ref 3.8–10.5)

## 2021-01-02 RX ORDER — PREGABALIN 225 MG/1
1000 CAPSULE ORAL DAILY
Refills: 0 | Status: DISCONTINUED | OUTPATIENT
Start: 2021-01-02 | End: 2021-01-07

## 2021-01-02 RX ORDER — THIAMINE MONONITRATE (VIT B1) 100 MG
100 TABLET ORAL DAILY
Refills: 0 | Status: DISCONTINUED | OUTPATIENT
Start: 2021-01-02 | End: 2021-01-07

## 2021-01-02 RX ORDER — LANOLIN ALCOHOL/MO/W.PET/CERES
3 CREAM (GRAM) TOPICAL AT BEDTIME
Refills: 0 | Status: DISCONTINUED | OUTPATIENT
Start: 2021-01-02 | End: 2021-01-07

## 2021-01-02 RX ORDER — DIVALPROEX SODIUM 500 MG/1
125 TABLET, DELAYED RELEASE ORAL
Refills: 0 | Status: DISCONTINUED | OUTPATIENT
Start: 2021-01-02 | End: 2021-01-07

## 2021-01-02 RX ORDER — SENNA PLUS 8.6 MG/1
2 TABLET ORAL AT BEDTIME
Refills: 0 | Status: DISCONTINUED | OUTPATIENT
Start: 2021-01-02 | End: 2021-01-07

## 2021-01-02 RX ORDER — POLYETHYLENE GLYCOL 3350 17 G/17G
17 POWDER, FOR SOLUTION ORAL DAILY
Refills: 0 | Status: DISCONTINUED | OUTPATIENT
Start: 2021-01-02 | End: 2021-01-07

## 2021-01-02 RX ORDER — ASPIRIN/CALCIUM CARB/MAGNESIUM 324 MG
81 TABLET ORAL DAILY
Refills: 0 | Status: DISCONTINUED | OUTPATIENT
Start: 2021-01-02 | End: 2021-01-07

## 2021-01-02 RX ORDER — SODIUM,POTASSIUM PHOSPHATES 278-250MG
1 POWDER IN PACKET (EA) ORAL ONCE
Refills: 0 | Status: COMPLETED | OUTPATIENT
Start: 2021-01-02 | End: 2021-01-02

## 2021-01-02 RX ORDER — ATORVASTATIN CALCIUM 80 MG/1
10 TABLET, FILM COATED ORAL AT BEDTIME
Refills: 0 | Status: DISCONTINUED | OUTPATIENT
Start: 2021-01-02 | End: 2021-01-07

## 2021-01-02 RX ORDER — ASCORBIC ACID 60 MG
1000 TABLET,CHEWABLE ORAL DAILY
Refills: 0 | Status: DISCONTINUED | OUTPATIENT
Start: 2021-01-02 | End: 2021-01-07

## 2021-01-02 RX ADMIN — Medication 10 MILLIGRAM(S): at 05:37

## 2021-01-02 RX ADMIN — PANTOPRAZOLE SODIUM 40 MILLIGRAM(S): 20 TABLET, DELAYED RELEASE ORAL at 09:00

## 2021-01-02 RX ADMIN — DIVALPROEX SODIUM 125 MILLIGRAM(S): 500 TABLET, DELAYED RELEASE ORAL at 05:37

## 2021-01-02 RX ADMIN — PREGABALIN 1000 MICROGRAM(S): 225 CAPSULE ORAL at 09:00

## 2021-01-02 RX ADMIN — Medication 1 MILLIGRAM(S): at 21:22

## 2021-01-02 RX ADMIN — Medication 3 MILLIGRAM(S): at 21:22

## 2021-01-02 RX ADMIN — Medication 110 MILLIGRAM(S): at 09:00

## 2021-01-02 RX ADMIN — Medication 1000 MILLIGRAM(S): at 09:00

## 2021-01-02 RX ADMIN — ATORVASTATIN CALCIUM 10 MILLIGRAM(S): 80 TABLET, FILM COATED ORAL at 21:21

## 2021-01-02 RX ADMIN — DIVALPROEX SODIUM 125 MILLIGRAM(S): 500 TABLET, DELAYED RELEASE ORAL at 17:08

## 2021-01-02 RX ADMIN — Medication 110 MILLIGRAM(S): at 21:21

## 2021-01-02 RX ADMIN — Medication 1: at 14:08

## 2021-01-02 RX ADMIN — Medication 81 MILLIGRAM(S): at 09:00

## 2021-01-02 RX ADMIN — Medication 100 MILLIGRAM(S): at 09:00

## 2021-01-02 RX ADMIN — Medication 15 MILLILITER(S): at 09:00

## 2021-01-02 RX ADMIN — Medication 1 DROP(S): at 17:07

## 2021-01-02 RX ADMIN — ENOXAPARIN SODIUM 40 MILLIGRAM(S): 100 INJECTION SUBCUTANEOUS at 09:00

## 2021-01-02 RX ADMIN — Medication 1 DROP(S): at 11:59

## 2021-01-02 RX ADMIN — Medication 1 DROP(S): at 05:37

## 2021-01-02 RX ADMIN — Medication 1 PACKET(S): at 09:00

## 2021-01-02 NOTE — PROGRESS NOTE ADULT - ASSESSMENT
80 year old man with subacute CVA and subdural hematomas now with oropharyngeal dysphagia        Problem/Recommendation - 1:  Problem: Oropharyngeal aspiration. Recommendation: s/p PEG, doing well  -gastrostomy feeding underway     Problem/Recommendation - 2:  ·  Problem: CVA (cerebral vascular accident).  Recommendation: per neurology.      Problem/Recommendation - 3:  ·  Problem: drop in hemoglobin: no overt GI bleeding noted. Repeat hgb stable. Monitor for GI bleeding. Repeat hgb.   PPI QD.      Problem/Recommendation - 4:  ·  Problem: COVID 19    Attending Attestation:   Differential diagnosis and plan of care discussed with patient after the evaluation  35 Minutes spent on total encounter of which more than fifty percent of the encounter was spent counseling and/or coordinating care by the attending physician.    Davidson Roca M.D.   Gastroenterology and Hepatology  Cell: [phone] .

## 2021-01-02 NOTE — PROGRESS NOTE ADULT - SUBJECTIVE AND OBJECTIVE BOX
More agitated yesterday during afternoon  Received haldol 1 mg   More calm    Vital Signs Last 24 Hrs  T(C): 36.4 (02 Jan 2021 05:53), Max: 36.6 (01 Jan 2021 22:40)  T(F): 97.6 (02 Jan 2021 05:53), Max: 97.8 (01 Jan 2021 22:40)  HR: 90 (02 Jan 2021 05:53) (71 - 91)  BP: 134/76 (02 Jan 2021 05:53) (132/75 - 134/76)  BP(mean): --  RR: 18 (02 Jan 2021 05:53) (18 - 18)  SpO2: 99% (02 Jan 2021 05:53) (99% - 100%)    I&O's Summary    01-01-21 @ 07:01  -  01-02-21 @ 07:00  --------------------------------------------------------  IN: 800 mL / OUT: 0 mL / NET: 800 mL        PHYSICAL EXAM:  GENERAL: NAD, thin cachetic elderly  HEAD:  Atraumatic, Normocephalic  EYES: eyes close shut   NECK: Supple, No JVD  CHEST/LUNG: mild decrease breath sounds bilaterally; No wheeze   HEART: Regular rate and rhythm; No murmurs, rubs, or gallops  ABDOMEN: Soft, Nontender, Nondistended; Bowel sounds present, PEG in place  Neuro: AAOx1, slow words, poor insight, eyes close shut  EXTREMITIES:  2+ Peripheral Pulses, No clubbing, cyanosis, or edema  SKIN: No rashes or lesions     LABS:                        10.5   5.41  )-----------( 217      ( 02 Jan 2021 07:33 )             32.2     01-02    136  |  98  |  15  ----------------------------<  176<H>  4.1   |  28  |  0.51    Ca    9.6      02 Jan 2021 07:33  Phos  2.3     01-02  Mg     2.0     01-02    TPro  6.0  /  Alb  3.6  /  TBili  0.5  /  DBili  x   /  AST  24  /  ALT  23  /  AlkPhos  94  01-02      CAPILLARY BLOOD GLUCOSE      POCT Blood Glucose.: 176 mg/dL (02 Jan 2021 07:29)  POCT Blood Glucose.: 190 mg/dL (01 Jan 2021 22:10)  POCT Blood Glucose.: 220 mg/dL (01 Jan 2021 17:25)            RADIOLOGY & ADDITIONAL TESTS:    Imaging Personally Reviewed:  [x] YES  [ ] NO    Consultant(s) Notes Reviewed:  [x] YES  [ ] NO

## 2021-01-02 NOTE — CHART NOTE - NSCHARTNOTEFT_GEN_A_CORE
Patients D-dimer noted to have trended up to 1065 today - LE duplex pending though no LE edema noted on exam.  No hypoxia noted and otherwise stable, will hold off on CTA chest for now.   WIll continue current dose of lovenox 40mg daily for DVT ppx.  Above events and plan discussed with Dr. Chicas.

## 2021-01-03 LAB
ALBUMIN SERPL ELPH-MCNC: 3.5 G/DL — SIGNIFICANT CHANGE UP (ref 3.3–5)
ALP SERPL-CCNC: 110 U/L — SIGNIFICANT CHANGE UP (ref 40–120)
ALT FLD-CCNC: 23 U/L — SIGNIFICANT CHANGE UP (ref 4–41)
ANION GAP SERPL CALC-SCNC: 8 MMOL/L — SIGNIFICANT CHANGE UP (ref 7–14)
AST SERPL-CCNC: 20 U/L — SIGNIFICANT CHANGE UP (ref 4–40)
BILIRUB SERPL-MCNC: 0.4 MG/DL — SIGNIFICANT CHANGE UP (ref 0.2–1.2)
BUN SERPL-MCNC: 17 MG/DL — SIGNIFICANT CHANGE UP (ref 7–23)
CALCIUM SERPL-MCNC: 8.9 MG/DL — SIGNIFICANT CHANGE UP (ref 8.4–10.5)
CHLORIDE SERPL-SCNC: 98 MMOL/L — SIGNIFICANT CHANGE UP (ref 98–107)
CO2 SERPL-SCNC: 28 MMOL/L — SIGNIFICANT CHANGE UP (ref 22–31)
CREAT SERPL-MCNC: 0.51 MG/DL — SIGNIFICANT CHANGE UP (ref 0.5–1.3)
D DIMER BLD IA.RAPID-MCNC: 370 NG/ML DDU — HIGH
GLUCOSE SERPL-MCNC: 194 MG/DL — HIGH (ref 70–99)
HCT VFR BLD CALC: 29.3 % — LOW (ref 39–50)
HGB BLD-MCNC: 9.7 G/DL — LOW (ref 13–17)
MAGNESIUM SERPL-MCNC: 1.9 MG/DL — SIGNIFICANT CHANGE UP (ref 1.6–2.6)
MCHC RBC-ENTMCNC: 28.8 PG — SIGNIFICANT CHANGE UP (ref 27–34)
MCHC RBC-ENTMCNC: 33.1 GM/DL — SIGNIFICANT CHANGE UP (ref 32–36)
MCV RBC AUTO: 86.9 FL — SIGNIFICANT CHANGE UP (ref 80–100)
NRBC # BLD: 0 /100 WBCS — SIGNIFICANT CHANGE UP
NRBC # FLD: 0 K/UL — SIGNIFICANT CHANGE UP
PHOSPHATE SERPL-MCNC: 2.8 MG/DL — SIGNIFICANT CHANGE UP (ref 2.5–4.5)
PLATELET # BLD AUTO: 218 K/UL — SIGNIFICANT CHANGE UP (ref 150–400)
POTASSIUM SERPL-MCNC: 3.9 MMOL/L — SIGNIFICANT CHANGE UP (ref 3.5–5.3)
POTASSIUM SERPL-SCNC: 3.9 MMOL/L — SIGNIFICANT CHANGE UP (ref 3.5–5.3)
PROT SERPL-MCNC: 5.9 G/DL — LOW (ref 6–8.3)
RBC # BLD: 3.37 M/UL — LOW (ref 4.2–5.8)
RBC # FLD: 13.3 % — SIGNIFICANT CHANGE UP (ref 10.3–14.5)
SODIUM SERPL-SCNC: 134 MMOL/L — LOW (ref 135–145)
WBC # BLD: 6.18 K/UL — SIGNIFICANT CHANGE UP (ref 3.8–10.5)
WBC # FLD AUTO: 6.18 K/UL — SIGNIFICANT CHANGE UP (ref 3.8–10.5)

## 2021-01-03 RX ORDER — SODIUM CHLORIDE 9 MG/ML
1000 INJECTION INTRAMUSCULAR; INTRAVENOUS; SUBCUTANEOUS ONCE
Refills: 0 | Status: COMPLETED | OUTPATIENT
Start: 2021-01-03 | End: 2021-01-03

## 2021-01-03 RX ADMIN — PREGABALIN 1000 MICROGRAM(S): 225 CAPSULE ORAL at 11:50

## 2021-01-03 RX ADMIN — Medication 2: at 00:32

## 2021-01-03 RX ADMIN — Medication 110 MILLIGRAM(S): at 09:51

## 2021-01-03 RX ADMIN — SODIUM CHLORIDE 1000 MILLILITER(S): 9 INJECTION INTRAMUSCULAR; INTRAVENOUS; SUBCUTANEOUS at 05:32

## 2021-01-03 RX ADMIN — Medication 15 MILLILITER(S): at 11:50

## 2021-01-03 RX ADMIN — Medication 1 DROP(S): at 11:50

## 2021-01-03 RX ADMIN — Medication 3 MILLIGRAM(S): at 23:29

## 2021-01-03 RX ADMIN — Medication 81 MILLIGRAM(S): at 11:49

## 2021-01-03 RX ADMIN — Medication 1000 MILLIGRAM(S): at 11:49

## 2021-01-03 RX ADMIN — Medication 1 DROP(S): at 05:30

## 2021-01-03 RX ADMIN — Medication 100 MILLIGRAM(S): at 11:49

## 2021-01-03 RX ADMIN — PANTOPRAZOLE SODIUM 40 MILLIGRAM(S): 20 TABLET, DELAYED RELEASE ORAL at 11:49

## 2021-01-03 RX ADMIN — Medication 1 DROP(S): at 23:29

## 2021-01-03 RX ADMIN — ATORVASTATIN CALCIUM 10 MILLIGRAM(S): 80 TABLET, FILM COATED ORAL at 23:29

## 2021-01-03 RX ADMIN — Medication 110 MILLIGRAM(S): at 23:29

## 2021-01-03 RX ADMIN — Medication 10 MILLIGRAM(S): at 05:30

## 2021-01-03 RX ADMIN — ENOXAPARIN SODIUM 40 MILLIGRAM(S): 100 INJECTION SUBCUTANEOUS at 11:50

## 2021-01-03 RX ADMIN — Medication 1 DROP(S): at 18:43

## 2021-01-03 RX ADMIN — Medication 2: at 11:49

## 2021-01-03 RX ADMIN — DIVALPROEX SODIUM 125 MILLIGRAM(S): 500 TABLET, DELAYED RELEASE ORAL at 18:43

## 2021-01-03 RX ADMIN — Medication 1 MILLIGRAM(S): at 23:29

## 2021-01-03 RX ADMIN — DIVALPROEX SODIUM 125 MILLIGRAM(S): 500 TABLET, DELAYED RELEASE ORAL at 05:30

## 2021-01-03 RX ADMIN — Medication 1 DROP(S): at 00:32

## 2021-01-03 NOTE — PROGRESS NOTE ADULT - PROBLEM SELECTOR PLAN 1
Asymptomatic and saturating well on RA so far  Airborne/droplet precautions  Check inflammatory markers q3days  LE venous dopplers pending as D-dimer increased to above 1000 1/2, although he is still saturating well on RA  On prophylactic dose lovenox

## 2021-01-03 NOTE — CHART NOTE - NSCHARTNOTEFT_GEN_A_CORE
Pt has been calmer and not trying to get out of bed unattended, as reported by RN and does not require CO with 1:1 and will be ok with close observation in a room with PCA in attendance for another patient as well.     A/P:  1:1 d/c'd and pt may be in attended  and monitored if staff is nearby

## 2021-01-03 NOTE — PROVIDER CONTACT NOTE (OTHER) - SITUATION
Pt's Left arm swollen, unsure if team is aware.
Patient BP 81/49, HR 58.
Patient is agitated and is refusing care at the moment
Pt BP is 159/100, pt is asymptomatic
Recheck blood pressure of : 163/109 (left arm) and 169/113 (right arm) HR: 89
patient received to [facility] from ED; vitals as follows: 174/112 (right arm blood pressure) HR: 86
pt received from ED, pt /100
pt received from ED, pt /100
pt received from ED, pt /98
Patient current IV not working well. Pump constantly indicate "patient side occluded." Already attempted 6 times to insert IV with no success. Only 500 cc administered.
Pt is becoming increasingly restless and agitated. Pt is hitting staff and attempting to climb out of bed.
pt has a f/s of 223.
Pt was agitated and refusing FS and medication
Since patient is NPO at midnight, ok to hold feeds?

## 2021-01-03 NOTE — PROVIDER CONTACT NOTE (OTHER) - ACTION/TREATMENT ORDERED:
Awaiting further actions.
Hold feeds at midnight. Will continue to monitor.
will add sliding scale
ACP aware. Continue to monitor blood pressure.
Recheck BP manually
Haldol IV, perform EKG after pt calms down to assess QT interval
Provider will come see patient.
Haldol given
administer Enalapril as ordered. will continue to monitor.
administer hydralazine IV as ordered.
will order Hydralazine
will order Hydralazine
will order Hydralazine 2.5
Provider aware, Re attempt in 1 hour

## 2021-01-03 NOTE — CHART NOTE - NSCHARTNOTEFT_GEN_A_CORE
Notified by RN that pt w/ hypotension noted w/ AM vitals. BP 81/49, repeated x2. Pt mostly non-verbal, A&Ox0, but seems asymptomatic w/ no change from baseline. 1L NS bolus ordered. RN to repeat Vitals after bolus completes, will follow up and continue to monitor pt closely.     ICU Vital Signs Last 24 Hrs  T(C): 36.5 (03 Jan 2021 05:21), Max: 36.6 (02 Jan 2021 13:40)  T(F): 97.7 (03 Jan 2021 05:21), Max: 97.9 (02 Jan 2021 13:40)  HR: 58 (03 Jan 2021 05:21) (58 - 80)  BP: 81/49 (03 Jan 2021 05:21) (81/49 - 129/72)  RR: 18 (03 Jan 2021 05:21) (17 - 18)  SpO2: 98% (03 Jan 2021 05:21) (98% - 98%)

## 2021-01-03 NOTE — PROVIDER CONTACT NOTE (OTHER) - ASSESSMENT
Patient AxOx0. Unable to assess symptoms. No signs of distress. Patient is resting comfortably in bed. Patient BP after 500 cc bolus is 102/57.
patient started on Glucerna, via NG tube
Patient A&O x1, npo at midnight for possible PEG placement?
Pt is becoming increasingly restless and agitated. Pt is hitting staff and attempting to climb out of bed.
Patient BP 81/49, HR 58. Patient resting in bed with no s/s of distress.
Pt restless and agitated
Pt's eye is crusty with yellow discharge.
alert and oriented x2; disoriented to situation and time. ecchymosis generalized throughout body; stage 2 sacral area. 100% on room air
alert and oriented x2; ecchymosis generalized; bedrest; denies chest pain, SOB or HA.
pt disoriented x2, not in visible distress.
+1 edema, no pain, no discoloration

## 2021-01-03 NOTE — PROVIDER CONTACT NOTE (OTHER) - BACKGROUND
Dx: agitation and restlessness
Pt admitted for confusion
80 year old male with pmhx of HTN, DM and dementia. present to ED for agitation
80 year old male; pmhx of DM, HTN and Dementia. present to ED for agitation
Patient admitted for restlessness and agitation
Pt came in for restlessness and agitation
Pt recently had eye surgery.
pt admitted for restlessness and agitation
pt here for failure to thrive.
80 year old male admitted for restlessness and agitation with covid 19.

## 2021-01-03 NOTE — PROGRESS NOTE ADULT - ASSESSMENT
80 year old man with subacute CVA and subdural hematomas now with oropharyngeal dysphagia        Problem/Recommendation - 1:  Problem: Oropharyngeal aspiration. Recommendation: s/p PEG, doing well  -gastrostomy feeding underway     Problem/Recommendation - 2:  ·  Problem: CVA (cerebral vascular accident).  Recommendation: per neurology.      Problem/Recommendation - 3:  ·  Problem: drop in hemoglobin: no overt GI bleeding noted. Repeat hgb stable. Monitor for GI bleeding. Repeat hgb.   PPI QD.      Problem/Recommendation - 4:  ·  Problem: COVID 19    Attending Attestation:   Differential diagnosis and plan of care discussed with patient after the evaluation  35 Minutes spent on total encounter of which more than fifty percent of the encounter was spent counseling and/or coordinating care by the attending physician.    Davidson Roca M.D.   Gastroenterology and Hepatology  Cell: [phone] .

## 2021-01-03 NOTE — PROGRESS NOTE ADULT - SUBJECTIVE AND OBJECTIVE BOX
Received 1LNS this morning for low BP, f/u BP went from 80 to 102 systolic  Awaiting LE venous dopplers  Saturating well on RA    Vital Signs Last 24 Hrs  T(C): 36.5 (03 Jan 2021 05:21), Max: 36.6 (02 Jan 2021 13:40)  T(F): 97.7 (03 Jan 2021 05:21), Max: 97.9 (02 Jan 2021 13:40)  HR: 58 (03 Jan 2021 07:03) (58 - 80)  BP: 102/57 (03 Jan 2021 07:03) (81/49 - 129/72)  BP(mean): --  RR: 18 (03 Jan 2021 05:21) (17 - 18)  SpO2: 98% (03 Jan 2021 05:21) (98% - 98%)    I&O's Summary    01-02-21 @ 07:01  -  01-03-21 @ 07:00  --------------------------------------------------------  IN: 200 mL / OUT: 0 mL / NET: 200 mL          PHYSICAL EXAM:  GENERAL: NAD, thin cachetic elderly  HEAD:  Atraumatic, Normocephalic  EYES: eyes close shut   NECK: Supple, No JVD  CHEST/LUNG: mild decrease breath sounds bilaterally; No wheeze   HEART: Regular rate and rhythm; No murmurs, rubs, or gallops  ABDOMEN: Soft, Nontender, Nondistended; Bowel sounds present, PEG in place  Neuro: AAOx0-1, slow words, poor insight, eyes close shut  EXTREMITIES:  2+ Peripheral Pulses, No clubbing, cyanosis, or edema  SKIN: No rashes or lesions     LABS:                        10.5   5.41  )-----------( 217      ( 02 Jan 2021 07:33 )             32.2     01-02    136  |  98  |  15  ----------------------------<  176<H>  4.1   |  28  |  0.51    Ca    9.6      02 Jan 2021 07:33  Phos  2.3     01-02  Mg     2.0     01-02    TPro  6.0  /  Alb  3.6  /  TBili  0.5  /  DBili  x   /  AST  24  /  ALT  23  /  AlkPhos  94  01-02      CAPILLARY BLOOD GLUCOSE      POCT Blood Glucose.: 120 mg/dL (03 Jan 2021 06:02)  POCT Blood Glucose.: 202 mg/dL (03 Jan 2021 00:11)  POCT Blood Glucose.: 145 mg/dL (02 Jan 2021 18:04)  POCT Blood Glucose.: 167 mg/dL (02 Jan 2021 12:25)            RADIOLOGY & ADDITIONAL TESTS:    Imaging Personally Reviewed:  [x] YES  [ ] NO    Consultant(s) Notes Reviewed:  [x] YES  [ ] NO

## 2021-01-03 NOTE — CHART NOTE - NSCHARTNOTESELECT_GEN_ALL_CORE
Event Note
Event Note
Follow Up/Nutrition Services
Follow-Up/Nutrition Services
Malnutrition Notification
Palliative Care/Off Service Note
ACP NP note/Event Note
Event Note

## 2021-01-03 NOTE — PROVIDER CONTACT NOTE (OTHER) - NAME OF MD/NP/PA/DO NOTIFIED:
KARLEE Smith
Samir Gustafson
ACP Sj
Armando MACHADO
Armando Piedra; NP
Bebe DESIR
Dr. Roberts [id number]
Dr. Roberts [id number]
Jennifer INTERIANO ACP
Shoshana Staples
Sj richards
Ronan hopson
Ronan MACHADO
ACP: David Sims NP

## 2021-01-03 NOTE — PROVIDER CONTACT NOTE (OTHER) - DATE AND TIME:
19-Dec-2020 19:00
20-Dec-2020 06:23
13-Dec-2020 09:45
13-Dec-2020 12:45
13-Dec-2020 16:40
13-Dec-2020 16:40
13-Dec-2020 19:10
14-Dec-2020 13:00
14-Dec-2020 21:20
17-Dec-2020 17:42
03-Jan-2021 07:20
01-Jan-2021 12:12
03-Jan-2021 05:25
27-Dec-2020 22:00

## 2021-01-03 NOTE — PROVIDER CONTACT NOTE (OTHER) - REASON
Pt BP is 159/100, pt is asymptomatic
Pt refusing Eye drops
elevated blood pressure
elevated blood pressure
pt /100
pt /100
pt /98
Pt is becoming increasingly restless and agitated
Patient BP 81/49, HR 58
Pt was agitated and refusing FS and medication
Since patient is NPO at midnight, ok to hold feeds?
Pt's Left arm swollen
f/s 223
Post bolus /57

## 2021-01-04 LAB
ALBUMIN SERPL ELPH-MCNC: 3.1 G/DL — LOW (ref 3.3–5)
ALP SERPL-CCNC: 83 U/L — SIGNIFICANT CHANGE UP (ref 40–120)
ALT FLD-CCNC: 21 U/L — SIGNIFICANT CHANGE UP (ref 4–41)
ANION GAP SERPL CALC-SCNC: 10 MMOL/L — SIGNIFICANT CHANGE UP (ref 7–14)
AST SERPL-CCNC: 18 U/L — SIGNIFICANT CHANGE UP (ref 4–40)
BILIRUB SERPL-MCNC: 0.4 MG/DL — SIGNIFICANT CHANGE UP (ref 0.2–1.2)
BUN SERPL-MCNC: 13 MG/DL — SIGNIFICANT CHANGE UP (ref 7–23)
CALCIUM SERPL-MCNC: 8.9 MG/DL — SIGNIFICANT CHANGE UP (ref 8.4–10.5)
CHLORIDE SERPL-SCNC: 99 MMOL/L — SIGNIFICANT CHANGE UP (ref 98–107)
CO2 SERPL-SCNC: 27 MMOL/L — SIGNIFICANT CHANGE UP (ref 22–31)
CREAT SERPL-MCNC: 0.51 MG/DL — SIGNIFICANT CHANGE UP (ref 0.5–1.3)
CRP SERPL-MCNC: 12.8 MG/L — HIGH
D DIMER BLD IA.RAPID-MCNC: 566 NG/ML DDU — HIGH
FERRITIN SERPL-MCNC: 168 NG/ML — SIGNIFICANT CHANGE UP (ref 30–400)
GLUCOSE SERPL-MCNC: 139 MG/DL — HIGH (ref 70–99)
HCT VFR BLD CALC: 30.2 % — LOW (ref 39–50)
HGB BLD-MCNC: 9.8 G/DL — LOW (ref 13–17)
LDH SERPL L TO P-CCNC: 301 U/L — HIGH (ref 135–225)
MAGNESIUM SERPL-MCNC: 1.9 MG/DL — SIGNIFICANT CHANGE UP (ref 1.6–2.6)
MCHC RBC-ENTMCNC: 29.1 PG — SIGNIFICANT CHANGE UP (ref 27–34)
MCHC RBC-ENTMCNC: 32.5 GM/DL — SIGNIFICANT CHANGE UP (ref 32–36)
MCV RBC AUTO: 89.6 FL — SIGNIFICANT CHANGE UP (ref 80–100)
NRBC # BLD: 0 /100 WBCS — SIGNIFICANT CHANGE UP
NRBC # FLD: 0 K/UL — SIGNIFICANT CHANGE UP
PHOSPHATE SERPL-MCNC: 2.4 MG/DL — LOW (ref 2.5–4.5)
PLATELET # BLD AUTO: 217 K/UL — SIGNIFICANT CHANGE UP (ref 150–400)
POTASSIUM SERPL-MCNC: 4.1 MMOL/L — SIGNIFICANT CHANGE UP (ref 3.5–5.3)
POTASSIUM SERPL-SCNC: 4.1 MMOL/L — SIGNIFICANT CHANGE UP (ref 3.5–5.3)
PROT SERPL-MCNC: 5.7 G/DL — LOW (ref 6–8.3)
RBC # BLD: 3.37 M/UL — LOW (ref 4.2–5.8)
RBC # FLD: 13.4 % — SIGNIFICANT CHANGE UP (ref 10.3–14.5)
SARS-COV-2 RNA SPEC QL NAA+PROBE: SIGNIFICANT CHANGE UP
SODIUM SERPL-SCNC: 136 MMOL/L — SIGNIFICANT CHANGE UP (ref 135–145)
WBC # BLD: 4.92 K/UL — SIGNIFICANT CHANGE UP (ref 3.8–10.5)
WBC # FLD AUTO: 4.92 K/UL — SIGNIFICANT CHANGE UP (ref 3.8–10.5)

## 2021-01-04 PROCEDURE — 93970 EXTREMITY STUDY: CPT | Mod: 26

## 2021-01-04 RX ADMIN — Medication 10 MILLIGRAM(S): at 06:27

## 2021-01-04 RX ADMIN — Medication 3 MILLIGRAM(S): at 22:35

## 2021-01-04 RX ADMIN — ENOXAPARIN SODIUM 40 MILLIGRAM(S): 100 INJECTION SUBCUTANEOUS at 12:01

## 2021-01-04 RX ADMIN — DIVALPROEX SODIUM 125 MILLIGRAM(S): 500 TABLET, DELAYED RELEASE ORAL at 06:42

## 2021-01-04 RX ADMIN — PREGABALIN 1000 MICROGRAM(S): 225 CAPSULE ORAL at 12:01

## 2021-01-04 RX ADMIN — Medication 2: at 12:30

## 2021-01-04 RX ADMIN — Medication 1 MILLIGRAM(S): at 22:35

## 2021-01-04 RX ADMIN — Medication 1000 MILLIGRAM(S): at 12:00

## 2021-01-04 RX ADMIN — Medication 1 DROP(S): at 18:55

## 2021-01-04 RX ADMIN — DIVALPROEX SODIUM 125 MILLIGRAM(S): 500 TABLET, DELAYED RELEASE ORAL at 17:26

## 2021-01-04 RX ADMIN — Medication 100 MILLIGRAM(S): at 12:00

## 2021-01-04 RX ADMIN — Medication 15 MILLILITER(S): at 12:01

## 2021-01-04 RX ADMIN — Medication 110 MILLIGRAM(S): at 10:57

## 2021-01-04 RX ADMIN — Medication 1 DROP(S): at 05:36

## 2021-01-04 RX ADMIN — ATORVASTATIN CALCIUM 10 MILLIGRAM(S): 80 TABLET, FILM COATED ORAL at 22:35

## 2021-01-04 RX ADMIN — Medication 110 MILLIGRAM(S): at 22:34

## 2021-01-04 RX ADMIN — Medication 81 MILLIGRAM(S): at 12:00

## 2021-01-04 RX ADMIN — PANTOPRAZOLE SODIUM 40 MILLIGRAM(S): 20 TABLET, DELAYED RELEASE ORAL at 12:18

## 2021-01-04 NOTE — ADVANCED PRACTICE NURSE CONSULT - REASON FOR CONSULT
Patient seen on COVID 19 skin care rounds after primary RN reached out with regards to assessment of skin impairment and recommendations of topical management of sacral skin impairment. Chart reviewed: WBC 4.92, H/H 9.8/30.2, BMI 18.7, D-Dimer, 566, Serum albumin 3.1, Shay 14. Patient H/O of DM, HTN, HLD, chronic conjunctivitis s/p corneal surg on steroids p/w worsening agitation and FTT. Positive for COVID 19 12/29. Patient seen by Opthalmology for corneal thinning, Neurology for AMS, Palliative for goals of care, Nutrition for severe malnutrition and GI for oropharyngeal dysphagia s/p PEG placement 12/28/20

## 2021-01-04 NOTE — ADVANCED PRACTICE NURSE CONSULT - ASSESSMENT
General: A&Ox0, twisting and turning in bed, agitated, turns one assist, incontinent of urine and stool, perineal care provided for urinary incontinence. Cachectic, temporal wasting, prominent bony prominences. Skin warm, dry with increased moisture in intertriginous folds, poor skin turgor, Blanchable erythema on bilateral heels. Dry, flaky BLE and feet.     LUQ PEG- hyperpigmentation to peritubular skin otherwise  intact at this time.     Incontinence associated dermatitis to perineum and bilateral buttocks as evidence of increased moisture and hyperpigmentation. Left buttock with an area of blanching erythema/hyperpigmentation (difficult to assess due to darkened skin) measuring [id number]. Not located on a bony prominence, within sacral/gluteal folds.

## 2021-01-04 NOTE — PROGRESS NOTE ADULT - ASSESSMENT
80 year old man with subacute CVA and subdural hematomas now with oropharyngeal dysphagia        Problem/Recommendation - 1:  Problem: Oropharyngeal aspiration. Recommendation: s/p PEG, doing well  -gastrostomy feeding underway     Problem/Recommendation - 2:  ·  Problem: CVA (cerebral vascular accident).  Recommendation: per neurology.      Problem/Recommendation - 3:  ·  Problem: drop in hemoglobin: no overt GI bleeding noted. Repeat hgb stable. Monitor for GI bleeding. Repeat hgb.   PPI QD.      Problem/Recommendation - 4:  ·  Problem: COVID 19    Attending Attestation:   Differential diagnosis and plan of care discussed with patient after the evaluation  35 Minutes spent on total encounter of which more than fifty percent of the encounter was spent counseling and/or coordinating care by the attending physician.    Davidson Roca M.D.   Gastroenterology and Hepatology  Cell: [phone] .

## 2021-01-04 NOTE — PROGRESS NOTE ADULT - PROBLEM SELECTOR PLAN 1
Asymptomatic and saturating well on RA so far  Airborne/droplet precautions  Check inflammatory markers q3days  LE venous dopplers pending as D-dimer increased to above 1000 1/2/21, although he is still saturating well on RA  On prophylactic dose lovenox

## 2021-01-04 NOTE — PROGRESS NOTE ADULT - SUBJECTIVE AND OBJECTIVE BOX
Chief Complaint:  Patient is a 80y old  Male who presents with a chief complaint of AMS (04 Jan 2021 10:23)      Interval Events:   no events    Allergies:  No Known Allergies      Hospital Medications:  ascorbic acid 1000 milliGRAM(s) Oral daily  aspirin  chewable 81 milliGRAM(s) Oral daily  atorvastatin 10 milliGRAM(s) Oral at bedtime  bisacodyl Suppository 10 milliGRAM(s) Rectal daily PRN  cyanocobalamin 1000 MICROGram(s) Oral daily  dextrose 40% Gel 15 Gram(s) Oral once  dextrose 5%. 1000 milliLiter(s) IV Continuous <Continuous>  dextrose 5%. 1000 milliLiter(s) IV Continuous <Continuous>  dextrose 50% Injectable 25 Gram(s) IV Push once  dextrose 50% Injectable 12.5 Gram(s) IV Push once  dextrose 50% Injectable 25 Gram(s) IV Push once  diVALproex Sprinkle 125 milliGRAM(s) Oral two times a day  doxazosin 1 milliGRAM(s) Oral at bedtime  doxycycline IVPB 100 milliGRAM(s) IV Intermittent every 12 hours  enoxaparin Injectable 40 milliGRAM(s) SubCutaneous daily  glucagon  Injectable 1 milliGRAM(s) IntraMuscular once  insulin lispro (ADMELOG) corrective regimen sliding scale   SubCutaneous every 6 hours  melatonin 3 milliGRAM(s) Oral at bedtime  moxifloxacin 0.5% Solution 1 Drop(s) Both EYES <User Schedule>  multivitamin/minerals/iron Oral Solution (CENTRUM) 15 milliLiter(s) Oral daily  pantoprazole  Injectable 40 milliGRAM(s) IV Push daily  polyethylene glycol 3350 17 Gram(s) Oral daily PRN  predniSONE   Tablet 10 milliGRAM(s) Oral daily  senna 2 Tablet(s) Oral at bedtime PRN  thiamine 100 milliGRAM(s) Oral daily      PMHX/PSHX:  BPH (benign prostatic hyperplasia)    Hyperlipidemia    HTN (hypertension)    DM (diabetes mellitus)    No pertinent past medical history    No significant past surgical history        Family history:  No pertinent family history in first degree relatives        ROS:     General:  No wt loss, fevers, chills, night sweats, fatigue,   Eyes:  Good vision, no reported pain  ENT:  No sore throat, pain, runny nose, dysphagia  CV:  No pain, palpitations, hypo/hypertension  Resp:  No dyspnea, cough, tachypnea, wheezing  GI:  See HPI  :  No pain, bleeding, incontinence, nocturia  Muscle:  No pain, weakness  Neuro:  No weakness, tingling, memory problems  Psych:  No fatigue, insomnia, mood problems, depression  Endocrine:  No polyuria, polydipsia, cold/heat intolerance  Heme:  No petechiae, ecchymosis, easy bruisability  Skin:  No rash, edema      PHYSICAL EXAM:     GENERAL:  Appears stated age, well-groomed, well-nourished, no distress  HEENT:  NC/AT,  conjunctivae clear, sclera-anicteric  NECK: Trachea midline, supple  CHEST:  Full & symmetric excursion, no increased effort, breath sounds clear  HEART:  Regular rhythm, no daily/heave  ABDOMEN:  Soft, non-tender, non-distended, normoactive bowel sounds,  no masses ,no hepato-splenomegaly,   EXTREMITIES:  no cyanosis,clubbing or edema  SKIN:  No rash/erythema/petechiae, no jaundice  NEURO:  Alert, oriented, no asterixis  RECTAL: Deferred    Vital Signs:  Vital Signs Last 24 Hrs  T(C): 36.4 (04 Jan 2021 13:32), Max: 36.6 (03 Jan 2021 23:38)  T(F): 97.5 (04 Jan 2021 13:32), Max: 97.8 (03 Jan 2021 23:38)  HR: 73 (04 Jan 2021 13:32) (64 - 73)  BP: 144/76 (04 Jan 2021 17:54) (112/63 - 144/76)  BP(mean): --  RR: 17 (04 Jan 2021 17:54) (16 - 20)  SpO2: 100% (04 Jan 2021 17:54) (100% - 100%)  Daily     Daily     LABS:                        9.8    4.92  )-----------( 217      ( 04 Jan 2021 06:58 )             30.2     01-04    136  |  99  |  13  ----------------------------<  139<H>  4.1   |  27  |  0.51    Ca    8.9      04 Jan 2021 06:58  Phos  2.4     01-04  Mg     1.9     01-04    TPro  5.7<L>  /  Alb  3.1<L>  /  TBili  0.4  /  DBili  x   /  AST  18  /  ALT  21  /  AlkPhos  83  01-04    LIVER FUNCTIONS - ( 04 Jan 2021 06:58 )  Alb: 3.1 g/dL / Pro: 5.7 g/dL / ALK PHOS: 83 U/L / ALT: 21 U/L / AST: 18 U/L / GGT: x                   Imaging:

## 2021-01-04 NOTE — ADVANCED PRACTICE NURSE CONSULT - RECOMMEDATIONS
Topical Recommendations:     BLLE- apply Sween 24 daily moisturizer daily.     LUQ PEG- Cleanse q shift with NS, apply liquid barrier film beneath arnaud disc.  If redness noted under arnaud disc bumper apply thin foam  dressing without border (Mepilex Lite)- cut to mid dressing with a 'Y' shape.   Secondary securement to abdomen taping in 'H' fashion with Steri-strips.     Areas of IAD- Cleanse with skin cleanser, pat dry. Apply [employer] moisture barrier ointment twice a day and PRN with incontinent episodes.     Continue low air loss bed therapy, continue or initiate seat cushion, heel elevation with Z-flow boots, turn & reposition q2h with Zflow fluidized pillow, continue moisture management with barrier creams & single breathable pad, continue measures to decrease friction/shear.     Please contact Wound Care Service Line if we can be of further assistance (ext [phone]).

## 2021-01-04 NOTE — PROGRESS NOTE ADULT - SUBJECTIVE AND OBJECTIVE BOX
Calmer this morning, off 1:1  Still awaiting LE venous dopplers (for increase in D-dimer this past weekend)  Saturating well on RA    Vital Signs Last 24 Hrs  T(C): 36.6 (04 Jan 2021 05:31), Max: 36.8 (03 Jan 2021 17:30)  T(F): 97.8 (04 Jan 2021 05:31), Max: 98.2 (03 Jan 2021 17:30)  HR: 73 (04 Jan 2021 05:31) (64 - 78)  BP: 136/70 (04 Jan 2021 05:31) (110/70 - 136/70)  BP(mean): --  RR: 20 (04 Jan 2021 05:31) (17 - 20)  SpO2: 100% (04 Jan 2021 05:31) (98% - 100%)    I&O's Summary    01-03-21 @ 07:01  -  01-04-21 @ 07:00  --------------------------------------------------------  IN: 500 mL / OUT: 0 mL / NET: 500 mL        PHYSICAL EXAM:  GENERAL: NAD, thin cachetic elderly  HEAD:  Atraumatic, Normocephalic  EYES: eyes close shut   NECK: Supple, No JVD  CHEST/LUNG: mild decrease breath sounds bilaterally; No wheeze   HEART: Regular rate and rhythm; No murmurs, rubs, or gallops  ABDOMEN: Soft, Nontender, Nondistended; Bowel sounds present, PEG in place  Neuro: AAOx0-1, slow words, poor insight, eyes close shut  EXTREMITIES:  2+ Peripheral Pulses, No clubbing, cyanosis, or edema  SKIN: No rashes or lesions     LABS:                        9.8    4.92  )-----------( 217      ( 04 Jan 2021 06:58 )             30.2     01-04    136  |  99  |  13  ----------------------------<  139<H>  4.1   |  27  |  0.51    Ca    8.9      04 Jan 2021 06:58  Phos  2.4     01-04  Mg     1.9     01-04    TPro  5.7<L>  /  Alb  3.1<L>  /  TBili  0.4  /  DBili  x   /  AST  18  /  ALT  21  /  AlkPhos  83  01-04      CAPILLARY BLOOD GLUCOSE      POCT Blood Glucose.: 135 mg/dL (04 Jan 2021 06:45)  POCT Blood Glucose.: 148 mg/dL (03 Jan 2021 22:11)  POCT Blood Glucose.: 133 mg/dL (03 Jan 2021 17:26)  POCT Blood Glucose.: 247 mg/dL (03 Jan 2021 11:25)            RADIOLOGY & ADDITIONAL TESTS:    Imaging Personally Reviewed:  [x] YES  [ ] NO    Consultant(s) Notes Reviewed:  [x] YES  [ ] NO

## 2021-01-04 NOTE — PROGRESS NOTE ADULT - PROBLEM SELECTOR PLAN 9
BMI<19, 60lb weight loss in 1 year  Had malignancy w/u last month, pan CT/MR, EGD unremarkable  Cont dietary supplements  -s/p NGT Insertion 12/17/20, now with PEG placed. see above BMI<19, 60lb weight loss in 1 year  Had malignancy w/u last month, pan CT/MR, EGD unremarkable  Cont dietary supplements  S/p NGT Insertion 12/17/20  S/p PEG placement 12/28/20

## 2021-01-05 LAB
ANION GAP SERPL CALC-SCNC: 7 MMOL/L — SIGNIFICANT CHANGE UP (ref 7–14)
BUN SERPL-MCNC: 15 MG/DL — SIGNIFICANT CHANGE UP (ref 7–23)
CALCIUM SERPL-MCNC: 8.9 MG/DL — SIGNIFICANT CHANGE UP (ref 8.4–10.5)
CHLORIDE SERPL-SCNC: 99 MMOL/L — SIGNIFICANT CHANGE UP (ref 98–107)
CO2 SERPL-SCNC: 28 MMOL/L — SIGNIFICANT CHANGE UP (ref 22–31)
CREAT SERPL-MCNC: 0.46 MG/DL — LOW (ref 0.5–1.3)
GLUCOSE SERPL-MCNC: 139 MG/DL — HIGH (ref 70–99)
HCT VFR BLD CALC: 27.8 % — LOW (ref 39–50)
HGB BLD-MCNC: 9.1 G/DL — LOW (ref 13–17)
MCHC RBC-ENTMCNC: 28.6 PG — SIGNIFICANT CHANGE UP (ref 27–34)
MCHC RBC-ENTMCNC: 32.7 GM/DL — SIGNIFICANT CHANGE UP (ref 32–36)
MCV RBC AUTO: 87.4 FL — SIGNIFICANT CHANGE UP (ref 80–100)
NRBC # BLD: 0 /100 WBCS — SIGNIFICANT CHANGE UP
NRBC # FLD: 0 K/UL — SIGNIFICANT CHANGE UP
PLATELET # BLD AUTO: 224 K/UL — SIGNIFICANT CHANGE UP (ref 150–400)
POTASSIUM SERPL-MCNC: 3.9 MMOL/L — SIGNIFICANT CHANGE UP (ref 3.5–5.3)
POTASSIUM SERPL-SCNC: 3.9 MMOL/L — SIGNIFICANT CHANGE UP (ref 3.5–5.3)
RBC # BLD: 3.18 M/UL — LOW (ref 4.2–5.8)
RBC # FLD: 13.5 % — SIGNIFICANT CHANGE UP (ref 10.3–14.5)
SODIUM SERPL-SCNC: 134 MMOL/L — LOW (ref 135–145)
WBC # BLD: 6 K/UL — SIGNIFICANT CHANGE UP (ref 3.8–10.5)
WBC # FLD AUTO: 6 K/UL — SIGNIFICANT CHANGE UP (ref 3.8–10.5)

## 2021-01-05 RX ADMIN — Medication 1 DROP(S): at 12:59

## 2021-01-05 RX ADMIN — ENOXAPARIN SODIUM 40 MILLIGRAM(S): 100 INJECTION SUBCUTANEOUS at 12:50

## 2021-01-05 RX ADMIN — Medication 1 DROP(S): at 05:50

## 2021-01-05 RX ADMIN — Medication 1 DROP(S): at 17:49

## 2021-01-05 RX ADMIN — PANTOPRAZOLE SODIUM 40 MILLIGRAM(S): 20 TABLET, DELAYED RELEASE ORAL at 12:52

## 2021-01-05 RX ADMIN — Medication 110 MILLIGRAM(S): at 12:49

## 2021-01-05 RX ADMIN — Medication 81 MILLIGRAM(S): at 12:51

## 2021-01-05 RX ADMIN — Medication 100 MILLIGRAM(S): at 12:51

## 2021-01-05 RX ADMIN — DIVALPROEX SODIUM 125 MILLIGRAM(S): 500 TABLET, DELAYED RELEASE ORAL at 17:50

## 2021-01-05 RX ADMIN — DIVALPROEX SODIUM 125 MILLIGRAM(S): 500 TABLET, DELAYED RELEASE ORAL at 06:03

## 2021-01-05 RX ADMIN — Medication 1000 MILLIGRAM(S): at 12:50

## 2021-01-05 RX ADMIN — Medication 3 MILLIGRAM(S): at 21:15

## 2021-01-05 RX ADMIN — Medication 2: at 00:17

## 2021-01-05 RX ADMIN — Medication 1: at 05:50

## 2021-01-05 RX ADMIN — Medication 1: at 12:33

## 2021-01-05 RX ADMIN — Medication 1 MILLIGRAM(S): at 21:15

## 2021-01-05 RX ADMIN — Medication 10 MILLIGRAM(S): at 05:49

## 2021-01-05 RX ADMIN — ATORVASTATIN CALCIUM 10 MILLIGRAM(S): 80 TABLET, FILM COATED ORAL at 21:15

## 2021-01-05 RX ADMIN — Medication 110 MILLIGRAM(S): at 21:15

## 2021-01-05 RX ADMIN — Medication 15 MILLILITER(S): at 12:51

## 2021-01-05 RX ADMIN — PREGABALIN 1000 MICROGRAM(S): 225 CAPSULE ORAL at 12:51

## 2021-01-05 RX ADMIN — Medication 1 DROP(S): at 00:17

## 2021-01-05 NOTE — PROGRESS NOTE ADULT - PROBLEM SELECTOR PROBLEM 8
Severe protein-calorie malnutrition
Type 2 diabetes mellitus with other specified complication, without long-term current use of insulin
DVT prophylaxis
Type 2 diabetes mellitus with other specified complication, without long-term current use of insulin
Severe protein-calorie malnutrition
Severe protein-calorie malnutrition
Type 2 diabetes mellitus with other specified complication, without long-term current use of insulin
Severe protein-calorie malnutrition
Type 2 diabetes mellitus with other specified complication, without long-term current use of insulin
Type 2 diabetes mellitus with other specified complication, without long-term current use of insulin
Severe protein-calorie malnutrition
DVT prophylaxis
Type 2 diabetes mellitus with other specified complication, without long-term current use of insulin
DVT prophylaxis
Type 2 diabetes mellitus with other specified complication, without long-term current use of insulin
Severe protein-calorie malnutrition
Severe protein-calorie malnutrition
Arrest of Dilatation

## 2021-01-05 NOTE — PROGRESS NOTE ADULT - PROBLEM SELECTOR PROBLEM 1
CVA (cerebral vascular accident)
Metabolic encephalopathy
COVID-19
CVA (cerebral vascular accident)
Other encephalopathy
CVA (cerebral vascular accident)
Other encephalopathy
COVID-19
CVA (cerebral vascular accident)
Metabolic encephalopathy
CVA (cerebral vascular accident)
CVA (cerebral vascular accident)
Metabolic encephalopathy
CVA (cerebral vascular accident)
COVID-19
CVA (cerebral vascular accident)

## 2021-01-05 NOTE — PROGRESS NOTE ADULT - ASSESSMENT
80 year old male with PMH of DM, HTN, HLD, chronic conjunctivitis s/p corneal surg on steroids p/w worsening agitation and FTT. 80 year old male with PMH of DM, HTN, HLD, chronic conjunctivitis s/p corneal surg on steroids p/w worsening agitation and FTT.     Problem/Plan - :  ·  Problem: COVID-19.  Plan: Asymptomatic and saturating well on RA so far  Airborne/droplet precautions  LE venous dopplers 1/4 (-)  On prophylactic dose lovenox  COVID-19 PCR 1/4 not detected.      Problem/Plan - :  ·  Problem: CVA (cerebral vascular accident).  Plan: MRI/MRA head 12/18/20 revealed small acute/subacute infarcts in the left posterior frontal lobe along w/predominantly chronic bilateral subdural hematomas unchanged from CT dated 12/13/2020, new from MRI dated 6/23/2020, with small subacute subdural components  Cont ASA/statin; holding off on adding plavix  Appreciate palliative input; family was not interested in hospice  Speech/swallow reevaluate -> recommended NPO  After numerous discussions, family agreed to PEG tube placement  GI consult appreciated. PEG placed 12/28/20, started tube feeding and tolerating.      Problem/Plan - :  ·  Problem: Metabolic encephalopathy.  Plan: 2' COVID-19 in setting of dementia  Initial CTH unremarkable  UA, CXR negative  On depakote while on prednisone  Neurology eval appreciated  (pt saw Dr. Nissenbaum last year, discussed case. he doesn't come to [facility]).      Problem/Plan - :  ·  Problem: Hyponatremia.  Plan: Likely hypovolemic hyponatremia  Improved s/p IVF.      Problem/Plan - :  ·  Problem: Conjunctiva disorder.  Plan: S/p corneal patch 11/2020 at [facility]  Cont prednisone 25mg --> 20 mg daily (decrease 5 mg qweekly)  Doxy 100 and moxifloxacin eye drops as per opthalmology clinic note 12/11  Repeat vit A level was ordered for 12/17  Opthal consult and f/u appreciated.      Problem/Plan - :  ·  Problem: Alzheimer's dementia with behavioral disturbance, unspecified timing of dementia onset. Plan: Haldol 1 mg IVP prn  Frequent reorientation  Try to avoid benzos.     Problem/Plan - :  ·  Problem: Essential hypertension.  Plan: Hypertensive to 170/110 in ER  Increased enalapril from 5 mg to 10 mg. cont to uptitrate as needed.      Problem/Plan - :  ·  Problem: Type 2 diabetes mellitus with other specified complication, without long-term current use of insulin.  Plan: -200  A1c 6.6%  Insulin ss for now  Still holding metformin.      Problem/Plan - :  ·  Problem: Severe protein-calorie malnutrition.  Plan: BMI<19, 60lb weight loss in 1 year  Had malignancy w/u last month, pan CT/MR, EGD unremarkable  Cont dietary supplements  S/p NGT Insertion 12/17/20  S/p PEG placement 12/28/20.      Problem/Plan - :  ·  Problem: DVT prophylaxis. Plan: Lovenox sc.     Problem/Plan - :  ·  Problem: Disposition.  Plan: To STR after 2 consecutively (-) COVID-19 PCRs

## 2021-01-05 NOTE — PROGRESS NOTE ADULT - PROBLEM SELECTOR PLAN 8
BMI<19, 60lb weight loss in 1 year  -had malignancy w/u last month, pan CT/MR, EGD unremarkable  -c/w dietary supplements  -s/p NGT Insertion 12/17/20, started on TF
-Lovenox sc
-Lovenox sc
BMI<19, 60lb weight loss in 1 year  -had malignancy w/u last month, pan CT/MR, EGD unremarkable  -c/w dietary supplements  -s/p NGT Insertion 12/17/20, started on TF
--200  -A1c 6.6%  -insulin ss for now. hold metformin
-200  A1c 6.6%  Insulin ss for now  Still holding metformin
--200  -A1c 6.6%  -insulin ss for now. hold metformin
BMI<19, 60lb weight loss in 1 year  -had malignancy w/u last month, pan CT/MR, EGD unremarkable  -c/w dietary supplements  -s/p NGT Insertion 12/17/20, now with PEG placed. see above
--200  -A1c 6.6%  -insulin ss for now. hold metformin
BMI<19, 60lb weight loss in 1 year  -had malignancy w/u last month, pan CT/MR, EGD unremarkable  -c/w dietary supplements  -s/p NGT Insertion 12/17/20, started on TF
-Lovenox sc
BMI<19, 60lb weight loss in 1 year  -had malignancy w/u last month, pan CT/MR, EGD unremarkable  -c/w dietary supplements  -s/p NGT Insertion 12/17/20, started on TF
-200  A1c 6.6%  Insulin ss for now  Still holding metformin
BMI<19, 60lb weight loss in 1 year  -had malignancy w/u last month, pan CT/MR, EGD unremarkable  -c/w dietary supplements  -s/p NGT Insertion 12/17/20, started on TF
BMI<19, 60lb weight loss in 1 year  -had malignancy w/u last month, pan CT/MR, EGD unremarkable  -c/w dietary supplements  -s/p NGT Insertion 12/17/20, started on TF

## 2021-01-05 NOTE — PROGRESS NOTE ADULT - PROBLEM SELECTOR PLAN 2
-MRI/MRA head 12/18/20 revealed small acute/subacute infarcts in the left posterior frontal lobe along w/predominantly chronic bilateral subdural hematomas unchanged from CT dated 12/13/2020, new from MRI dated 6/23/2020, with small subacute subdural components  -cont ASA/statin; holding off on adding plavix  -appreciate palliative input; family not interested in hospice  -speech/swallow reevaluate -> recommended NPO  -after numerous discussion family agreed to PEG tube placement  -GI consult appreciated. PEG placed 12/28/20, started tube feeding and tolerating
-s/p corneal patch 11/2020 at [facility]  -c/w prednisone 25mg daily, doxy 100 and moxifloxacin eye drops as per opthalmology clinic note 12/11  -repeat vit A level was ordered for 12/17  -opthal consult appreciated
DDx inc.  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 no e/o acute intracranial pathology or infection   -initial CTH unremarkable  -UA, CXR negative  -on depakote while on prednisone  -neurology eval appreciated   (pt saw Dr. Nissenbaum last year, discussed case. he doesn't come to [facility])  - MRI with new stroke. see above.
.  > Pls assist with ADL's  > Skin care as per protocol
DDx inc.  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 no e/o acute intracranial pathology or infection   -initial CTH unremarkable  -UA, CXR negative  -on depakote while on prednisone  -neurology eval appreciated   (pt saw Dr. Nissenbaum last year, discussed case. he doesn't come to [facility])  - MRI with new stroke. see above.
DDx inc.  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 no e/o acute intracranial pathology or infection   -initial CTH unremarkable  -UA, CXR negative  -on depakote while on prednisone  -neurology eval appreciated   (pt saw Dr. Nissenbaum last year, discussed case. he doesn't come to [facility])  - MRI with new stroke. see above.
DDx inc.  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 no e/o acute intracranial pathology or infection   -initial CTH unremarkable  -UA, CXR negative  -on depakote while on prednisone  -neurology eval appreciated   (pt saw Dr. Nissenbaum last year, discussed case. he doesn't come to [facility])
-s/p corneal patch 11/2020 at [facility]  -c/w prednisone 30, doxy 100 and moxifloxacin eye drops as per opthalmology clinic note 12/11  -opthal consult
-s/p corneal patch 11/2020 at [facility]  -c/w prednisone 30, doxy 100 and moxifloxacin eye drops as per opthalmology clinic note 12/11  -opthal consult
-s/p corneal patch 11/2020 at [facility]  -c/w prednisone 30, doxy 100 and moxifloxacin eye drops as per opthalmology clinic note 12/11  -opthal consult
DDx inc.  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 no e/o acute intracranial pathology or infection   -initial CTH unremarkable  -UA, CXR negative  -on depakote while on prednisone  -neurology eval appreciated   (pt saw Dr. Nissenbaum last year, discussed case. he doesn't come to [facility])
DDx inc.  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 no e/o acute intracranial pathology or infection   -initial CTH unremarkable  -UA, CXR negative  -on depakote while on prednisone  -neurology eval appreciated   (pt saw Dr. Nissenbaum last year, discussed case. he doesn't come to [facility])  - MRI with new stroke. see above.
DDx inc.  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 no e/o acute intracranial pathology or infection   -initial CTH unremarkable  -UA, CXR negative  -on depakote while on prednisone  -neurology eval appreciated   (pt saw Dr. Nissenbaum last year, discussed case. he doesn't come to [facility])  -MRI with new stroke. see above.
DDx inc.  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 no e/o acute intracranial pathology or infection   -initial CTH unremarkable  -UA, CXR negative  -on depakote while on prednisone  -neurology eval appreciated   (pt saw Dr. Nissenbaum last year, discussed case. he doesn't come to [facility])  - MRI with new stroke. see above.
DDx inc.  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 no e/o acute intracranial pathology or infection   -initial CTH unremarkable  -UA, CXR negative  -on depakote while on prednisone  -neurology eval appreciated   (pt saw Dr. Nissenbaum last year, discussed case. he doesn't come to [facility])
MRI/MRA head 12/18/20 revealed small acute/subacute infarcts in the left posterior frontal lobe along w/predominantly chronic bilateral subdural hematomas unchanged from CT dated 12/13/2020, new from MRI dated 6/23/2020, with small subacute subdural components  Cont ASA/statin; holding off on adding plavix  Appreciate palliative input; family was not interested in hospice  Speech/swallow reevaluate -> recommended NPO  After numerous discussions, family agreed to PEG tube placement  GI consult appreciated. PEG placed 12/28/20, started tube feeding and tolerating
.  > Pls assist with ADL's  > Skin care as per protocol
-s/p corneal patch 11/2020 at [facility]  -c/w prednisone 25mg daily, doxy 100 and moxifloxacin eye drops as per opthalmology clinic note 12/11  -repeat vit A level was ordered for 12/17  -opthal consult appreciated
DDx inc.  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 no e/o acute intracranial pathology or infection   -initial CTH unremarkable  -UA, CXR negative  -on depakote while on prednisone  -neurology eval appreciated   (pt saw Dr. Nissenbaum last year, discussed case. he doesn't come to [facility])
DDx inc.  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 no e/o acute intracranial pathology or infection   -initial CTH unremarkable  -UA, CXR negative  -on depakote while on prednisone  -neurology eval appreciated   (pt saw Dr. Nissenbaum last year, discussed case. he doesn't come to [facility])  - MRI with new stroke. see above.
-s/p corneal patch 11/2020 at [facility]  -c/w prednisone 25mg daily, doxy 100 and moxifloxacin eye drops as per opthalmology clinic note 12/11  -repeat vit A level was ordered for 12/17  -opthal consult appreciated
DDx inc.  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 no e/o acute intracranial pathology or infection   -initial CTH unremarkable  -UA, CXR negative  -on depakote while on prednisone  -neurology eval appreciated   (pt saw Dr. Nissenbaum last year, discussed case. he doesn't come to [facility])  - MRI with new stroke. see above.
DDx inc.  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 no e/o acute intracranial pathology or infection   -initial CTH unremarkable  -UA, CXR negative  -on depakote while on prednisone  -neurology eval appreciated   (pt saw Dr. Nissenbaum last year, discussed case. he doesn't come to [facility])
DDx inc.  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 no e/o acute intracranial pathology or infection   -initial CTH unremarkable  -UA, CXR negative  -on depakote while on prednisone  -neurology eval appreciated   (pt saw Dr. Nissenbaum last year, discussed case. he doesn't come to [facility])
MRI/MRA head 12/18/20 revealed small acute/subacute infarcts in the left posterior frontal lobe along w/predominantly chronic bilateral subdural hematomas unchanged from CT dated 12/13/2020, new from MRI dated 6/23/2020, with small subacute subdural components  Cont ASA/statin; holding off on adding plavix  Appreciate palliative input; family was not interested in hospice  Speech/swallow reevaluate -> recommended NPO  After numerous discussions, family agreed to PEG tube placement  GI consult appreciated. PEG placed 12/28/20, started tube feeding and tolerating

## 2021-01-05 NOTE — PROGRESS NOTE ADULT - PROBLEM SELECTOR PROBLEM 2
Metabolic encephalopathy
Metabolic encephalopathy
Conjunctiva disorder
Metabolic encephalopathy
Conjunctiva disorder
CVA (cerebral vascular accident)
Metabolic encephalopathy
Metabolic encephalopathy
Functional quadriplegia
Metabolic encephalopathy
CVA (cerebral vascular accident)
Functional quadriplegia
Metabolic encephalopathy
CVA (cerebral vascular accident)
Conjunctiva disorder
Metabolic encephalopathy

## 2021-01-05 NOTE — PROGRESS NOTE ADULT - ASSESSMENT
80 year old man with subacute CVA and subdural hematomas now with oropharyngeal dysphagia        Problem/Recommendation - 1:  Problem: Oropharyngeal aspiration. Recommendation: s/p PEG, doing well  -gastrostomy feeding underway     Problem/Recommendation - 2:  ·  Problem: CVA (cerebral vascular accident).  Recommendation: per neurology.      Problem/Recommendation - 3:  ·  Problem: drop in hemoglobin: no overt GI bleeding noted. Repeat hgb stable, but slight decline today. Monitor for GI bleeding. Repeat hgb.   PPI QD.      Problem/Recommendation - 4:  ·  Problem: COVID 19    Attending Attestation:   Differential diagnosis and plan of care discussed with patient after the evaluation  35 Minutes spent on total encounter of which more than fifty percent of the encounter was spent counseling and/or coordinating care by the attending physician.    Davidson Roca M.D.   Gastroenterology and Hepatology  Cell: [phone] .

## 2021-01-05 NOTE — PROGRESS NOTE ADULT - PROBLEM SELECTOR PLAN 9
BMI<19, 60lb weight loss in 1 year  Had malignancy w/u last month, pan CT/MR, EGD unremarkable  Cont dietary supplements  S/p NGT Insertion 12/17/20  S/p PEG placement 12/28/20

## 2021-01-05 NOTE — PROGRESS NOTE ADULT - PROBLEM SELECTOR PROBLEM 7
Palliative care encounter
Severe protein-calorie malnutrition
Essential hypertension
Type 2 diabetes mellitus with other specified complication, without long-term current use of insulin
DVT prophylaxis
Essential hypertension
Essential hypertension
Type 2 diabetes mellitus with other specified complication, without long-term current use of insulin
DVT prophylaxis
Severe protein-calorie malnutrition
Type 2 diabetes mellitus with other specified complication, without long-term current use of insulin
Type 2 diabetes mellitus with other specified complication, without long-term current use of insulin
Palliative care encounter
Essential hypertension
DVT prophylaxis
DVT prophylaxis
Type 2 diabetes mellitus with other specified complication, without long-term current use of insulin
Essential hypertension
Essential hypertension
Severe protein-calorie malnutrition
Essential hypertension
Type 2 diabetes mellitus with other specified complication, without long-term current use of insulin
Type 2 diabetes mellitus with other specified complication, without long-term current use of insulin

## 2021-01-05 NOTE — PROGRESS NOTE ADULT - PROBLEM SELECTOR PLAN 4
-s/p corneal patch 11/2020 at [facility]  -c/w prednisone 25mg --> 20 mg daily (decrease 5 mg qweekly)  - doxy 100 and moxifloxacin eye drops as per opthalmology clinic note 12/11  -repeat vit A level was ordered for 12/17  -opthal consult and f/u appreciated
mild. likely hypovolemic hyponatremia  improving on IVF.
-Qtc acceptable to give another dose of haldol 1 mg today  -Frequent reorientation  -Consider 1:1 if danger to himself or staff  -Try to avoid benzos
-s/p corneal patch 11/2020 at [facility]  -c/w prednisone 25mg --> 20 mg daily (decrease 5 mg qweekly)  - doxy 100 and moxifloxacin eye drops as per opthalmology clinic note 12/11  -repeat vit A level was ordered for 12/17  -opthal consult and f/u appreciated
.  > Neuro on board  > On statin and antiplatelet therapy.
Asymptomatic and saturating well on RA so far  Airborne/droplet precautions  Will check inflammatory markers in AM  On prophylactic dose lovenox
-Qtc acceptable to give another dose of haldol 1 mg today  -Frequent reorientation  -Consider 1:1 if danger to himself or staff  -Try to avoid benzos
-s/p corneal patch 11/2020 at [facility]  -c/w prednisone 25mg --> 20 mg daily (decrease 5 mg qweekly)  - doxy 100 and moxifloxacin eye drops as per opthalmology clinic note 12/11  -repeat vit A level was ordered for 12/17  -opthal consult and f/u appreciated
.  > Neuro on board  > On statin and antiplatelet therapy.
hypertensive to 170/110 in ER  -increased enalapril from 5 mg to 10 mg. cont to uptitrate as needed
hypertensive to 170/110 in ER  -increased enalapril from 5 mg to 10 mg. cont to uptitrate as needed
Likely hypovolemic hyponatremia  Improved s/p IVF
--200  -check A1c 6.6%  -insulin ss for now. hold metformin  - mildly elevated betahydroxy butyrate unremarkable.
--200  -check A1c 6.6%  -insulin ss for now. hold metformin  - mildly elevated betahydroxy butyrate unremarkable.
Asymptomatic and saturating well on RA so far  Airborne/droplet precautions  Will check inflammatory markers q3days  On prophylactic dose lovenox
hypertensive to 170/110 in ER  -increased enalapril from 5 to 10. cont to titrate as needed  - BP improves
-s/p corneal patch 11/2020 at [facility]  -c/w prednisone 25mg --> 20 mg daily (decrease 5 mg qweekly)  - doxy 100 and moxifloxacin eye drops as per opthalmology clinic note 12/11  -repeat vit A level was ordered for 12/17  -opthal consult and f/u appreciated
Likely hypovolemic hyponatremia  Improved s/p IVF
-s/p corneal patch 11/2020 at [facility]  -c/w prednisone 25mg --> 20 mg daily (decrease 5 mg qweekly)  - doxy 100 and moxifloxacin eye drops as per opthalmology clinic note 12/11  -repeat vit A level was ordered for 12/17  -opthal consult and f/u appreciated
Asymptomatic and saturating well on RA so far  Airborne/droplet precautions  Will check inflammatory markers q3days  On prophylactic dose lovenox
-s/p corneal patch 11/2020 at [facility]  -c/w prednisone 25mg --> 20 mg daily (decrease 5 mg qweekly)  - doxy 100 and moxifloxacin eye drops as per opthalmology clinic note 12/11  -repeat vit A level was ordered for 12/17  -opthal consult and f/u appreciated
hypertensive to 170/110 in ER  -increased enalapril from 5 mg to 10 mg. cont to uptitrate as needed
-Qtc acceptable to give another dose of haldol 1 mg today  -Frequent reorientation  -Consider 1:1 if danger to himself or staff  -Try to avoid benzos
-s/p corneal patch 11/2020 at [facility]  -c/w prednisone 25mg --> 20 mg daily (decrease 5 mg qweekly)  - doxy 100 and moxifloxacin eye drops as per opthalmology clinic note 12/11  -repeat vit A level was ordered for 12/17  -opthal consult and f/u appreciated
Asymptomatic and saturating well on RA so far  Airborne/droplet precautions  Will check inflammatory markers q3days  On prophylactic dose lovenox

## 2021-01-05 NOTE — PROGRESS NOTE ADULT - PROBLEM SELECTOR PROBLEM 9
DVT prophylaxis
Severe protein-calorie malnutrition
DVT prophylaxis
Severe protein-calorie malnutrition
DVT prophylaxis

## 2021-01-05 NOTE — PROGRESS NOTE ADULT - PROBLEM SELECTOR PLAN 3
-s/p corneal patch 11/2020 at [facility]  -c/w prednisone 25mg daily, doxy 100 and moxifloxacin eye drops as per opthalmology clinic note 12/11  -repeat vit A level was ordered for 12/17  -opthal consult appreciated
mild. likely hypovolemic hyponatremia  gentle IVF for 12 more hours
-Qtc acceptable to give another dose of haldol 1 mg today  -Frequent reorientation  -Consider 1:1 if danger to himself or staff  -Try to avoid benzos
mild. likely hypovolemic hyponatremia  improving on IVF.
mild. likely hypovolemic hyponatremia  gentle IVF x 12 hrs, repeat labs tomorrow am
DDx inc.  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 no e/o acute intracranial pathology or infection   Initial CTH unremarkable  UA, CXR negative  On depakote while on prednisone  Neurology eval appreciated  (pt saw Dr. Nissenbaum last year, discussed case. he doesn't come to [facility])
.  12/23  > Repeat Sp/Sw eval 12/22 noted  > Family still undecided about PEG vs comfort feeds
.  12/22  Family meeting re: PEG vs pleasure feeds if patient does not regain swallow function. Family to meet and decide.
-Qtc acceptable to give another dose of haldol 1 mg today  -Frequent reorientation  -Consider 1:1 if danger to himself or staff  -Try to avoid benzos
2' COVID-19 in setting of dementia  Initial CTH unremarkable  UA, CXR negative  On depakote while on prednisone  Neurology eval appreciated  (pt saw Dr. Nissenbaum last year, discussed case. he doesn't come to [facility])
hypertensive to 170/110 in ER  -increased enalapril from 5 to 10. cont to titrate as needed  - BP improves
hypertensive to 170/110 in ER  -increased enalapril from 5 to 10. cont to titrate as needed  - BP improves
mild. likely hypovolemic hyponatremia  improving on IVF.
-s/p corneal patch 11/2020 at [facility]  -c/w prednisone 25mg daily, doxy 100 and moxifloxacin eye drops as per opthalmology clinic note 12/11  -repeat vit A level was ordered for 12/17  -opthal consult appreciated
-s/p corneal patch 11/2020 at [facility]  -c/w prednisone 25mg daily, doxy 100 and moxifloxacin eye drops as per opthalmology clinic note 12/11  -repeat vit A level was ordered for 12/17  -opthal consult appreciated
-Qtc acceptable to give another dose of haldol 1 mg today  -Frequent reorientation  -Consider 1:1 if danger to himself or staff  -Try to avoid benzos
-Qtc acceptable to give another dose of haldol 1 mg today  -Frequent reorientation  -Consider 1:1 if danger to himself or staff  -Try to avoid benzos
DDx inc.  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 no e/o acute intracranial pathology or infection   -initial CTH unremarkable  -UA, CXR negative  -on depakote while on prednisone  -neurology eval appreciated   (pt saw Dr. Nissenbaum last year, discussed case. he doesn't come to [facility])  -MRI with new stroke. see above.
mild. likely hypovolemic hyponatremia  improving on IVF.
mild. likely hypovolemic hyponatremia  improving on IVF.
mild. likely hypovolemic hyponatremia  gentle IVF for 12 more hours
mild. likely hypovolemic hyponatremia  improving on IVF.
mild. likely hypovolemic hyponatremia  improving on IVF.

## 2021-01-05 NOTE — PROGRESS NOTE ADULT - PROBLEM SELECTOR PROBLEM 6
Essential hypertension
Type 2 diabetes mellitus with other specified complication, without long-term current use of insulin
Severe protein-calorie malnutrition
Severe protein-calorie malnutrition
Essential hypertension
Essential hypertension
Alzheimer's dementia with behavioral disturbance, unspecified timing of dementia onset
Advanced care planning/counseling discussion
Alzheimer's dementia with behavioral disturbance, unspecified timing of dementia onset
Essential hypertension
Advanced care planning/counseling discussion
Alzheimer's dementia with behavioral disturbance, unspecified timing of dementia onset
Alzheimer's dementia with behavioral disturbance, unspecified timing of dementia onset
DVT prophylaxis
DVT prophylaxis
Severe protein-calorie malnutrition
Type 2 diabetes mellitus with other specified complication, without long-term current use of insulin
Essential hypertension
Severe protein-calorie malnutrition
Alzheimer's dementia with behavioral disturbance, unspecified timing of dementia onset
Type 2 diabetes mellitus with other specified complication, without long-term current use of insulin
Essential hypertension
Alzheimer's dementia with behavioral disturbance, unspecified timing of dementia onset
Alzheimer's dementia with behavioral disturbance, unspecified timing of dementia onset
Essential hypertension

## 2021-01-05 NOTE — PROGRESS NOTE ADULT - PROBLEM SELECTOR PROBLEM 3
Metabolic encephalopathy
Conjunctiva disorder
Hyponatremia
Metabolic encephalopathy
Hyponatremia
Severe protein-calorie malnutrition
Alzheimer's dementia with behavioral disturbance, unspecified timing of dementia onset
Alzheimer's dementia with behavioral disturbance, unspecified timing of dementia onset
Essential hypertension
Essential hypertension
Hyponatremia
Hyponatremia
Alzheimer's dementia with behavioral disturbance, unspecified timing of dementia onset
Hyponatremia
Hyponatremia
Severe protein-calorie malnutrition
Conjunctiva disorder
Hyponatremia
Conjunctiva disorder
Alzheimer's dementia with behavioral disturbance, unspecified timing of dementia onset
Hyponatremia
Metabolic encephalopathy
Hyponatremia

## 2021-01-05 NOTE — PROGRESS NOTE ADULT - PROBLEM SELECTOR PROBLEM 4
COVID-19
Alzheimer's dementia with behavioral disturbance, unspecified timing of dementia onset
Conjunctiva disorder
Essential hypertension
Hyponatremia
Essential hypertension
Type 2 diabetes mellitus with other specified complication, without long-term current use of insulin
Type 2 diabetes mellitus with other specified complication, without long-term current use of insulin
Cerebrovascular accident (CVA), unspecified mechanism
Conjunctiva disorder
Cerebrovascular accident (CVA), unspecified mechanism
Hyponatremia
Conjunctiva disorder
Conjunctiva disorder
Alzheimer's dementia with behavioral disturbance, unspecified timing of dementia onset
COVID-19
Alzheimer's dementia with behavioral disturbance, unspecified timing of dementia onset
COVID-19
COVID-19
Conjunctiva disorder
Hyponatremia
Conjunctiva disorder
Conjunctiva disorder

## 2021-01-05 NOTE — PROGRESS NOTE ADULT - PROBLEM SELECTOR PROBLEM 5
Alzheimer's dementia with behavioral disturbance, unspecified timing of dementia onset
Conjunctiva disorder
Alzheimer's dementia with behavioral disturbance, unspecified timing of dementia onset
Alzheimer's dementia with behavioral disturbance, unspecified timing of dementia onset
Conjunctiva disorder
Essential hypertension
Type 2 diabetes mellitus with other specified complication, without long-term current use of insulin
Conjunctiva disorder
Alzheimer's dementia with behavioral disturbance, unspecified timing of dementia onset
Alzheimer's dementia with behavioral disturbance, unspecified timing of dementia onset
Essential hypertension
Type 2 diabetes mellitus with other specified complication, without long-term current use of insulin
Severe protein-calorie malnutrition
Severe protein-calorie malnutrition
Type 2 diabetes mellitus with other specified complication, without long-term current use of insulin
Alzheimer's dementia with behavioral disturbance, unspecified timing of dementia onset
Essential hypertension
Type 2 diabetes mellitus with other specified complication, without long-term current use of insulin
Alzheimer's dementia with behavioral disturbance, unspecified timing of dementia onset
Alzheimer's dementia with behavioral disturbance, unspecified timing of dementia onset
Conjunctiva disorder
Alzheimer's dementia with behavioral disturbance, unspecified timing of dementia onset

## 2021-01-05 NOTE — PROGRESS NOTE ADULT - PROBLEM SELECTOR PLAN 6
--200  -A1c 6.6%  -insulin ss for now. hold metformin
Haldol 1 mg IVP prn  Frequent reorientation  Try to avoid benzos
-Qtc acceptable to give another dose of haldol 1 mg today  -Frequent reorientation  -Try to avoid benzos
BMI<19, 60lb weight loss in 1 year  -had malignancy w/u last month, pan CT/MR, EGD unremarkable  -c/w dietary supplements  -s/p NGT Insertion 12/17/20, started on TF
hypertensive to 170/110 in ER  -increased enalapril from 5 mg to 10 mg. cont to uptitrate as needed
-Haldol 1 mg IVP prn  -Frequent reorientation  -Try to avoid benzos
.  # Code status: FULL  # HCP/ Surrogate: Maia @ [phone]  # Estimated prognosis: Weeks to months  # Hospice eligible: YES- family not interested    12/22  > Continue updating family about patient condition  > Re-eval by Sp/Sw  > Family not interested in hospice- plan for [facility]  > Will ff up code status when family calls back    Due to visitation restrictions in the hospital in light of COVID pandemic, all discussions with the patient/ patient's healthcare proxy or surrogate have been done via telehealth. This is to prevent spread of infection within the hospital and out in the community. Total time discussing advance care planning, goals of care discussions, and discussions about code status and hospice = 46 mins (1:00 to 1:46)
-Qtc acceptable to give another dose of haldol 1 mg today  -Frequent reorientation  -Try to avoid benzos
.  # Code status: FULL  # HCP/ Surrogate: Maia @ [phone]  # Estimated prognosis: Weeks to months  # Hospice eligible: YES- family not interested    12/23  > Broached hospice w/ family today- not interested  > Still undecided about PEG vs comfort feeds  > Asked if Hudson Pastor (son) could also be on the visitor's list- d/w team    Due to visitation restrictions in the hospital in light of COVID pandemic, all discussions with the patient/ patient's healthcare proxy or surrogate have been done via telehealth. This is to prevent spread of infection within the hospital and out in the community. Total time discussing advance care planning, goals of care discussions, and discussions about code status and hospice = 16 mins (1:00 to 1:16)
--200  -A1c 6.6%  -insulin ss for now. hold metformin
-Qtc acceptable to give another dose of haldol 1 mg today  -Frequent reorientation  -Try to avoid benzos
hypertensive to 170/110 in ER  -increased enalapril from 5 mg to 10 mg. cont to uptitrate as needed
hypertensive to 170/110 in ER  -increased enalapril from 5 mg to 10 mg. cont to uptitrate as needed  - BP stable
hypertensive to 170/110 in ER  -increased enalapril from 5 mg to 10 mg. cont to uptitrate as needed  - BP stable
Haldol 1 mg IVP prn  Frequent reorientation  Try to avoid benzos
-Lovenox sc
-Lovenox sc
BMI<19, 60lb weight loss in 1 year  -had malignancy w/u last month, pan CT/MR, EGD unremarkable  -c/w dietary supplements  -he will need NGT for TF
BMI<19, 60lb weight loss in 1 year  -had malignancy w/u last month, pan CT/MR, EGD unremarkable  -c/w dietary supplements  -s/p NGT Insertion 12/17/20, started on TF
--200  -A1c 6.6%  -insulin ss for now. hold metformin
hypertensive to 170/110 in ER  -increased enalapril from 5 mg to 10 mg. cont to uptitrate as needed
BMI<19, 60lb weight loss in 1 year  -had malignancy w/u last month, pan CT/MR, EGD unremarkable  -c/w dietary supplements  -s/p NGT Insertion 12/17/20, started on TF
hypertensive to 170/110 in ER  -increased enalapril from 5 mg to 10 mg. cont to uptitrate as needed
-Haldol 1 mg IVP prn  -Frequent reorientation  -Try to avoid benzos
hypertensive to 170/110 in ER  -increased enalapril from 5 mg to 10 mg. cont to uptitrate as needed

## 2021-01-05 NOTE — PROGRESS NOTE ADULT - SUBJECTIVE AND OBJECTIVE BOX
Intermittently agitated  LE venous dopplers (-) yesterday  Saturating well on RA    Vital Signs Last 24 Hrs  T(C): 36.4 (05 Jan 2021 10:32), Max: 36.5 (05 Jan 2021 02:40)  T(F): 97.6 (05 Jan 2021 10:32), Max: 97.7 (05 Jan 2021 02:40)  HR: 67 (05 Jan 2021 10:32) (59 - 73)  BP: 128/79 (05 Jan 2021 10:32) (111/65 - 144/76)  BP(mean): --  RR: 18 (05 Jan 2021 10:32) (16 - 18)  SpO2: 100% (05 Jan 2021 10:32) (100% - 100%)    I&O's Summary    01-04-21 @ 07:01  -  01-05-21 @ 07:00  --------------------------------------------------------  IN: 150 mL / OUT: 0 mL / NET: 150 mL          PHYSICAL EXAM:  GENERAL: NAD, thin cachetic elderly  HEAD:  Atraumatic, Normocephalic  EYES: eyes close shut   NECK: Supple, No JVD  CHEST/LUNG: mild decrease breath sounds bilaterally; No wheeze   HEART: Regular rate and rhythm; No murmurs, rubs, or gallops  ABDOMEN: Soft, Nontender, Nondistended; Bowel sounds present, PEG in place  Neuro: AAOx0-1, slow words, poor insight, eyes close shut  EXTREMITIES:  2+ Peripheral Pulses, No clubbing, cyanosis, or edema  SKIN: No rashes or lesions     LABS:                        9.1    6.00  )-----------( 224      ( 05 Jan 2021 07:46 )             27.8     01-05    134<L>  |  99  |  15  ----------------------------<  139<H>  3.9   |  28  |  0.46<L>    Ca    8.9      05 Jan 2021 07:46  Phos  2.4     01-04  Mg     1.9     01-04    TPro  5.7<L>  /  Alb  3.1<L>  /  TBili  0.4  /  DBili  x   /  AST  18  /  ALT  21  /  AlkPhos  83  01-04      CAPILLARY BLOOD GLUCOSE      POCT Blood Glucose.: 160 mg/dL (05 Jan 2021 05:10)  POCT Blood Glucose.: 220 mg/dL (04 Jan 2021 23:58)  POCT Blood Glucose.: 116 mg/dL (04 Jan 2021 18:10)  POCT Blood Glucose.: 225 mg/dL (04 Jan 2021 12:27)            RADIOLOGY & ADDITIONAL TESTS:    Imaging Personally Reviewed:  [x] YES  [ ] NO    Consultant(s) Notes Reviewed:  [x] YES  [ ] NO

## 2021-01-05 NOTE — PROGRESS NOTE ADULT - PROBLEM SELECTOR PLAN 1
Asymptomatic and saturating well on RA so far  Airborne/droplet precautions  LE venous dopplers 1/4 (-)  On prophylactic dose lovenox  COVID-19 PCR 1/4 not detected

## 2021-01-05 NOTE — PROGRESS NOTE ADULT - PROBLEM SELECTOR PLAN 7
--200  -A1c 6.6%  -insulin ss for now. hold metformin
BMI<19, 60lb weight loss in 1 year  -had malignancy w/u last month, pan CT/MR, EGD unremarkable  -c/w dietary supplements  -s/p NGT Insertion 12/17/20, started on TF
hypertensive to 170/110 in ER  -increased enalapril from 5 mg to 10 mg. cont to uptitrate as needed  - BP stable
Thank you for allowing us to participate in your patient's care. We will continue to follow with you. Please page [phone] or contact us via [employer] Teams for any q's or c's.     Garcia Canas  Palliative Medicine
Thank you for allowing us to participate in your patient's care. We will continue to follow with you. Please page [phone] or contact us via [employer] Teams for any q's or c's.     Garcia Canas  Palliative Medicine
-Lovenox sc
--200  -A1c 6.6%  -insulin ss for now. hold metformin
-Lovenox sc
--200  -A1c 6.6%  -insulin ss for now. hold metformin
--200  -A1c 6.6%  -insulin ss for now. hold metformin
-Lovenox sc
hypertensive to 170/110 in ER  -increased enalapril from 5 mg to 10 mg. cont to uptitrate as needed  -BP stable
Hypertensive to 170/110 in ER  Increased enalapril from 5 mg to 10 mg. cont to uptitrate as needed
--200  -A1c 6.6%  -insulin ss for now. hold metformin
BMI<19, 60lb weight loss in 1 year  -had malignancy w/u last month, pan CT/MR, EGD unremarkable  -c/w dietary supplements  -s/p NGT Insertion 12/17/20, started on TF
Hypertensive to 170/110 in ER  Increased enalapril from 5 mg to 10 mg. cont to uptitrate as needed
hypertensive to 170/110 in ER  -increased enalapril from 5 mg to 10 mg. cont to uptitrate as needed  -BP stable
hypertensive to 170/110 in ER  -increased enalapril from 5 mg to 10 mg. cont to uptitrate as needed  - BP stable
BMI<19, 60lb weight loss in 1 year  -had malignancy w/u last month, pan CT/MR, EGD unremarkable  -c/w dietary supplements  -s/p NGT Insertion 12/17/20, started on TF
-Lovenox sc
hypertensive to 170/110 in ER  -increased enalapril from 5 mg to 10 mg. cont to uptitrate as needed  - BP stable

## 2021-01-05 NOTE — PROGRESS NOTE ADULT - PROBLEM SELECTOR PLAN 5
--200  -A1c 6.6%  -insulin ss for now. hold metformin
.  FAST 7C    > Poor prognosis  > Agitation recs as per neuro: Depakote PRN.
hypertensive to 170/110 in ER  -increased enalapril from 5 mg to 10 mg. cont to uptitrate as needed
-Qtc acceptable to give another dose of haldol 1 mg today  -Frequent reorientation  -Try to avoid benzos
-s/p corneal patch 11/2020 at [facility]  -c/w prednisone 25mg --> 20 mg daily (decrease 5 mg qweekly)  - doxy 100 and moxifloxacin eye drops as per opthalmology clinic note 12/11  -repeat vit A level was ordered for 12/17  -opthal consult and f/u appreciated
.  FAST 7C    > Poor prognosis  > Agitation recs as per neuro: Depakote PRN.
-s/p corneal patch 11/2020 at [facility]  -c/w prednisone 25mg --> 20 mg daily (decrease 5 mg qweekly)  - doxy 100 and moxifloxacin eye drops as per opthalmology clinic note 12/11  -repeat vit A level was ordered for 12/17  -opthal consult and f/u appreciated
hypertensive to 170/110 in ER  -increased enalapril from 5 mg to 10 mg. cont to uptitrate as needed
-Qtc acceptable to give another dose of haldol 1 mg today  -Frequent reorientation  -Try to avoid benzos
S/p corneal patch 11/2020 at [facility]  Cont prednisone 25mg --> 20 mg daily (decrease 5 mg qweekly)  Doxy 100 and moxifloxacin eye drops as per opthalmology clinic note 12/11  Repeat vit A level was ordered for 12/17  Opthal consult and f/u appreciated
--200  -check A1c 6.6%  -insulin ss for now. hold metformin  - mildly elevated betahydroxy butyrate unremarkable.
BMI<19, 60lb weight loss in 1 year  -had malignancy w/u last month, pan CT/MR, EGD unremarkable  -c/w dietary supplements
BMI<19, 60lb weight loss in 1 year  -had malignancy w/u last month, pan CT/MR, EGD unremarkable  -c/w dietary supplements
S/p corneal patch 11/2020 at [facility]  Cont prednisone 25mg --> 20 mg daily (decrease 5 mg qweekly)  Doxy 100 and moxifloxacin eye drops as per opthalmology clinic note 12/11  Repeat vit A level was ordered for 12/17  Opthal consult and f/u appreciated
--200  -A1c 6.6%  -insulin ss for now. hold metformin
--200  -A1c 6.6%  -insulin ss for now. hold metformin
-s/p corneal patch 11/2020 at [facility]  -c/w prednisone 25mg --> 20 mg daily (decrease 5 mg qweekly)  - doxy 100 and moxifloxacin eye drops as per opthalmology clinic note 12/11  -repeat vit A level was ordered for 12/17  -opthal consult and f/u appreciated
-Qtc acceptable to give another dose of haldol 1 mg today  -Frequent reorientation  -Try to avoid benzos
hypertensive to 170/110 in ER  -increased enalapril from 5 mg to 10 mg. cont to uptitrate as needed
-Qtc acceptable to give another dose of haldol 1 mg today  -Frequent reorientation  -Try to avoid benzos
-s/p corneal patch 11/2020 at [facility]  -c/w prednisone 25mg --> 20 mg daily (decrease 5 mg qweekly)  - doxy 100 and moxifloxacin eye drops as per opthalmology clinic note 12/11  -repeat vit A level was ordered for 12/17  -opthal consult and f/u appreciated
-s/p corneal patch 11/2020 at [facility]  -c/w prednisone 25mg --> 20 mg daily (decrease 5 mg qweekly)  - doxy 100 and moxifloxacin eye drops as per opthalmology clinic note 12/11  -repeat vit A level was ordered for 12/17  -opthal consult and f/u appreciated

## 2021-01-06 LAB
ANION GAP SERPL CALC-SCNC: 10 MMOL/L — SIGNIFICANT CHANGE UP (ref 7–14)
BUN SERPL-MCNC: 14 MG/DL — SIGNIFICANT CHANGE UP (ref 7–23)
CALCIUM SERPL-MCNC: 9.1 MG/DL — SIGNIFICANT CHANGE UP (ref 8.4–10.5)
CHLORIDE SERPL-SCNC: 100 MMOL/L — SIGNIFICANT CHANGE UP (ref 98–107)
CO2 SERPL-SCNC: 26 MMOL/L — SIGNIFICANT CHANGE UP (ref 22–31)
CREAT SERPL-MCNC: 0.53 MG/DL — SIGNIFICANT CHANGE UP (ref 0.5–1.3)
GLUCOSE SERPL-MCNC: 89 MG/DL — SIGNIFICANT CHANGE UP (ref 70–99)
HCT VFR BLD CALC: 31.3 % — LOW (ref 39–50)
HGB BLD-MCNC: 9.9 G/DL — LOW (ref 13–17)
MAGNESIUM SERPL-MCNC: 2 MG/DL — SIGNIFICANT CHANGE UP (ref 1.6–2.6)
MCHC RBC-ENTMCNC: 28.5 PG — SIGNIFICANT CHANGE UP (ref 27–34)
MCHC RBC-ENTMCNC: 31.6 GM/DL — LOW (ref 32–36)
MCV RBC AUTO: 90.2 FL — SIGNIFICANT CHANGE UP (ref 80–100)
NRBC # BLD: 0 /100 WBCS — SIGNIFICANT CHANGE UP
NRBC # FLD: 0 K/UL — SIGNIFICANT CHANGE UP
PHOSPHATE SERPL-MCNC: 2.7 MG/DL — SIGNIFICANT CHANGE UP (ref 2.5–4.5)
PLATELET # BLD AUTO: 263 K/UL — SIGNIFICANT CHANGE UP (ref 150–400)
POTASSIUM SERPL-MCNC: 4.3 MMOL/L — SIGNIFICANT CHANGE UP (ref 3.5–5.3)
POTASSIUM SERPL-SCNC: 4.3 MMOL/L — SIGNIFICANT CHANGE UP (ref 3.5–5.3)
RBC # BLD: 3.47 M/UL — LOW (ref 4.2–5.8)
RBC # FLD: 13.6 % — SIGNIFICANT CHANGE UP (ref 10.3–14.5)
SARS-COV-2 RNA SPEC QL NAA+PROBE: SIGNIFICANT CHANGE UP
SODIUM SERPL-SCNC: 136 MMOL/L — SIGNIFICANT CHANGE UP (ref 135–145)
WBC # BLD: 5.52 K/UL — SIGNIFICANT CHANGE UP (ref 3.8–10.5)
WBC # FLD AUTO: 5.52 K/UL — SIGNIFICANT CHANGE UP (ref 3.8–10.5)

## 2021-01-06 PROCEDURE — 99024 POSTOP FOLLOW-UP VISIT: CPT

## 2021-01-06 RX ADMIN — Medication 10 MILLIGRAM(S): at 05:16

## 2021-01-06 RX ADMIN — Medication 15 MILLILITER(S): at 13:09

## 2021-01-06 RX ADMIN — DIVALPROEX SODIUM 125 MILLIGRAM(S): 500 TABLET, DELAYED RELEASE ORAL at 18:41

## 2021-01-06 RX ADMIN — PANTOPRAZOLE SODIUM 40 MILLIGRAM(S): 20 TABLET, DELAYED RELEASE ORAL at 12:46

## 2021-01-06 RX ADMIN — ENOXAPARIN SODIUM 40 MILLIGRAM(S): 100 INJECTION SUBCUTANEOUS at 12:47

## 2021-01-06 RX ADMIN — Medication 100 MILLIGRAM(S): at 12:46

## 2021-01-06 RX ADMIN — Medication 110 MILLIGRAM(S): at 21:53

## 2021-01-06 RX ADMIN — PREGABALIN 1000 MICROGRAM(S): 225 CAPSULE ORAL at 13:09

## 2021-01-06 RX ADMIN — Medication 1 DROP(S): at 22:01

## 2021-01-06 RX ADMIN — Medication 81 MILLIGRAM(S): at 12:46

## 2021-01-06 RX ADMIN — DIVALPROEX SODIUM 125 MILLIGRAM(S): 500 TABLET, DELAYED RELEASE ORAL at 05:16

## 2021-01-06 RX ADMIN — Medication 110 MILLIGRAM(S): at 10:07

## 2021-01-06 RX ADMIN — ATORVASTATIN CALCIUM 10 MILLIGRAM(S): 80 TABLET, FILM COATED ORAL at 21:52

## 2021-01-06 RX ADMIN — Medication 2: at 12:45

## 2021-01-06 RX ADMIN — Medication 1000 MILLIGRAM(S): at 12:46

## 2021-01-06 RX ADMIN — Medication 1: at 02:01

## 2021-01-06 RX ADMIN — Medication 1: at 10:08

## 2021-01-06 RX ADMIN — Medication 1 DROP(S): at 12:22

## 2021-01-06 RX ADMIN — Medication 1 DROP(S): at 02:02

## 2021-01-06 RX ADMIN — Medication 1 DROP(S): at 05:16

## 2021-01-06 RX ADMIN — Medication 1 DROP(S): at 18:42

## 2021-01-06 RX ADMIN — Medication 3 MILLIGRAM(S): at 21:52

## 2021-01-06 RX ADMIN — Medication 1 MILLIGRAM(S): at 21:53

## 2021-01-06 NOTE — PROGRESS NOTE ADULT - SUBJECTIVE AND OBJECTIVE BOX
[facility] DEPARTMENT OF OPHTHALMOLOGY  ------------------------------------------------------------------------------  Jad Frank MD PGY-3  Pager: [phone]/[facility]: [phone]  ------------------------------------------------------------------------------    Interval History: No acute events overnight. Today patient denying blurred vision, eye pain, flashes, floaters, FBS, erythema, or discharge.     MEDICATIONS  (STANDING):  ascorbic acid 1000 milliGRAM(s) Oral daily  aspirin  chewable 81 milliGRAM(s) Oral daily  atorvastatin 10 milliGRAM(s) Oral at bedtime  cyanocobalamin 1000 MICROGram(s) Oral daily  dextrose 40% Gel 15 Gram(s) Oral once  dextrose 5%. 1000 milliLiter(s) (50 mL/Hr) IV Continuous <Continuous>  dextrose 5%. 1000 milliLiter(s) (100 mL/Hr) IV Continuous <Continuous>  dextrose 50% Injectable 25 Gram(s) IV Push once  dextrose 50% Injectable 12.5 Gram(s) IV Push once  dextrose 50% Injectable 25 Gram(s) IV Push once  diVALproex Sprinkle 125 milliGRAM(s) Oral two times a day  doxazosin 1 milliGRAM(s) Oral at bedtime  doxycycline IVPB 100 milliGRAM(s) IV Intermittent every 12 hours  enoxaparin Injectable 40 milliGRAM(s) SubCutaneous daily  glucagon  Injectable 1 milliGRAM(s) IntraMuscular once  insulin lispro (ADMELOG) corrective regimen sliding scale   SubCutaneous every 6 hours  melatonin 3 milliGRAM(s) Oral at bedtime  moxifloxacin 0.5% Solution 1 Drop(s) Both EYES <User Schedule>  multivitamin/minerals/iron Oral Solution (CENTRUM) 15 milliLiter(s) Oral daily  pantoprazole  Injectable 40 milliGRAM(s) IV Push daily  predniSONE   Tablet 10 milliGRAM(s) Oral daily  thiamine 100 milliGRAM(s) Oral daily    MEDICATIONS  (PRN):  bisacodyl Suppository 10 milliGRAM(s) Rectal daily PRN Constipation  polyethylene glycol 3350 17 Gram(s) Oral daily PRN Constipation  senna 2 Tablet(s) Oral at bedtime PRN Constipation      VITALS: T(C): 36.6 (01-06-21 @ 09:48)  T(F): 97.8 (01-06-21 @ 09:48), Max: 97.8 (01-06-21 @ 09:48)  HR: 73 (01-06-21 @ 09:48) (61 - 73)  BP: 133/75 (01-06-21 @ 09:48) (121/67 - 133/75)  RR:  (16 - 18)  SpO2:  (100% - 100%)  Wt(kg): --  Alert and oriented x 1-2 ; appropriate mood and affect    Ophthalmology Exam:  Visual acuity (sc): HM OU  Pupils: PERRL OU, no APD  Ttono: Soft OU  Extraocular movements (EOMs): Full OU, no pain, no diplopia  Confrontational Visual Field (CVF): ED due to MS  Color Plates: ED due to poor vision    Pen Light Exam (PLE)  External: Clear shield in place OU  Lids/Lashes/Lacrimal Ducts: Flat OU Inferior lids noted to be slightly ectropic OU with deep fornices, eyelids crusted shut with significant discharge (which was cleaned gently with gauze and saline)  Sclera/Conjunctiva: tr injection OD, 1+ injection OS  Cornea: OD with patch graft in place, well sutured, laura negative, no epithelial defect, trace SPK, slight haze 4 o'clock near suture; OS with corneal glue intact overlying central area of defect with overlying BCL.   Anterior Chamber: D+F OU, No hypopyon appreciated OU, formed OU  Iris: poor view but appears Flat OU  Lens: poor view OU

## 2021-01-06 NOTE — PROGRESS NOTE ADULT - ASSESSMENT
80y male w/ pmhx/ochx of DM, HTN, HLD, chronic conjunctivitis and 60 lb weight loss with progressive corneal thinning of both eyes, s/p corneal patch graft of the right eye. OD with patch graft in place, without epi defect. OS with central perforation. On 12/24/20 glue was no longer in place, pt reglued and BCL placed. Now OS contact lens and glue in place. Chamber formed. On 12/29/20 patient found to be COVID positive, now awaiting 2x negative COVID tests and discharge.     # Corneal thinning of both eyes, s/p corneal patch graft of the right eye and corneal gluing of the left eye (most recently 12/24/20)  - Stable.   - K glue placed over central area of perforation OS and BCL replaced 12/24/2020. Patient tolerated procedure well.   - c/w moxiflox QID to both eyes  - c/w taper PO prednisone to 10 mg and continue to taper down by 5 mg weekly. Patient with acute mental status change possibly related to steroids; if need to be held for this reason, please let ophthalmology know. Prednisone has helped slow this patient's corneal melting and thinning, but may be contributing to worsening mental status  - given recent weight loss and b/l nature of corneal pathology, malignancy workup was done to evaluate for underlying disease process, unrevealing to date   - c/w vitamin C 1g daily and doxycycline 100mg BID daily as per primary team if no contraindications  - serum vitamin A levels at 21. Pt on daily multi-vitamin and d/w primary team NP the need for additional vitamin A supplementation.   - c/w daily multivitamin to ensure adequate vit A supplementation  - prior conjunctival biopsy with lymphoplasmocytic infiltrate, consistent with chronic inflammatory process  - ophthalmology will monitor closely  - findings discussed with patient and primary team    SDW Dr. Warren (cornea)    Outpatient follow-up: Patient should follow-up with his/her ophthalmologist or with [facility] Department of Ophthalmology within 2-3 days of discharge at:    [address]  [place] [address]  [phone]

## 2021-01-06 NOTE — PROGRESS NOTE ADULT - NSHPATTENDINGPLANDISCUSS_GEN_ALL_CORE
rayshawn and JEANNETTE Gustafson
pt and NP
pt and GI
pt and RN, d/w GI
rayshawn and JEANNETTE Gustafson
pt and BENJAMÍN Meng
pt and the wife Maia at bedside. d/w GI
pt, NP and the wife Maia
rayshawn and the wife Maia.

## 2021-01-06 NOTE — PROGRESS NOTE ADULT - ASSESSMENT
80 year old male with PMH of DM, HTN, HLD, chronic conjunctivitis s/p corneal surg on steroids p/w worsening agitation and FTT.     Problem/Plan - :  ·  Problem: COVID-19.  Plan: Asymptomatic and saturating well on RA so far  Airborne/droplet precautions  LE venous dopplers 1/4 (-)  On prophylactic dose lovenox  COVID-19 PCR 1/4 not detected.      Problem/Plan - :  ·  Problem: CVA (cerebral vascular accident).  Plan: MRI/MRA head 12/18/20 revealed small acute/subacute infarcts in the left posterior frontal lobe along w/predominantly chronic bilateral subdural hematomas unchanged from CT dated 12/13/2020, new from MRI dated 6/23/2020, with small subacute subdural components  Cont ASA/statin; holding off on adding plavix  Appreciate palliative input; family was not interested in hospice  Speech/swallow reevaluate -> recommended NPO  After numerous discussions, family agreed to PEG tube placement  GI consult appreciated. PEG placed 12/28/20, started tube feeding and tolerating.      Problem/Plan - :  ·  Problem: Metabolic encephalopathy.  Plan: 2' COVID-19 in setting of dementia  Initial CTH unremarkable  UA, CXR negative  On depakote while on prednisone  Neurology eval appreciated  (pt saw Dr. Nissenbaum last year, discussed case. he doesn't come to [facility]).      Problem/Plan - :  ·  Problem: Hyponatremia.  Plan: Likely hypovolemic hyponatremia  Improved s/p IVF.      Problem/Plan - :  ·  Problem: Conjunctiva disorder.  Plan: S/p corneal patch 11/2020 at [facility]  Cont prednisone 25mg --> 20 mg daily (decrease 5 mg qweekly)  Doxy 100 and moxifloxacin eye drops as per opthalmology clinic note 12/11  Repeat vit A level was ordered for 12/17  Opthal consult and f/u appreciated.      Problem/Plan - :  ·  Problem: Alzheimer's dementia with behavioral disturbance, unspecified timing of dementia onset. Plan: Haldol 1 mg IVP prn  Frequent reorientation  Try to avoid benzos.     Problem/Plan - :  ·  Problem: Essential hypertension.  Plan: Hypertensive to 170/110 in ER  Increased enalapril from 5 mg to 10 mg. cont to uptitrate as needed.      Problem/Plan - :  ·  Problem: Type 2 diabetes mellitus with other specified complication, without long-term current use of insulin.  Plan: -200  A1c 6.6%  Insulin ss for now  Still holding metformin.      Problem/Plan - :  ·  Problem: Severe protein-calorie malnutrition.  Plan: BMI<19, 60lb weight loss in 1 year  Had malignancy w/u last month, pan CT/MR, EGD unremarkable  Cont dietary supplements  S/p NGT Insertion 12/17/20  S/p PEG placement 12/28/20.      Problem/Plan - :  ·  Problem: DVT prophylaxis. Plan: Lovenox sc.     Problem/Plan - :  ·  Problem: Disposition.  Plan: To STR after 2 consecutively (-) COVID-19 PCRs

## 2021-01-06 NOTE — PROGRESS NOTE ADULT - SUBJECTIVE AND OBJECTIVE BOX
SUBJECTIVE / OVERNIGHT EVENTS:  awake alert  mild vision returning  able to count fingers  "I am hungry" "why are you not feeding me"  explained that PEG feeding in progress.   denied pain.     --------------------------------------------------------------------------------------------  LABS:                        9.9    5.52  )-----------( 263      ( 06 Jan 2021 17:06 )             31.3     01-05    134<L>  |  99  |  15  ----------------------------<  139<H>  3.9   |  28  |  0.46<L>    Ca    8.9      05 Jan 2021 07:46        CAPILLARY BLOOD GLUCOSE      POCT Blood Glucose.: 78 mg/dL (06 Jan 2021 16:36)  POCT Blood Glucose.: 219 mg/dL (06 Jan 2021 12:26)  POCT Blood Glucose.: 195 mg/dL (06 Jan 2021 08:25)  POCT Blood Glucose.: 200 mg/dL (06 Jan 2021 01:55)  POCT Blood Glucose.: 160 mg/dL (05 Jan 2021 22:26)            RADIOLOGY & ADDITIONAL TESTS:    Imaging Personally Reviewed:  [x] YES  [ ] NO    Consultant(s) Notes Reviewed:  [x] YES  [ ] NO    MEDICATIONS  (STANDING):  ascorbic acid 1000 milliGRAM(s) Oral daily  aspirin  chewable 81 milliGRAM(s) Oral daily  atorvastatin 10 milliGRAM(s) Oral at bedtime  cyanocobalamin 1000 MICROGram(s) Oral daily  dextrose 40% Gel 15 Gram(s) Oral once  dextrose 5%. 1000 milliLiter(s) (50 mL/Hr) IV Continuous <Continuous>  dextrose 5%. 1000 milliLiter(s) (100 mL/Hr) IV Continuous <Continuous>  dextrose 50% Injectable 25 Gram(s) IV Push once  dextrose 50% Injectable 12.5 Gram(s) IV Push once  dextrose 50% Injectable 25 Gram(s) IV Push once  diVALproex Sprinkle 125 milliGRAM(s) Oral two times a day  doxazosin 1 milliGRAM(s) Oral at bedtime  doxycycline IVPB 100 milliGRAM(s) IV Intermittent every 12 hours  enoxaparin Injectable 40 milliGRAM(s) SubCutaneous daily  glucagon  Injectable 1 milliGRAM(s) IntraMuscular once  insulin lispro (ADMELOG) corrective regimen sliding scale   SubCutaneous every 6 hours  melatonin 3 milliGRAM(s) Oral at bedtime  moxifloxacin 0.5% Solution 1 Drop(s) Both EYES <User Schedule>  multivitamin/minerals/iron Oral Solution (CENTRUM) 15 milliLiter(s) Oral daily  pantoprazole  Injectable 40 milliGRAM(s) IV Push daily  predniSONE   Tablet 10 milliGRAM(s) Oral daily  thiamine 100 milliGRAM(s) Oral daily    MEDICATIONS  (PRN):  bisacodyl Suppository 10 milliGRAM(s) Rectal daily PRN Constipation  polyethylene glycol 3350 17 Gram(s) Oral daily PRN Constipation  senna 2 Tablet(s) Oral at bedtime PRN Constipation      Care Discussed with Consultants/Other Providers [x] YES  [ ] NO    Vital Signs Last 24 Hrs  T(C): 36.6 (06 Jan 2021 09:48), Max: 36.6 (06 Jan 2021 09:48)  T(F): 97.8 (06 Jan 2021 09:48), Max: 97.8 (06 Jan 2021 09:48)  HR: 73 (06 Jan 2021 09:48) (61 - 73)  BP: 133/75 (06 Jan 2021 09:48) (121/67 - 133/75)  BP(mean): --  RR: 16 (06 Jan 2021 09:48) (16 - 18)  SpO2: 100% (06 Jan 2021 09:48) (100% - 100%)  I&O's Summary    05 Jan 2021 07:01  -  06 Jan 2021 07:00  --------------------------------------------------------  IN: 150 mL / OUT: 0 mL / NET: 150 mL      PHYSICAL EXAM:  GENERAL: NAD, thin cachetic elderly, on room air  HEAD:  Atraumatic, Normocephalic  EYES: eyes open, +vision deficit.   NECK: Supple, No JVD  CHEST/LUNG: mild decrease breath sounds bilaterally; No wheeze   HEART: Regular rate and rhythm; No murmurs, rubs, or gallops  ABDOMEN: Soft, Nontender, Nondistended; Bowel sounds present, PEG in place  Neuro: AAOx1, speech better, poor insight.   EXTREMITIES:  2+ Peripheral Pulses, No clubbing, cyanosis, or edema  SKIN: No rashes or lesions

## 2021-01-06 NOTE — PROGRESS NOTE ADULT - NUTRITIONAL ASSESSMENT
This patient has been assessed with a concern for Malnutrition and has been determined to have a diagnosis/diagnoses of Moderate protein-calorie malnutrition.    This patient is being managed with:   Diet NPO with Tube Feed-  Tube Feeding Modality: Nasogastric  Glucerna 1.5 Bryce (GLUCERNA1.5RTH)  Total Volume for 24 Hours (mL): 1200  Continuous  Starting Tube Feed Rate {mL per Hour}: 20  Increase Tube Feed Rate by (mL): 10     Every 4 hours  Until Goal Tube Feed Rate (mL per Hour): 50  Tube Feed Duration (in Hours): 24  Tube Feed Start Time: 10:00  No Carb Prosource (1pkg = 15gms Protein)     Qty per Day:  1  Entered: Dec 17 2020 11:43AM    
This patient has been assessed with a concern for Malnutrition and has been determined to have a diagnosis/diagnoses of Severe protein-calorie malnutrition.    This patient is being managed with:   Diet NPO with Tube Feed-  Tube Feeding Modality: Nasogastric  Glucerna 1.5 Bryce (GLUCERNA1.5RTH)  Total Volume for 24 Hours (mL): 1200  Continuous  Starting Tube Feed Rate {mL per Hour}: 20  Increase Tube Feed Rate by (mL): 10     Every 4 hours  Until Goal Tube Feed Rate (mL per Hour): 50  Tube Feed Duration (in Hours): 24  Tube Feed Start Time: 10:00  No Carb Prosource (1pkg = 15gms Protein)     Qty per Day:  1  Entered: Dec 17 2020 11:43AM    
This patient has been assessed with a concern for Malnutrition and has been determined to have a diagnosis/diagnoses of Severe protein-calorie malnutrition.    This patient is being managed with:   Diet NPO with Tube Feed-  Tube Feeding Modality: Nasogastric  Glucerna 1.5 Bryce (GLUCERNA1.5RTH)  Total Volume for 24 Hours (mL): 1200  Continuous  Starting Tube Feed Rate {mL per Hour}: 20  Increase Tube Feed Rate by (mL): 10     Every 4 hours  Until Goal Tube Feed Rate (mL per Hour): 50  Tube Feed Duration (in Hours): 24  Tube Feed Start Time: 13:10  No Carb Prosource (1pkg = 15gms Protein)     Qty per Day:  1  Entered: Dec 29 2020  1:07PM    
This patient has been assessed with a concern for Malnutrition and has been determined to have a diagnosis/diagnoses of Severe protein-calorie malnutrition.    This patient is being managed with:   Diet NPO with Tube Feed-  Tube Feeding Modality: Nasogastric  Glucerna 1.5 Bryce (GLUCERNA1.5RTH)  Total Volume for 24 Hours (mL): 1200  Continuous  Starting Tube Feed Rate {mL per Hour}: 20  Increase Tube Feed Rate by (mL): 10     Every 4 hours  Until Goal Tube Feed Rate (mL per Hour): 50  Tube Feed Duration (in Hours): 24  Tube Feed Start Time: 13:10  No Carb Prosource (1pkg = 15gms Protein)     Qty per Day:  1  Entered: Dec 29 2020  1:07PM    
This patient has been assessed with a concern for Malnutrition and has been determined to have a diagnosis/diagnoses of Severe protein-calorie malnutrition.    This patient is being managed with:   Diet NPO with Tube Feed-  Tube Feeding Modality: Nasogastric  Glucerna 1.5 Bryce (GLUCERNA1.5RTH)  Total Volume for 24 Hours (mL): 1200  Continuous  Starting Tube Feed Rate {mL per Hour}: 20  Increase Tube Feed Rate by (mL): 10     Every 4 hours  Until Goal Tube Feed Rate (mL per Hour): 50  Tube Feed Duration (in Hours): 24  Tube Feed Start Time: 10:00  No Carb Prosource (1pkg = 15gms Protein)     Qty per Day:  1  Entered: Dec 17 2020 11:43AM    
This patient has been assessed with a concern for Malnutrition and has been determined to have a diagnosis/diagnoses of Severe protein-calorie malnutrition.    This patient is being managed with:   Diet NPO with Tube Feed-  Tube Feeding Modality: Nasogastric  Glucerna 1.5 Bryce (GLUCERNA1.5RTH)  Total Volume for 24 Hours (mL): 1200  Continuous  Starting Tube Feed Rate {mL per Hour}: 20  Increase Tube Feed Rate by (mL): 10     Every 4 hours  Until Goal Tube Feed Rate (mL per Hour): 50  Tube Feed Duration (in Hours): 24  Tube Feed Start Time: 13:10  No Carb Prosource (1pkg = 15gms Protein)     Qty per Day:  1  Entered: Dec 29 2020  1:07PM    
This patient has been assessed with a concern for Malnutrition and has been determined to have a diagnosis/diagnoses of Severe protein-calorie malnutrition.    This patient is being managed with:   Diet NPO with Tube Feed-  Tube Feeding Modality: Nasogastric  Glucerna 1.5 Bryce (GLUCERNA1.5RTH)  Total Volume for 24 Hours (mL): 1200  Continuous  Starting Tube Feed Rate {mL per Hour}: 20  Increase Tube Feed Rate by (mL): 10     Every 4 hours  Until Goal Tube Feed Rate (mL per Hour): 50  Tube Feed Duration (in Hours): 24  Tube Feed Start Time: 10:00  No Carb Prosource (1pkg = 15gms Protein)     Qty per Day:  1  Entered: Dec 17 2020 11:43AM    
This patient has been assessed with a concern for Malnutrition and has been determined to have a diagnosis/diagnoses of Severe protein-calorie malnutrition.    This patient is being managed with:   Diet NPO with Tube Feed-  Tube Feeding Modality: Nasogastric  Glucerna 1.5 Bryce (GLUCERNA1.5RTH)  Total Volume for 24 Hours (mL): 1200  Continuous  Starting Tube Feed Rate {mL per Hour}: 20  Increase Tube Feed Rate by (mL): 10     Every 4 hours  Until Goal Tube Feed Rate (mL per Hour): 50  Tube Feed Duration (in Hours): 24  Tube Feed Start Time: 13:10  No Carb Prosource (1pkg = 15gms Protein)     Qty per Day:  1  Entered: Dec 29 2020  1:07PM    
This patient has been assessed with a concern for Malnutrition and has been determined to have a diagnosis/diagnoses of Moderate protein-calorie malnutrition.    This patient is being managed with:   Diet DASH/TLC-  Sodium & Cholesterol Restricted  Pureed  Consistent Carbohydrate {Evening Snack} (CSTCHOSN)  Entered: Dec 14 2020  3:28PM    
This patient has been assessed with a concern for Malnutrition and has been determined to have a diagnosis/diagnoses of Severe protein-calorie malnutrition.    This patient is being managed with:   Diet NPO with Tube Feed-  Tube Feeding Modality: Nasogastric  Glucerna 1.5 Bryce (GLUCERNA1.5RTH)  Total Volume for 24 Hours (mL): 1200  Continuous  Starting Tube Feed Rate {mL per Hour}: 20  Increase Tube Feed Rate by (mL): 10     Every 4 hours  Until Goal Tube Feed Rate (mL per Hour): 50  Tube Feed Duration (in Hours): 24  Tube Feed Start Time: 13:10  No Carb Prosource (1pkg = 15gms Protein)     Qty per Day:  1  Entered: Dec 29 2020  1:07PM    
This patient has been assessed with a concern for Malnutrition and has been determined to have a diagnosis/diagnoses of Moderate protein-calorie malnutrition.    This patient is being managed with:   Diet DASH/TLC-  Sodium & Cholesterol Restricted  Pureed  Consistent Carbohydrate {Evening Snack} (CSTCHOSN)  Entered: Dec 14 2020  3:28PM    
This patient has been assessed with a concern for Malnutrition and has been determined to have a diagnosis/diagnoses of Moderate protein-calorie malnutrition.    This patient is being managed with:   Diet NPO-  Entered: Dec 15 2020  2:38PM    
This patient has been assessed with a concern for Malnutrition and has been determined to have a diagnosis/diagnoses of Severe protein-calorie malnutrition.    This patient is being managed with:   Diet NPO with Tube Feed-  Tube Feeding Modality: Nasogastric  Glucerna 1.5 Bryce (GLUCERNA1.5RTH)  Total Volume for 24 Hours (mL): 1200  Continuous  Starting Tube Feed Rate {mL per Hour}: 20  Increase Tube Feed Rate by (mL): 10     Every 4 hours  Until Goal Tube Feed Rate (mL per Hour): 50  Tube Feed Duration (in Hours): 24  Tube Feed Start Time: 10:00  No Carb Prosource (1pkg = 15gms Protein)     Qty per Day:  1  Entered: Dec 17 2020 11:43AM    
This patient has been assessed with a concern for Malnutrition and has been determined to have a diagnosis/diagnoses of Severe protein-calorie malnutrition.    This patient is being managed with:   Diet NPO with Tube Feed-  Tube Feeding Modality: Nasogastric  Glucerna 1.5 Bryce (GLUCERNA1.5RTH)  Total Volume for 24 Hours (mL): 1200  Continuous  Starting Tube Feed Rate {mL per Hour}: 20  Increase Tube Feed Rate by (mL): 10     Every 4 hours  Until Goal Tube Feed Rate (mL per Hour): 50  Tube Feed Duration (in Hours): 24  Tube Feed Start Time: 13:10  No Carb Prosource (1pkg = 15gms Protein)     Qty per Day:  1  Entered: Dec 29 2020  1:07PM    
This patient has been assessed with a concern for Malnutrition and has been determined to have a diagnosis/diagnoses of Severe protein-calorie malnutrition.    This patient is being managed with:   Diet NPO with Tube Feed-  Tube Feeding Modality: Nasogastric  Glucerna 1.5 Bryce (GLUCERNA1.5RTH)  Total Volume for 24 Hours (mL): 1200  Continuous  Starting Tube Feed Rate {mL per Hour}: 20  Increase Tube Feed Rate by (mL): 10     Every 4 hours  Until Goal Tube Feed Rate (mL per Hour): 50  Tube Feed Duration (in Hours): 24  Tube Feed Start Time: 10:00  No Carb Prosource (1pkg = 15gms Protein)     Qty per Day:  1  Entered: Dec 17 2020 11:43AM    
This patient has been assessed with a concern for Malnutrition and has been determined to have a diagnosis/diagnoses of Severe protein-calorie malnutrition.    This patient is being managed with:   Diet NPO with Tube Feed-  Tube Feeding Modality: Nasogastric  Glucerna 1.5 Bryce (GLUCERNA1.5RTH)  Total Volume for 24 Hours (mL): 1200  Continuous  Starting Tube Feed Rate {mL per Hour}: 20  Increase Tube Feed Rate by (mL): 10     Every 4 hours  Until Goal Tube Feed Rate (mL per Hour): 50  Tube Feed Duration (in Hours): 24  Tube Feed Start Time: 13:10  No Carb Prosource (1pkg = 15gms Protein)     Qty per Day:  1  Entered: Dec 29 2020  1:07PM    
This patient has been assessed with a concern for Malnutrition and has been determined to have a diagnosis/diagnoses of Severe protein-calorie malnutrition.    This patient is being managed with:   Diet NPO with Tube Feed-  Tube Feeding Modality: Nasogastric  Glucerna 1.5 Bryce (GLUCERNA1.5RTH)  Total Volume for 24 Hours (mL): 1200  Continuous  Starting Tube Feed Rate {mL per Hour}: 20  Increase Tube Feed Rate by (mL): 10     Every 4 hours  Until Goal Tube Feed Rate (mL per Hour): 50  Tube Feed Duration (in Hours): 24  Tube Feed Start Time: 13:10  No Carb Prosource (1pkg = 15gms Protein)     Qty per Day:  1  Entered: Dec 29 2020  1:07PM

## 2021-01-07 ENCOUNTER — TRANSCRIPTION ENCOUNTER (OUTPATIENT)
Age: 81
End: 2021-01-07

## 2021-01-07 VITALS
SYSTOLIC BLOOD PRESSURE: 133 MMHG | DIASTOLIC BLOOD PRESSURE: 70 MMHG | RESPIRATION RATE: 18 BRPM | OXYGEN SATURATION: 100 % | TEMPERATURE: 98 F | HEART RATE: 61 BPM

## 2021-01-07 LAB
ALBUMIN SERPL ELPH-MCNC: 3.4 G/DL — SIGNIFICANT CHANGE UP (ref 3.3–5)
ALP SERPL-CCNC: 91 U/L — SIGNIFICANT CHANGE UP (ref 40–120)
ALT FLD-CCNC: 25 U/L — SIGNIFICANT CHANGE UP (ref 4–41)
ANION GAP SERPL CALC-SCNC: 8 MMOL/L — SIGNIFICANT CHANGE UP (ref 7–14)
AST SERPL-CCNC: 20 U/L — SIGNIFICANT CHANGE UP (ref 4–40)
BASOPHILS # BLD AUTO: 0.01 K/UL — SIGNIFICANT CHANGE UP (ref 0–0.2)
BASOPHILS NFR BLD AUTO: 0.2 % — SIGNIFICANT CHANGE UP (ref 0–2)
BILIRUB SERPL-MCNC: 0.4 MG/DL — SIGNIFICANT CHANGE UP (ref 0.2–1.2)
BUN SERPL-MCNC: 14 MG/DL — SIGNIFICANT CHANGE UP (ref 7–23)
CALCIUM SERPL-MCNC: 9.2 MG/DL — SIGNIFICANT CHANGE UP (ref 8.4–10.5)
CHLORIDE SERPL-SCNC: 100 MMOL/L — SIGNIFICANT CHANGE UP (ref 98–107)
CO2 SERPL-SCNC: 28 MMOL/L — SIGNIFICANT CHANGE UP (ref 22–31)
CREAT SERPL-MCNC: 0.49 MG/DL — LOW (ref 0.5–1.3)
CRP SERPL-MCNC: 10.6 MG/L — HIGH
D DIMER BLD IA.RAPID-MCNC: 429 NG/ML DDU — HIGH
EOSINOPHIL # BLD AUTO: 0.02 K/UL — SIGNIFICANT CHANGE UP (ref 0–0.5)
EOSINOPHIL NFR BLD AUTO: 0.3 % — SIGNIFICANT CHANGE UP (ref 0–6)
FERRITIN SERPL-MCNC: 160 NG/ML — SIGNIFICANT CHANGE UP (ref 30–400)
GLUCOSE SERPL-MCNC: 148 MG/DL — HIGH (ref 70–99)
HCT VFR BLD CALC: 31.5 % — LOW (ref 39–50)
HGB BLD-MCNC: 10 G/DL — LOW (ref 13–17)
IANC: 5 K/UL — SIGNIFICANT CHANGE UP (ref 1.5–8.5)
IMM GRANULOCYTES NFR BLD AUTO: 0.8 % — SIGNIFICANT CHANGE UP (ref 0–1.5)
LDH SERPL L TO P-CCNC: 301 U/L — HIGH (ref 135–225)
LYMPHOCYTES # BLD AUTO: 0.4 K/UL — LOW (ref 1–3.3)
LYMPHOCYTES # BLD AUTO: 6.7 % — LOW (ref 13–44)
MAGNESIUM SERPL-MCNC: 1.9 MG/DL — SIGNIFICANT CHANGE UP (ref 1.6–2.6)
MCHC RBC-ENTMCNC: 28.7 PG — SIGNIFICANT CHANGE UP (ref 27–34)
MCHC RBC-ENTMCNC: 31.7 GM/DL — LOW (ref 32–36)
MCV RBC AUTO: 90.3 FL — SIGNIFICANT CHANGE UP (ref 80–100)
MONOCYTES # BLD AUTO: 0.45 K/UL — SIGNIFICANT CHANGE UP (ref 0–0.9)
MONOCYTES NFR BLD AUTO: 7.6 % — SIGNIFICANT CHANGE UP (ref 2–14)
NEUTROPHILS # BLD AUTO: 5 K/UL — SIGNIFICANT CHANGE UP (ref 1.8–7.4)
NEUTROPHILS NFR BLD AUTO: 84.4 % — HIGH (ref 43–77)
NRBC # BLD: 0 /100 WBCS — SIGNIFICANT CHANGE UP
NRBC # FLD: 0 K/UL — SIGNIFICANT CHANGE UP
PHOSPHATE SERPL-MCNC: 2.7 MG/DL — SIGNIFICANT CHANGE UP (ref 2.5–4.5)
PLATELET # BLD AUTO: 260 K/UL — SIGNIFICANT CHANGE UP (ref 150–400)
POTASSIUM SERPL-MCNC: 4.1 MMOL/L — SIGNIFICANT CHANGE UP (ref 3.5–5.3)
POTASSIUM SERPL-SCNC: 4.1 MMOL/L — SIGNIFICANT CHANGE UP (ref 3.5–5.3)
PROCALCITONIN SERPL-MCNC: 0.04 NG/ML — SIGNIFICANT CHANGE UP (ref 0.02–0.1)
PROT SERPL-MCNC: 5.9 G/DL — LOW (ref 6–8.3)
RBC # BLD: 3.49 M/UL — LOW (ref 4.2–5.8)
RBC # FLD: 13.8 % — SIGNIFICANT CHANGE UP (ref 10.3–14.5)
SODIUM SERPL-SCNC: 136 MMOL/L — SIGNIFICANT CHANGE UP (ref 135–145)
WBC # BLD: 5.93 K/UL — SIGNIFICANT CHANGE UP (ref 3.8–10.5)
WBC # FLD AUTO: 5.93 K/UL — SIGNIFICANT CHANGE UP (ref 3.8–10.5)

## 2021-01-07 RX ORDER — ASCORBIC ACID 60 MG
1 TABLET,CHEWABLE ORAL
Qty: 0 | Refills: 0 | DISCHARGE

## 2021-01-07 RX ORDER — ATORVASTATIN CALCIUM 80 MG/1
1 TABLET, FILM COATED ORAL
Qty: 0 | Refills: 0 | DISCHARGE

## 2021-01-07 RX ORDER — POLYETHYLENE GLYCOL 3350 17 G/17G
1 POWDER, FOR SOLUTION ORAL
Qty: 0 | Refills: 0 | DISCHARGE

## 2021-01-07 RX ORDER — SENNA PLUS 8.6 MG/1
2 TABLET ORAL
Qty: 0 | Refills: 0 | DISCHARGE

## 2021-01-07 RX ORDER — ASPIRIN/CALCIUM CARB/MAGNESIUM 324 MG
1 TABLET ORAL
Qty: 0 | Refills: 0 | DISCHARGE

## 2021-01-07 RX ORDER — DOXAZOSIN MESYLATE 4 MG
1 TABLET ORAL
Qty: 0 | Refills: 0 | DISCHARGE
Start: 2021-01-07

## 2021-01-07 RX ORDER — SENNA PLUS 8.6 MG/1
1 TABLET ORAL
Qty: 0 | Refills: 0 | DISCHARGE

## 2021-01-07 RX ORDER — SENNA PLUS 8.6 MG/1
2 TABLET ORAL
Qty: 0 | Refills: 0 | DISCHARGE
Start: 2021-01-07

## 2021-01-07 RX ORDER — PREGABALIN 225 MG/1
1 CAPSULE ORAL
Qty: 0 | Refills: 0 | DISCHARGE
Start: 2021-01-07

## 2021-01-07 RX ORDER — TAMSULOSIN HYDROCHLORIDE 0.4 MG/1
1 CAPSULE ORAL
Qty: 0 | Refills: 0 | DISCHARGE

## 2021-01-07 RX ORDER — THIAMINE MONONITRATE (VIT B1) 100 MG
1 TABLET ORAL
Qty: 0 | Refills: 0 | DISCHARGE
Start: 2021-01-07

## 2021-01-07 RX ORDER — MOXIFLOXACIN HCL 0.5 %
1 DROPS OPHTHALMIC (EYE)
Qty: 0 | Refills: 0 | DISCHARGE
Start: 2021-01-07

## 2021-01-07 RX ORDER — POLYETHYLENE GLYCOL 3350 17 G/17G
17 POWDER, FOR SOLUTION ORAL
Qty: 0 | Refills: 0 | DISCHARGE
Start: 2021-01-07

## 2021-01-07 RX ORDER — PANTOPRAZOLE SODIUM 20 MG/1
40 TABLET, DELAYED RELEASE ORAL
Qty: 0 | Refills: 0 | DISCHARGE
Start: 2021-01-07

## 2021-01-07 RX ORDER — PREGABALIN 225 MG/1
1 CAPSULE ORAL
Qty: 0 | Refills: 0 | DISCHARGE

## 2021-01-07 RX ORDER — MOXIFLOXACIN HCL 0.5 %
1 DROPS OPHTHALMIC (EYE)
Qty: 0 | Refills: 0 | DISCHARGE

## 2021-01-07 RX ORDER — MULTIVIT-MIN/FERROUS GLUCONATE 9 MG/15 ML
15 LIQUID (ML) ORAL
Qty: 0 | Refills: 0 | DISCHARGE
Start: 2021-01-07

## 2021-01-07 RX ORDER — DIVALPROEX SODIUM 500 MG/1
1 TABLET, DELAYED RELEASE ORAL
Qty: 0 | Refills: 0 | DISCHARGE
Start: 2021-01-07

## 2021-01-07 RX ORDER — ATORVASTATIN CALCIUM 80 MG/1
1 TABLET, FILM COATED ORAL
Qty: 0 | Refills: 0 | DISCHARGE
Start: 2021-01-07

## 2021-01-07 RX ORDER — POLYETHYLENE GLYCOL 3350 17 G/17G
17 POWDER, FOR SOLUTION ORAL
Qty: 0 | Refills: 0 | DISCHARGE

## 2021-01-07 RX ORDER — FAMOTIDINE 10 MG/ML
1 INJECTION INTRAVENOUS
Qty: 0 | Refills: 0 | DISCHARGE

## 2021-01-07 RX ORDER — LANOLIN ALCOHOL/MO/W.PET/CERES
1 CREAM (GRAM) TOPICAL
Qty: 0 | Refills: 0 | DISCHARGE
Start: 2021-01-07

## 2021-01-07 RX ORDER — ASCORBIC ACID 60 MG
1 TABLET,CHEWABLE ORAL
Qty: 0 | Refills: 0 | DISCHARGE
Start: 2021-01-07

## 2021-01-07 RX ORDER — ENOXAPARIN SODIUM 100 MG/ML
40 INJECTION SUBCUTANEOUS
Qty: 0 | Refills: 0 | DISCHARGE
Start: 2021-01-07

## 2021-01-07 RX ORDER — ASPIRIN/CALCIUM CARB/MAGNESIUM 324 MG
1 TABLET ORAL
Qty: 0 | Refills: 0 | DISCHARGE
Start: 2021-01-07

## 2021-01-07 RX ADMIN — PREGABALIN 1000 MICROGRAM(S): 225 CAPSULE ORAL at 12:29

## 2021-01-07 RX ADMIN — Medication 1000 MILLIGRAM(S): at 12:28

## 2021-01-07 RX ADMIN — Medication 110 MILLIGRAM(S): at 09:10

## 2021-01-07 RX ADMIN — Medication 1: at 15:34

## 2021-01-07 RX ADMIN — ENOXAPARIN SODIUM 40 MILLIGRAM(S): 100 INJECTION SUBCUTANEOUS at 12:28

## 2021-01-07 RX ADMIN — PANTOPRAZOLE SODIUM 40 MILLIGRAM(S): 20 TABLET, DELAYED RELEASE ORAL at 12:28

## 2021-01-07 RX ADMIN — Medication 1: at 09:52

## 2021-01-07 RX ADMIN — Medication 100 MILLIGRAM(S): at 12:28

## 2021-01-07 RX ADMIN — Medication 1 DROP(S): at 12:28

## 2021-01-07 RX ADMIN — Medication 81 MILLIGRAM(S): at 12:27

## 2021-01-07 RX ADMIN — Medication 1: at 03:41

## 2021-01-07 RX ADMIN — DIVALPROEX SODIUM 125 MILLIGRAM(S): 500 TABLET, DELAYED RELEASE ORAL at 06:31

## 2021-01-07 RX ADMIN — Medication 10 MILLIGRAM(S): at 06:31

## 2021-01-07 RX ADMIN — Medication 15 MILLILITER(S): at 12:28

## 2021-01-07 RX ADMIN — Medication 1 DROP(S): at 06:31

## 2021-01-07 NOTE — PROGRESS NOTE ADULT - REASON FOR ADMISSION
AMS

## 2021-01-07 NOTE — PROGRESS NOTE ADULT - PROVIDER SPECIALTY LIST ADULT
Gastroenterology
Internal Medicine
Neurology
Neurology
Ophthalmology
Ophthalmology
Gastroenterology
Neurology
Ophthalmology
Gastroenterology
Internal Medicine
Neurology
Ophthalmology
Internal Medicine
Palliative Care
Palliative Care
Internal Medicine

## 2021-01-07 NOTE — PROGRESS NOTE ADULT - ASSESSMENT
80 year old man with subacute CVA and subdural hematomas now with oropharyngeal dysphagia        Problem/Recommendation - 1:  Problem: Oropharyngeal aspiration. Recommendation: s/p PEG, doing well  -gastrostomy feeding underway     Problem/Recommendation - 2:  ·  Problem: CVA (cerebral vascular accident).  Recommendation: per neurology.      Problem/Recommendation - 3:  ·  Problem: drop in hemoglobin: no overt GI bleeding noted. Repeat hgb stable, but slight decline today. Monitor for GI bleeding. Repeat hgb.   PPI QD.      Problem/Recommendation - 4:  ·  Problem: COVID 19    Attending Attestation:   Differential diagnosis and plan of care discussed with patient after the evaluation  35 Minutes spent on total encounter of which more than fifty percent of the encounter was spent counseling and/or coordinating care by the attending physician.    Davidson Roca M.D.   Gastroenterology and Hepatology  Cell: [phone] .

## 2021-01-07 NOTE — DISCHARGE NOTE NURSING/CASE MANAGEMENT/SOCIAL WORK - NSDCFUADDAPPT_GEN_ALL_CORE_FT
If you are in need of a general medicine physician and post-discharge medical follow-up for further care/recommendations you may contact the [facility] for an appointment [phone]    Repeat CT Head in 4 weeks to see if there is any progression of subdural hematoma     Follow up with ophthalmologist within 2-3 days of discharge

## 2021-01-07 NOTE — DISCHARGE NOTE NURSING/CASE MANAGEMENT/SOCIAL WORK - PATIENT PORTAL LINK FT
You can access the FollowMyHealth Patient Portal offered by [facility] by registering at the following website: [email or web address]. By joining [employer]’s FollowMyHealth portal, you will also be able to view your health information using other applications (apps) compatible with our system.

## 2021-01-20 NOTE — CONSULT NOTE ADULT - CONSULT REQUESTED DATE/TIME
11-Nov-2020 11:00
13-Nov-2020
08-Nov-2020 15:39
10-Nov-2020 21:26
11-Nov-2020 11:45
11-Nov-2020 16:39
12-Nov-2020 13:38

## 2021-01-20 NOTE — CONSULT NOTE ADULT - SUBJECTIVE AND OBJECTIVE BOX
Patient seen and examined at bedside. Conjunctival biopsy right eye performed. Consent obtained from patient and patient's family at bedside.     Procedure note. Conjunctival biopsy right eye    The patient has a history of idiopathic bilateral corneal perforations. The lid was retracted and 1% lidocaine with epinephrine 1:100,000 on a 30 gauge needle was injected subconjunctivally in the area between the inferior border of the tarsus and fornix. The palpebral conjunctiva was then exposed and the specimen was then taken from the area between the inferior border of tarsus and fornix using toothed forceps and susu scissors. The specimen was then placed on saline soaked gauze and sent for immunopathology. Another specimen was then taken and placed in formalin. Erythromycin ointment was then placed into the right eye with a shield.     Rosy Warren Cornea Attending

## 2021-01-20 NOTE — CONSULT NOTE ADULT - REASON FOR ADMISSION
Right eye progressive corneal thinning
Bilateral eye progressive corneal thinning
Right eye progressive corneal thinning

## 2021-01-20 NOTE — CONSULT NOTE ADULT - CONSULT REASON
Bilateral corneal perforation
Evaluation of corneal melt
Postoperative Day 1
Weight loss  R/O Malignancy
concern for underlying malignancy
enlarged prostate with mass effect and concern for lytic lesions on CT
r/o infectious etiology of corneal thinning

## 2021-07-06 NOTE — BEHAVIORAL HEALTH ASSESSMENT NOTE - VIOLENCE PROTECTIVE FACTORS:
Bactrim Pregnancy And Lactation Text: This medication is Pregnancy Category D and is known to cause fetal risk.  It is also excreted in breast milk. Relationship stability

## 2021-09-07 NOTE — PROGRESS NOTE ADULT - ASSESSMENT
80 year old man with subacute CVA and subdural hematomas now with oropharyngeal dysphagia        Problem/Recommendation - 1:  Problem: Oropharyngeal aspiration. Recommendation: s/p PEG, doing well  -gastrostomy feeding underway     Problem/Recommendation - 2:  ·  Problem: CVA (cerebral vascular accident).  Recommendation: per neurology.      Problem/Recommendation - 3:  ·  Problem: drop in hemoglobin: no overt GI bleeding noted. Repeat hgb stable. Monitor for GI bleeding. Repeat hgb.   PPI QD.      Problem/Recommendation - 4:  ·  Problem: COVID 19    Attending Attestation:   Differential diagnosis and plan of care discussed with patient after the evaluation  35 Minutes spent on total encounter of which more than fifty percent of the encounter was spent counseling and/or coordinating care by the attending physician.    Davidson Roca M.D.   Gastroenterology and Hepatology  Cell: [phone] .       Private car

## 2021-10-21 NOTE — H&P ADULT - NSICDXNOPASTSURGICALHX_GEN_ALL_CORE
[facility] PRE-OPERATIVE NOTE     Subjective   Chief Complaint had concerns including Office Visit and Pre-Op Exam (11/02/21 Dr. Ambrose).  Surgery Date: 11/2/2021  Planned Surgery: Cataract surgery  Type of Anesthesia: MAC  Pre-op Diagnosis: Cataract obscuring vision  Request for Consult by Surgeon: Hair Ambrose MD    BRIEF HISTORY LEADING to SURGERY: Janes Yepez is a 81 year old male here for a pre-operative anesthesia clearance for surgery as requested by the surgeon.  Had left cataract surgery 8 years ago and in past 2 months worsening of right sided cataract.     History of HTN, PMR on daily low dose prednisone, CKD III, Iron deficiency anemia, Gout, Diffuse osteoarthritis, history of prostate cancer.  - History of prostate ca and will have follow up in November 2021.  Requesting updated PSA to forward for upcoming visit.    Review of Systems   Constitutional: Negative for chills, fatigue and fever.   HENT: Negative for congestion and sore throat.    Respiratory: Negative for cough and shortness of breath.    Cardiovascular: Negative for chest pain and palpitations.   Gastrointestinal: Negative for abdominal pain.   Musculoskeletal: Positive for arthralgias.   Skin: Negative for rash.   Neurological: Negative for light-headedness and headaches.        Objective   Blood pressure (!) 146/75, pulse (!) 54, height 5' 11\" (1.803 m), weight 77.7 kg (171 lb 3 oz), SpO2 97 %.  Physical Exam  Constitutional:       General: He is not in acute distress.     Appearance: He is well-developed.   HENT:      Head: Normocephalic and atraumatic.      Mouth/Throat:      Mouth: Mucous membranes are moist.      Pharynx: Oropharynx is clear. No oropharyngeal exudate or posterior oropharyngeal erythema.   Eyes:      General:         Right eye: No discharge.         Left eye: No discharge.      Extraocular Movements: Extraocular movements intact.      Conjunctiva/sclera: Conjunctivae normal.      Pupils: Pupils are equal, round, and  reactive to light.   Neck:      Vascular: No carotid bruit.   Cardiovascular:      Rate and Rhythm: Normal rate and regular rhythm.      Heart sounds: Normal heart sounds. No murmur heard.   No friction rub. No gallop.    Pulmonary:      Effort: Pulmonary effort is normal. No respiratory distress.      Breath sounds: Normal breath sounds. No wheezing, rhonchi or rales.   Musculoskeletal:      Cervical back: Normal range of motion and neck supple.   Lymphadenopathy:      Cervical: No cervical adenopathy.   Skin:     General: Skin is warm and dry.      Findings: No rash.   Neurological:      Mental Status: He is alert and oriented to person, place, and time.           RESULTS REVIEW       Most Recent  Na+  144 (6/21/2021)  K+  5 (6/21/2021)   Glucose  110 (6/21/2021)  GFR  56 (6/21/2021)   The last eGFR was abnormal at 56 (6/21/2021).The last Total Bilirubin was abnormal at 1.1 (5/3/2021) (Normal is 0.2 - 1.0 mg/dL).    Most Recent  WBC  5.7 (5/3/2021)  HGB  15.3 (5/3/2021)     Platelet  257 (5/3/2021)               MEDICAL, FAMILY, ALLERGY AND SOCIAL HISTORY        Problem List: has Anemia; Arthralgia of multiple sites; Chronic kidney disease (CKD) stage G3a/A2, moderately decreased glomerular filtration rate (GFR) between 45-59 mL/min/1.73 square meter and albuminuria creatinine ratio between  mg/g (CMS/[facility]); Essential hypertension; Iron deficiency anemia due to chronic blood loss; Nephrolithiasis; Osteoarthritis of hand; Polymyalgia rheumatica (CMS/HCC); Restrictive lung disease; Polyneuropathy; Osteoarthritis of right hip; Chronic lower back pain; Long term (current) use of systemic steroids; Paralyzed hemidiaphragm; Personal history of colonic polyps; Other specified disorders of adrenal gland (CMS/HCC); Cervical spinal stenosis; Colon polyps; Gout; HTN (hypertension); PMR (polymyalgia rheumatica) (CMS/HCC); Polyarthritis; Prostate carcinoma (CMS/HCC); Iron deficiency anemia; Hypertension, essential,  benign; Long term current use of systemic steroids; Primary osteoarthritis of right hip; Chronic renal impairment; Rotator cuff syndrome of right shoulder; Chronic renal impairment, stage 1; and Chronic renal impairment, stage 3 (moderate) (CMS/[facility]) on their problem list.    Medical:  has a past medical history of Cervical spinal stenosis, Colon polyps, Gout, HTN (hypertension), Iron deficiency anemia, Nephrolithiasis, Paralyzed hemidiaphragm, PMR (polymyalgia rheumatica) (CMS/HCC), Polyarthritis, and Prostate carcinoma (CMS/[facility]).    Surgical:  has a past surgical history that includes Inguinal hernia repair; appendectomy; Hemorrhoidectomy; Tonsillectomy and adenoidectomy; cataract extraction extracapsular w/ intraocular lens implantation (Left); and carpal tunnel release (Right, 2021).    Family: family history includes Cancer, Prostate in his brother; Congestive Heart Failure in his brother; Coronary Artery Disease in his mother; Lung Disease in his father; Pneumonia in his mother.    Social:  reports that he has never smoked. He has never used smokeless tobacco. He reports that he does not drink alcohol.    Allergies: is allergic to codeine and opioid analgesics.       Current Outpatient Medications   Medication Instructions   • albuterol 108 (90 Base) MCG/ACT inhaler 2 puffs, Inhalation, EVERY 4 HOURS PRN   • allopurinol (ZYLOPRIM) 300 MG tablet DAILY   • amLODIPine (NORVASC) 5 MG tablet TAKE 1 TABLET BY MOUTH ONE TIME DAILY   • famotidine (PEPCID) 20 mg, Oral, 2 TIMES DAILY   • Ilevro 0.3 % ophthalmic suspension INSTILL ONE (1) DROP INTO RIGHT EYE TWO (2) TIMES DAILY; START ONE (1) DAY PRIOR TO SURGERY   • Inveltys 1 % Suspension INSTILL ONE (1) DROP INTO RIGHT EYE TWO (2) TIMES DAILY; START ONE (1) DAY PRIOR TO SURGERY   • losartan (COZAAR) 100 MG tablet TAKE 1 TABLET BY MOUTH ONE TIME DAILY   • moxifloxacin (VIGAMOX) 0.5 % ophthalmic solution INSTILL ONE (1) DROP INTO RIGHT EYE TWO (2) TIMES DAILY; START ONE  (1) DAY PRIOR TO SURGERY   • potassium citrate ER 1,080 mg, Oral, 3 TIMES DAILY WITH MEALS   • predniSONE (DELTASONE) 1 mg, Oral, DAILY, TAKE 1 TABLETS DAILY   • saccharomyces boulardii (FLORASTOR) 250 MG capsule TAKE 1 CAPSULE TWICE DAILY        SURGICAL RISK HISTORY        Functional Capacity  Are you able to do light housework, dusting, wash dishes, climb a flight of stairs or walk up a hill without chest pain or problems breathing (>4 METS)? Yes    Malnutrition Screening Current weight 171 pounds  Have you recently lost weight without trying? No  Have you been eating poorly because of a decreased appetite? No  Score = 0, Not at Risk       Family History Risks   Adverse Reaction to Anesthesia: No  Bleeding or Blood Disorder: No  Malignant Hyperthermia: No    Personal Surgical History  Anesthetic Complications: No  Problems with Surgery: No    Revised Cardiac Risk Index  Intermediate or Higher Risk Surgery No   History of Ischemic Heart Disease or Cardiac Chest Pain No   Congestive Heart Failure No;     History of Stroke or TIA No   Last Creatinine >2 No; 1.2 g/dL (06/21/2021)   Prescribed Insulin or GLP-1 No   Estimated Risk of a Major Cardiac Complication 0 risk factors = 3.9%     Advance Directives Documented No       ASSESSMENT AND PLAN     Patient is medically optimized for surgery.     Visit Diagnoses, Orders and Preop Risks    1. Cortical age-related cataract of right eye    2. Preop examination    3. Hypertension, essential, benign    4. Long term current use of systemic steroids    5. Prostate carcinoma (CMS/HCC)    6. Iron deficiency anemia, unspecified iron deficiency anemia type    7. Chronic kidney disease (CKD) stage G3a/A2, moderately decreased glomerular filtration rate (GFR) between 45-59 mL/min/1.73 square meter and albuminuria creatinine ratio between  mg/g (CMS/HCC)      Orders Placed This Encounter   • PSA   • Basic Metabolic Panel   • CBC with Automated Differential   • CBC with  Automated Differential (performable only)      #Hypertension, Essential, Benign today's blood pressure was /75    #Chronic Renal Impairment, Stage 3 (Moderate) (Cms/Hcc) the last eGFR was 56 (6/21/2021)    #Iron Deficiency Anemia the last Hemoglobin was 15.3 (5/3/2021) in comparison the second to last result was 14.5 (11/4/2020).    #Prostate Carcinoma (Cms/[facility])       Current Medications: has a current medication list which includes the following prescription(s): prednisone, losartan, amlodipine, famotidine, albuterol, potassium citrate er, saccharomyces boulardii, allopurinol, ilevro, moxifloxacin, and inveltys.    Before Surgery STOP Taking   -NSAIDs for 2 days.  -Multi-Vitamin the day of surgery. Herbal Medications and Vitamin E for 7 days.  -Topical medications (except eye drops) on the day of surgery.  -All other chronic medications are to be continued unless an alternative plan was advised with the surgeon and/or specialist.    Junior Quiroz MD    Sent copy of note to: Hair Ambrose MD         <-- Click to add NO significant Past Surgical History

## 2021-12-17 NOTE — CONSULT NOTE ADULT - PROBLEM SELECTOR RECOMMENDATION 9
Normal
tentative plan for PEG tomorrow  will reach out to son  NPO past midnight
.  Multifactorial etiology: CVA, failure to thrive, metabolic    > Not agitated during my visit  > Neuro on board; recommending Depakote  > Pls continue coordinating care with wife, Maia

## 2022-01-10 ENCOUNTER — APPOINTMENT (OUTPATIENT)
Dept: ORTHOPEDIC SURGERY | Facility: CLINIC | Age: 82
End: 2022-01-10
Payer: MEDICARE

## 2022-01-10 ENCOUNTER — INPATIENT (INPATIENT)
Facility: HOSPITAL | Age: 82
LOS: 4 days | Discharge: NURSING, REHAB OR LONG-TERM CARE | DRG: 521 | End: 2022-01-15
Attending: ORTHOPAEDIC SURGERY | Admitting: ORTHOPAEDIC SURGERY
Payer: MEDICARE

## 2022-01-10 ENCOUNTER — TRANSCRIPTION ENCOUNTER (OUTPATIENT)
Age: 82
End: 2022-01-10

## 2022-01-10 VITALS — HEART RATE: 74 BPM | SYSTOLIC BLOOD PRESSURE: 158 MMHG | DIASTOLIC BLOOD PRESSURE: 100 MMHG

## 2022-01-10 VITALS
DIASTOLIC BLOOD PRESSURE: 91 MMHG | RESPIRATION RATE: 16 BRPM | SYSTOLIC BLOOD PRESSURE: 183 MMHG | WEIGHT: 169.98 LBS | TEMPERATURE: 99 F | HEART RATE: 86 BPM | HEIGHT: 68 IN | OXYGEN SATURATION: 100 %

## 2022-01-10 DIAGNOSIS — Z86.39 PERSONAL HISTORY OF OTHER ENDOCRINE, NUTRITIONAL AND METABOLIC DISEASE: ICD-10-CM

## 2022-01-10 DIAGNOSIS — Z78.9 OTHER SPECIFIED HEALTH STATUS: ICD-10-CM

## 2022-01-10 DIAGNOSIS — S72.001D FRACTURE OF UNSPECIFIED PART OF NECK OF RIGHT FEMUR, SUBSEQUENT ENCOUNTER FOR CLOSED FRACTURE WITH ROUTINE HEALING: ICD-10-CM

## 2022-01-10 DIAGNOSIS — M25.551 PAIN IN RIGHT HIP: ICD-10-CM

## 2022-01-10 DIAGNOSIS — Z86.79 PERSONAL HISTORY OF OTHER DISEASES OF THE CIRCULATORY SYSTEM: ICD-10-CM

## 2022-01-10 DIAGNOSIS — Z86.59 PERSONAL HISTORY OF OTHER MENTAL AND BEHAVIORAL DISORDERS: ICD-10-CM

## 2022-01-10 DIAGNOSIS — Z87.39 PERSONAL HISTORY OF OTHER DISEASES OF THE MUSCULOSKELETAL SYSTEM AND CONNECTIVE TISSUE: ICD-10-CM

## 2022-01-10 LAB
ALBUMIN SERPL ELPH-MCNC: 3.9 G/DL — SIGNIFICANT CHANGE UP (ref 3.3–5)
ALP SERPL-CCNC: 88 U/L — SIGNIFICANT CHANGE UP (ref 40–120)
ALT FLD-CCNC: 16 U/L — SIGNIFICANT CHANGE UP (ref 10–45)
ANION GAP SERPL CALC-SCNC: 14 MMOL/L — SIGNIFICANT CHANGE UP (ref 5–17)
APTT BLD: 29.5 SEC — SIGNIFICANT CHANGE UP (ref 27.5–35.5)
AST SERPL-CCNC: 32 U/L — SIGNIFICANT CHANGE UP (ref 10–40)
BASOPHILS # BLD AUTO: 0.04 K/UL — SIGNIFICANT CHANGE UP (ref 0–0.2)
BASOPHILS NFR BLD AUTO: 0.7 % — SIGNIFICANT CHANGE UP (ref 0–2)
BILIRUB SERPL-MCNC: 0.4 MG/DL — SIGNIFICANT CHANGE UP (ref 0.2–1.2)
BLD GP AB SCN SERPL QL: NEGATIVE — SIGNIFICANT CHANGE UP
BUN SERPL-MCNC: 13 MG/DL — SIGNIFICANT CHANGE UP (ref 7–23)
CALCIUM SERPL-MCNC: 9.8 MG/DL — SIGNIFICANT CHANGE UP (ref 8.4–10.5)
CHLORIDE SERPL-SCNC: 96 MMOL/L — SIGNIFICANT CHANGE UP (ref 96–108)
CO2 SERPL-SCNC: 20 MMOL/L — LOW (ref 22–31)
CREAT SERPL-MCNC: 0.54 MG/DL — SIGNIFICANT CHANGE UP (ref 0.5–1.3)
EOSINOPHIL # BLD AUTO: 0.07 K/UL — SIGNIFICANT CHANGE UP (ref 0–0.5)
EOSINOPHIL NFR BLD AUTO: 1.2 % — SIGNIFICANT CHANGE UP (ref 0–6)
GLUCOSE SERPL-MCNC: 129 MG/DL — HIGH (ref 70–99)
HCT VFR BLD CALC: 34.6 % — LOW (ref 39–50)
HGB BLD-MCNC: 11.3 G/DL — LOW (ref 13–17)
IMM GRANULOCYTES NFR BLD AUTO: 0.5 % — SIGNIFICANT CHANGE UP (ref 0–1.5)
INR BLD: 1.13 RATIO — SIGNIFICANT CHANGE UP (ref 0.88–1.16)
LYMPHOCYTES # BLD AUTO: 0.67 K/UL — LOW (ref 1–3.3)
LYMPHOCYTES # BLD AUTO: 11.5 % — LOW (ref 13–44)
MAGNESIUM SERPL-MCNC: 1.9 MG/DL — SIGNIFICANT CHANGE UP (ref 1.6–2.6)
MCHC RBC-ENTMCNC: 27.1 PG — SIGNIFICANT CHANGE UP (ref 27–34)
MCHC RBC-ENTMCNC: 32.7 GM/DL — SIGNIFICANT CHANGE UP (ref 32–36)
MCV RBC AUTO: 83 FL — SIGNIFICANT CHANGE UP (ref 80–100)
MONOCYTES # BLD AUTO: 0.63 K/UL — SIGNIFICANT CHANGE UP (ref 0–0.9)
MONOCYTES NFR BLD AUTO: 10.8 % — SIGNIFICANT CHANGE UP (ref 2–14)
NEUTROPHILS # BLD AUTO: 4.41 K/UL — SIGNIFICANT CHANGE UP (ref 1.8–7.4)
NEUTROPHILS NFR BLD AUTO: 75.3 % — SIGNIFICANT CHANGE UP (ref 43–77)
NRBC # BLD: 0 /100 WBCS — SIGNIFICANT CHANGE UP (ref 0–0)
PLATELET # BLD AUTO: 405 K/UL — HIGH (ref 150–400)
POTASSIUM SERPL-MCNC: 5.5 MMOL/L — HIGH (ref 3.5–5.3)
POTASSIUM SERPL-SCNC: 5.5 MMOL/L — HIGH (ref 3.5–5.3)
PROT SERPL-MCNC: 7.8 G/DL — SIGNIFICANT CHANGE UP (ref 6–8.3)
PROTHROM AB SERPL-ACNC: 13.5 SEC — SIGNIFICANT CHANGE UP (ref 10.6–13.6)
RBC # BLD: 4.17 M/UL — LOW (ref 4.2–5.8)
RBC # FLD: 13.7 % — SIGNIFICANT CHANGE UP (ref 10.3–14.5)
RH IG SCN BLD-IMP: POSITIVE — SIGNIFICANT CHANGE UP
SARS-COV-2 RNA SPEC QL NAA+PROBE: DETECTED
SODIUM SERPL-SCNC: 130 MMOL/L — LOW (ref 135–145)
WBC # BLD: 5.85 K/UL — SIGNIFICANT CHANGE UP (ref 3.8–10.5)
WBC # FLD AUTO: 5.85 K/UL — SIGNIFICANT CHANGE UP (ref 3.8–10.5)

## 2022-01-10 PROCEDURE — 73552 X-RAY EXAM OF FEMUR 2/>: CPT | Mod: 26,RT

## 2022-01-10 PROCEDURE — 99204 OFFICE O/P NEW MOD 45 MIN: CPT

## 2022-01-10 PROCEDURE — 71045 X-RAY EXAM CHEST 1 VIEW: CPT | Mod: 26

## 2022-01-10 PROCEDURE — 93010 ELECTROCARDIOGRAM REPORT: CPT | Mod: GC

## 2022-01-10 PROCEDURE — 99285 EMERGENCY DEPT VISIT HI MDM: CPT | Mod: CS,25,GC

## 2022-01-10 PROCEDURE — 73502 X-RAY EXAM HIP UNI 2-3 VIEWS: CPT | Mod: 26,RT

## 2022-01-10 PROCEDURE — 73502 X-RAY EXAM HIP UNI 2-3 VIEWS: CPT

## 2022-01-10 RX ORDER — SODIUM CHLORIDE 9 MG/ML
1000 INJECTION, SOLUTION INTRAVENOUS
Refills: 0 | Status: DISCONTINUED | OUTPATIENT
Start: 2022-01-10 | End: 2022-01-15

## 2022-01-10 RX ORDER — INSULIN LISPRO 100/ML
VIAL (ML) SUBCUTANEOUS
Refills: 0 | Status: DISCONTINUED | OUTPATIENT
Start: 2022-01-10 | End: 2022-01-15

## 2022-01-10 RX ORDER — DORZOLAMIDE HYDROCHLORIDE TIMOLOL MALEATE 20; 5 MG/ML; MG/ML
1 SOLUTION/ DROPS OPHTHALMIC
Refills: 0 | Status: DISCONTINUED | OUTPATIENT
Start: 2022-01-10 | End: 2022-01-15

## 2022-01-10 RX ORDER — FOLIC ACID 0.8 MG
1 TABLET ORAL DAILY
Refills: 0 | Status: DISCONTINUED | OUTPATIENT
Start: 2022-01-10 | End: 2022-01-15

## 2022-01-10 RX ORDER — SODIUM CHLORIDE 9 MG/ML
1000 INJECTION INTRAMUSCULAR; INTRAVENOUS; SUBCUTANEOUS
Refills: 0 | Status: DISCONTINUED | OUTPATIENT
Start: 2022-01-10 | End: 2022-01-13

## 2022-01-10 RX ORDER — DEXTROSE 50 % IN WATER 50 %
25 SYRINGE (ML) INTRAVENOUS ONCE
Refills: 0 | Status: DISCONTINUED | OUTPATIENT
Start: 2022-01-10 | End: 2022-01-15

## 2022-01-10 RX ORDER — DEXTROSE 50 % IN WATER 50 %
15 SYRINGE (ML) INTRAVENOUS ONCE
Refills: 0 | Status: DISCONTINUED | OUTPATIENT
Start: 2022-01-10 | End: 2022-01-15

## 2022-01-10 RX ORDER — DOXAZOSIN MESYLATE 4 MG
1 TABLET ORAL AT BEDTIME
Refills: 0 | Status: DISCONTINUED | OUTPATIENT
Start: 2022-01-10 | End: 2022-01-15

## 2022-01-10 RX ORDER — GLUCAGON INJECTION, SOLUTION 0.5 MG/.1ML
1 INJECTION, SOLUTION SUBCUTANEOUS ONCE
Refills: 0 | Status: DISCONTINUED | OUTPATIENT
Start: 2022-01-10 | End: 2022-01-15

## 2022-01-10 RX ORDER — ACETAMINOPHEN 500 MG
650 TABLET ORAL EVERY 6 HOURS
Refills: 0 | Status: DISCONTINUED | OUTPATIENT
Start: 2022-01-10 | End: 2022-01-15

## 2022-01-10 RX ORDER — ASCORBIC ACID 60 MG
500 TABLET,CHEWABLE ORAL
Refills: 0 | Status: DISCONTINUED | OUTPATIENT
Start: 2022-01-10 | End: 2022-01-15

## 2022-01-10 RX ORDER — ATORVASTATIN CALCIUM 80 MG/1
10 TABLET, FILM COATED ORAL AT BEDTIME
Refills: 0 | Status: DISCONTINUED | OUTPATIENT
Start: 2022-01-10 | End: 2022-01-15

## 2022-01-10 RX ORDER — METOCLOPRAMIDE HCL 10 MG
10 TABLET ORAL EVERY 6 HOURS
Refills: 0 | Status: DISCONTINUED | OUTPATIENT
Start: 2022-01-10 | End: 2022-01-15

## 2022-01-10 RX ORDER — SODIUM CHLORIDE 9 MG/ML
1000 INJECTION, SOLUTION INTRAVENOUS
Refills: 0 | Status: DISCONTINUED | OUTPATIENT
Start: 2022-01-10 | End: 2022-01-10

## 2022-01-10 RX ORDER — MAGNESIUM HYDROXIDE 400 MG/1
30 TABLET, CHEWABLE ORAL DAILY
Refills: 0 | Status: DISCONTINUED | OUTPATIENT
Start: 2022-01-10 | End: 2022-01-15

## 2022-01-10 RX ORDER — HEPARIN SODIUM 5000 [USP'U]/ML
5000 INJECTION INTRAVENOUS; SUBCUTANEOUS ONCE
Refills: 0 | Status: COMPLETED | OUTPATIENT
Start: 2022-01-10 | End: 2022-01-10

## 2022-01-10 RX ORDER — INSULIN LISPRO 100/ML
VIAL (ML) SUBCUTANEOUS AT BEDTIME
Refills: 0 | Status: DISCONTINUED | OUTPATIENT
Start: 2022-01-10 | End: 2022-01-15

## 2022-01-10 RX ORDER — THIAMINE MONONITRATE (VIT B1) 100 MG
100 TABLET ORAL DAILY
Refills: 0 | Status: DISCONTINUED | OUTPATIENT
Start: 2022-01-10 | End: 2022-01-15

## 2022-01-10 RX ORDER — PREGABALIN 225 MG/1
1000 CAPSULE ORAL DAILY
Refills: 0 | Status: DISCONTINUED | OUTPATIENT
Start: 2022-01-10 | End: 2022-01-15

## 2022-01-10 RX ORDER — SENNA PLUS 8.6 MG/1
2 TABLET ORAL AT BEDTIME
Refills: 0 | Status: DISCONTINUED | OUTPATIENT
Start: 2022-01-10 | End: 2022-01-15

## 2022-01-10 RX ORDER — DEXTROSE 50 % IN WATER 50 %
12.5 SYRINGE (ML) INTRAVENOUS ONCE
Refills: 0 | Status: DISCONTINUED | OUTPATIENT
Start: 2022-01-10 | End: 2022-01-15

## 2022-01-10 RX ORDER — ASPIRIN/CALCIUM CARB/MAGNESIUM 324 MG
81 TABLET ORAL DAILY
Refills: 0 | Status: DISCONTINUED | OUTPATIENT
Start: 2022-01-10 | End: 2022-01-11

## 2022-01-10 RX ORDER — OXYCODONE HYDROCHLORIDE 5 MG/1
2.5 TABLET ORAL EVERY 4 HOURS
Refills: 0 | Status: DISCONTINUED | OUTPATIENT
Start: 2022-01-10 | End: 2022-01-15

## 2022-01-10 RX ORDER — LANOLIN ALCOHOL/MO/W.PET/CERES
3 CREAM (GRAM) TOPICAL AT BEDTIME
Refills: 0 | Status: DISCONTINUED | OUTPATIENT
Start: 2022-01-10 | End: 2022-01-15

## 2022-01-10 RX ORDER — OXYCODONE HYDROCHLORIDE 5 MG/1
5 TABLET ORAL EVERY 4 HOURS
Refills: 0 | Status: DISCONTINUED | OUTPATIENT
Start: 2022-01-10 | End: 2022-01-15

## 2022-01-10 RX ORDER — PANTOPRAZOLE SODIUM 20 MG/1
40 TABLET, DELAYED RELEASE ORAL DAILY
Refills: 0 | Status: DISCONTINUED | OUTPATIENT
Start: 2022-01-10 | End: 2022-01-15

## 2022-01-10 RX ADMIN — ATORVASTATIN CALCIUM 10 MILLIGRAM(S): 80 TABLET, FILM COATED ORAL at 22:30

## 2022-01-10 RX ADMIN — SENNA PLUS 2 TABLET(S): 8.6 TABLET ORAL at 22:30

## 2022-01-10 RX ADMIN — Medication 1 MILLIGRAM(S): at 22:30

## 2022-01-10 RX ADMIN — Medication 3 MILLIGRAM(S): at 22:29

## 2022-01-10 RX ADMIN — HEPARIN SODIUM 5000 UNIT(S): 5000 INJECTION INTRAVENOUS; SUBCUTANEOUS at 17:00

## 2022-01-10 NOTE — ED PROVIDER NOTE - ATTENDING CONTRIBUTION TO CARE
82 yo male, hx of dementia, transfer from EMS reporting known R hip fx.  ortho aware.  plan for admission with possible OR.  check labs, COVID swab, ortho consult, admit for further management.

## 2022-01-10 NOTE — CONSULT NOTE ADULT - CONSULT REASON
Medicine being consulted for Risk Stratification of possible surgery and management of medical problems.

## 2022-01-10 NOTE — ED PROVIDER NOTE - IV ALTEPLASE INCLUSION HIDDEN
Discussion/Summary   thyroid numbers are normal. please be certain that you are drinking water during the day, less than 48 ounces but you do still need to drink water        Verified Results  TSH WITH REFLEX 23Mar2017 12:01AM RASHMI CARRENO     Test Name Result Flag Reference   TSH 2.124 mcUnits/mL  0.350-5.000   Findings most consistent with euthyroid state, no additional testing suggested. TSH may be normal in patients with thyroid dysfunction and pituitary disease. Clinical correlation recommended.  (Reflex TSH algorithm is not recommended in hospitalized patients. A variety of drugs, as well as serious acute and chronic illnesses may alter thyroid function tests. Commonly implicated drugs include glucocorticoids, dopamine, carbamazepine, iodine, amiodarone, lithium and heparin.)     COMP METABOLIC PANEL WITH LIPID PANEL (CPNL,LIPDPL) 23Mar2017 12:01AM RASHMI CARRENO     Test Name Result Flag Reference   FASTING STATUS UNKNOWN hrs     SODIUM 141 mmol/L  135-145   POTASSIUM 5.0 mmol/L  3.4-5.1   CHLORIDE 103 mmol/L     CARBON DIOXIDE 31 mmol/L  21-32   ANION GAP 12 mmol/L  10-20   GLUCOSE 90 mg/dl  65-99   BUN 17 mg/dl  6-20   CREATININE 1.59 mg/dl H 0.67-1.17   GFR EST.AFRICAN AMER 52     eGFR 30-59 mL/min/1.73m2 = Moderate decrease in kidney function. Stage 3 CKD (chronic kidney disease) or moderate kidney disease.   GFR EST.NONAFRI AMER 45     eGFR 30-59 mL/min/1.73m2 = Moderate decrease in kidney function. Stage 3 CKD (chronic kidney disease) or moderate kidney disease.   BUN/CREATININE RATIO 11  7-25   CALCIUM 8.7 mg/dl  8.4-10.2   BILIRUBIN TOTAL 0.5 mg/dl  0.2-1.0   GOT/AST 13 Units/L  <38   GPT/ALT 17 Units/L  <79   ALKALINE PHOSPHATASE 132 Units/L H    TOTAL PROTEIN 6.6 g/dl  6.4-8.2   ALBUMIN 3.4 g/dl L 3.6-5.1   GLOBULIN (CALCULATED) 3.2 g/dl  2.0-4.0   A/G RATIO 1.1  1.0-2.4   FASTING STATUS UNKNOWN hrs     CHOLESTEROL 137 mg/dl  <200   Desirable            <200  Borderline High       200 to 239  High                 >=240   LDL CHOLESTEROL (CALCULATED) 69 mg/dl  <130   OPTIMAL               <100  NEAR OPTIMAL          100-129  BORDERLINE HIGH       130-159  HIGH                  160-189  VERY HIGH             >=190   HDL CHOLESTEROL 35 mg/dl L >59   Low            <40  Borderline Low 40 to 49  Near Optimal   50 to 59  Optimal        >=60   TRIGLYCERIDES 165 mg/dl H <150   Normal                   <150  Borderline High          150 to 199  High                     200 to 499  Very High                >=500   NON-HDL CHOLESTEROL 102 mg/dl     Therapeutic Target:  CHD and risk equivalents <130  Multiple risk factors    <160  0 to 1 risk factors      <190   CHOLESTEROL/HDL RATIO 3.9  <4.5       Signatures   Electronically signed by : RASHMI CARRENO M.D.; Mar 29 2017 11:22AM CST     show

## 2022-01-10 NOTE — H&P ADULT - NSHPPHYSICALEXAM_GEN_ALL_CORE
PHYSICAL EXAM:  T(C): 36.6 (01-10-22 @ 17:22), Max: 37.1 (01-10-22 @ 13:34)  HR: 76 (01-10-22 @ 17:22) (76 - 86)  BP: 155/97 (01-10-22 @ 17:22) (155/97 - 183/91)  RR: 18 (01-10-22 @ 17:22) (16 - 18)  SpO2: 99% (01-10-22 @ 17:22) (99% - 100%)    Gen: NAD, Resting comfortably    RLE:  Skin intact, no erythema or ecchymosis  Pos bony tenderness to palpation right hip  +EHL/FHL/TA/GSC  +SILT L3-S1  + DP  Compartments soft and compressible  No calf tenderness    Secondary Exam: Benign, Skin intact, NTTP along axial spine, SILT throughout, motor grossly intact throughout, no other orthopedic injuries at this time, compartments soft and compressible

## 2022-01-10 NOTE — CONSULT NOTE ADULT - SUBJECTIVE AND OBJECTIVE BOX
Patient is a 81y old  Male who presents with a chief complaint of     HPI: 81 year-old-male with history of dementia, DM, HTN, HLD, chronic conjunctivitis presents with right hip pain, sent in by Dr. Axel Bishop for surgery. Unable to obtain meaningful information from the patient; patient keeps saying "help me". Reports "no pain."      PAST MEDICAL & SURGICAL HISTORY:  DM (diabetes mellitus)    HTN (hypertension)    Hyperlipidemia    BPH (benign prostatic hyperplasia)    No significant past surgical history        FAMILY HISTORY:      SOCIAL HISTORY:    Allergies    No Known Allergies    Intolerances          REVIEW OF SYSTEMS:  General: no weakness, no fever/chills, no weight loss/gain  Skin/Breast: no rash, no jaundice  Ophthalmologic: no vision changes, no dry eyes   Respiratory and Thorax: no cough, no wheezing, no hemoptysis, no dyspnea  Cardiovascular: no chest pain, no shortness of breath, no orthopnea  Gastrointestinal: no n/v/d, no abdominal pain, no dysphagia   Genitourinary: no dysuria, no frequency, no nocturia, no hematuria  Musculoskeletal: no trauma, no sprain/strain, no myalgias, no arthralgias, +hip fracture  Neurological: no HA, no dizziness, no weakness, no numbness  Psychiatric: no depression, no SI/HI  Hematology/Lymphatics: no easy bruising  Endocrine: no heat or cold intolerance. no weight gain or loss  Allergic/Immunologic: no allergy or recent reaction       Vital Signs Last 24 Hrs  T(C): 36.6 (10 Chu 2022 17:22), Max: 37.1 (10 Chu 2022 13:34)  T(F): 97.9 (10 Chu 2022 17:22), Max: 98.8 (10 Chu 2022 13:34)  HR: 76 (10 Chu 2022 17:22) (76 - 86)  BP: 155/97 (10 Chu 2022 17:22) (155/97 - 183/91)  BP(mean): --  RR: 18 (10 Chu 2022 17:22) (16 - 18)  SpO2: 99% (10 Chu 2022 17:22) (99% - 100%)  I&O's Summary    LABS:                        11.3   5.85  )-----------( 405      ( 10 Chu 2022 15:09 )             34.6     01-10    130<L>  |  96  |  13  ----------------------------<  129<H>  5.5<H>   |  20<L>  |  0.54    Ca    9.8      10 Chu 2022 15:09  Mg     1.9     01-10    TPro  7.8  /  Alb  3.9  /  TBili  0.4  /  DBili  x   /  AST  32  /  ALT  16  /  AlkPhos  88  01-10    PT/INR - ( 10 Chu 2022 15:09 )   PT: 13.5 sec;   INR: 1.13 ratio         PTT - ( 10 Chu 2022 15:09 )  PTT:29.5 sec  CAPILLARY BLOOD GLUCOSE          PHYSICAL EXAM:  GENERAL: NAD, thin cachetic elderly, on room air  HEAD:  Atraumatic, Normocephalic  EYES: eyes open, +vision deficit.   NECK: Supple, No JVD  CHEST/LUNG: mild decrease breath sounds bilaterally; No wheeze   HEART: Regular rate and rhythm; No murmurs, rubs, or gallops  ABDOMEN: Soft, Nontender, Nondistended; Bowel sounds present, PEG in place  Neuro: AAOx1, speech okay, poor insight.   EXTREMITIES:  2+ Peripheral Pulses, No clubbing, cyanosis, or edema  SKIN: No rashes or lesions         RADIOLOGY & ADDITIONAL TESTS:    Imaging Personally Reviewed:  [x] YES  [ ] NO    Consultant(s) Notes Reviewed:  [x] YES  [ ] NO      MEDICATIONS  (STANDING):  ascorbic acid 500 milliGRAM(s) Oral two times a day  folic acid 1 milliGRAM(s) Oral daily  multivitamin 1 Tablet(s) Oral daily  sodium chloride 0.9%. 1000 milliLiter(s) (75 mL/Hr) IV Continuous <Continuous>    MEDICATIONS  (PRN):  acetaminophen     Tablet .. 650 milliGRAM(s) Oral every 6 hours PRN Mild Pain (1 - 3)  magnesium hydroxide Suspension 30 milliLiter(s) Oral daily PRN Constipation  metoclopramide Injectable 10 milliGRAM(s) IV Push every 6 hours PRN Nausea and/or Vomiting  oxyCODONE    IR 5 milliGRAM(s) Oral every 4 hours PRN Severe Pain (7 - 10)  oxyCODONE    IR 2.5 milliGRAM(s) Oral every 4 hours PRN Moderate Pain (4 - 6)      Care Discussed with Consultants/Other Providers [x] YES  [ ] NO

## 2022-01-10 NOTE — H&P ADULT - ASSESSMENT
A/P: 81M w R FN fx    plan for right hip hemiarthroplasty tomorrow with Dr Bishop  discussed w pt and wife HCP all agree all questions answered  Analgesia  NWB  DVT ppx hold after mN  preop labs  PT/OT  Ice and elevate as tolerated  medical comanagement appreciated please document clearance

## 2022-01-10 NOTE — H&P ADULT - ATTENDING COMMENTS
Pt has been a resident in a nursing home for the past year due to multiple medical comorbidities.  Xray obtained by rehab facility on 1/2 demonstrated FN fx.  Pt was sent to my office for evaluation and then sent to ER for admission for hemiarthrolasty.  The pt's wife was w him at the office visit and the plan for hemiarthroplasty was discussed and she agreed.  He has dementia, she is the HCP.

## 2022-01-10 NOTE — CONSULT NOTE ADULT - ASSESSMENT
81 year-old-male with history of dementia, DM, HTN, HLD, chronic conjunctivitis presents with right hip pain, sent in by Dr. Axel Bishop for surgery. Unable to obtain meaningful information from the patient; patient keeps saying "help me". Reports "no pain."    Problem/Plan - :  ·  Problem: Hip fracture  Plan: pain stable  right hip Xray with displaced, comminuted fracture of the right subcapital/transcervical femoral neck   plan for OR per otho  pain control, bowel regimen PRN    Risk Stratification for planned procedure:  Vitals/Labs/Chart reviewed. prior EKG reviewed, sinus. Pt poor historian but denied chest pain. No shortness of breath.  No signs of acute ischemia nor acute cardio/pulmonary decompensation.    Patient is at intermediate risk for intermediate risk procedure.  No further medical workup needed. Medically optimized.   (Have ordered routine EKG tonight, will review). 2019 TTE with normal LV.     {[id number]} Problem/Plan - :  ·  Problem: history of CVA (cerebral vascular accident).  Plan:   MRI/MRA head 12/18/20 revealed small acute/subacute infarcts in the left posterior frontal lobe along w/predominantly chronic bilateral subdural hematomas unchanged from CT dated 12/13/2020, new from MRI dated 6/23/2020, with small subacute subdural components  per prior notes, cont ASA/statin; holding off on adding plavix  Appreciate palliative input; family was not interested in hospice  Speech/swallow reevaluate -> recommended NPO, hence PEG tube placed 12/28/20  c/w tube feeding   {[id number]}outpt neuro Dr. Nissenbaum      {[id number]} Problem/Plan - :  ·  Problem: Hyponatremia.  mild  Plan: Likely hypovolemic hyponatremia  gentle IVF (NS)  tube feeding     {[id number]} Problem/Plan - :  ·  Problem: Conjunctiva disorder.  Plan: S/p corneal patch 11/2020 at [facility]  hx of being on prednisone taper  optho consult nonurgently for a follow up.     {[id number]} Problem/Plan - :  ·  Problem: Alzheimer's dementia  Plan: Frequent reorientation  Try to avoid benzos.  PRN haldol if agitated    {[id number]} Problem/Plan - :  ·  Problem: Essential hypertension.  Plan:   Enalapril 10 mg     {[id number]} Problem/Plan - :  ·  Problem: Type 2 diabetes mellitus with other specified complication, without long-term current use of insulin.  Plan: -200  A1c 6.6%  Insulin ss for now    {[id number]} Problem/Plan - :  ·  Problem: Severe protein-calorie malnutrition.  Plan: BMI<19, 60lb weight loss in 1 year  Had malignancy w/u last month, pan CT/MR, EGD unremarkable  Cont dietary supplements  S/p NGT Insertion 12/17/20  S/p PEG placement 12/28/20.     {[id number]} Problem/Plan - :  ·  Problem: DVT prophylaxis. Plan: Hep SQ         81 year-old-male with history of dementia, DM, HTN, HLD, chronic conjunctivitis presents with right hip pain, sent in by Dr. Axel Bishop for surgery. Unable to obtain meaningful information from the patient; patient keeps saying "help me". Reports "no pain."    Problem/Plan - :  ·  Problem: Hip fracture  Plan: pain stable  right hip Xray with displaced, comminuted fracture of the right subcapital/transcervical femoral neck   plan for OR per otho  pain control, bowel regimen PRN    Risk Stratification for planned procedure:  Vitals/Labs/Chart reviewed. prior EKG reviewed, sinus. Pt poor historian but denied chest pain. No shortness of breath.  No signs of acute ischemia nor acute cardio/pulmonary decompensation.    Patient is at intermediate risk for intermediate risk procedure.  No further medical workup needed. Medically optimized.   (Have ordered routine EKG tonight, will review). 2019 TTE with normal LV.     {[id number]} Problem/Plan - :  ·  Problem: history of CVA (cerebral vascular accident).  Plan:   MRI/MRA head 12/18/20 revealed small acute/subacute infarcts in the left posterior frontal lobe along w/predominantly chronic bilateral subdural hematomas unchanged from CT dated 12/13/2020, new from MRI dated 6/23/2020, with small subacute subdural components  per prior notes, cont ASA/statin; holding off on adding plavix  seen by palliative in the past, family was not interested in hospice  Speech/swallow -> recommended NPO, hence PEG tube placed 12/28/20  {[id number]}outpt neuro Dr. Nissenbaum      {[id number]} Problem/Plan - :  ·  Problem: Hyponatremia.  mild  Plan: Likely hypovolemic hyponatremia  gentle IVF (NS)  tube feeding     {[id number]} Problem/Plan - :  ·  Problem: Conjunctiva disorder.  Plan: S/p corneal patch 11/2020 at [facility]  hx of being on prednisone taper  optho consult nonurgently for a follow up.     {[id number]} Problem/Plan - :  ·  Problem: Alzheimer's dementia  Plan: Frequent reorientation  Try to avoid benzos.  PRN haldol if agitated    {[id number]} Problem/Plan - :  ·  Problem: Essential hypertension.  Plan:   Enalapril 10 mg     {[id number]} Problem/Plan - :  ·  Problem: Type 2 diabetes mellitus with other specified complication, without long-term current use of insulin.  Plan: -200  A1c 6.6%  Insulin ss for now    {[id number]} Problem/Plan - :  ·  Problem: Severe protein-calorie malnutrition.  Plan: BMI<19, 60lb weight loss in 1 year  Had malignancy w/u last month, pan CT/MR, EGD unremarkable  Cont dietary supplements  S/p NGT Insertion 12/17/20  S/p PEG placement 12/28/20.     {[id number]} Problem/Plan - :  ·  Problem: DVT prophylaxis. Plan: Hep SQ         81 year-old-male with history of dementia, DM, HTN, HLD, chronic conjunctivitis presents with right hip pain, sent in by Dr. Axel Bishop for surgery. Unable to obtain meaningful information from the patient; patient keeps saying "help me". Reports "no pain."    Problem/Plan - :  ·  Problem: Hip fracture  Plan: pain stable  right hip Xray with displaced, comminuted fracture of the right subcapital/transcervical femoral neck   plan for OR per otho  pain control, bowel regimen PRN    Risk Stratification for planned procedure:  Vitals/Labs/Chart reviewed. prior EKG reviewed, sinus. Pt poor historian but denied chest pain. No shortness of breath.  No signs of acute ischemia nor acute cardio/pulmonary decompensation.    Patient is at intermediate risk for intermediate risk procedure.  No further medical workup needed. Medically optimized.   (Have ordered routine EKG tonight, will review). 2019 TTE with normal LV.     {[id number]} Problem/Plan - :  ·  Problem: history of CVA (cerebral vascular accident).  Plan:   MRI/MRA head 12/18/20 revealed small acute/subacute infarcts in the left posterior frontal lobe along w/predominantly chronic bilateral subdural hematomas unchanged from CT dated 12/13/2020, new from MRI dated 6/23/2020, with small subacute subdural components  per prior notes, cont ASA/statin; holding off on adding plavix  seen by palliative in the past, family was not interested in hospice  Speech/swallow -> recommended NPO, hence PEG tube placed 12/28/20  he no longer have PEG, will clarify with the wife Maia. left message.   {[id number]}outpt neuro Dr. Nissenbaum      {[id number]} Problem/Plan - :  ·  Problem: Hyponatremia.  mild  Plan: Likely hypovolemic hyponatremia  gentle IVF (NS)  {[id number]}   Problem/Plan - :  ·  Problem: Conjunctiva disorder.  Plan: S/p corneal patch 11/2020 at [facility]  hx of being on prednisone taper  optho consult nonurgently for a follow up.     {[id number]} Problem/Plan - :  ·  Problem: Alzheimer's dementia  Plan: Frequent reorientation  Try to avoid benzos.  PRN haldol if agitated    {[id number]} Problem/Plan - :  ·  Problem: Essential hypertension.  Plan:   Enalapril 10 mg     {[id number]} Problem/Plan - :  ·  Problem: Type 2 diabetes mellitus with other specified complication, without long-term current use of insulin.  Plan: -200  A1c 6.6%  Insulin ss for now    {[id number]} Problem/Plan - :  ·  Problem: Severe protein-calorie malnutrition.  Plan: BMI<19, 60lb weight loss in 1 year  Had malignancy w/u last month, pan CT/MR, EGD unremarkable  Cont dietary supplements  S/p NGT Insertion 12/17/20  S/p PEG placement 12/28/20. now no longer present.     {[id number]} Problem/Plan - :  ·  Problem: DVT prophylaxis. Plan: Hep SQ

## 2022-01-10 NOTE — ED PROVIDER NOTE - PHYSICAL EXAMINATION
General: appears uncomfortable  HEENT: atraumatic, normocephalic; pupils are equal, round and react to light, not following commands to track movement, red conjunctive, mucous membranes moist  Neck: no jugular venous distension, full range of motion  Chest/Lung: clear to auscultation bilaterally, no wheezes/rhonchi/rales  Heart: regular rate and rhythm, no murmur/gallops/rubs  Abdomen: normal bowel sounds, soft, non-tender, non-distended  Extremities: no lower extremity edema, +2 radial pulses bilaterally, +2 dorsalis pedis pulses bilaterally, right hip abducted and internally rotated  Musculoskeletal: unable to assess strength   Nervous System: alert, not oriented, unable to assess strength/sensation

## 2022-01-10 NOTE — H&P ADULT - HISTORY OF PRESENT ILLNESS
Patient is a 81yMale home ambulator with assistive devices who presents to ED w/ a c/o of right hip pain. Patient states he fell 4 days ago and has been right hip pain and inability to walk. Denies HH/LOC. Denies any numbness or tingling. Denies having any other pain elsewhere. Denies any previous orthopedic history. No other orthopedic concerns at this time.

## 2022-01-10 NOTE — ED PROVIDER NOTE - CLINICAL SUMMARY MEDICAL DECISION MAKING FREE TEXT BOX
Patient is a 81 year-old-male with history of dementia, DM, HTN, HLD, chronic conjunctivitis presents with right hip pain, sent in by Dr. Axel Bishop for surgery. Pre-op labs, ecg, cxr, covid swab, xray of right hip/femur. Admit to Dr. Bishop.

## 2022-01-10 NOTE — ED PROVIDER NOTE - INTERPRETATION
EKG reviewed for rate, rhythm, axis, intervals and segments, including QRS morphology, P wave appearance T wave appearance, KY interval, and QT interval.  I find the EKG to be unremarkable in all of these regards except as follows: incomplete R bundle, LAFB

## 2022-01-10 NOTE — ED ADULT NURSE NOTE - OBJECTIVE STATEMENT
82 yo M bib ems for R femoral head fx sent to ED by Provider Axel Bishop, for surgery tomorrow. Pt sustained a fall on Dec 22. PMH DM, HTN, osteoarthritis. 82 yo M bib ems for R femoral head fx sent to ED by Provider Axel Bishop, for surgery tomorrow. Pt sustained a fall on Dec 22. PMH DM, HTN, osteoarthritis.  IV line placed labs drawn and sent Provider at bedside evaluating.

## 2022-01-10 NOTE — ED PROVIDER NOTE - OBJECTIVE STATEMENT
Patient is a 81 year-old-male with history of dementia, DM, HTN, HLD, chronic conjunctivitis presents with right hip pain, sent in by Dr. Axel Bishop for surgery. Unable to obtain meaningful information from the patient; patient keeps saying "help me". Reports "no pain."

## 2022-01-11 ENCOUNTER — APPOINTMENT (OUTPATIENT)
Dept: ORTHOPEDIC SURGERY | Facility: HOSPITAL | Age: 82
End: 2022-01-11

## 2022-01-11 LAB
A1C WITH ESTIMATED AVERAGE GLUCOSE RESULT: 6.2 % — HIGH (ref 4–5.6)
ANION GAP SERPL CALC-SCNC: 12 MMOL/L — SIGNIFICANT CHANGE UP (ref 5–17)
ANION GAP SERPL CALC-SCNC: 14 MMOL/L — SIGNIFICANT CHANGE UP (ref 5–17)
APTT BLD: 30.9 SEC — SIGNIFICANT CHANGE UP (ref 27.5–35.5)
BLD GP AB SCN SERPL QL: NEGATIVE — SIGNIFICANT CHANGE UP
BUN SERPL-MCNC: 8 MG/DL — SIGNIFICANT CHANGE UP (ref 7–23)
BUN SERPL-MCNC: 9 MG/DL — SIGNIFICANT CHANGE UP (ref 7–23)
CALCIUM SERPL-MCNC: 9.2 MG/DL — SIGNIFICANT CHANGE UP (ref 8.4–10.5)
CALCIUM SERPL-MCNC: 9.6 MG/DL — SIGNIFICANT CHANGE UP (ref 8.4–10.5)
CHLORIDE SERPL-SCNC: 97 MMOL/L — SIGNIFICANT CHANGE UP (ref 96–108)
CHLORIDE SERPL-SCNC: 97 MMOL/L — SIGNIFICANT CHANGE UP (ref 96–108)
CO2 SERPL-SCNC: 21 MMOL/L — LOW (ref 22–31)
CO2 SERPL-SCNC: 22 MMOL/L — SIGNIFICANT CHANGE UP (ref 22–31)
CREAT SERPL-MCNC: 0.46 MG/DL — LOW (ref 0.5–1.3)
CREAT SERPL-MCNC: 0.54 MG/DL — SIGNIFICANT CHANGE UP (ref 0.5–1.3)
ESTIMATED AVERAGE GLUCOSE: 131 MG/DL — HIGH (ref 68–114)
GLUCOSE BLDC GLUCOMTR-MCNC: 105 MG/DL — HIGH (ref 70–99)
GLUCOSE BLDC GLUCOMTR-MCNC: 115 MG/DL — HIGH (ref 70–99)
GLUCOSE BLDC GLUCOMTR-MCNC: 116 MG/DL — HIGH (ref 70–99)
GLUCOSE BLDC GLUCOMTR-MCNC: 126 MG/DL — HIGH (ref 70–99)
GLUCOSE SERPL-MCNC: 112 MG/DL — HIGH (ref 70–99)
GLUCOSE SERPL-MCNC: 117 MG/DL — HIGH (ref 70–99)
HCT VFR BLD CALC: 30.8 % — LOW (ref 39–50)
HCT VFR BLD CALC: 33.1 % — LOW (ref 39–50)
HGB BLD-MCNC: 10.7 G/DL — LOW (ref 13–17)
HGB BLD-MCNC: 9.8 G/DL — LOW (ref 13–17)
INR BLD: 1.19 RATIO — HIGH (ref 0.88–1.16)
MCHC RBC-ENTMCNC: 27 PG — SIGNIFICANT CHANGE UP (ref 27–34)
MCHC RBC-ENTMCNC: 27.6 PG — SIGNIFICANT CHANGE UP (ref 27–34)
MCHC RBC-ENTMCNC: 31.8 GM/DL — LOW (ref 32–36)
MCHC RBC-ENTMCNC: 32.3 GM/DL — SIGNIFICANT CHANGE UP (ref 32–36)
MCV RBC AUTO: 84.8 FL — SIGNIFICANT CHANGE UP (ref 80–100)
MCV RBC AUTO: 85.3 FL — SIGNIFICANT CHANGE UP (ref 80–100)
NRBC # BLD: 0 /100 WBCS — SIGNIFICANT CHANGE UP (ref 0–0)
NRBC # BLD: 0 /100 WBCS — SIGNIFICANT CHANGE UP (ref 0–0)
PLATELET # BLD AUTO: 347 K/UL — SIGNIFICANT CHANGE UP (ref 150–400)
PLATELET # BLD AUTO: 397 K/UL — SIGNIFICANT CHANGE UP (ref 150–400)
POTASSIUM SERPL-MCNC: 4.1 MMOL/L — SIGNIFICANT CHANGE UP (ref 3.5–5.3)
POTASSIUM SERPL-MCNC: 4.3 MMOL/L — SIGNIFICANT CHANGE UP (ref 3.5–5.3)
POTASSIUM SERPL-SCNC: 4.1 MMOL/L — SIGNIFICANT CHANGE UP (ref 3.5–5.3)
POTASSIUM SERPL-SCNC: 4.3 MMOL/L — SIGNIFICANT CHANGE UP (ref 3.5–5.3)
PROTHROM AB SERPL-ACNC: 14.2 SEC — HIGH (ref 10.6–13.6)
RBC # BLD: 3.63 M/UL — LOW (ref 4.2–5.8)
RBC # BLD: 3.88 M/UL — LOW (ref 4.2–5.8)
RBC # FLD: 13.5 % — SIGNIFICANT CHANGE UP (ref 10.3–14.5)
RBC # FLD: 13.6 % — SIGNIFICANT CHANGE UP (ref 10.3–14.5)
RH IG SCN BLD-IMP: POSITIVE — SIGNIFICANT CHANGE UP
SODIUM SERPL-SCNC: 130 MMOL/L — LOW (ref 135–145)
SODIUM SERPL-SCNC: 133 MMOL/L — LOW (ref 135–145)
WBC # BLD: 4.84 K/UL — SIGNIFICANT CHANGE UP (ref 3.8–10.5)
WBC # BLD: 7.84 K/UL — SIGNIFICANT CHANGE UP (ref 3.8–10.5)
WBC # FLD AUTO: 4.84 K/UL — SIGNIFICANT CHANGE UP (ref 3.8–10.5)
WBC # FLD AUTO: 7.84 K/UL — SIGNIFICANT CHANGE UP (ref 3.8–10.5)

## 2022-01-11 PROCEDURE — 93970 EXTREMITY STUDY: CPT | Mod: 26

## 2022-01-11 PROCEDURE — 27236 TREAT THIGH FRACTURE: CPT | Mod: RT

## 2022-01-11 PROCEDURE — 72170 X-RAY EXAM OF PELVIS: CPT | Mod: 26

## 2022-01-11 DEVICE — HEAD UNIVERSAL: Type: IMPLANTABLE DEVICE | Site: RIGHT | Status: FUNCTIONAL

## 2022-01-11 DEVICE — STEM HIP 132DEG SZ6 35X111MM V40: Type: IMPLANTABLE DEVICE | Site: RIGHT | Status: FUNCTIONAL

## 2022-01-11 DEVICE — HEAD FEMORAL: Type: IMPLANTABLE DEVICE | Site: RIGHT | Status: FUNCTIONAL

## 2022-01-11 RX ORDER — ACETAMINOPHEN 500 MG
1000 TABLET ORAL ONCE
Refills: 0 | Status: DISCONTINUED | OUTPATIENT
Start: 2022-01-11 | End: 2022-01-11

## 2022-01-11 RX ORDER — HYDROMORPHONE HYDROCHLORIDE 2 MG/ML
0.5 INJECTION INTRAMUSCULAR; INTRAVENOUS; SUBCUTANEOUS
Refills: 0 | Status: DISCONTINUED | OUTPATIENT
Start: 2022-01-11 | End: 2022-01-11

## 2022-01-11 RX ORDER — ENOXAPARIN SODIUM 100 MG/ML
40 INJECTION SUBCUTANEOUS EVERY 24 HOURS
Refills: 0 | Status: DISCONTINUED | OUTPATIENT
Start: 2022-01-11 | End: 2022-01-15

## 2022-01-11 RX ORDER — SODIUM CHLORIDE 9 MG/ML
1000 INJECTION, SOLUTION INTRAVENOUS
Refills: 0 | Status: DISCONTINUED | OUTPATIENT
Start: 2022-01-11 | End: 2022-01-11

## 2022-01-11 RX ORDER — POLYETHYLENE GLYCOL 3350 17 G/17G
17 POWDER, FOR SOLUTION ORAL DAILY
Refills: 0 | Status: DISCONTINUED | OUTPATIENT
Start: 2022-01-11 | End: 2022-01-15

## 2022-01-11 RX ORDER — CEFAZOLIN SODIUM 1 G
2000 VIAL (EA) INJECTION EVERY 8 HOURS
Refills: 0 | Status: COMPLETED | OUTPATIENT
Start: 2022-01-11 | End: 2022-01-12

## 2022-01-11 RX ORDER — ONDANSETRON 8 MG/1
4 TABLET, FILM COATED ORAL ONCE
Refills: 0 | Status: DISCONTINUED | OUTPATIENT
Start: 2022-01-11 | End: 2022-01-11

## 2022-01-11 RX ORDER — FENTANYL CITRATE 50 UG/ML
25 INJECTION INTRAVENOUS
Refills: 0 | Status: DISCONTINUED | OUTPATIENT
Start: 2022-01-11 | End: 2022-01-11

## 2022-01-11 RX ADMIN — PREGABALIN 1000 MICROGRAM(S): 225 CAPSULE ORAL at 11:12

## 2022-01-11 RX ADMIN — SODIUM CHLORIDE 75 MILLILITER(S): 9 INJECTION INTRAMUSCULAR; INTRAVENOUS; SUBCUTANEOUS at 17:53

## 2022-01-11 RX ADMIN — Medication 500 MILLIGRAM(S): at 23:33

## 2022-01-11 RX ADMIN — PANTOPRAZOLE SODIUM 40 MILLIGRAM(S): 20 TABLET, DELAYED RELEASE ORAL at 11:13

## 2022-01-11 RX ADMIN — ATORVASTATIN CALCIUM 10 MILLIGRAM(S): 80 TABLET, FILM COATED ORAL at 23:33

## 2022-01-11 RX ADMIN — Medication 100 MILLIGRAM(S): at 11:27

## 2022-01-11 RX ADMIN — Medication 1 MILLIGRAM(S): at 11:12

## 2022-01-11 RX ADMIN — SENNA PLUS 2 TABLET(S): 8.6 TABLET ORAL at 23:33

## 2022-01-11 RX ADMIN — Medication 100 MILLIGRAM(S): at 22:41

## 2022-01-11 RX ADMIN — Medication 81 MILLIGRAM(S): at 11:13

## 2022-01-11 RX ADMIN — SODIUM CHLORIDE 75 MILLILITER(S): 9 INJECTION INTRAMUSCULAR; INTRAVENOUS; SUBCUTANEOUS at 09:08

## 2022-01-11 RX ADMIN — Medication 3 MILLIGRAM(S): at 23:33

## 2022-01-11 RX ADMIN — Medication 500 MILLIGRAM(S): at 05:41

## 2022-01-11 RX ADMIN — Medication 1 TABLET(S): at 11:13

## 2022-01-11 RX ADMIN — Medication 10 MILLIGRAM(S): at 05:40

## 2022-01-11 RX ADMIN — HYDROMORPHONE HYDROCHLORIDE 0.5 MILLIGRAM(S): 2 INJECTION INTRAMUSCULAR; INTRAVENOUS; SUBCUTANEOUS at 19:30

## 2022-01-11 RX ADMIN — DORZOLAMIDE HYDROCHLORIDE TIMOLOL MALEATE 1 DROP(S): 20; 5 SOLUTION/ DROPS OPHTHALMIC at 05:40

## 2022-01-11 NOTE — PROGRESS NOTE ADULT - SUBJECTIVE AND OBJECTIVE BOX
Orthopedics      Patient seen and examined at bedside. Feeling well. Pain controlled. No n/v. No acute events overnight.    Vital Signs Last 24 Hrs  T(C): 36.3 (01-11-22 @ 04:16), Max: 37.1 (01-10-22 @ 13:34)  T(F): 97.3 (01-11-22 @ 04:16), Max: 98.8 (01-10-22 @ 13:34)  HR: 77 (01-11-22 @ 04:16) (68 - 86)  BP: 155/96 (01-11-22 @ 04:16) (155/96 - 183/91)  BP(mean): --  RR: 18 (01-11-22 @ 04:16) (16 - 18)  SpO2: 100% (01-11-22 @ 04:16) (99% - 100%)                        11.3   5.85  )-----------( 405      ( 10 Chu 2022 15:09 )             34.6     10 Chu 2022 15:09    130    |  96     |  13     ----------------------------<  129    5.5     |  20     |  0.54     Ca    9.8        10 Chu 2022 15:09  Mg     1.9       10 Chu 2022 15:09    TPro  7.8    /  Alb  3.9    /  TBili  0.4    /  DBili  x      /  AST  32     /  ALT  16     /  AlkPhos  88     10 Chu 2022 15:09    PT/INR - ( 10 Chu 2022 15:09 )   PT: 13.5 sec;   INR: 1.13 ratio         PTT - ( 10 Chu 2022 15:09 )  PTT:29.5 sec      Physical Exam: PHYSICAL EXAM:  T(C): 36.6 (01-10-22 @ 17:22), Max: 37.1 (01-10-22 @ 13:34)  HR: 76 (01-10-22 @ 17:22) (76 - 86)  BP: 155/97 (01-10-22 @ 17:22) (155/97 - 183/91)  RR: 18 (01-10-22 @ 17:22) (16 - 18)  SpO2: 99% (01-10-22 @ 17:22) (99% - 100%)    Gen: NAD, Resting comfortably    RLE:  Skin intact, no erythema or ecchymosis  Pos bony tenderness to palpation right hip  +EHL/FHL/TA/GSC  +SILT L3-S1  + DP  Compartments soft and compressible  No calf tenderness             Assessment:  	  A/P: 81M w R FN fx    plan for right hip hemiarthroplasty today with Dr Bishop  discussed w pt and wife HCP all agree all questions answered  Analgesia  NWB  DVT ppx hold  preop labs  PT/OT  Ice and elevate as tolerated  medical comanagement appreciated

## 2022-01-11 NOTE — CHART NOTE - NSCHARTNOTEFT_GEN_A_CORE
Resting without complaints.  No Chest Pain, SOB, N/V.    T(C): 36.1 (01-11-22 @ 16:55), Max: 37 (01-11-22 @ 08:54)  HR: 59 (01-11-22 @ 18:00) (57 - 99)  BP: 118/73 (01-11-22 @ 18:00) (118/73 - 168/93)  RR: 18 (01-11-22 @ 18:00) (17 - 18)  SpO2: 100% (01-11-22 @ 18:00) (98% - 100%)      Exam:  Alert and Lloyd, No Acute Distress  Card: +S1/S2, RRR  Pulm: CTAB  Laterality: R Hip  Aquacel c/d/i  Calves soft, non-tender bilaterally  +PF/DF/EHL/FHL  SILT  + DP pulse    Xray: S/P R Hip Hemiarthroplasty, prosthesis in place and intact with no signs of larisas prosthetic Fx.                          9.8    7.84  )-----------( 347      ( 11 Jan 2022 17:35 )             30.8    01-11    130<L>  |  97  |  9   ----------------------------<  117<H>  4.3   |  21<L>  |  0.54    Ca    9.2      11 Jan 2022 17:35  Mg     1.9     01-10    TPro  7.8  /  Alb  3.9  /  TBili  0.4  /  DBili  x   /  AST  32  /  ALT  16  /  AlkPhos  88  01-10      A/P: 81y Male S/p R Total Hip Adi-Arthroplasty. VSS. NAD  -PT/OT-WBAT RLE with Anterior Hip Precautions  -F/U AM labs tomorrow  -IS  -DVT PPx: Lovenox 40mg SQ Daily and SCDs  -Pain Control  -Continue Current Tx  -Dispo planning pending PT recommendations    Gian Cochran PA-C  Orthopedic Surgery Team  Team Pager #[phone]

## 2022-01-11 NOTE — PROGRESS NOTE ADULT - SUBJECTIVE AND OBJECTIVE BOX
ORTHO ATTENDING POST OP    s/p R  hip hemiarthroplasty  WBAT R  LE  Anterior hip precautions  Lovenox  venodynes  ancef x 24h  OOB to chair in AM  rehab consult  CBC in RR and AM   f/u medicine

## 2022-01-11 NOTE — PROGRESS NOTE ADULT - NSPROGADDITIONALINFOA_GEN_ALL_CORE
Noel Cohen will be covering for me starting 1/12/22. He can be reached at  if needed.     - Dr. LUCHO Brothers ([facility])  - [phone] Discussed with the wife.     Noel Cohen will be covering for me starting 1/12/22. He can be reached at  if needed.     - [facility]  - [phone]

## 2022-01-11 NOTE — PROGRESS NOTE ADULT - SUBJECTIVE AND OBJECTIVE BOX
SUBJECTIVE / OVERNIGHT EVENTS:  s/p hemiarthroplasty  seen in PACU  awake, stable  eyes close  pain controlled.  remain calm.     --------------------------------------------------------------------------------------------  LABS:                        9.8    7.84  )-----------( 347      ( 11 Jan 2022 17:35 )             30.8     01-11    133<L>  |  97  |  8   ----------------------------<  112<H>  4.1   |  22  |  0.46<L>    Ca    9.6      11 Jan 2022 10:21  Mg     1.9     01-10    TPro  7.8  /  Alb  3.9  /  TBili  0.4  /  DBili  x   /  AST  32  /  ALT  16  /  AlkPhos  88  01-10    PT/INR - ( 11 Jan 2022 10:21 )   PT: 14.2 sec;   INR: 1.19 ratio         PTT - ( 11 Jan 2022 10:21 )  PTT:30.9 sec  CAPILLARY BLOOD GLUCOSE      POCT Blood Glucose.: 115 mg/dL (11 Jan 2022 12:21)  POCT Blood Glucose.: 116 mg/dL (11 Jan 2022 06:04)  POCT Blood Glucose.: 105 mg/dL (10 Chu 2022 21:28)            RADIOLOGY & ADDITIONAL TESTS:    Imaging Personally Reviewed:  [x] YES  [ ] NO    Consultant(s) Notes Reviewed:  [x] YES  [ ] NO    MEDICATIONS  (STANDING):  acetaminophen   IVPB .. 1000 milliGRAM(s) IV Intermittent once  ascorbic acid 500 milliGRAM(s) Oral two times a day  atorvastatin 10 milliGRAM(s) Oral at bedtime  ceFAZolin   IVPB 2000 milliGRAM(s) IV Intermittent every 8 hours  cyanocobalamin 1000 MICROGram(s) Oral daily  dextrose 40% Gel 15 Gram(s) Oral once  dextrose 5%. 1000 milliLiter(s) (50 mL/Hr) IV Continuous <Continuous>  dextrose 5%. 1000 milliLiter(s) (100 mL/Hr) IV Continuous <Continuous>  dextrose 50% Injectable 25 Gram(s) IV Push once  dextrose 50% Injectable 12.5 Gram(s) IV Push once  dextrose 50% Injectable 25 Gram(s) IV Push once  dorzolamide 2%/timolol 0.5% Ophthalmic Solution 1 Drop(s) Both EYES two times a day  doxazosin 1 milliGRAM(s) Oral at bedtime  enalapril 10 milliGRAM(s) Oral daily  enoxaparin Injectable 40 milliGRAM(s) SubCutaneous every 24 hours  folic acid 1 milliGRAM(s) Oral daily  glucagon  Injectable 1 milliGRAM(s) IntraMuscular once  insulin lispro (ADMELOG) corrective regimen sliding scale   SubCutaneous three times a day before meals  insulin lispro (ADMELOG) corrective regimen sliding scale   SubCutaneous at bedtime  lactated ringers. 1000 milliLiter(s) (75 mL/Hr) IV Continuous <Continuous>  melatonin 3 milliGRAM(s) Oral at bedtime  multivitamin 1 Tablet(s) Oral daily  pantoprazole  Injectable 40 milliGRAM(s) IV Push daily  polyethylene glycol 3350 17 Gram(s) Oral daily  senna 2 Tablet(s) Oral at bedtime  sodium chloride 0.9%. 1000 milliLiter(s) (75 mL/Hr) IV Continuous <Continuous>  thiamine 100 milliGRAM(s) Oral daily    MEDICATIONS  (PRN):  acetaminophen     Tablet .. 650 milliGRAM(s) Oral every 6 hours PRN Mild Pain (1 - 3)  fentaNYL    Injectable 25 MICROGram(s) IV Push every 5 minutes PRN Moderate Pain (4 - 6)  HYDROmorphone  Injectable 0.5 milliGRAM(s) IV Push every 10 minutes PRN Moderate Pain (4 - 6)  magnesium hydroxide Suspension 30 milliLiter(s) Oral daily PRN Constipation  metoclopramide Injectable 10 milliGRAM(s) IV Push every 6 hours PRN Nausea and/or Vomiting  ondansetron Injectable 4 milliGRAM(s) IV Push once PRN Nausea and/or Vomiting  oxyCODONE    IR 5 milliGRAM(s) Oral every 4 hours PRN Severe Pain (7 - 10)  oxyCODONE    IR 2.5 milliGRAM(s) Oral every 4 hours PRN Moderate Pain (4 - 6)      Care Discussed with Consultants/Other Providers [x] YES  [ ] NO    Vital Signs Last 24 Hrs  T(C): 36.1 (11 Jan 2022 16:55), Max: 37 (11 Jan 2022 08:54)  T(F): 97 (11 Jan 2022 16:55), Max: 98.6 (11 Jan 2022 08:54)  HR: 69 (11 Jan 2022 17:45) (57 - 99)  BP: 146/72 (11 Jan 2022 17:45) (119/62 - 168/93)  BP(mean): 106 (11 Jan 2022 17:45) (85 - 106)  RR: 18 (11 Jan 2022 17:45) (17 - 18)  SpO2: 100% (11 Jan 2022 17:45) (98% - 100%)  I&O's Summary      PHYSICAL EXAM:  GENERAL: NAD, thin cachetic elderly  HEAD:  Atraumatic, Normocephalic  EYES: eyes open, +vision deficit.   NECK: Supple, No JVD  CHEST/LUNG: mild decrease breath sounds bilaterally; No wheeze   HEART: Regular rate and rhythm; No murmurs, rubs, or gallops  ABDOMEN: Soft, Nontender, Nondistended; Bowel sounds present, PEG in place  Neuro: AAOx1, speech okay, poor insight. eyes close. answer simple questions   EXTREMITIES:  2+ Peripheral Pulses, No clubbing, cyanosis, or edema  SKIN: No rashes or lesions

## 2022-01-11 NOTE — PROGRESS NOTE ADULT - ASSESSMENT
81 year-old-male with history of dementia, DM, HTN, HLD, chronic conjunctivitis presents with right hip pain, sent in by Dr. Axel Bishop for surgery. Unable to obtain meaningful information from the patient; patient keeps saying "help me". Reports "no pain."    Problem/Plan - :  ·  Problem: Hip fracture  Plan: pain stable  right hip Xray with displaced, comminuted fracture of the right subcapital/transcervical femoral neck   2019 TTE with normal LV. no need to repeat prior to OR. euvolemic.  s/p R hemiarthroplasty 1/11/21  pain control, bowel regimen PRN  larissa-operative abx per ortho.   Physical therapy. Out of bed to chair with assistance.     {[id number]} Problem/Plan - :  ·  Problem: history of CVA (cerebral vascular accident).    Plan:   MRI/MRA head 12/18/20 revealed small acute/subacute infarcts in the left posterior frontal lobe along w/predominantly chronic bilateral subdural hematomas unchanged from CT dated 12/13/2020, new from MRI dated 6/23/2020, with small subacute subdural components  per prior notes, cont ASA/statin; holding off on adding plavix  seen by palliative in the past, family was not interested in hospice  Speech/swallow -> recommended NPO, hence PEG tube placed 12/28/20  {[id number]}outpt neuro Dr. Nissenbaum    can consider speech/swallow eval if his functional status improved.    {[id number]} Problem/Plan - :  ·  Problem: Hyponatremia.  mild  Plan: Likely hypovolemic hyponatremia  gentle IVF (NS)  tube feeding     {[id number]} Problem/Plan - :  ·  Problem: Conjunctiva disorder.  Plan: S/p corneal patch 11/2020 at [facility]  hx of being on prednisone taper  optho consult nonurgently for a follow up.     {[id number]} Problem/Plan - :  ·  Problem: Alzheimer's dementia  Plan: Frequent reorientation  Try to avoid benzos.  PRN haldol if agitated    {[id number]} Problem/Plan - :  ·  Problem: Essential hypertension.  Plan:   Enalapril 10 mg     {[id number]} Problem/Plan - :  ·  Problem: Type 2 diabetes mellitus with other specified complication, without long-term current use of insulin.  Plan: -200  A1c 6.6%  Insulin ss for now    {[id number]} Problem/Plan - :  ·  Problem: Severe protein-calorie malnutrition.  Plan: BMI<19, 60lb weight loss in 1 year  Had malignancy w/u last month, pan CT/MR, EGD unremarkable  Cont dietary supplements  S/p NGT Insertion 12/17/20  S/p PEG placement 12/28/20.     {[id number]} Problem/Plan - :  ·  Problem: DVT prophylaxis. Plan: Hep SQ         81 year-old-male with history of dementia, DM, HTN, HLD, chronic conjunctivitis presents with right hip pain, sent in by Dr. Axel Bishop for surgery. Unable to obtain meaningful information from the patient; patient keeps saying "help me". Reports "no pain."    Problem/Plan - :  ·  Problem: Hip fracture  Plan: pain stable  right hip Xray with displaced, comminuted fracture of the right subcapital/transcervical femoral neck   2019 TTE with normal LV. no need to repeat prior to OR. euvolemic.  s/p R hemiarthroplasty 1/11/21  pain control, bowel regimen PRN  larissa-operative abx per ortho.   Physical therapy. Out of bed to chair with assistance.     {[id number]} Problem/Plan - :  ·  Problem: history of CVA (cerebral vascular accident).    Plan:   MRI/MRA head 12/18/20 revealed small acute/subacute infarcts in the left posterior frontal lobe along w/predominantly chronic bilateral subdural hematomas unchanged from CT dated 12/13/2020, new from MRI dated 6/23/2020, with small subacute subdural components  per prior notes, cont ASA/statin; holding off on adding plavix  seen by palliative in the past, family was not interested in hospice  Speech/swallow -> recommended NPO, hence PEG tube placed 12/28/20  {[id number]}outpt neuro Dr. Nissenbaum    Discussed with the wife Maia.  His PEG was removed in October 2021. His eating has improved.  He is eating soft diet in the [facility].  can resume soft diet post op.   can consider speech/swallow eval if any signs of dysphagia.     {[id number]} Problem/Plan - :  ·  Problem: Hyponatremia.  mild  Plan: Likely hypovolemic hyponatremia  gentle IVF (NS)    {[id number]} Problem/Plan - :  ·  Problem: Conjunctiva disorder.  Plan: S/p corneal patch 11/2020 at [facility]  hx of being on prednisone taper  vision somewhat limited.   per the wife, he no loner sees optho for a year.     {[id number]} Problem/Plan - :  ·  Problem: Alzheimer's dementia  Plan: Frequent reorientation  Try to avoid benzos.  PRN haldol if agitated    {[id number]} Problem/Plan - :  ·  Problem: Essential hypertension.  Plan:   Enalapril 10 mg     {[id number]} Problem/Plan - :  ·  Problem: Type 2 diabetes mellitus with other specified complication, without long-term current use of insulin.  Plan: -200  A1c 6.6%  Insulin ss for now    {[id number]} Problem/Plan - :  ·  Problem: Severe protein-calorie malnutrition.  Plan: BMI<19, 60lb weight loss in 1 year  Had malignancy w/u last month, pan CT/MR, EGD unremarkable  Cont dietary supplements  S/p NGT Insertion 12/17/20  S/p PEG placement 12/28/20.   removed 10/2021.   resume soft diet    {[id number]} Problem/Plan - :  ·  Problem: DVT prophylaxis. Plan: Hep SQ

## 2022-01-12 LAB
ANION GAP SERPL CALC-SCNC: 16 MMOL/L — SIGNIFICANT CHANGE UP (ref 5–17)
BUN SERPL-MCNC: 13 MG/DL — SIGNIFICANT CHANGE UP (ref 7–23)
CALCIUM SERPL-MCNC: 9.6 MG/DL — SIGNIFICANT CHANGE UP (ref 8.4–10.5)
CHLORIDE SERPL-SCNC: 96 MMOL/L — SIGNIFICANT CHANGE UP (ref 96–108)
CO2 SERPL-SCNC: 21 MMOL/L — LOW (ref 22–31)
CREAT SERPL-MCNC: 0.58 MG/DL — SIGNIFICANT CHANGE UP (ref 0.5–1.3)
GLUCOSE BLDC GLUCOMTR-MCNC: 169 MG/DL — HIGH (ref 70–99)
GLUCOSE BLDC GLUCOMTR-MCNC: 192 MG/DL — HIGH (ref 70–99)
GLUCOSE BLDC GLUCOMTR-MCNC: 221 MG/DL — HIGH (ref 70–99)
GLUCOSE BLDC GLUCOMTR-MCNC: 230 MG/DL — HIGH (ref 70–99)
GLUCOSE SERPL-MCNC: 165 MG/DL — HIGH (ref 70–99)
HCT VFR BLD CALC: 31.6 % — LOW (ref 39–50)
HGB BLD-MCNC: 10.1 G/DL — LOW (ref 13–17)
MCHC RBC-ENTMCNC: 27.2 PG — SIGNIFICANT CHANGE UP (ref 27–34)
MCHC RBC-ENTMCNC: 32 GM/DL — SIGNIFICANT CHANGE UP (ref 32–36)
MCV RBC AUTO: 85.2 FL — SIGNIFICANT CHANGE UP (ref 80–100)
NRBC # BLD: 0 /100 WBCS — SIGNIFICANT CHANGE UP (ref 0–0)
PLATELET # BLD AUTO: 377 K/UL — SIGNIFICANT CHANGE UP (ref 150–400)
POTASSIUM SERPL-MCNC: 4.6 MMOL/L — SIGNIFICANT CHANGE UP (ref 3.5–5.3)
POTASSIUM SERPL-SCNC: 4.6 MMOL/L — SIGNIFICANT CHANGE UP (ref 3.5–5.3)
RBC # BLD: 3.71 M/UL — LOW (ref 4.2–5.8)
RBC # FLD: 13.5 % — SIGNIFICANT CHANGE UP (ref 10.3–14.5)
SODIUM SERPL-SCNC: 133 MMOL/L — LOW (ref 135–145)
WBC # BLD: 7.43 K/UL — SIGNIFICANT CHANGE UP (ref 3.8–10.5)
WBC # FLD AUTO: 7.43 K/UL — SIGNIFICANT CHANGE UP (ref 3.8–10.5)

## 2022-01-12 RX ORDER — SODIUM CHLORIDE 9 MG/ML
1000 INJECTION INTRAMUSCULAR; INTRAVENOUS; SUBCUTANEOUS
Refills: 0 | Status: DISCONTINUED | OUTPATIENT
Start: 2022-01-12 | End: 2022-01-13

## 2022-01-12 RX ORDER — SODIUM CHLORIDE 9 MG/ML
500 INJECTION INTRAMUSCULAR; INTRAVENOUS; SUBCUTANEOUS ONCE
Refills: 0 | Status: COMPLETED | OUTPATIENT
Start: 2022-01-12 | End: 2022-01-12

## 2022-01-12 RX ADMIN — Medication 1: at 10:05

## 2022-01-12 RX ADMIN — Medication 1 MILLIGRAM(S): at 00:43

## 2022-01-12 RX ADMIN — Medication 1 TABLET(S): at 14:01

## 2022-01-12 RX ADMIN — PREGABALIN 1000 MICROGRAM(S): 225 CAPSULE ORAL at 14:01

## 2022-01-12 RX ADMIN — DORZOLAMIDE HYDROCHLORIDE TIMOLOL MALEATE 1 DROP(S): 20; 5 SOLUTION/ DROPS OPHTHALMIC at 05:39

## 2022-01-12 RX ADMIN — Medication 100 MILLIGRAM(S): at 06:16

## 2022-01-12 RX ADMIN — Medication 100 MILLIGRAM(S): at 14:01

## 2022-01-12 RX ADMIN — Medication 1 MILLIGRAM(S): at 21:06

## 2022-01-12 RX ADMIN — PANTOPRAZOLE SODIUM 40 MILLIGRAM(S): 20 TABLET, DELAYED RELEASE ORAL at 14:03

## 2022-01-12 RX ADMIN — Medication 1: at 17:30

## 2022-01-12 RX ADMIN — ATORVASTATIN CALCIUM 10 MILLIGRAM(S): 80 TABLET, FILM COATED ORAL at 21:06

## 2022-01-12 RX ADMIN — SODIUM CHLORIDE 500 MILLILITER(S): 9 INJECTION INTRAMUSCULAR; INTRAVENOUS; SUBCUTANEOUS at 10:37

## 2022-01-12 RX ADMIN — ENOXAPARIN SODIUM 40 MILLIGRAM(S): 100 INJECTION SUBCUTANEOUS at 05:39

## 2022-01-12 RX ADMIN — Medication 3 MILLIGRAM(S): at 21:06

## 2022-01-12 RX ADMIN — DORZOLAMIDE HYDROCHLORIDE TIMOLOL MALEATE 1 DROP(S): 20; 5 SOLUTION/ DROPS OPHTHALMIC at 17:26

## 2022-01-12 RX ADMIN — Medication 500 MILLIGRAM(S): at 05:39

## 2022-01-12 RX ADMIN — Medication 1 MILLIGRAM(S): at 14:01

## 2022-01-12 RX ADMIN — Medication 2: at 14:02

## 2022-01-12 RX ADMIN — DORZOLAMIDE HYDROCHLORIDE TIMOLOL MALEATE 1 DROP(S): 20; 5 SOLUTION/ DROPS OPHTHALMIC at 00:43

## 2022-01-12 RX ADMIN — Medication 500 MILLIGRAM(S): at 17:27

## 2022-01-12 RX ADMIN — POLYETHYLENE GLYCOL 3350 17 GRAM(S): 17 POWDER, FOR SOLUTION ORAL at 14:03

## 2022-01-12 RX ADMIN — SENNA PLUS 2 TABLET(S): 8.6 TABLET ORAL at 21:06

## 2022-01-12 NOTE — PHYSICAL THERAPY INITIAL EVALUATION ADULT - TRANSFER SAFETY CONCERNS NOTED: BED/CHAIR, REHAB EVAL
PT in front/decreased balance during turns/decreased sequencing ability/decreased step length/decreased weight-shifting ability

## 2022-01-12 NOTE — PHYSICAL THERAPY INITIAL EVALUATION ADULT - PERTINENT HX OF CURRENT PROBLEM, REHAB EVAL
Pt is 81M admitted 1/10/22 PMHx home ambulator with assistive devices who presents to ED w/ a c/o of right hip pain. Patient states he fell 4 days ago and has been right hip pain and inability to walk.

## 2022-01-12 NOTE — PHYSICAL THERAPY INITIAL EVALUATION ADULT - PERSONAL SAFETY AND JUDGMENT, REHAB EVAL
PULMONARY  CONSULT NOTE      PATIENT IDENTIFICATION:  Name: Cara Castañeda  Age: 76 y.o.  Sex: female  :  1944  MRN: [id number]    DATE OF CONSULTATION:  2021                     CHIEF COMPLAINT: Asthma    History of Present Illness:   Cara Castañeda is a 76 y.o. female patient feeling significantly better on her inhalers, no dyspnea exertion no shortness of breath no coughing no wheezing even with the change in the weather recently  Patient still having difficulty sleeping at night related to her arthritis    Review of Systems:   Constitutional:  As above   Eyes: negative   ENT/oropharynx: negative   Cardiovascular: negative   Respiratory: negative   Gastrointestinal: negative   Genitourinary: negative   Neurological: negative   Musculoskeletal: negative   Integument/breast: negative   Endocrine: negative   Allergic/Immunologic: negative     Past Medical History:  Past Medical History:   Diagnosis Date   • Allergic rhinitis     on immunotherapy- Dr. Benito    • Asthma     Dr. Benito   • Bruit of left carotid artery 2020   • Carotid stenosis     <50%   • Cold sore    • Gallstone pancreatitis    • GERD (gastroesophageal reflux disease)     Dr. Oliva   • Hiatal hernia     small, sliding    • Hyperlipidemia    • Hypertension    • Left ventricular hypertrophy    • Osteoarthritis     Dr. Avila   • Osteopenia with high risk of fracture    • Pre-diabetes    • Vocal cord nodule     Dr. Nieves       Past Surgical History:  Past Surgical History:   Procedure Laterality Date   • APPENDECTOMY     • CHOLECYSTECTOMY  2006    Dr. Hernandez   • ENDOSCOPY      & Dilation. Dr. Oliva   • ERCP  2006    Dr. Oliva   • HYSTERECTOMY  1974    ovaries remain, for benign reasons   • OTHER SURGICAL HISTORY      Stress Echo. No ischemia. EF 60-65%.    • TONSILLECTOMY AND ADENOIDECTOMY     • TOTAL KNEE ARTHROPLASTY  2017        Family History:  Family History   Problem Relation Age of  Onset   • Hypertension Mother    • Hypertension Father    • Heart disease Father    • Prostate cancer Paternal Grandfather         Social History:   Social History     Tobacco Use   • Smoking status: Never Smoker   • Smokeless tobacco: Never Used   Substance Use Topics   • Alcohol use: No        Allergies:  Allergies   Allergen Reactions   • Aspirin GI Bleeding   • Albuterol Sulfate Anxiety   • Atorvastatin Calcium Nausea Only   • Sulfa Antibiotics Nausea Only   • Tolterodine Nausea Only     Detrol       Home Meds:  (Not in a hospital admission)      Objective:    Vitals Ranges:   Temp:  [97.1 °F (36.2 °C)] 97.1 °F (36.2 °C)  Heart Rate:  [84] 84  Resp:  [15] 15  BP: (145)/(84) 145/84  Body mass index is 26.05 kg/m².     Exam:  General Appearance:  WDWN    HEENT:   without obvious abnormality,  Conjunctiva/corneas clear,  Normal external ear canals, no drainage    Clear orsalmucosa,  Mallampati score 3    Neck:  Supple, symmetrical, trachea midline. No JVD.  Lungs:   Bilateral basal rhonchi bilaterally, respirations unlabored symmetrical wall movement.    Chest wall:  No tenderness or deformity.    Heart:  Regular rate and rhythm, S1 and S2 normal.  Extremities: Trace edema no clubbing or Cyanosis        Data Review:  All labs (24hrs): No results found for this or any previous visit (from the past 24 hour(s)).     Imaging:  DEXA Bone Density Axial  Narrative:    DATE OF EXAM:   3/15/2021 12:49 PM     PROCEDURE:   DEXA BONE DENSITY AXIAL-     INDICATIONS:   osteopenia; M85.89-Other specified disorders of bone density and  structure, multiple sites     COMPARISON:  02/14/2019.     TECHNIQUE:   Dual energy x-ray absorptiometry (DEXA) was performed on a [place].     FINDINGS:   The T-score in the lumbar spine is -0.9.   Compared to the previous examination, there has been a 3.8% decrease in  the bone density.     The T-score in the proximal femur is -1.9.  Compared to the previous examination, there has been a 4.8%  decrease in  the bone density.     According to the World Health Organization criteria, this is classified  as osteopenia.     Using the FRAX scores, which utilized risk factors and femoral neck bone  density:  The 10 year probability of major osteoporotic fracture is 13%.  The 10 year probability of a hip fracture is  3.2%.     Impression: Osteopenia.     Copies of the computerized graph sheet can be obtained in the [facility] PACS or from Epic via the [employer] information department.     Electronically Signed By-Gloria Boyer MD On:3/15/2021 1:47 PM  This report was finalized on [id number] by  Gloria Boyer MD.  Mammo Screening Digital Tomosynthesis Bilateral With CAD  Narrative: MAMMO SCREENING DIGITAL TOMOSYNTHESIS BILATERAL W CAD-     Date of Exam: 3/15/2021 12:29 PM     Indication: Screening.     Comparison: 03/05/2020, 02/14/2019, 02/10/2017, 02/10/2015, 02/04/2015,  01/30/2014, 01/22/2013.     Technique: MLO and CC views of bilateral breast(s) were obtained  according to routine screening breast mammography protocol with  tomosynthesis.       The mammographic images were interpreted with the assistance of a  computer aided detection system.      FINDINGS:  There are scattered areas of fibroglandular density.      There are no suspicious masses, suspicious microcalcifications, or  architectural distortion in either breast.      Impression: No mammographic signs of malignancy. Recommend routine mammographic  screening.     BI-RADS ASSESSMENT: BI-RADS 1. Negative.     The patient's information is entered into a computerized reminder system  with a targeted due date for the next mammogram.     Note:  It has been reported that there is approximately a 15% false  negative in mammography.  Therefore, management of a palpable  abnormality should not be deferred because of a negative mammogram.           Electronically Signed By-Gloria Boyer MD On:3/15/2021 1:30 PM  This report was finalized on  [id number] by  Gloria Boyer MD.       ASSESSMENT:  Diagnoses and all orders for this visit:    Mild persistent asthma without complication    Primary insomnia        PLAN:   Bronchodilator inhaled corticosteroid    Education how to use inhalers    Encouraged to use incentive spirometer    Continue to exercise slowly as tolerated    Monitor for any change in the color of the sputum    Avoid any exposure to fumes, gas or any irritant  Sleep hygiene tips discussed in details with pt and plan to follow it with maintaining regular time for sleep and using sleep aid as needed     Follow-up 1 year  Natty Hodge MD. D, ABSM.  4/14/2021  14:15 EDT      patient confused/impaired/at risk behaviors demonstrated

## 2022-01-12 NOTE — PROGRESS NOTE ADULT - SUBJECTIVE AND OBJECTIVE BOX
Family @ bedside  He denies that any pain has reoccurred    Vital Signs Last 24 Hrs  T(C): 36.9 (12 Jan 2022 13:59), Max: 37.1 (11 Jan 2022 20:40)  T(F): 98.4 (12 Jan 2022 13:59), Max: 98.7 (11 Jan 2022 20:40)  HR: 87 (12 Jan 2022 13:59) (57 - 91)  BP: 114/71 (12 Jan 2022 13:59) (103/68 - 146/72)  BP(mean): 84 (11 Jan 2022 19:45) (78 - 106)  RR: 18 (12 Jan 2022 13:59) (17 - 18)  SpO2: 99% (12 Jan 2022 13:59) (98% - 100%)    I&O's Summary    01-11-22 @ 07:01  -  01-12-22 @ 07:00  --------------------------------------------------------  IN: 625 mL / OUT: 0 mL / NET: 625 mL    01-12-22 @ 07:01  -  01-12-22 @ 14:27  --------------------------------------------------------  IN: 0 mL / OUT: 0 mL / NET: 0 mL        PHYSICAL EXAM:  GENERAL: NAD, thin cachetic elderly  HEAD:  Atraumatic, Normocephalic  EYES: eyes open, +vision deficit.   NECK: Supple, No JVD  CHEST/LUNG: mild decrease breath sounds bilaterally; No wheeze   HEART: Regular rate and rhythm; No murmurs, rubs, or gallops  ABDOMEN: Soft, Nontender, Nondistended; Bowel sounds present, PEG in place  Neuro: AAOx1, speech okay, poor insight. eyes close. answer simple questions   EXTREMITIES:  2+ Peripheral Pulses, No clubbing, cyanosis, or edema  SKIN: No rashes or lesions     LABS:                        10.1   7.43  )-----------( 377      ( 12 Jan 2022 09:03 )             31.6     01-12    133<L>  |  96  |  13  ----------------------------<  165<H>  4.6   |  21<L>  |  0.58    Ca    9.6      12 Jan 2022 09:03  Mg     1.9     01-10    TPro  7.8  /  Alb  3.9  /  TBili  0.4  /  DBili  x   /  AST  32  /  ALT  16  /  AlkPhos  88  01-10    PT/INR - ( 11 Jan 2022 10:21 )   PT: 14.2 sec;   INR: 1.19 ratio         PTT - ( 11 Jan 2022 10:21 )  PTT:30.9 sec  CAPILLARY BLOOD GLUCOSE      POCT Blood Glucose.: 221 mg/dL (12 Jan 2022 13:37)  POCT Blood Glucose.: 169 mg/dL (12 Jan 2022 09:58)  POCT Blood Glucose.: 126 mg/dL (11 Jan 2022 21:42)            RADIOLOGY & ADDITIONAL TESTS:    Imaging Personally Reviewed:  [x] YES  [ ] NO    Will obtain old records:  [ ] YES  [x] NO

## 2022-01-12 NOTE — PHYSICAL THERAPY INITIAL EVALUATION ADULT - GENERAL OBSERVATIONS, REHAB EVAL
received semisupine in bed, A&OX1-2, following simple commands, pleasantly confused, forgetful, eyes mostly closed t/o session, admitted s/p fall, admitted s/p fall, s/p right hip radha (1/11), RLE WBAT, +supplemental 02 via NC, +IV.

## 2022-01-12 NOTE — PROGRESS NOTE ADULT - ASSESSMENT
82 y/o M s/p right hip hemiarthroplasty POD#1, physical therapy WBAT anterior total hip precautions  Lisa Garcia PA-C  Orthopaedic Surgery  Team pager [phone]  [facility] [phone]  adhdti-[phone]

## 2022-01-12 NOTE — PHYSICAL THERAPY INITIAL EVALUATION ADULT - PRECAUTIONS/LIMITATIONS, REHAB EVAL
XRAY PELVIS: Cementless right hip hemiarthroplasty prosthesis implanted.Intact and aligned hardware and no periprosthetic fractures.Postoperative soft tissue changes.Correlate with intraoperative findings.Generalized osteopenia and bilateral femoral vascular calcifications./fall precautions/right hip precautions

## 2022-01-12 NOTE — PROGRESS NOTE ADULT - ASSESSMENT
81 year-old-male with history of dementia, DM, HTN, HLD, chronic conjunctivitis presents with right hip pain, sent in by Dr. Axel Bishop for surgery. Unable to obtain meaningful information from the patient; patient keeps saying "help me". Reports "no pain."    Problem/Plan - :  ·  Problem: Hip fracture  Plan: pain stable  right hip Xray with displaced, comminuted fracture of the right subcapital/transcervical femoral neck   2019 TTE with normal LV. no need to repeat prior to OR. euvolemic.  s/p R hemiarthroplasty 1/11/21  pain control, bowel regimen PRN  larissa-operative abx per ortho.   Physical therapy. Out of bed to chair with assistance.   Appreciate orthopedic intervention and post-op care    {[id number]} Problem/Plan - :  ·  Problem: history of CVA (cerebral vascular accident).    Plan:   MRI/MRA head 12/18/20 revealed small acute/subacute infarcts in the left posterior frontal lobe along w/predominantly chronic bilateral subdural hematomas unchanged from CT dated 12/13/2020, new from MRI dated 6/23/2020, with small subacute subdural components  per prior notes, cont ASA/statin; holding off on adding plavix  seen by palliative in the past, family was not interested in hospice  Speech/swallow -> recommended NPO, hence PEG tube placed 12/28/20  {[id number]}outpt neuro Dr. Nissenbaum    Discussed with the wife Maia.  His PEG was removed in October 2021. His eating has improved.  He is eating soft diet in the [facility].   Cont soft, bite-sized diet    {[id number]} Problem/Plan - :  ·  Problem: Hyponatremia.  mild  Plan: Likely hypovolemic hyponatremia  S/p gentle NS IVF 1/12/22    {[id number]} Problem/Plan - :  ·  Problem: Conjunctiva disorder.  Plan: S/p corneal patch 11/2020 at [facility]  hx of being on prednisone taper  vision somewhat limited.   per the wife, he no loner sees optho for a year.     {[id number]} Problem/Plan - :  ·  Problem: Alzheimer's dementia  Plan: Frequent reorientation  Try to avoid benzos.  PRN haldol if agitated    {[id number]} Problem/Plan - :  ·  Problem: Essential hypertension.  Plan:   Enalapril 10 mg     {[id number]} Problem/Plan - :  ·  Problem: Type 2 diabetes mellitus with other specified complication, without long-term current use of insulin.  Plan: -200  A1c 6.6%  Insulin ss for now    {[id number]} Problem/Plan - :  ·  Problem: Severe protein-calorie malnutrition.  Plan: BMI<19, 60lb weight loss in 1 year  Had malignancy w/u last month, pan CT/MR, EGD unremarkable  Cont dietary supplements  S/p NGT Insertion 12/17/20  S/p PEG placement 12/28/20.   removed 10/2021.   Soft and bite-sized diet    {[id number]} Problem/Plan - :  ·  Problem: DVT prophylaxis. Plan: Hep SQ

## 2022-01-12 NOTE — PROGRESS NOTE ADULT - SUBJECTIVE AND OBJECTIVE BOX
Patient is a 81y old  Male who presents with a chief complaint of right hip fracture  Patient s/p right hip hemiarthroplasty POD#1  Patient comfortable  No complaints    T(C): 36.5 (01-12-22 @ 05:36), Max: 37.1 (01-11-22 @ 20:40)  HR: 84 (01-12-22 @ 05:36) (57 - 99)  BP: 103/68 (01-12-22 @ 05:36) (103/68 - 168/85)  RR: 18 (01-12-22 @ 05:36) (17 - 18)  SpO2: 100% (01-12-22 @ 05:36) (98% - 100%)    PHYSICAL EXAM:  NAD, Alert  [Right ] Hip: Aquacel dressing C/D/I; sensation grossly intact to light touch; (+) DF/PF; (+) Distal Pulses; No Calf tenderness B/L, PAS     LABS:                   9.8    7.84  )-----------( 347      ( 11 Jan 2022 17:35 )             30.8   01-11    130<L>  |  97  |  9   ----------------------------<  117<H>  4.3   |  21<L>  |  0.54  Ca    9.2      11 Jan 2022 17:35  Mg     1.9     01-10  TPro  7.8  /  Alb  3.9  /  TBili  0.4  /  DBili  x   /  AST  32  /  ALT  16  /  AlkPhos  88  01-10  PT/INR - ( 11 Jan 2022 10:21 )   PT: 14.2 sec;   INR: 1.19 ratio    PTT - ( 11 Jan 2022 10:21 )  PTT:30.9 sec

## 2022-01-13 LAB
ANION GAP SERPL CALC-SCNC: 12 MMOL/L — SIGNIFICANT CHANGE UP (ref 5–17)
BLD GP AB SCN SERPL QL: NEGATIVE — SIGNIFICANT CHANGE UP
BUN SERPL-MCNC: 15 MG/DL — SIGNIFICANT CHANGE UP (ref 7–23)
CALCIUM SERPL-MCNC: 9.3 MG/DL — SIGNIFICANT CHANGE UP (ref 8.4–10.5)
CHLORIDE SERPL-SCNC: 99 MMOL/L — SIGNIFICANT CHANGE UP (ref 96–108)
CO2 SERPL-SCNC: 23 MMOL/L — SIGNIFICANT CHANGE UP (ref 22–31)
CREAT SERPL-MCNC: 0.7 MG/DL — SIGNIFICANT CHANGE UP (ref 0.5–1.3)
GLUCOSE BLDC GLUCOMTR-MCNC: 178 MG/DL — HIGH (ref 70–99)
GLUCOSE BLDC GLUCOMTR-MCNC: 184 MG/DL — HIGH (ref 70–99)
GLUCOSE BLDC GLUCOMTR-MCNC: 196 MG/DL — HIGH (ref 70–99)
GLUCOSE BLDC GLUCOMTR-MCNC: 227 MG/DL — HIGH (ref 70–99)
GLUCOSE BLDC GLUCOMTR-MCNC: 270 MG/DL — HIGH (ref 70–99)
GLUCOSE SERPL-MCNC: 174 MG/DL — HIGH (ref 70–99)
HCT VFR BLD CALC: 26.9 % — LOW (ref 39–50)
HGB BLD-MCNC: 8.7 G/DL — LOW (ref 13–17)
MCHC RBC-ENTMCNC: 27.5 PG — SIGNIFICANT CHANGE UP (ref 27–34)
MCHC RBC-ENTMCNC: 32.3 GM/DL — SIGNIFICANT CHANGE UP (ref 32–36)
MCV RBC AUTO: 85.1 FL — SIGNIFICANT CHANGE UP (ref 80–100)
NRBC # BLD: 0 /100 WBCS — SIGNIFICANT CHANGE UP (ref 0–0)
PLATELET # BLD AUTO: 336 K/UL — SIGNIFICANT CHANGE UP (ref 150–400)
POTASSIUM SERPL-MCNC: 3.7 MMOL/L — SIGNIFICANT CHANGE UP (ref 3.5–5.3)
POTASSIUM SERPL-SCNC: 3.7 MMOL/L — SIGNIFICANT CHANGE UP (ref 3.5–5.3)
RBC # BLD: 3.16 M/UL — LOW (ref 4.2–5.8)
RBC # FLD: 13.7 % — SIGNIFICANT CHANGE UP (ref 10.3–14.5)
RH IG SCN BLD-IMP: POSITIVE — SIGNIFICANT CHANGE UP
SODIUM SERPL-SCNC: 134 MMOL/L — LOW (ref 135–145)
WBC # BLD: 8.03 K/UL — SIGNIFICANT CHANGE UP (ref 3.8–10.5)
WBC # FLD AUTO: 8.03 K/UL — SIGNIFICANT CHANGE UP (ref 3.8–10.5)

## 2022-01-13 RX ADMIN — Medication 500 MILLIGRAM(S): at 17:21

## 2022-01-13 RX ADMIN — Medication 100 MILLIGRAM(S): at 12:27

## 2022-01-13 RX ADMIN — Medication 1 MILLIGRAM(S): at 12:26

## 2022-01-13 RX ADMIN — SENNA PLUS 2 TABLET(S): 8.6 TABLET ORAL at 22:19

## 2022-01-13 RX ADMIN — PANTOPRAZOLE SODIUM 40 MILLIGRAM(S): 20 TABLET, DELAYED RELEASE ORAL at 12:26

## 2022-01-13 RX ADMIN — DORZOLAMIDE HYDROCHLORIDE TIMOLOL MALEATE 1 DROP(S): 20; 5 SOLUTION/ DROPS OPHTHALMIC at 06:26

## 2022-01-13 RX ADMIN — Medication 500 MILLIGRAM(S): at 06:26

## 2022-01-13 RX ADMIN — ATORVASTATIN CALCIUM 10 MILLIGRAM(S): 80 TABLET, FILM COATED ORAL at 22:19

## 2022-01-13 RX ADMIN — Medication 650 MILLIGRAM(S): at 09:06

## 2022-01-13 RX ADMIN — Medication 1: at 18:14

## 2022-01-13 RX ADMIN — Medication 1 TABLET(S): at 12:26

## 2022-01-13 RX ADMIN — Medication 3 MILLIGRAM(S): at 22:19

## 2022-01-13 RX ADMIN — PREGABALIN 1000 MICROGRAM(S): 225 CAPSULE ORAL at 12:27

## 2022-01-13 RX ADMIN — Medication 1: at 09:36

## 2022-01-13 RX ADMIN — Medication 650 MILLIGRAM(S): at 12:25

## 2022-01-13 RX ADMIN — Medication 3: at 12:25

## 2022-01-13 RX ADMIN — Medication 1 MILLIGRAM(S): at 22:19

## 2022-01-13 RX ADMIN — ENOXAPARIN SODIUM 40 MILLIGRAM(S): 100 INJECTION SUBCUTANEOUS at 06:27

## 2022-01-13 RX ADMIN — POLYETHYLENE GLYCOL 3350 17 GRAM(S): 17 POWDER, FOR SOLUTION ORAL at 12:27

## 2022-01-13 RX ADMIN — DORZOLAMIDE HYDROCHLORIDE TIMOLOL MALEATE 1 DROP(S): 20; 5 SOLUTION/ DROPS OPHTHALMIC at 17:22

## 2022-01-13 NOTE — PROGRESS NOTE ADULT - SUBJECTIVE AND OBJECTIVE BOX
Orthopedics      Patient seen and examined at bedside. Unable to provide history.    Vital Signs Last 24 Hrs  T(C): 36.8 (01-13-22 @ 06:20), Max: 37.2 (01-13-22 @ 00:22)  T(F): 98.2 (01-13-22 @ 06:20), Max: 99 (01-13-22 @ 00:22)  HR: 75 (01-13-22 @ 06:20) (75 - 102)  BP: 101/57 (01-13-22 @ 06:20) (95/53 - 115/73)  BP(mean): --  RR: 18 (01-13-22 @ 06:20) (18 - 18)  SpO2: 100% (01-13-22 @ 06:20) (98% - 100%)                        10.1   7.43  )-----------( 377      ( 12 Jan 2022 09:03 )             31.6     12 Jan 2022 09:03    133    |  96     |  13     ----------------------------<  165    4.6     |  21     |  0.58     Ca    9.6        12 Jan 2022 09:03      PT/INR - ( 11 Jan 2022 10:21 )   PT: 14.2 sec;   INR: 1.19 ratio         PTT - ( 11 Jan 2022 10:21 )  PTT:30.9 sec    Physical Exam:  RLE:  Dressing c/d/i  NTTP calves b/l  Unable to cooperate with neuro exam  Compartments soft and compressible  2+ Pulses palpable      A/P: 81yM POD 2 s/p R hip hemiarthroplasty  -FU AM labs  -Pain control  -WBAT RLE  -DVT ppx  -Incentive Spirometry  -Elevation to extremity  -Medical management appreciated  -DC to [facility] today

## 2022-01-14 ENCOUNTER — TRANSCRIPTION ENCOUNTER (OUTPATIENT)
Age: 82
End: 2022-01-14

## 2022-01-14 PROBLEM — S72.001D CLOSED FRACTURE OF NECK OF RIGHT FEMUR WITH ROUTINE HEALING, SUBSEQUENT ENCOUNTER: Status: ACTIVE | Noted: 2022-01-10

## 2022-01-14 LAB
ANION GAP SERPL CALC-SCNC: 9 MMOL/L — SIGNIFICANT CHANGE UP (ref 5–17)
BUN SERPL-MCNC: 12 MG/DL — SIGNIFICANT CHANGE UP (ref 7–23)
CALCIUM SERPL-MCNC: 9 MG/DL — SIGNIFICANT CHANGE UP (ref 8.4–10.5)
CHLORIDE SERPL-SCNC: 101 MMOL/L — SIGNIFICANT CHANGE UP (ref 96–108)
CO2 SERPL-SCNC: 25 MMOL/L — SIGNIFICANT CHANGE UP (ref 22–31)
CREAT SERPL-MCNC: 0.56 MG/DL — SIGNIFICANT CHANGE UP (ref 0.5–1.3)
GLUCOSE BLDC GLUCOMTR-MCNC: 135 MG/DL — HIGH (ref 70–99)
GLUCOSE BLDC GLUCOMTR-MCNC: 172 MG/DL — HIGH (ref 70–99)
GLUCOSE BLDC GLUCOMTR-MCNC: 204 MG/DL — HIGH (ref 70–99)
GLUCOSE BLDC GLUCOMTR-MCNC: 208 MG/DL — HIGH (ref 70–99)
GLUCOSE SERPL-MCNC: 167 MG/DL — HIGH (ref 70–99)
HCT VFR BLD CALC: 22.8 % — LOW (ref 39–50)
HGB BLD-MCNC: 7.6 G/DL — LOW (ref 13–17)
MCHC RBC-ENTMCNC: 27.7 PG — SIGNIFICANT CHANGE UP (ref 27–34)
MCHC RBC-ENTMCNC: 33.3 GM/DL — SIGNIFICANT CHANGE UP (ref 32–36)
MCV RBC AUTO: 83.2 FL — SIGNIFICANT CHANGE UP (ref 80–100)
NRBC # BLD: 0 /100 WBCS — SIGNIFICANT CHANGE UP (ref 0–0)
PLATELET # BLD AUTO: 316 K/UL — SIGNIFICANT CHANGE UP (ref 150–400)
POTASSIUM SERPL-MCNC: 3.7 MMOL/L — SIGNIFICANT CHANGE UP (ref 3.5–5.3)
POTASSIUM SERPL-SCNC: 3.7 MMOL/L — SIGNIFICANT CHANGE UP (ref 3.5–5.3)
RBC # BLD: 2.74 M/UL — LOW (ref 4.2–5.8)
RBC # FLD: 13.7 % — SIGNIFICANT CHANGE UP (ref 10.3–14.5)
SODIUM SERPL-SCNC: 135 MMOL/L — SIGNIFICANT CHANGE UP (ref 135–145)
WBC # BLD: 8.71 K/UL — SIGNIFICANT CHANGE UP (ref 3.8–10.5)
WBC # FLD AUTO: 8.71 K/UL — SIGNIFICANT CHANGE UP (ref 3.8–10.5)

## 2022-01-14 RX ADMIN — Medication 3 MILLIGRAM(S): at 21:16

## 2022-01-14 RX ADMIN — POLYETHYLENE GLYCOL 3350 17 GRAM(S): 17 POWDER, FOR SOLUTION ORAL at 12:29

## 2022-01-14 RX ADMIN — DORZOLAMIDE HYDROCHLORIDE TIMOLOL MALEATE 1 DROP(S): 20; 5 SOLUTION/ DROPS OPHTHALMIC at 05:57

## 2022-01-14 RX ADMIN — Medication 10 MILLIGRAM(S): at 05:57

## 2022-01-14 RX ADMIN — ATORVASTATIN CALCIUM 10 MILLIGRAM(S): 80 TABLET, FILM COATED ORAL at 21:16

## 2022-01-14 RX ADMIN — SENNA PLUS 2 TABLET(S): 8.6 TABLET ORAL at 21:16

## 2022-01-14 RX ADMIN — Medication 1: at 09:22

## 2022-01-14 RX ADMIN — Medication 1 TABLET(S): at 12:29

## 2022-01-14 RX ADMIN — PREGABALIN 1000 MICROGRAM(S): 225 CAPSULE ORAL at 12:10

## 2022-01-14 RX ADMIN — PANTOPRAZOLE SODIUM 40 MILLIGRAM(S): 20 TABLET, DELAYED RELEASE ORAL at 12:28

## 2022-01-14 RX ADMIN — Medication 1 MILLIGRAM(S): at 12:28

## 2022-01-14 RX ADMIN — Medication 1 MILLIGRAM(S): at 21:17

## 2022-01-14 RX ADMIN — Medication 2: at 12:46

## 2022-01-14 RX ADMIN — Medication 500 MILLIGRAM(S): at 05:57

## 2022-01-14 RX ADMIN — Medication 100 MILLIGRAM(S): at 12:29

## 2022-01-14 RX ADMIN — ENOXAPARIN SODIUM 40 MILLIGRAM(S): 100 INJECTION SUBCUTANEOUS at 05:57

## 2022-01-14 NOTE — PROGRESS NOTE ADULT - ATTENDING COMMENTS
For hemiarthroplasty today.  Pt has been a resident in a nursing home for the past year due to multiple medical comorbidities.  Xray obtained by rehab facility on 1/2 demonstrated FN fx.  Pt was sent to my office for evaluation and then sent to ER for admission for hemiarthrolasty.  The pt's wife was w him at the office visit and the plan for hemiarthroplasty was discussed and she agreed.  He has dementia, she is the HCP.
PT. DVT ppx . f/u labs/medicine.  dc planning
PT. DVT ppx. f/u medicine.  DC planning

## 2022-01-14 NOTE — DISCHARGE NOTE PROVIDER - CARE PROVIDER_API CALL
Axel Bishop (MD)  Orthopaedic Surgery  [address]  [place] [address]  Phone: [phone]  Fax: [phone]  Follow Up Time:

## 2022-01-14 NOTE — DISCHARGE NOTE PROVIDER - NSDCMRMEDTOKEN_GEN_ALL_CORE_FT
ascorbic acid 1000 mg oral tablet: 1 tab(s) orally once a day  aspirin 81 mg oral tablet, chewable: 1 tab(s) orally once a day  atorvastatin 10 mg oral tablet: 1 tab(s) orally once a day (at bedtime)  bisacodyl 10 mg rectal suppository: 1 suppository(ies) rectal once a day, As needed, Constipation  Cosopt 2.23%-0.68% ophthalmic solution: 1 drop(s) to each affected eye 2 times a day  cyanocobalamin 1000 mcg oral tablet: 1 tab(s) orally once a day  divalproex sodium 125 mg oral delayed release capsule: 1 cap(s) orally 2 times a day  doxazosin 1 mg oral tablet: 1 tab(s) orally once a day (at bedtime)  doxycycline 100 mg injection: 100  injectable 2 times a day  enoxaparin: 40 milligram(s) subcutaneous once a day  melatonin 3 mg oral tablet: 1 tab(s) orally once a day (at bedtime)  metFORMIN 1000 mg oral tablet: 1 tab(s) orally 2 times a day  moxifloxacin 0.5% ophthalmic solution: 1 drop(s) to each affected eye   Multiple Vitamins with Minerals oral liquid: 15 milliliter(s) orally once a day through PEG   pantoprazole 40 mg intravenous injection: 40 milligram(s) intravenous once a day  polyethylene glycol 3350 oral powder for reconstitution: 17 gram(s) orally once a day, As needed, Constipation  predniSONE: 10 milligram(s) by gastrostomy tube as follows for taper from 1/2/21 - 1/8/21  5 milligrams by gastrostomy tube as follows for taper from 1/9/21 - 1/15/21  senna oral tablet: 2 tab(s) orally once a day (at bedtime), As needed, Constipation  thiamine 100 mg oral tablet: 1 tab(s) orally once a day   acetaminophen 325 mg oral tablet: 2 tab(s) orally every 6 hours, As needed, Mild Pain (1 - 3)  ascorbic acid 1000 mg oral tablet: 1 tab(s) orally once a day  aspirin 81 mg oral tablet, chewable: 1 tab(s) orally once a day  atorvastatin 10 mg oral tablet: 1 tab(s) orally once a day (at bedtime)  bisacodyl 10 mg rectal suppository: 1 suppository(ies) rectal once a day, As needed, Constipation  Cosopt 2.23%-0.68% ophthalmic solution: 1 drop(s) to each affected eye 2 times a day  cyanocobalamin 1000 mcg oral tablet: 1 tab(s) orally once a day  divalproex sodium 125 mg oral delayed release capsule: 1 cap(s) orally 2 times a day  doxazosin 1 mg oral tablet: 1 tab(s) orally once a day (at bedtime)  enoxaparin: 40 milligram(s) subcutaneous once a day  melatonin 3 mg oral tablet: 1 tab(s) orally once a day (at bedtime)  metFORMIN 1000 mg oral tablet: 1 tab(s) orally 2 times a day  moxifloxacin 0.5% ophthalmic solution: 1 drop(s) to each affected eye   oxyCODONE: 0.5 tab(s) orally every 6 hours, As Needed  oxyCODONE 5 mg oral tablet: 1 tab(s) orally every 6 hours, As Needed - 10)  pantoprazole 40 mg intravenous injection: 40 milligram(s) intravenous once a day  senna oral tablet: 2 tab(s) orally once a day (at bedtime), As needed, Constipation  thiamine 100 mg oral tablet: 1 tab(s) orally once a day

## 2022-01-14 NOTE — PROGRESS NOTE ADULT - ASSESSMENT
A/P: 81yM POD 2 s/p R hip hemiarthroplasty    -FU AM labs  -Pain control  -WBAT RLE  -DVT ppx  -Incentive Spirometry  -Elevation of extremity  -Medical management appreciated  -DC to ELIANE from our standpoint

## 2022-01-14 NOTE — DISCHARGE NOTE PROVIDER - HOSPITAL COURSE
History of Present Illness:   Patient is a 81yMale home ambulator with assistive devices who presents to ED w/ a c/o of right hip pain. Patient states he fell 4 days ago and has been right hip pain and inability to walk. Denies HH/LOC. Denies any numbness or tingling. Denies having any other pain elsewhere. Denies any previous orthopedic history. No other orthopedic concerns at this time.     Past Medical, Past Surgical History:  PAST MEDICAL HISTORY:  BPH (benign prostatic hyperplasia)   DM (diabetes mellitus)   HTN (hypertension)   Hyperlipidemia.   Dementia    PAST SURGICAL HISTORY:  No significant past surgical history.    HOSPITAL COURSE:  82 y/o M underwent right hip hemiarthroplasty on 1/11/2022 with Dr. Bishop.  Patient tolerated procedure well.  Patient was evaluated postoperatively by physical and occupational therapists for weight bearing as tolerated ambulation and advised that patient would benefit from admission to rehab facility.  Patient advised to keep surgical incision/dressing clean and dry, and have dressing/sutures/surgical staples removed and steri strips applied post op day #14 (1/25/2022)if applicable.  Patient further advised to follow up with Dr. Bishop in his office 3-4 weeks post op.     History of Present Illness:   Patient is a 81yMale home ambulator with assistive devices who presents to ED w/ a c/o of right hip pain. Patient states he fell 4 days ago and has been right hip pain and inability to walk. Denies HH/LOC. Denies any numbness or tingling. Denies having any other pain elsewhere. Denies any previous orthopedic history. No other orthopedic concerns at this time.     Past Medical, Past Surgical History:  PAST MEDICAL HISTORY:  BPH (benign prostatic hyperplasia)   DM (diabetes mellitus)   HTN (hypertension)   Hyperlipidemia.   Dementia    PAST SURGICAL HISTORY:  No significant past surgical history.    HOSPITAL COURSE:  82 y/o M underwent right hip hemiarthroplasty on 1/11/2022 with Dr. Bishop.  Patient tolerated procedure well.  Patient was evaluated postoperatively by physical and occupational therapists for weight bearing as tolerated ambulation and advised that patient would benefit from admission to rehab facility.  Patient was transfused PRBCs perioperatively for acute blood loss amemia. Patient advised to keep surgical incision/dressing clean and dry, and have dressing/sutures/surgical staples removed and steri strips applied post op day #14 (1/25/2022)if applicable.  Patient further advised to follow up with Dr. Bishop in his office 3-4 weeks post op.

## 2022-01-14 NOTE — DISCHARGE NOTE PROVIDER - NSDCFUADDINST_GEN_ALL_CORE_FT
Continue weight bearing as tolerated ambulation with rolling walker and maintaining anterior total hip precautions. Keep surgical incision/Aquacel dressing clean and dry, have dressing/sutures/staples removed and steri-strips applied post operative day #14 (1/25/2022)if applicable. Follow up with Dr. Bishop in his office 3-4 weeks post op. Continue weight bearing as tolerated ambulation with rolling walker and maintaining anterior total hip precautions. Keep surgical incision/Aquacel dressing clean and dry, have dressing/sutures/staples removed and steri-strips applied post operative day #14 (1/25/2022) if applicable. Follow up with Dr. Bishop in his office 3-4 weeks post op.

## 2022-01-14 NOTE — PROGRESS NOTE ADULT - SUBJECTIVE AND OBJECTIVE BOX
Patient is a 81y old  Male w/ hx of R femoral neck fracture    Subjective: NEON. Due to hx of dementia pt is unable to give history.       POST OPERATIVE DAY #:  [ ]   Patient resting comfortable, without complaints  T(C): 36.9 (01-14-22 @ 01:00), Max: 37 (01-13-22 @ 09:33)  HR: 85 (01-14-22 @ 01:00) (72 - 85)  BP: 113/59 (01-14-22 @ 01:00) (101/57 - 114/71)  RR: 18 (01-14-22 @ 01:00) (18 - 18)  SpO2: 100% (01-14-22 @ 01:00) (100% - 100%)  Wt(kg): --      Physical Exam:  RLE:  Dressing c/d/i  NTTP calves b/l  Unable to cooperate with neuro exam  Compartments soft and compressible  2+ Pulses palpable    LABS:                        8.7    8.03  )-----------( 336      ( 13 Jan 2022 08:10 )             26.9     01-13    134<L>  |  99  |  15  ----------------------------<  174<H>  3.7   |  23  |  0.70    Ca    9.3      13 Jan 2022 08:10          RADIOLOGY & ADDITIONAL TESTS:     Patient is a 81y old  Male w/ hx of R femoral neck fracture    Subjective: NEON. Due to hx of dementia pt is unable to give history.       T(C): 36.9 (01-14-22 @ 01:00), Max: 37 (01-13-22 @ 09:33)  HR: 85 (01-14-22 @ 01:00) (72 - 85)  BP: 113/59 (01-14-22 @ 01:00) (101/57 - 114/71)  RR: 18 (01-14-22 @ 01:00) (18 - 18)  SpO2: 100% (01-14-22 @ 01:00) (100% - 100%)  Wt(kg): --      Physical Exam:  RLE:  Dressing c/d/i  NTTP calves b/l  Unable to cooperate with neuro exam  Compartments soft and compressible  2+ Pulses palpable    LABS:                        8.7    8.03  )-----------( 336      ( 13 Jan 2022 08:10 )             26.9     01-13    134<L>  |  99  |  15  ----------------------------<  174<H>  3.7   |  23  |  0.70    Ca    9.3      13 Jan 2022 08:10          RADIOLOGY & ADDITIONAL TESTS:

## 2022-01-15 ENCOUNTER — TRANSCRIPTION ENCOUNTER (OUTPATIENT)
Age: 82
End: 2022-01-15

## 2022-01-15 VITALS
TEMPERATURE: 98 F | SYSTOLIC BLOOD PRESSURE: 148 MMHG | DIASTOLIC BLOOD PRESSURE: 83 MMHG | HEART RATE: 65 BPM | OXYGEN SATURATION: 100 % | RESPIRATION RATE: 18 BRPM

## 2022-01-15 DIAGNOSIS — S72.001A FRACTURE OF UNSPECIFIED PART OF NECK OF RIGHT FEMUR, INITIAL ENCOUNTER FOR CLOSED FRACTURE: ICD-10-CM

## 2022-01-15 LAB
ANION GAP SERPL CALC-SCNC: 12 MMOL/L — SIGNIFICANT CHANGE UP (ref 5–17)
BUN SERPL-MCNC: 12 MG/DL — SIGNIFICANT CHANGE UP (ref 7–23)
CALCIUM SERPL-MCNC: 9.5 MG/DL — SIGNIFICANT CHANGE UP (ref 8.4–10.5)
CHLORIDE SERPL-SCNC: 99 MMOL/L — SIGNIFICANT CHANGE UP (ref 96–108)
CO2 SERPL-SCNC: 24 MMOL/L — SIGNIFICANT CHANGE UP (ref 22–31)
CREAT SERPL-MCNC: 0.44 MG/DL — LOW (ref 0.5–1.3)
GLUCOSE BLDC GLUCOMTR-MCNC: 157 MG/DL — HIGH (ref 70–99)
GLUCOSE BLDC GLUCOMTR-MCNC: 172 MG/DL — HIGH (ref 70–99)
GLUCOSE SERPL-MCNC: 154 MG/DL — HIGH (ref 70–99)
HCT VFR BLD CALC: 31.5 % — LOW (ref 39–50)
HGB BLD-MCNC: 10.1 G/DL — LOW (ref 13–17)
MCHC RBC-ENTMCNC: 27.4 PG — SIGNIFICANT CHANGE UP (ref 27–34)
MCHC RBC-ENTMCNC: 32.1 GM/DL — SIGNIFICANT CHANGE UP (ref 32–36)
MCV RBC AUTO: 85.4 FL — SIGNIFICANT CHANGE UP (ref 80–100)
NRBC # BLD: 0 /100 WBCS — SIGNIFICANT CHANGE UP (ref 0–0)
PLATELET # BLD AUTO: 310 K/UL — SIGNIFICANT CHANGE UP (ref 150–400)
POTASSIUM SERPL-MCNC: 4.3 MMOL/L — SIGNIFICANT CHANGE UP (ref 3.5–5.3)
POTASSIUM SERPL-SCNC: 4.3 MMOL/L — SIGNIFICANT CHANGE UP (ref 3.5–5.3)
RBC # BLD: 3.69 M/UL — LOW (ref 4.2–5.8)
RBC # FLD: 14.7 % — HIGH (ref 10.3–14.5)
SODIUM SERPL-SCNC: 135 MMOL/L — SIGNIFICANT CHANGE UP (ref 135–145)
WBC # BLD: 8.97 K/UL — SIGNIFICANT CHANGE UP (ref 3.8–10.5)
WBC # FLD AUTO: 8.97 K/UL — SIGNIFICANT CHANGE UP (ref 3.8–10.5)

## 2022-01-15 PROCEDURE — 97110 THERAPEUTIC EXERCISES: CPT

## 2022-01-15 PROCEDURE — U0005: CPT

## 2022-01-15 PROCEDURE — 83735 ASSAY OF MAGNESIUM: CPT

## 2022-01-15 PROCEDURE — 86923 COMPATIBILITY TEST ELECTRIC: CPT

## 2022-01-15 PROCEDURE — 85027 COMPLETE CBC AUTOMATED: CPT

## 2022-01-15 PROCEDURE — 97162 PT EVAL MOD COMPLEX 30 MIN: CPT

## 2022-01-15 PROCEDURE — 73552 X-RAY EXAM OF FEMUR 2/>: CPT

## 2022-01-15 PROCEDURE — 80048 BASIC METABOLIC PNL TOTAL CA: CPT

## 2022-01-15 PROCEDURE — 86901 BLOOD TYPING SEROLOGIC RH(D): CPT

## 2022-01-15 PROCEDURE — 86850 RBC ANTIBODY SCREEN: CPT

## 2022-01-15 PROCEDURE — 36430 TRANSFUSION BLD/BLD COMPNT: CPT

## 2022-01-15 PROCEDURE — 73502 X-RAY EXAM HIP UNI 2-3 VIEWS: CPT

## 2022-01-15 PROCEDURE — 71045 X-RAY EXAM CHEST 1 VIEW: CPT

## 2022-01-15 PROCEDURE — 85610 PROTHROMBIN TIME: CPT

## 2022-01-15 PROCEDURE — 97530 THERAPEUTIC ACTIVITIES: CPT

## 2022-01-15 PROCEDURE — 36415 COLL VENOUS BLD VENIPUNCTURE: CPT

## 2022-01-15 PROCEDURE — 86900 BLOOD TYPING SEROLOGIC ABO: CPT

## 2022-01-15 PROCEDURE — 80053 COMPREHEN METABOLIC PANEL: CPT

## 2022-01-15 PROCEDURE — C1776: CPT

## 2022-01-15 PROCEDURE — 82962 GLUCOSE BLOOD TEST: CPT

## 2022-01-15 PROCEDURE — U0003: CPT

## 2022-01-15 PROCEDURE — 93970 EXTREMITY STUDY: CPT

## 2022-01-15 PROCEDURE — 99285 EMERGENCY DEPT VISIT HI MDM: CPT

## 2022-01-15 PROCEDURE — C9399: CPT

## 2022-01-15 PROCEDURE — 83036 HEMOGLOBIN GLYCOSYLATED A1C: CPT

## 2022-01-15 PROCEDURE — 85730 THROMBOPLASTIN TIME PARTIAL: CPT

## 2022-01-15 PROCEDURE — 85025 COMPLETE CBC W/AUTO DIFF WBC: CPT

## 2022-01-15 PROCEDURE — P9016: CPT

## 2022-01-15 PROCEDURE — 72170 X-RAY EXAM OF PELVIS: CPT

## 2022-01-15 RX ORDER — OXYCODONE HYDROCHLORIDE 5 MG/1
0.5 TABLET ORAL
Qty: 0 | Refills: 0 | DISCHARGE
Start: 2022-01-15

## 2022-01-15 RX ORDER — OXYCODONE HYDROCHLORIDE 5 MG/1
1 TABLET ORAL
Qty: 0 | Refills: 0 | DISCHARGE
Start: 2022-01-15

## 2022-01-15 RX ORDER — ACETAMINOPHEN 500 MG
2 TABLET ORAL
Qty: 0 | Refills: 0 | DISCHARGE
Start: 2022-01-15

## 2022-01-15 RX ADMIN — Medication 1: at 08:14

## 2022-01-15 RX ADMIN — DORZOLAMIDE HYDROCHLORIDE TIMOLOL MALEATE 1 DROP(S): 20; 5 SOLUTION/ DROPS OPHTHALMIC at 05:01

## 2022-01-15 RX ADMIN — ENOXAPARIN SODIUM 40 MILLIGRAM(S): 100 INJECTION SUBCUTANEOUS at 05:00

## 2022-01-15 RX ADMIN — Medication 10 MILLIGRAM(S): at 05:00

## 2022-01-15 RX ADMIN — Medication 500 MILLIGRAM(S): at 05:00

## 2022-01-15 NOTE — PROGRESS NOTE ADULT - SUBJECTIVE AND OBJECTIVE BOX
Post op Day 4    Patient resting without complaints.  No chest pain, SOB, N/V. Received 1U PRBCs yesterday due to ABLA 2/2 hip surgery.    T(C): 36.8 (01-15-22 @ 04:53), Max: 36.9 (01-14-22 @ 08:40)  HR: 66 (01-15-22 @ 04:53) (55 - 88)  BP: 151/86 (01-15-22 @ 04:53) (116/64 - 151/86)  RR: 18 (01-15-22 @ 04:53) (18 - 18)  SpO2: 100% (01-15-22 @ 04:53) (99% - 100%)  Wt(kg): --    Exam:  Alert and Oriented, No Acute Distress  Lower Extremities: Right hip aquacel c/d/i  Calves Soft, Non-tender bilaterally  +PF/DF/EHL/FHL  SILT  +DP Pulse                          7.6    8.71  )-----------( 316      ( 14 Jan 2022 07:49 )             22.8    01-14    135  |  101  |  12  ----------------------------<  167<H>  3.7   |  25  |  0.56    Ca    9.0      14 Jan 2022 07:49         Post op Day 4    Patient resting without complaints.  No chest pain, SOB, N/V. Received 1U PRBCs yesterday due to ABLA 2/2 hip surgery.    T(C): 36.8 (01-15-22 @ 04:53), Max: 36.9 (01-14-22 @ 08:40)  HR: 66 (01-15-22 @ 04:53) (55 - 88)  BP: 151/86 (01-15-22 @ 04:53) (116/64 - 151/86)  RR: 18 (01-15-22 @ 04:53) (18 - 18)  SpO2: 100% (01-15-22 @ 04:53) (99% - 100%)  Wt(kg): --    Exam:  Alert and Oriented, No Acute Distress  Lower Extremities: Right hip aquacel c/d/i  Calves Soft, Non-tender bilaterally  +PF/DF/EHL/FHL  SILT  +DP Pulse  Dickson catheter in place                        7.6    8.71  )-----------( 316      ( 14 Jan 2022 07:49 )             22.8    01-14    135  |  101  |  12  ----------------------------<  167<H>  3.7   |  25  |  0.56    Ca    9.0      14 Jan 2022 07:49         Post op Day 4    Patient resting without complaints.  No chest pain, SOB, N/V. Received 1U PRBCs yesterday due to ABLA 2/2 hip surgery.    T(C): 36.8 (01-15-22 @ 04:53), Max: 36.9 (01-14-22 @ 08:40)  HR: 66 (01-15-22 @ 04:53) (55 - 88)  BP: 151/86 (01-15-22 @ 04:53) (116/64 - 151/86)  RR: 18 (01-15-22 @ 04:53) (18 - 18)  SpO2: 100% (01-15-22 @ 04:53) (99% - 100%)  Wt(kg): --    Exam:  Alert and Oriented, No Acute Distress  Lower Extremities: Right hip aquacel c/d/i  Calves Soft, Non-tender bilaterally  +PF/DF/EHL/FHL  SILT  +DP Pulse  Condom catheter in place                        7.6    8.71  )-----------( 316      ( 14 Jan 2022 07:49 )             22.8    01-14    135  |  101  |  12  ----------------------------<  167<H>  3.7   |  25  |  0.56    Ca    9.0      14 Jan 2022 07:49

## 2022-01-15 NOTE — PROGRESS NOTE ADULT - PROBLEM SELECTOR PLAN 1
PT/OT-WBAT to RLE with Anterior Precautions  FU AM Labs  IS  DVT PPx - Lovenox 40 mg QD  Pain Control  Continue Current Tx.  Dispo planning pending repeat labs and facility acceptance    Helen Hackett PA-C  Team Pager: #[phone]

## 2022-01-15 NOTE — DISCHARGE NOTE NURSING/CASE MANAGEMENT/SOCIAL WORK - PATIENT PORTAL LINK FT
You can access the FollowMyHealth Patient Portal offered by [facility] by registering at the following website: [email or web address]. By joining [employer]’s FollowMyHealth portal, you will also be able to view your health information using other applications (apps) compatible with our system.

## 2022-01-15 NOTE — PROGRESS NOTE ADULT - PROVIDER SPECIALTY LIST ADULT
Internal Medicine
Orthopedics
Internal Medicine
Orthopedics
Orthopedics

## 2022-01-15 NOTE — DISCHARGE NOTE NURSING/CASE MANAGEMENT/SOCIAL WORK - NSDCPEFALRISK_GEN_ALL_CORE
For information on Fall & Injury Prevention, visit: [email or web address] OR  [email or web address] OR  https://www.cdc.gov/steadi/patient.html

## 2022-06-21 NOTE — PROGRESS NOTE ADULT - PROBLEM SELECTOR PLAN 3
"Oncology Rooming Note    June 21, 2022 1:20 PM   Lenny Chau is a 76 year old male who presents for:    Chief Complaint   Patient presents with     Oncology Clinic Visit     Rad Onc follow up     Initial Vitals: BP (!) 147/77   Pulse 71   Temp 98.3  F (36.8  C)   Resp 18   Wt 83.7 kg (184 lb 8 oz)   SpO2 95%   BMI 25.73 kg/m   Estimated body mass index is 25.73 kg/m  as calculated from the following:    Height as of 2/2/22: 1.803 m (5' 11\").    Weight as of this encounter: 83.7 kg (184 lb 8 oz). Body surface area is 2.05 meters squared.  Mild Pain (2) Comment: Data Unavailable   No LMP for male patient.  Allergies reviewed: Yes  Medications reviewed: Yes    Medications: Medication refills not needed today.  Pharmacy name entered into [employer]: [id number] [place] [address]    Clinical concerns: nothing new, still some mild cough, chronic low back pain radiating to right upper leg.  Dr. Batista was notified.      Kristie Babcock RN              " Hold home metformin  STEPH QAC/HS  DASH CC diet

## 2022-09-02 NOTE — PATIENT PROFILE ADULT - CENTRAL VENOUS CATHETER/PICC LINE
Writer calls patient and updates her to know that Dr. Coello had requested her records be sent from [place] for review. Writer informs patient the records have been received and Dr. Coello in reviewing them.     Writer informs patient that we will return her calls as soon as we have a response from Dr. Coello.    Patient expresses understanding and has no additional questions at this time.    no

## 2022-11-10 NOTE — PHYSICAL THERAPY INITIAL EVALUATION ADULT - PREDICTED DURATION OF THERAPY (DAYS/WKS), PT EVAL
Please see Dr Abarca in one week for repeat blood test and follow up care. It is important to have a follow up visit to monitor your kidney function and to re-evaluate your heart medications.     Follow up with PCP in 1-2 weeks. 
1-2 weeks

## 2022-12-08 NOTE — INPATIENT CERTIFICATION FOR MEDICARE PATIENTS - PHYSICIAN CONCUR
I concur with the Admission Order and I certify that services are provided in accordance with Section 42 CFR § 412.3 16

## 2023-01-01 NOTE — PHYSICAL THERAPY INITIAL EVALUATION ADULT - LEVEL OF CONSCIOUSNESS, REHAB EVAL
confused/alert/lethargic/somnolent
I will STOP taking the medications listed below when I get home from the hospital:  None

## 2024-02-17 NOTE — H&P ADULT - NSRESEARCHGRANTASSESS_GEN_A_CORE
Arterial Line    Performed by: Thiago Dyer MD  Authorized by: Thiago Dyer MD    Patient Location:  OR  Indication*:  Continuous blood pressure monitoring  Laterality*:  Right  Site*:  Radial  Max Sterile Barrier Technique*:  Hand Washing, Cap/Mask, Sterile gloves, Biopatch, Sterile towel drapes, Sterile full body drape and Sterile dressing applied  Local Anesthetic:  None  Prep*:  Chlorhexidine gluconate (CHG)  Catheter Size*:  20 G  Catheter Length*:  1 3/4 in  Catheter Type:  Arrow  Ultrasound-Guided*: No    Seldinger Technique: No    Line Secured*:  Transparent dressing w/border and Tape  Events: patient tolerated procedure well with no complications         Not applicable: This is a surgical and/or non-medical patient

## 2024-06-11 NOTE — H&P ADULT - ATTENDING SUPERVISION STATEMENT
Pt arrives to ED via EMS with c/o SOB. Pt was seen here last Wednesday and discharged from hospital on Friday, possible PNA. Pt was discharged on 3LO2 NC - Pt states SOB and cough worsening today and unable to wait for Home Health RN to come in the morning. Pt using inhaler at home with no relief. Pt has hx COPD, Kidney Transplant, TIA. Per EMS BP was 130's/palp HR 70's Blood sugar 74 EKG was SR.   
Resident

## 2024-09-13 NOTE — PRE-OP CHECKLIST - DNR CLARIFICATION FORM COMPLETED
Bed: 40  Expected date:   Expected time:   Means of arrival:   Comments:  Delroy: Head injury  
RN to RN report given to [facility] ANDREW Johnson  
n/a
n/a

## 2024-09-22 NOTE — PATIENT PROFILE ADULT - LAST ORAL INTAKE
24-Oct-2019 02:30
arrives s/p fall, was at a men[place] reports slipped on soap while in the shower. c/o pain to neck and head. coming from urgent care, arrives in soft C collar. + hitting head. denies LOC or use of blood thinners. hx of HTN, DM II. .

## 2025-05-20 NOTE — ED CLERICAL - BED REQUESTED
Discharge paperwork provided and reviewed with patient. Patient ambulatory out of ED.  
10-Chu-2022 13:48

## 2025-06-09 NOTE — DIETITIAN INITIAL EVALUATION ADULT. - PROBLEM SELECTOR PROBLEM 6
Labs drawn peripherally and dry dressing applied. Specimen sent to lab.   
Hyperlipidemia, unspecified hyperlipidemia type

## 2025-06-26 NOTE — PATIENT PROFILE ADULT - NSPROIMPLANTSMEDDEV_GEN_A_NUR
Medication passed protocol.     Medication: fluticasone-salmeterol 250-50 MCG/ACT inhaler  passed protocol.   Last prescribed:01/31/2025 # 1 with 5 refills   Last office visit date: 06/26/2025  Next appointment scheduled?: No;     
None

## (undated) DEVICE — SOL IRR BAG NS 0.9% 3000ML

## (undated) DEVICE — SPECIMEN CONTAINER 100ML

## (undated) DEVICE — SUT POLYSORB 1 36" GS-21 UNDYED

## (undated) DEVICE — Device

## (undated) DEVICE — STAPLER SKIN VISI-STAT 35 WIDE

## (undated) DEVICE — GLV 7.5 PROTEXIS (WHITE)

## (undated) DEVICE — GLV 8 PROTEXIS (WHITE)

## (undated) DEVICE — POSITIONER FOAM EGG CRATE ULNAR 2PCS (PINK)

## (undated) DEVICE — STRYKER FEMORAL CANAL BRUSH

## (undated) DEVICE — DRAPE HIP W POUCHES 87X115X134"

## (undated) DEVICE — SOL IRR POUR NS 0.9% 500ML

## (undated) DEVICE — GLV 6.5 PROTEXIS (WHITE)

## (undated) DEVICE — VENODYNE/SCD SLEEVE CALF LARGE

## (undated) DEVICE — GLV 7 PROTEXIS (WHITE)

## (undated) DEVICE — DRSG TAPE MICROFOAM 3"

## (undated) DEVICE — WARMING BLANKET UPPER ADULT

## (undated) DEVICE — DRSG AQUACEL 3.5 X 12"

## (undated) DEVICE — SUT POLYSORB 2-0 36" GS-21 UNDYED

## (undated) DEVICE — HOOD FLYTE STRYKER HELMET SHIELD

## (undated) DEVICE — DRAPE 3/4 SHEET W REINFORCEMENT 56X77"

## (undated) DEVICE — SUT POLYSORB 0 30" GS-21 UNDYED

## (undated) DEVICE — POSITIONER FOAM ABDUCTION PILLOW MED (PINK)

## (undated) DEVICE — GLV 8.5 PROTEXIS (WHITE)

## (undated) DEVICE — PRESSURIZER FEM CNL W/O HUB MED

## (undated) DEVICE — SOL IRR POUR H2O 1000ML

## (undated) DEVICE — PACK TOTAL HIP (2 PACKS)

## (undated) DEVICE — SUCTION YANKAUER NO CONTROL VENT

## (undated) DEVICE — SAW BLADE STRYKER RECIPROCATING 77.6X0.77X11.2MM

## (undated) DEVICE — ELCTR BOVIE PENCIL SMOKE EVACUATION